# Patient Record
Sex: MALE | Race: WHITE | NOT HISPANIC OR LATINO | Employment: OTHER | URBAN - METROPOLITAN AREA
[De-identification: names, ages, dates, MRNs, and addresses within clinical notes are randomized per-mention and may not be internally consistent; named-entity substitution may affect disease eponyms.]

---

## 2018-05-14 LAB
ALBUMIN (HISTORICAL): 5.6 MG/DL
ALBUMIN CREATININE RATIO (HISTORICAL): 51 MG/G
ANION GAP SERPL CALCULATED.3IONS-SCNC: 15.4 MM/L
BASOPHILS # BLD AUTO: 0.1 X3/UL (ref 0–0.3)
BASOPHILS # BLD AUTO: 0.5 % (ref 0–2)
BUN SERPL-MCNC: 18 MG/DL (ref 7–25)
CALCIUM SERPL-MCNC: 9 MG/DL (ref 8.6–10.5)
CHLORIDE SERPL-SCNC: 101 MM/L (ref 98–107)
CO2 SERPL-SCNC: 24 MM/L (ref 21–31)
CREAT SERPL-MCNC: 1.03 MG/DL (ref 0.7–1.3)
CREATININE, RANDOM URINE (HISTORICAL): 109.9 MG/DL
DEPRECATED RDW RBC AUTO: 13.4 % (ref 11.5–14.5)
EGFR (HISTORICAL): > 60 GFR
EGFR AFRICAN AMERICAN (HISTORICAL): > 60 GFR
EOSINOPHIL # BLD AUTO: 0.8 X3/UL (ref 0–0.5)
EOSINOPHIL NFR BLD AUTO: 8.2 % (ref 0–5)
EST. AVERAGE GLUCOSE BLD GHB EST-MCNC: 163 MG/DL
GLUCOSE (HISTORICAL): 106 MG/DL (ref 65–99)
HBA1C MFR BLD HPLC: 7.3 % (ref 4–6.2)
HCT VFR BLD AUTO: 38.5 % (ref 42–52)
HGB BLD-MCNC: 13 G/DL (ref 14–18)
LYMPHOCYTES # BLD AUTO: 2.6 X3/UL (ref 1.2–4.2)
LYMPHOCYTES NFR BLD AUTO: 26.5 % (ref 20.5–51.1)
MCH RBC QN AUTO: 32.6 PG (ref 26–34)
MCHC RBC AUTO-ENTMCNC: 33.9 G/DL (ref 31–36)
MCV RBC AUTO: 96.1 FL (ref 81–99)
MONOCYTES # BLD AUTO: 0.7 X3/UL (ref 0–1)
MONOCYTES NFR BLD AUTO: 7.1 % (ref 1.7–12)
NEUTROPHILS # BLD AUTO: 5.6 X3/UL (ref 1.4–6.5)
NEUTS SEG NFR BLD AUTO: 57.7 % (ref 42.2–75.2)
OSMOLALITY, SERUM (HISTORICAL): 274 MOSM (ref 262–291)
PLATELET # BLD AUTO: 280 X3/UL (ref 130–400)
PMV BLD AUTO: 8.3 FL (ref 8.6–11.7)
POTASSIUM SERPL-SCNC: 4.4 MM/L (ref 3.5–5.5)
RBC # BLD AUTO: 4 X6/UL (ref 4.3–5.9)
SODIUM SERPL-SCNC: 136 MM/L (ref 134–143)
WBC # BLD AUTO: 9.7 X3/UL (ref 4.8–10.8)

## 2018-09-27 ENCOUNTER — HOSPITAL ENCOUNTER (OUTPATIENT)
Dept: RADIOLOGY | Facility: HOSPITAL | Age: 63
Discharge: HOME | End: 2018-09-27
Payer: MEDICARE

## 2018-09-27 ENCOUNTER — TRANSCRIBE ORDERS (OUTPATIENT)
Dept: ADMINISTRATIVE | Facility: HOSPITAL | Age: 63
End: 2018-09-27

## 2018-09-27 DIAGNOSIS — R52 PAIN: Primary | ICD-10-CM

## 2018-09-27 DIAGNOSIS — R52 PAIN: ICD-10-CM

## 2018-09-27 PROCEDURE — 73560 X-RAY EXAM OF KNEE 1 OR 2: CPT

## 2018-09-28 ENCOUNTER — TRANSCRIBE ORDERS (OUTPATIENT)
Dept: ADMINISTRATIVE | Facility: HOSPITAL | Age: 63
End: 2018-09-28

## 2018-09-28 ENCOUNTER — APPOINTMENT (OUTPATIENT)
Dept: LAB | Facility: HOSPITAL | Age: 63
End: 2018-09-28
Payer: MEDICARE

## 2018-09-28 DIAGNOSIS — E78.2 MIXED HYPERLIPIDEMIA: ICD-10-CM

## 2018-09-28 DIAGNOSIS — D64.9 ANEMIA, UNSPECIFIED TYPE: ICD-10-CM

## 2018-09-28 DIAGNOSIS — M10.9 GOUT, UNSPECIFIED CAUSE, UNSPECIFIED CHRONICITY, UNSPECIFIED SITE: ICD-10-CM

## 2018-09-28 DIAGNOSIS — E11.9 TYPE 2 DIABETES MELLITUS WITHOUT COMPLICATION, WITHOUT LONG-TERM CURRENT USE OF INSULIN (HCC): ICD-10-CM

## 2018-09-28 DIAGNOSIS — D64.9 ANEMIA, UNSPECIFIED TYPE: Primary | ICD-10-CM

## 2018-09-28 LAB
ALBUMIN SERPL BCP-MCNC: 4 G/DL (ref 3.5–5.7)
ALP SERPL-CCNC: 59 U/L (ref 55–165)
ALT SERPL W P-5'-P-CCNC: 19 U/L (ref 7–52)
ANION GAP SERPL CALCULATED.3IONS-SCNC: 9 MMOL/L (ref 4–13)
AST SERPL W P-5'-P-CCNC: 14 U/L (ref 13–39)
BASOPHILS # BLD AUTO: 0 THOUSANDS/ΜL (ref 0–0.1)
BASOPHILS NFR BLD AUTO: 1 % (ref 0–2)
BILIRUB SERPL-MCNC: 0.4 MG/DL (ref 0.2–1)
BUN SERPL-MCNC: 19 MG/DL (ref 7–25)
CALCIUM SERPL-MCNC: 9.1 MG/DL (ref 8.6–10.5)
CHLORIDE SERPL-SCNC: 106 MMOL/L (ref 98–107)
CO2 SERPL-SCNC: 26 MMOL/L (ref 21–31)
CREAT SERPL-MCNC: 1.01 MG/DL (ref 0.7–1.3)
EOSINOPHIL # BLD AUTO: 0.7 THOUSAND/ΜL (ref 0–0.61)
EOSINOPHIL NFR BLD AUTO: 9 % (ref 0–5)
ERYTHROCYTE [DISTWIDTH] IN BLOOD BY AUTOMATED COUNT: 13.2 % (ref 11.5–14.5)
EST. AVERAGE GLUCOSE BLD GHB EST-MCNC: 160 MG/DL
GFR SERPL CREATININE-BSD FRML MDRD: 78 ML/MIN/1.73SQ M
GLUCOSE P FAST SERPL-MCNC: 140 MG/DL (ref 65–99)
HBA1C MFR BLD: 7.2 % (ref 4.2–6.3)
HCT VFR BLD AUTO: 36.8 % (ref 36.5–49.3)
HGB BLD-MCNC: 12.4 G/DL (ref 14–18)
LDLC SERPL DIRECT ASSAY-MCNC: 59.7 MG/DL (ref 75–193)
LYMPHOCYTES # BLD AUTO: 1.8 THOUSANDS/ΜL (ref 0.6–4.47)
LYMPHOCYTES NFR BLD AUTO: 24 % (ref 21–51)
MCH RBC QN AUTO: 33.2 PG (ref 26–34)
MCHC RBC AUTO-ENTMCNC: 33.6 G/DL (ref 31–37)
MCV RBC AUTO: 99 FL (ref 81–99)
MONOCYTES # BLD AUTO: 0.8 THOUSAND/ΜL (ref 0.17–1.22)
MONOCYTES NFR BLD AUTO: 11 % (ref 2–12)
NEUTROPHILS # BLD AUTO: 4.3 THOUSANDS/ΜL (ref 1.4–6.5)
NEUTS SEG NFR BLD AUTO: 56 % (ref 42–75)
NRBC BLD AUTO-RTO: 0 /100 WBCS
PLATELET # BLD AUTO: 260 THOUSANDS/UL (ref 149–390)
PMV BLD AUTO: 7.8 FL (ref 8.6–11.7)
POTASSIUM SERPL-SCNC: 4.1 MMOL/L (ref 3.5–5.5)
PROT SERPL-MCNC: 6.8 G/DL (ref 6.4–8.9)
RBC # BLD AUTO: 3.73 MILLION/UL (ref 4.3–5.9)
SODIUM SERPL-SCNC: 141 MMOL/L (ref 134–143)
URATE SERPL-MCNC: 7.1 MG/DL (ref 2.3–7.6)
WBC # BLD AUTO: 7.7 THOUSAND/UL (ref 4.8–10.8)

## 2018-09-28 PROCEDURE — 83036 HEMOGLOBIN GLYCOSYLATED A1C: CPT

## 2018-09-28 PROCEDURE — 36415 COLL VENOUS BLD VENIPUNCTURE: CPT

## 2018-09-28 PROCEDURE — 83721 ASSAY OF BLOOD LIPOPROTEIN: CPT

## 2018-09-28 PROCEDURE — 85025 COMPLETE CBC W/AUTO DIFF WBC: CPT

## 2018-09-28 PROCEDURE — 84550 ASSAY OF BLOOD/URIC ACID: CPT

## 2018-09-28 PROCEDURE — 80053 COMPREHEN METABOLIC PANEL: CPT

## 2019-02-11 ENCOUNTER — TRANSCRIBE ORDERS (OUTPATIENT)
Dept: ADMINISTRATIVE | Facility: HOSPITAL | Age: 64
End: 2019-02-11

## 2019-02-11 ENCOUNTER — APPOINTMENT (OUTPATIENT)
Dept: LAB | Facility: HOSPITAL | Age: 64
End: 2019-02-11
Payer: MEDICARE

## 2019-02-11 DIAGNOSIS — R53.83 FATIGUE, UNSPECIFIED TYPE: ICD-10-CM

## 2019-02-11 DIAGNOSIS — E10.8 TYPE 1 DIABETES MELLITUS WITH COMPLICATION (HCC): ICD-10-CM

## 2019-02-11 DIAGNOSIS — Z79.899 LONG TERM USE OF DRUG: ICD-10-CM

## 2019-02-11 DIAGNOSIS — R35.1 NOCTURIA: Primary | ICD-10-CM

## 2019-02-11 DIAGNOSIS — M10.9 GOUT, UNSPECIFIED CAUSE, UNSPECIFIED CHRONICITY, UNSPECIFIED SITE: ICD-10-CM

## 2019-02-11 DIAGNOSIS — R35.1 NOCTURIA: ICD-10-CM

## 2019-02-11 LAB
ALBUMIN SERPL BCP-MCNC: 4.2 G/DL (ref 3.5–5.7)
ALP SERPL-CCNC: 69 U/L (ref 55–165)
ALT SERPL W P-5'-P-CCNC: 21 U/L (ref 7–52)
ANION GAP SERPL CALCULATED.3IONS-SCNC: 12 MMOL/L (ref 4–13)
AST SERPL W P-5'-P-CCNC: 19 U/L (ref 13–39)
BASOPHILS # BLD AUTO: 0.1 THOUSANDS/ΜL (ref 0–0.1)
BASOPHILS NFR BLD AUTO: 1 % (ref 0–2)
BILIRUB SERPL-MCNC: 0.5 MG/DL (ref 0.2–1)
BUN SERPL-MCNC: 25 MG/DL (ref 7–25)
CALCIUM SERPL-MCNC: 9.2 MG/DL (ref 8.6–10.5)
CHLORIDE SERPL-SCNC: 100 MMOL/L (ref 98–107)
CO2 SERPL-SCNC: 27 MMOL/L (ref 21–31)
CREAT SERPL-MCNC: 1.32 MG/DL (ref 0.7–1.3)
EOSINOPHIL # BLD AUTO: 1 THOUSAND/ΜL (ref 0–0.61)
EOSINOPHIL NFR BLD AUTO: 10 % (ref 0–5)
ERYTHROCYTE [DISTWIDTH] IN BLOOD BY AUTOMATED COUNT: 13.6 % (ref 11.5–14.5)
EST. AVERAGE GLUCOSE BLD GHB EST-MCNC: 171 MG/DL
GFR SERPL CREATININE-BSD FRML MDRD: 57 ML/MIN/1.73SQ M
GLUCOSE P FAST SERPL-MCNC: 138 MG/DL (ref 65–99)
HBA1C MFR BLD: 7.6 % (ref 4.2–6.3)
HCT VFR BLD AUTO: 39 % (ref 42–47)
HGB BLD-MCNC: 12.7 G/DL (ref 14–18)
LYMPHOCYTES # BLD AUTO: 2.4 THOUSANDS/ΜL (ref 0.6–4.47)
LYMPHOCYTES NFR BLD AUTO: 24 % (ref 21–51)
MCH RBC QN AUTO: 32.5 PG (ref 26–34)
MCHC RBC AUTO-ENTMCNC: 32.6 G/DL (ref 31–37)
MCV RBC AUTO: 100 FL (ref 81–99)
MONOCYTES # BLD AUTO: 0.8 THOUSAND/ΜL (ref 0.17–1.22)
MONOCYTES NFR BLD AUTO: 8 % (ref 2–12)
NEUTROPHILS # BLD AUTO: 6 THOUSANDS/ΜL (ref 1.4–6.5)
NEUTS SEG NFR BLD AUTO: 59 % (ref 42–75)
NRBC BLD AUTO-RTO: 0 /100 WBCS
PLATELET # BLD AUTO: 302 THOUSANDS/UL (ref 149–390)
PMV BLD AUTO: 8.2 FL (ref 8.6–11.7)
POTASSIUM SERPL-SCNC: 4.9 MMOL/L (ref 3.5–5.5)
PROT SERPL-MCNC: 7.6 G/DL (ref 6.4–8.9)
PSA SERPL-MCNC: 3.8 NG/ML (ref 0–4)
RBC # BLD AUTO: 3.91 MILLION/UL (ref 4.3–5.9)
SODIUM SERPL-SCNC: 139 MMOL/L (ref 134–143)
TSH SERPL DL<=0.05 MIU/L-ACNC: 3.04 UIU/ML (ref 0.45–5.33)
URATE SERPL-MCNC: 7.5 MG/DL (ref 2.3–7.6)
WBC # BLD AUTO: 10.2 THOUSAND/UL (ref 4.8–10.8)

## 2019-02-11 PROCEDURE — 84550 ASSAY OF BLOOD/URIC ACID: CPT

## 2019-02-11 PROCEDURE — 83036 HEMOGLOBIN GLYCOSYLATED A1C: CPT

## 2019-02-11 PROCEDURE — 85025 COMPLETE CBC W/AUTO DIFF WBC: CPT

## 2019-02-11 PROCEDURE — 80053 COMPREHEN METABOLIC PANEL: CPT

## 2019-02-11 PROCEDURE — 84153 ASSAY OF PSA TOTAL: CPT

## 2019-02-11 PROCEDURE — 84443 ASSAY THYROID STIM HORMONE: CPT

## 2019-02-11 PROCEDURE — 36415 COLL VENOUS BLD VENIPUNCTURE: CPT

## 2019-04-10 ENCOUNTER — HOSPITAL ENCOUNTER (OUTPATIENT)
Dept: RADIOLOGY | Facility: HOSPITAL | Age: 64
Discharge: HOME | End: 2019-04-10
Payer: MEDICARE

## 2019-04-10 ENCOUNTER — TRANSCRIBE ORDERS (OUTPATIENT)
Dept: ADMINISTRATIVE | Facility: HOSPITAL | Age: 64
End: 2019-04-10

## 2019-04-10 DIAGNOSIS — M20.012 MALLET FINGER OF LEFT HAND: Primary | ICD-10-CM

## 2019-04-10 DIAGNOSIS — M20.012 MALLET FINGER OF LEFT HAND: ICD-10-CM

## 2019-04-10 PROCEDURE — 73140 X-RAY EXAM OF FINGER(S): CPT

## 2019-06-20 ENCOUNTER — OFFICE VISIT (OUTPATIENT)
Dept: FAMILY MEDICINE CLINIC | Facility: CLINIC | Age: 64
End: 2019-06-20
Payer: MEDICARE

## 2019-06-20 VITALS
DIASTOLIC BLOOD PRESSURE: 84 MMHG | BODY MASS INDEX: 38.14 KG/M2 | SYSTOLIC BLOOD PRESSURE: 136 MMHG | HEIGHT: 71 IN | WEIGHT: 272.4 LBS

## 2019-06-20 DIAGNOSIS — I10 ESSENTIAL HYPERTENSION, BENIGN: ICD-10-CM

## 2019-06-20 DIAGNOSIS — G89.29 CHRONIC LOW BACK PAIN WITHOUT SCIATICA, UNSPECIFIED BACK PAIN LATERALITY: ICD-10-CM

## 2019-06-20 DIAGNOSIS — M54.50 CHRONIC LOW BACK PAIN WITHOUT SCIATICA, UNSPECIFIED BACK PAIN LATERALITY: ICD-10-CM

## 2019-06-20 DIAGNOSIS — E11.9 TYPE 2 DIABETES MELLITUS WITHOUT COMPLICATION, WITHOUT LONG-TERM CURRENT USE OF INSULIN (HCC): Primary | ICD-10-CM

## 2019-06-20 PROBLEM — E78.2 MIXED HYPERLIPIDEMIA: Status: ACTIVE | Noted: 2019-06-20

## 2019-06-20 PROBLEM — I25.10 ATHEROSCLEROSIS OF NATIVE CORONARY ARTERY OF NATIVE HEART WITHOUT ANGINA PECTORIS: Status: ACTIVE | Noted: 2019-06-20

## 2019-06-20 PROBLEM — M17.10 OSTEOARTHRITIS OF KNEE: Status: ACTIVE | Noted: 2019-06-20

## 2019-06-20 PROBLEM — M17.9 OSTEOARTHRITIS OF KNEE: Status: ACTIVE | Noted: 2019-06-20

## 2019-06-20 PROCEDURE — 99213 OFFICE O/P EST LOW 20 MIN: CPT | Performed by: FAMILY MEDICINE

## 2019-06-20 RX ORDER — NITROGLYCERIN 0.4 MG/1
0.4 TABLET SUBLINGUAL
COMMUNITY
End: 2019-11-15 | Stop reason: SDUPTHER

## 2019-06-20 RX ORDER — LISINOPRIL 10 MG/1
10 TABLET ORAL DAILY
Refills: 0 | COMMUNITY
Start: 2019-06-12 | End: 2019-06-20 | Stop reason: SDUPTHER

## 2019-06-20 RX ORDER — BETAMETHASONE DIPROPIONATE 0.5 MG/G
1 CREAM TOPICAL 2 TIMES DAILY
COMMUNITY
End: 2020-01-14 | Stop reason: ALTCHOICE

## 2019-06-20 RX ORDER — ATORVASTATIN CALCIUM 40 MG/1
40 TABLET, FILM COATED ORAL DAILY
COMMUNITY
End: 2020-03-10

## 2019-06-20 RX ORDER — METHOCARBAMOL 750 MG/1
TABLET, FILM COATED ORAL 3 TIMES DAILY
COMMUNITY
End: 2020-03-20 | Stop reason: SDUPTHER

## 2019-06-20 RX ORDER — OXYCODONE HYDROCHLORIDE 10 MG/1
10 TABLET ORAL 3 TIMES DAILY
Qty: 90 TABLET | Refills: 0 | Status: SHIPPED | OUTPATIENT
Start: 2019-06-20 | End: 2019-07-16 | Stop reason: SDUPTHER

## 2019-06-20 RX ORDER — EPINEPHRINE 0.3 MG/.3ML
0.3 INJECTION SUBCUTANEOUS ONCE
COMMUNITY
End: 2019-11-15 | Stop reason: SDUPTHER

## 2019-06-20 RX ORDER — FENTANYL 50 UG/H
PATCH TRANSDERMAL
Qty: 5 PATCH | Refills: 0 | Status: SHIPPED | OUTPATIENT
Start: 2019-06-20 | End: 2019-07-09 | Stop reason: SDUPTHER

## 2019-06-20 RX ORDER — METOPROLOL TARTRATE 50 MG/1
50 TABLET, FILM COATED ORAL DAILY
COMMUNITY
End: 2020-04-01

## 2019-06-20 RX ORDER — MELOXICAM 15 MG/1
15 TABLET ORAL DAILY
Refills: 0 | COMMUNITY
Start: 2019-06-06 | End: 2021-02-22 | Stop reason: HOSPADM

## 2019-06-20 RX ORDER — BLOOD-GLUCOSE METER
KIT MISCELLANEOUS
COMMUNITY

## 2019-06-20 RX ORDER — OXYCODONE HYDROCHLORIDE 10 MG/1
10 TABLET ORAL 3 TIMES DAILY
Refills: 0 | COMMUNITY
Start: 2019-05-21 | End: 2019-06-20 | Stop reason: SDUPTHER

## 2019-06-20 RX ORDER — NALOXONE HYDROCHLORIDE 4 MG/.1ML
1 SPRAY NASAL AS NEEDED
COMMUNITY
End: 2019-12-13 | Stop reason: SDUPTHER

## 2019-06-20 RX ORDER — IBUPROFEN 200 MG
200 TABLET ORAL AS NEEDED
COMMUNITY
End: 2020-04-22 | Stop reason: ALTCHOICE

## 2019-06-20 RX ORDER — LISINOPRIL 10 MG/1
10 TABLET ORAL DAILY
Qty: 90 TABLET | Refills: 3 | Status: SHIPPED | OUTPATIENT
Start: 2019-06-20 | End: 2020-06-22 | Stop reason: SDUPTHER

## 2019-06-20 RX ORDER — FENTANYL 50 UG/H
PATCH TRANSDERMAL
Refills: 0 | COMMUNITY
Start: 2019-05-21 | End: 2019-06-20 | Stop reason: SDUPTHER

## 2019-06-20 RX ORDER — AMLODIPINE BESYLATE 10 MG/1
10 TABLET ORAL DAILY
Refills: 0 | COMMUNITY
Start: 2019-06-17 | End: 2020-03-09

## 2019-06-20 RX ORDER — LANCETS 28 GAUGE
EACH MISCELLANEOUS
COMMUNITY

## 2019-06-20 RX ORDER — GABAPENTIN 600 MG/1
600 TABLET ORAL 3 TIMES DAILY
Refills: 0 | COMMUNITY
Start: 2019-06-08 | End: 2020-03-19

## 2019-07-09 ENCOUNTER — TRANSCRIBE ORDERS (OUTPATIENT)
Dept: ADMINISTRATIVE | Facility: HOSPITAL | Age: 64
End: 2019-07-09

## 2019-07-09 ENCOUNTER — APPOINTMENT (OUTPATIENT)
Dept: LAB | Facility: HOSPITAL | Age: 64
End: 2019-07-09
Attending: FAMILY MEDICINE
Payer: MEDICARE

## 2019-07-09 DIAGNOSIS — G89.29 CHRONIC LOW BACK PAIN WITHOUT SCIATICA, UNSPECIFIED BACK PAIN LATERALITY: ICD-10-CM

## 2019-07-09 DIAGNOSIS — M54.50 CHRONIC LOW BACK PAIN WITHOUT SCIATICA, UNSPECIFIED BACK PAIN LATERALITY: ICD-10-CM

## 2019-07-09 DIAGNOSIS — E78.2 MIXED HYPERLIPIDEMIA: ICD-10-CM

## 2019-07-09 DIAGNOSIS — E11.9 DIABETES MELLITUS WITHOUT COMPLICATION (HCC): Primary | ICD-10-CM

## 2019-07-09 DIAGNOSIS — E11.9 DIABETES MELLITUS WITHOUT COMPLICATION (HCC): ICD-10-CM

## 2019-07-09 LAB
ALBUMIN SERPL BCP-MCNC: 4 G/DL (ref 3.5–5)
ALP SERPL-CCNC: 81 U/L (ref 46–116)
ALT SERPL W P-5'-P-CCNC: 30 U/L (ref 12–78)
ANION GAP SERPL CALCULATED.3IONS-SCNC: 10 MMOL/L (ref 4–13)
AST SERPL W P-5'-P-CCNC: 16 U/L (ref 5–45)
BILIRUB SERPL-MCNC: 0.5 MG/DL (ref 0.2–1)
BUN SERPL-MCNC: 17 MG/DL (ref 5–25)
CALCIUM SERPL-MCNC: 8.8 MG/DL (ref 8.3–10.1)
CHLORIDE SERPL-SCNC: 101 MMOL/L (ref 100–108)
CHOLEST SERPL-MCNC: 153 MG/DL (ref 50–200)
CO2 SERPL-SCNC: 27 MMOL/L (ref 21–32)
CREAT SERPL-MCNC: 1.23 MG/DL (ref 0.6–1.3)
EST. AVERAGE GLUCOSE BLD GHB EST-MCNC: 174 MG/DL
GFR SERPL CREATININE-BSD FRML MDRD: 61 ML/MIN/1.73SQ M
GLUCOSE P FAST SERPL-MCNC: 135 MG/DL (ref 65–99)
HBA1C MFR BLD: 7.7 % (ref 4.2–6.3)
HDLC SERPL-MCNC: 46 MG/DL (ref 40–60)
LDLC SERPL CALC-MCNC: 70 MG/DL (ref 0–100)
NONHDLC SERPL-MCNC: 107 MG/DL
POTASSIUM SERPL-SCNC: 4.3 MMOL/L (ref 3.5–5.3)
PROT SERPL-MCNC: 8 G/DL (ref 6.4–8.2)
SODIUM SERPL-SCNC: 138 MMOL/L (ref 136–145)
TRIGL SERPL-MCNC: 184 MG/DL

## 2019-07-09 PROCEDURE — 80053 COMPREHEN METABOLIC PANEL: CPT

## 2019-07-09 PROCEDURE — 36415 COLL VENOUS BLD VENIPUNCTURE: CPT

## 2019-07-09 PROCEDURE — 80061 LIPID PANEL: CPT

## 2019-07-09 PROCEDURE — 83036 HEMOGLOBIN GLYCOSYLATED A1C: CPT

## 2019-07-09 RX ORDER — FENTANYL 50 UG/H
PATCH TRANSDERMAL
Qty: 5 PATCH | Refills: 0 | Status: SHIPPED | OUTPATIENT
Start: 2019-07-09 | End: 2019-07-16 | Stop reason: SDUPTHER

## 2019-07-09 NOTE — PROGRESS NOTES
I queried the [place] prescription drug monitoring program   I note that the patient's finally dispensed 5 fentanyl patches to the patient on June 20th  He requested additional prescription today  That is why  I refilled his prescription 5 additional patches

## 2019-07-16 ENCOUNTER — OFFICE VISIT (OUTPATIENT)
Dept: FAMILY MEDICINE CLINIC | Facility: CLINIC | Age: 64
End: 2019-07-16
Payer: MEDICARE

## 2019-07-16 VITALS
HEIGHT: 75 IN | DIASTOLIC BLOOD PRESSURE: 68 MMHG | WEIGHT: 260.3 LBS | BODY MASS INDEX: 32.36 KG/M2 | SYSTOLIC BLOOD PRESSURE: 132 MMHG

## 2019-07-16 DIAGNOSIS — I10 ESSENTIAL HYPERTENSION, BENIGN: ICD-10-CM

## 2019-07-16 DIAGNOSIS — E11.9 TYPE 2 DIABETES MELLITUS WITHOUT COMPLICATION, WITHOUT LONG-TERM CURRENT USE OF INSULIN (HCC): Primary | ICD-10-CM

## 2019-07-16 DIAGNOSIS — M54.50 CHRONIC LOW BACK PAIN WITHOUT SCIATICA, UNSPECIFIED BACK PAIN LATERALITY: ICD-10-CM

## 2019-07-16 DIAGNOSIS — E78.2 MIXED HYPERLIPIDEMIA: ICD-10-CM

## 2019-07-16 DIAGNOSIS — G89.29 CHRONIC LOW BACK PAIN WITHOUT SCIATICA, UNSPECIFIED BACK PAIN LATERALITY: ICD-10-CM

## 2019-07-16 PROCEDURE — 99214 OFFICE O/P EST MOD 30 MIN: CPT | Performed by: FAMILY MEDICINE

## 2019-07-16 RX ORDER — OXYCODONE HYDROCHLORIDE 10 MG/1
10 TABLET ORAL 3 TIMES DAILY
Qty: 90 TABLET | Refills: 0 | Status: SHIPPED | OUTPATIENT
Start: 2019-07-16 | End: 2019-08-16 | Stop reason: SDUPTHER

## 2019-07-16 RX ORDER — FENTANYL 50 UG/H
PATCH TRANSDERMAL
Qty: 10 PATCH | Refills: 0 | Status: SHIPPED | OUTPATIENT
Start: 2019-07-16 | End: 2019-08-16 | Stop reason: SDUPTHER

## 2019-07-16 NOTE — ASSESSMENT & PLAN NOTE
Patient's blood pressure is well controlled on his current regiment  I recommended no changes  He will continue amlodipine and lisinopril

## 2019-07-16 NOTE — ASSESSMENT & PLAN NOTE
I reviewed with the patient his lipid panel  His LDL cholesterol was satisfactory  It was 70  His triglycerides were elevated  Weight loss will help this

## 2019-07-16 NOTE — PATIENT INSTRUCTIONS
Chronic Back Pain   Deborah Se: Musculoskeletal Back Pain  In: [employer], Pamela RS, Sara RM, et al, [facility]: Concepts and Clinical Practice, 7th ed  [address], [place], 2010  Laura BE & Dagoberto N: Chronic Low Back Pain  In: Gustabo Barnett, [facility], [facility], eds  [employer] Management of Pain, 4th ed  [address],[address] [place] [address], 2010  [address]: Back Pain  In: Tana Aranda MW, ed  The [facility], 6th ed  [address] [facility],[address], [place], 2012  Juana R: Treatment: current treatment recommendations for acute and chronic undifferentiated low back pain  [facility] 2012; 39(3):481-486  © 2017 [facility] Information is for End User's use only and may not be sold, redistributed or otherwise used for commercial purposes  All illustrations and images included in CareNotes® are the copyrighted property of A D A M , Inc  or Kenny Quiñones  The above information is an  only  It is not intended as medical advice for individual conditions or treatments  Talk to your doctor, nurse or pharmacist before following any medical regimen to see if it is safe and effective for you

## 2019-07-16 NOTE — ASSESSMENT & PLAN NOTE
Patient continues to experience chronic low back pain  Without the pain medication, patient would only be able to walk 10 steps or so without having to sit down  He is now able to function and perform his ADLs  Continue treatment with opiate analgesics is recommended as the patient has benefitted  I refilled his medication

## 2019-07-16 NOTE — PROGRESS NOTES
Assessment/Plan:    Chronic low back pain without sciatica  Patient continues to experience chronic low back pain  Without the pain medication, patient would only be able to walk 10 steps or so without having to sit down  He is now able to function and perform his ADLs  Continue treatment with opiate analgesics is recommended as the patient has benefitted  I refilled his medication  Type 2 diabetes mellitus without complication, without long-term current use of insulin (HCC)  Lab Results   Component Value Date    HGBA1C 7 7 (H) 07/09/2019     I reviewed the patient's labs with him  His hemoglobin A1c is 7 7  It has been quite a while now since his hemoglobin A1c has been less than 7 0%  This is the highest it has been for a while  We discussed adding another medication to his regimen but he refuses  He expressed concerned about Tradjenta causing pancreatic cancer  I recommended he continue this medication for now  Benefits outweigh any potential risks  I advised the patient I would like to refer him back to dietary but he declined  He tells me there is no use going if he will not listen to what they tell him to do  We discussed his diet at length  He is unable to exercise but he must try to watch his diet in order to lose weight  No results for input(s): POCGLU in the last 72 hours  Blood Sugar Average: Last 72 hrs:      Essential hypertension, benign  Patient's blood pressure is well controlled on his current regiment  I recommended no changes  He will continue amlodipine and lisinopril  Mixed hyperlipidemia  I reviewed with the patient his lipid panel  His LDL cholesterol was satisfactory  It was 70  His triglycerides were elevated  Weight loss will help this         Diagnoses and all orders for this visit:    Type 2 diabetes mellitus without complication, without long-term current use of insulin (HCC)    Chronic low back pain without sciatica, unspecified back pain laterality  - oxyCODONE (ROXICODONE) 10 MG TABS; Take 1 tablet (10 mg total) by mouth 3 (three) times a dayMax Daily Amount: 30 mg  -     fentaNYL (DURAGESIC) 50 mcg/hr; Apply 1 patch every 3 days    Essential hypertension, benign    Mixed hyperlipidemia        The [place] prescription drug monitoring program was queried  There were no red flags  Safe to proceed  BMI Counseling: Body mass index is 32 54 kg/m²  Discussed the patient's BMI with him  The BMI is above average  BMI counseling and education was provided to the patient  Nutrition recommendations include reducing portion sizes, decreasing overall calorie intake, moderation in carbohydrate intake and reducing intake of saturated fat and trans fat  Total time spent on this consultation was 27 minutes    BMI Counseling: Body mass index is 32 54 kg/m²  Discussed the patient's BMI with him  The BMI is above average  BMI counseling and education was provided to the patient  Nutrition recommendations include reducing portion sizes, decreasing overall calorie intake, moderation in carbohydrate intake and reducing intake of saturated fat and trans fat  Exercise recommendations include moderate aerobic physical activity for 150 minutes/week  Subjective:      Patient ID: Delmi Garcia is a 71 y o  male  This patient is a 69-year-old white male who presents to the office today for follow-up of his diabetes and also a medication refill  The patient has a history of chronic low back pain secondary to discogenic disease as well as osteoarthritis of the right knee  The following portions of the patient's history were reviewed and updated as appropriate: allergies, current medications, past family history, past medical history, past social history, past surgical history and problem list     Review of Systems   Eyes: Negative for photophobia, discharge and visual disturbance  Respiratory: Negative for cough, shortness of breath and wheezing      Cardiovascular: Negative for chest pain, palpitations and leg swelling  Gastrointestinal: Negative for abdominal distention, blood in stool, constipation, diarrhea and nausea  Endocrine: Negative for polydipsia, polyphagia and polyuria  Musculoskeletal: Positive for arthralgias and back pain  Objective:      /68 (BP Location: Left arm, Patient Position: Sitting, Cuff Size: Large)   Ht 6' 3" (1 905 m)   Wt 118 kg (260 lb 4 8 oz)   BMI 32 54 kg/m²          Physical Exam   Constitutional:   Patient is a 76-year-old white male who appears his stated age  He is in no apparent distress   HENT:   Head: Normocephalic and atraumatic  Right Ear: External ear normal    Left Ear: External ear normal    Mouth/Throat: Oropharynx is clear and moist    Tympanic membranes are clear   Eyes: Pupils are equal, round, and reactive to light  Conjunctivae are normal    Neck: Neck supple  No tracheal deviation present  No thyromegaly present  Cardiovascular: Normal rate, regular rhythm and normal heart sounds  Exam reveals no gallop and no friction rub  No murmur heard  Pulmonary/Chest: Effort normal and breath sounds normal  No stridor  No respiratory distress  He has no wheezes  He has no rales  Abdominal: Soft  Bowel sounds are normal  He exhibits no distension and no mass  There is no tenderness  There is no rebound and no guarding  Musculoskeletal:   There is crepitus to palpation and swelling of the right knee  There is decreased range of motion of the lumbar spine with tenderness noted to palpation  Lymphadenopathy:     He has no cervical adenopathy  Vitals reviewed  extremities:  Without cyanosis, clubbing, or edema

## 2019-07-16 NOTE — ASSESSMENT & PLAN NOTE
Lab Results   Component Value Date    HGBA1C 7 7 (H) 07/09/2019     I reviewed the patient's labs with him  His hemoglobin A1c is 7 7  It has been quite a while now since his hemoglobin A1c has been less than 7 0%  This is the highest it has been for a while  We discussed adding another medication to his regimen but he refuses  He expressed concerned about Tradjenta causing pancreatic cancer  I recommended he continue this medication for now  Benefits outweigh any potential risks  I advised the patient I would like to refer him back to dietary but he declined  He tells me there is no use going if he will not listen to what they tell him to do  We discussed his diet at length  He is unable to exercise but he must try to watch his diet in order to lose weight  No results for input(s): POCGLU in the last 72 hours      Blood Sugar Average: Last 72 hrs:

## 2019-08-16 ENCOUNTER — OFFICE VISIT (OUTPATIENT)
Dept: FAMILY MEDICINE CLINIC | Facility: CLINIC | Age: 64
End: 2019-08-16
Payer: MEDICARE

## 2019-08-16 VITALS
WEIGHT: 269.3 LBS | OXYGEN SATURATION: 98 % | HEART RATE: 81 BPM | SYSTOLIC BLOOD PRESSURE: 126 MMHG | HEIGHT: 72 IN | BODY MASS INDEX: 36.47 KG/M2 | DIASTOLIC BLOOD PRESSURE: 80 MMHG

## 2019-08-16 DIAGNOSIS — Z11.59 ENCOUNTER FOR HEPATITIS C SCREENING TEST FOR LOW RISK PATIENT: Primary | ICD-10-CM

## 2019-08-16 DIAGNOSIS — E11.9 TYPE 2 DIABETES MELLITUS WITHOUT COMPLICATION, WITHOUT LONG-TERM CURRENT USE OF INSULIN (HCC): ICD-10-CM

## 2019-08-16 DIAGNOSIS — G89.29 CHRONIC LOW BACK PAIN WITHOUT SCIATICA, UNSPECIFIED BACK PAIN LATERALITY: ICD-10-CM

## 2019-08-16 DIAGNOSIS — M54.50 CHRONIC LOW BACK PAIN WITHOUT SCIATICA, UNSPECIFIED BACK PAIN LATERALITY: ICD-10-CM

## 2019-08-16 PROCEDURE — 99213 OFFICE O/P EST LOW 20 MIN: CPT | Performed by: FAMILY MEDICINE

## 2019-08-16 RX ORDER — OXYCODONE HYDROCHLORIDE 10 MG/1
10 TABLET ORAL 3 TIMES DAILY
Qty: 90 TABLET | Refills: 0 | Status: SHIPPED | OUTPATIENT
Start: 2019-08-16 | End: 2019-09-16 | Stop reason: SDUPTHER

## 2019-08-16 RX ORDER — FENTANYL 50 UG/H
PATCH TRANSDERMAL
Qty: 10 PATCH | Refills: 0 | Status: SHIPPED | OUTPATIENT
Start: 2019-08-16 | End: 2019-09-16 | Stop reason: SDUPTHER

## 2019-08-16 NOTE — PROGRESS NOTES
Assessment/Plan:    Chronic low back pain without sciatica  I refilled the patient's prescriptions for oxycodone as well as his fentanyl  Patient continues to benefit from these medications without side effects  Without them, patient would only be able to walk a few feet without having to sit down  He would have difficulty performing his ADLs, if not finding it impossible  Type 2 diabetes mellitus without complication, without long-term current use of insulin (HCC)  Lab Results   Component Value Date    HGBA1C 7 7 (H) 07/09/2019     Last hemoglobin A1c was noted to be elevated at 7 7  His goal is under 7 0  I asked the patient to watch his diet and try to lose weight  He will have repeat blood work done in October  No results for input(s): POCGLU in the last 72 hours  Blood Sugar Average: Last 72 hrs:         Diagnoses and all orders for this visit:    Encounter for hepatitis C screening test for low risk patient  -     Hepatitis C antibody; Future    Type 2 diabetes mellitus without complication, without long-term current use of insulin (HCC)  -     Microalbumin / creatinine urine ratio  -     Basic metabolic panel; Future  -     Hemoglobin A1C; Future  -     Ambulatory referral to Podiatry; Future    Chronic low back pain without sciatica, unspecified back pain laterality  -     fentaNYL (DURAGESIC) 50 mcg/hr; Apply 1 patch every 3 days  -     oxyCODONE (ROXICODONE) 10 MG TABS; Take 1 tablet (10 mg total) by mouth 3 (three) times a dayMax Daily Amount: 30 mg        The [place] prescription drug monitoring program was queried  There were no red flags  Safe to proceed  Subjective:      Patient ID: Damion Arce is a 71 y o  male  This patient is a deep 39-year-old white male who presents to the office today for his routine checkup  The patient needs refills for his fentanyl patch and oxycodone  He is not due now but will be due in a few days    He just completed his 3 shots of Euflexxa for his knee  He tells me the jury is still out regarding its effectiveness  He is still having problems with his fractured finger  He plans on seeing Orthopedics again  The following portions of the patient's history were reviewed and updated as appropriate: allergies, current medications, past family history, past medical history, past social history, past surgical history and problem list     Review of Systems   Cardiovascular: Negative for chest pain, palpitations and leg swelling  Endocrine: Negative for polydipsia, polyphagia and polyuria  Musculoskeletal: Positive for arthralgias (Chronic right knee pain) and back pain  Reports finger pain         Objective:      /80   Pulse 81   Ht 6' (1 829 m)   Wt 122 kg (269 lb 4 8 oz)   SpO2 98%   BMI 36 52 kg/m²          Physical Exam   Constitutional: He appears well-developed and well-nourished  No distress  HENT:   Head: Normocephalic and atraumatic  Right Ear: External ear normal    Left Ear: External ear normal    Mouth/Throat: No oropharyngeal exudate  Eyes: Pupils are equal, round, and reactive to light  Conjunctivae are normal  No scleral icterus  Neck: Neck supple  No tracheal deviation present  No thyromegaly present  Cardiovascular: Normal rate, regular rhythm and normal heart sounds  Exam reveals no gallop and no friction rub  Pulses are no weak pulses  No murmur heard  Pulses:       Dorsalis pedis pulses are 2+ on the right side, and 2+ on the left side  Posterior tibial pulses are 2+ on the right side, and 2+ on the left side  Pulmonary/Chest: Effort normal and breath sounds normal  No stridor  No respiratory distress  He has no wheezes  He has no rales  Feet:   Right Foot:   Skin Integrity: Negative for ulcer, skin breakdown, erythema, warmth, callus or dry skin  Left Foot:   Skin Integrity: Negative for ulcer, skin breakdown, erythema, warmth, callus or dry skin     Lymphadenopathy:     He has no cervical adenopathy  Skin: He is not diaphoretic  Vitals reviewed  extremities:  Without cyanosis, clubbing, or edema    Patient's shoes and socks removed  Right Foot/Ankle   Right Foot Inspection  Skin Exam: skin normal and skin intact no dry skin, no warmth, no callus, no erythema, no maceration, no abnormal color, no pre-ulcer, no ulcer and no callus                          Toe Exam: no swelling, no tenderness and  no right toe deformity  Sensory   Vibration: absent  Proprioception: absent   Monofilament testing: diminished  Vascular  Capillary refills: < 3 seconds  The right DP pulse is 2+  The right PT pulse is 2+  Left Foot/Ankle  Left Foot Inspection  Skin Exam: skin normal and skin intactno dry skin, no warmth, no erythema, no maceration, normal color, no pre-ulcer, no ulcer and no callus                         Toe Exam: no swelling, no tenderness, no erythema and no left toe deformity                   Sensory   Vibration: diminished  Proprioception: diminished  Monofilament: diminished  Vascular  Capillary refills: < 3 seconds  The left DP pulse is 2+  The left PT pulse is 2+  Assign Risk Category:  No deformity present; Loss of protective sensation;  No weak pulses       Risk: 1

## 2019-08-16 NOTE — ASSESSMENT & PLAN NOTE
I refilled the patient's prescriptions for oxycodone as well as his fentanyl  Patient continues to benefit from these medications without side effects  Without them, patient would only be able to walk a few feet without having to sit down  He would have difficulty performing his ADLs, if not finding it impossible

## 2019-08-16 NOTE — PATIENT INSTRUCTIONS
Foot Care for People with Diabetes   AMBULATORY CARE:   What you need to know about foot care:   · Foot care helps protect your feet and prevent foot ulcers or sores  Long-term high blood sugar levels can damage the blood vessels and nerves in your legs and feet  This damage makes it hard to feel pressure, pain, temperature, and touch  You may not be able to feel a cut or sore, or shoes that are too tight  Foot care is needed to prevent serious problems, such as an infection or amputation  · Diabetes may cause your toes to become crooked or curved under  These changes may affect the way you walk and can lead to increased pressure on your foot  The pressure can decrease blood flow to your feet  Lack of blood flow increases your risk for a foot ulcer  Do not ignore small problems, such as dry skin or small wounds  These can become life-threatening over time without proper care  Contact your healthcare provider if:   · Your feet become numb, weak, or hard to move  · You have pus draining from a sore on your foot  · You have a wound on your foot that gets bigger, deeper, or does not heal      · You see blisters, cuts, scratches, calluses, or sores on your foot  · You have a fever, and your feet become red, warm, and swollen  · Your toenails become thick, curled, or yellow  · You find it hard to check your feet because your vision is poor  · You have questions or concerns about your condition or care  How to care for your feet:   · Check your feet each day  Look at your whole foot, including the bottom, and between and under your toes  Check for wounds, corns, and calluses  Use a mirror to see the bottom of your feet  The skin on your feet may be shiny, tight, or darker than normal  Your feet may also be cold and pale  Feel your feet by running your hands along the tops, bottoms, sides, and between your toes   Redness, swelling, and warmth are signs of blood flow problems that can lead to a foot ulcer  Do not try to remove corns or calluses yourself  · Wash your feet each day with soap and warm water  Do not use hot water, because this can injure your foot  Dry your feet gently with a towel after you wash them  Dry between and under your toes  · Apply lotion or a moisturizer on your dry feet  Ask your healthcare provider what lotions are best to use  Do not put lotion or moisturizer between your toes  · Cut your toenails correctly  File or cut your toenails straight across  Use a soft brush to clean around your toenails  If your toenails are very thick, you may need to have a healthcare provider or specialist cut them  · Protect your feet  Do not walk barefoot or wear your shoes without socks  Check your shoes for rocks or other objects that can hurt your feet  Wear cotton socks to help keep your feet dry  Wear socks without toe seams, or wear them with the seams inside out  Change your socks each day  Do not wear socks that are dirty or damp  · Wear shoes that fit well  Wear shoes that do not rub against any area of your feet  Your shoes should be ½ to ¾ inch (1 to 2 centimeters) longer than your feet  Your shoes should also have extra space around the widest part of your feet  Walking or athletic shoes with laces or straps that adjust are best  Ask your healthcare provider for help to choose shoes that fit you best  Ask him if you need to wear an insert, orthotic, or bandage on your feet  · Go to your follow-up visits  Your healthcare provider will do a foot exam at least once a year  You may need a foot exam more often if you have nerve damage, foot deformities, or ulcers  He will check for nerve damage and how well you can feel your feet  He will check your shoes to see if they fit well  Follow up with your healthcare provider or foot specialist as directed: You will need to have your feet checked at least once a year   You may need a foot exam more often if you have nerve damage, foot deformities, or ulcers  Write down your questions so you remember to ask them during your visits  © 2017 [address] Information is for End User's use only and may not be sold, redistributed or otherwise used for commercial purposes  All illustrations and images included in CareNotes® are the copyrighted property of A D A M [employer] Inc  or Kenny Quiñones  The above information is an  only  It is not intended as medical advice for individual conditions or treatments  Talk to your doctor, nurse or pharmacist before following any medical regimen to see if it is safe and effective for you

## 2019-08-16 NOTE — ASSESSMENT & PLAN NOTE
Lab Results   Component Value Date    HGBA1C 7 7 (H) 07/09/2019     Last hemoglobin A1c was noted to be elevated at 7 7  His goal is under 7 0  I asked the patient to watch his diet and try to lose weight  He will have repeat blood work done in October  No results for input(s): POCGLU in the last 72 hours      Blood Sugar Average: Last 72 hrs:

## 2019-08-20 ENCOUNTER — TELEPHONE (OUTPATIENT)
Dept: FAMILY MEDICINE CLINIC | Facility: CLINIC | Age: 64
End: 2019-08-20

## 2019-08-20 NOTE — TELEPHONE ENCOUNTER
Adrian Fong   Phone Number: [phone]             08/20/19 1:30 PM     As a result of outreach to the external facility it was discovered that the patient did not have the requested Diabetic Eye Exam completed/given  We have spoke with the external facility and the patient did not have the requested service(s)  This message will now be closed  Thank you   Becky Payan    Previous Messages      ----- Message -----   From: Brittaney Mauricio MA   Sent: 8/16/2019  12:36 PM EDT   To: [facility]   Subject: [facility]                                     I have reviewed both our old [address] and need your assistance in locating Diabetic Eye exam  Per Patient, testing was done by Dr Farhat Machado on 2018 Thank you

## 2019-09-16 ENCOUNTER — OFFICE VISIT (OUTPATIENT)
Dept: FAMILY MEDICINE CLINIC | Facility: CLINIC | Age: 64
End: 2019-09-16
Payer: MEDICARE

## 2019-09-16 VITALS
OXYGEN SATURATION: 96 % | WEIGHT: 268 LBS | BODY MASS INDEX: 36.3 KG/M2 | HEART RATE: 76 BPM | DIASTOLIC BLOOD PRESSURE: 82 MMHG | HEIGHT: 72 IN | SYSTOLIC BLOOD PRESSURE: 138 MMHG

## 2019-09-16 DIAGNOSIS — G89.29 CHRONIC LOW BACK PAIN WITHOUT SCIATICA, UNSPECIFIED BACK PAIN LATERALITY: ICD-10-CM

## 2019-09-16 DIAGNOSIS — R97.20 ELEVATED PSA: Primary | ICD-10-CM

## 2019-09-16 DIAGNOSIS — M54.50 CHRONIC LOW BACK PAIN WITHOUT SCIATICA, UNSPECIFIED BACK PAIN LATERALITY: ICD-10-CM

## 2019-09-16 DIAGNOSIS — M17.0 PRIMARY OSTEOARTHRITIS OF BOTH KNEES: ICD-10-CM

## 2019-09-16 DIAGNOSIS — Z23 IMMUNIZATION DUE: ICD-10-CM

## 2019-09-16 PROCEDURE — 99213 OFFICE O/P EST LOW 20 MIN: CPT | Performed by: FAMILY MEDICINE

## 2019-09-16 PROCEDURE — G0008 ADMIN INFLUENZA VIRUS VAC: HCPCS | Performed by: FAMILY MEDICINE

## 2019-09-16 PROCEDURE — 90662 IIV NO PRSV INCREASED AG IM: CPT | Performed by: FAMILY MEDICINE

## 2019-09-16 RX ORDER — FENTANYL 50 UG/H
PATCH TRANSDERMAL
Qty: 10 PATCH | Refills: 0 | Status: SHIPPED | OUTPATIENT
Start: 2019-09-16 | End: 2019-10-16 | Stop reason: SDUPTHER

## 2019-09-16 RX ORDER — OXYCODONE HYDROCHLORIDE 10 MG/1
10 TABLET ORAL 3 TIMES DAILY
Qty: 90 TABLET | Refills: 0 | Status: SHIPPED | OUTPATIENT
Start: 2019-09-16 | End: 2019-10-16 | Stop reason: SDUPTHER

## 2019-09-16 NOTE — PROGRESS NOTES
Assessment/Plan:    Chronic low back pain without sciatica  Patient has chronic lower back pain as well as generalized osteoarthritis, worse in both knees  He continues to require chronic opiates for pain control  Without this medication, patient would likely be homebound and unable to care for his house or himself  I refilled his fentanyl patch as well as his oxycodone  I am aware of the high dose but this is what is required for his pain  Patient has suffered no ill side effects from the medication  He has Narcan at home  Diagnoses and all orders for this visit:    Elevated PSA  -     PSA Total, Diagnostic; Future    Chronic low back pain without sciatica, unspecified back pain laterality  -     oxyCODONE (ROXICODONE) 10 MG TABS; Take 1 tablet (10 mg total) by mouth 3 (three) times a dayMax Daily Amount: 30 mg  -     fentaNYL (DURAGESIC) 50 mcg/hr; Apply 1 patch every 3 days    Immunization due  -     influenza vaccine, 5208-0000, high-dose, PF 0 5 mL (FLUZONE HIGH-DOSE)    Primary osteoarthritis of both knees          The [place] prescription drug monitoring program was queried  There were no red flags  Safe to proceed  Subjective:      Patient ID: Jaiden Gramajo is a 71 y o  male  This patient is a 59-year-old white male presents to the office today for a refill on his pain medication  Patient tells me he had injections in his knees in her little bit better but he still has a lot of pain in both knees  He tells me he had x-rays also on the left knee which showed bone on bone arthritis  He continues to experience chronic lower back pain  The following portions of the patient's history were reviewed and updated as appropriate: allergies, current medications, past family history, past medical history, past social history, past surgical history and problem list     Review of Systems   Gastrointestinal: Negative for blood in stool, diarrhea, nausea and vomiting     Endocrine: Negative for polydipsia, polyphagia and polyuria  Musculoskeletal: Positive for arthralgias, back pain and myalgias  Objective:      /82 (BP Location: Left arm, Patient Position: Sitting, Cuff Size: Adult)   Pulse 76   Ht 6' (1 829 m)   Wt 122 kg (268 lb)   SpO2 96%   BMI 36 35 kg/m²          Physical Exam   Constitutional: He appears well-developed and well-nourished  No distress  HENT:   Head: Normocephalic and atraumatic  Right Ear: External ear normal    Left Ear: External ear normal    Mouth/Throat: Oropharynx is clear and moist  No oropharyngeal exudate  Tympanic membranes are clear   Eyes: Pupils are equal, round, and reactive to light  Conjunctivae are normal  No scleral icterus  Neck: Neck supple  No tracheal deviation present  No thyromegaly present  Cardiovascular: Normal rate, regular rhythm and normal heart sounds  Exam reveals no gallop and no friction rub  No murmur heard  Pulmonary/Chest: Effort normal and breath sounds normal  No respiratory distress  He has no wheezes  He has no rales  Musculoskeletal:   There is tenderness noted to palpation in the bilateral paraspinal lumbar musculature, more so in the area of the sacrum and sacroiliac joints  There is decreased range of motion present of the lumbar spine  Lymphadenopathy:     He has no cervical adenopathy  Skin: He is not diaphoretic  Vitals reviewed  extremities:  Without cyanosis, clubbing, or edema  Swelling of both knees present

## 2019-09-16 NOTE — PATIENT INSTRUCTIONS
Foot Care for People with Diabetes   WHAT YOU NEED TO KNOW:   · Foot care helps protect your feet and prevent foot ulcers or sores  Long-term high blood sugar levels can damage the blood vessels and nerves in your legs and feet  This damage makes it hard to feel pressure, pain, temperature, and touch  You may not be able to feel a cut or sore, or shoes that are too tight  Foot care is needed to prevent serious problems, such as an infection or amputation  · Diabetes may cause your toes to become crooked or curved under  These changes may affect the way you walk and can lead to increased pressure on your foot  The pressure can decrease blood flow to your feet  Lack of blood flow increases your risk for a foot ulcer  Do not ignore small problems, such as dry skin or small wounds  These can become life-threatening over time without proper care  DISCHARGE INSTRUCTIONS:   Contact your healthcare provider if:   · Your feet become numb, weak, or hard to move  · You have pus draining from a sore on your foot  · You have a wound on your foot that gets bigger, deeper, or does not heal      · You see blisters, cuts, scratches, calluses, or sores on your foot  · You have a fever, and your feet become red, warm, and swollen  · Your toenails become thick, curled, or yellow  · You find it hard to check your feet because your vision is poor  · You have questions or concerns about your condition or care  Foot care:   · Check your feet each day  Look at your whole foot, including the bottom, and between and under your toes  Check for wounds, corns, and calluses  Use a mirror to see the bottom of your feet  The skin on your feet may be shiny, tight, or darker than normal  Your feet may also be cold and pale  Feel your feet by running your hands along the tops, bottoms, sides, and between your toes  Redness, swelling, and warmth are signs of blood flow problems that can lead to a foot ulcer   Do not try to remove corns or calluses yourself  · Wash your feet each day with soap and warm water  Do not use hot water, because this can injure your foot  Dry your feet gently with a towel after you wash them  Dry between and under your toes  · Apply lotion or a moisturizer on your dry feet  Ask your healthcare provider what lotions are best to use  Do not put lotion or moisturizer between your toes  · Cut your toenails correctly  File or cut your toenails straight across  Use a soft brush to clean around your toenails  If your toenails are very thick, you may need to have a healthcare provider or specialist cut them  · Protect your feet  Do not walk barefoot or wear your shoes without socks  Check your shoes for rocks or other objects that can hurt your feet  Wear cotton socks to help keep your feet dry  Wear socks without toe seams, or wear them with the seams inside out  Change your socks each day  Do not wear socks that are dirty or damp  · Wear shoes that fit well  Wear shoes that do not rub against any area of your feet  Your shoes should be ½ to ¾ inch (1 to 2 centimeters) longer than your feet  Your shoes should also have extra space around the widest part of your feet  Walking or athletic shoes with laces or straps that adjust are best  Ask your healthcare provider for help to choose shoes that fit you best  Ask him if you need to wear an insert, orthotic, or bandage on your feet  Follow up with your healthcare provider or foot specialist as directed: You will need to have your feet checked at least once a year  You may need a foot exam more often if you have nerve damage, foot deformities, or ulcers  Write down your questions so you remember to ask them during your visits  © 2017 [employer] Information is for End User's use only and may not be sold, redistributed or otherwise used for commercial purposes   All illustrations and images included in CareNotes® are the copyrighted property of [employer]  or Kenny Quiñones  The above information is an  only  It is not intended as medical advice for individual conditions or treatments  Talk to your doctor, nurse or pharmacist before following any medical regimen to see if it is safe and effective for you

## 2019-09-17 NOTE — ASSESSMENT & PLAN NOTE
Patient has chronic lower back pain as well as generalized osteoarthritis, worse in both knees  He continues to require chronic opiates for pain control  Without this medication, patient would likely be homebound and unable to care for his house or himself  I refilled his fentanyl patch as well as his oxycodone  I am aware of the high dose but this is what is required for his pain  Patient has suffered no ill side effects from the medication  He has Narcan at home

## 2019-10-15 ENCOUNTER — APPOINTMENT (OUTPATIENT)
Dept: LAB | Facility: HOSPITAL | Age: 64
End: 2019-10-15
Attending: FAMILY MEDICINE
Payer: MEDICARE

## 2019-10-15 DIAGNOSIS — R97.20 ELEVATED PSA: ICD-10-CM

## 2019-10-15 DIAGNOSIS — E11.9 TYPE 2 DIABETES MELLITUS WITHOUT COMPLICATION, WITHOUT LONG-TERM CURRENT USE OF INSULIN (HCC): ICD-10-CM

## 2019-10-15 DIAGNOSIS — Z11.59 ENCOUNTER FOR HEPATITIS C SCREENING TEST FOR LOW RISK PATIENT: ICD-10-CM

## 2019-10-15 LAB
ANION GAP SERPL CALCULATED.3IONS-SCNC: 14 MMOL/L (ref 4–13)
BUN SERPL-MCNC: 16 MG/DL (ref 5–25)
CALCIUM SERPL-MCNC: 9.3 MG/DL (ref 8.3–10.1)
CHLORIDE SERPL-SCNC: 101 MMOL/L (ref 100–108)
CO2 SERPL-SCNC: 23 MMOL/L (ref 21–32)
CREAT SERPL-MCNC: 1.45 MG/DL (ref 0.6–1.3)
CREAT UR-MCNC: 517 MG/DL
EST. AVERAGE GLUCOSE BLD GHB EST-MCNC: 189 MG/DL
GFR SERPL CREATININE-BSD FRML MDRD: 50 ML/MIN/1.73SQ M
GLUCOSE P FAST SERPL-MCNC: 183 MG/DL (ref 65–99)
HBA1C MFR BLD: 8.2 % (ref 4.2–6.3)
HCV AB SER QL: NORMAL
MICROALBUMIN UR-MCNC: 201 MG/L (ref 0–20)
MICROALBUMIN/CREAT 24H UR: 39 MG/G CREATININE (ref 0–30)
POTASSIUM SERPL-SCNC: 3.9 MMOL/L (ref 3.5–5.3)
PSA SERPL-MCNC: 4 NG/ML (ref 0–4)
SODIUM SERPL-SCNC: 138 MMOL/L (ref 136–145)

## 2019-10-15 PROCEDURE — 86803 HEPATITIS C AB TEST: CPT

## 2019-10-15 PROCEDURE — 80048 BASIC METABOLIC PNL TOTAL CA: CPT

## 2019-10-15 PROCEDURE — 36415 COLL VENOUS BLD VENIPUNCTURE: CPT

## 2019-10-15 PROCEDURE — 82570 ASSAY OF URINE CREATININE: CPT | Performed by: FAMILY MEDICINE

## 2019-10-15 PROCEDURE — 83036 HEMOGLOBIN GLYCOSYLATED A1C: CPT

## 2019-10-15 PROCEDURE — 82043 UR ALBUMIN QUANTITATIVE: CPT | Performed by: FAMILY MEDICINE

## 2019-10-15 PROCEDURE — 84153 ASSAY OF PSA TOTAL: CPT

## 2019-10-16 ENCOUNTER — OFFICE VISIT (OUTPATIENT)
Dept: FAMILY MEDICINE CLINIC | Facility: CLINIC | Age: 64
End: 2019-10-16
Payer: MEDICARE

## 2019-10-16 VITALS
HEIGHT: 72 IN | BODY MASS INDEX: 37.5 KG/M2 | DIASTOLIC BLOOD PRESSURE: 68 MMHG | OXYGEN SATURATION: 97 % | SYSTOLIC BLOOD PRESSURE: 132 MMHG | WEIGHT: 276.9 LBS | HEART RATE: 76 BPM

## 2019-10-16 DIAGNOSIS — Z23 IMMUNIZATION DUE: ICD-10-CM

## 2019-10-16 DIAGNOSIS — G89.29 CHRONIC LOW BACK PAIN WITHOUT SCIATICA, UNSPECIFIED BACK PAIN LATERALITY: ICD-10-CM

## 2019-10-16 DIAGNOSIS — M54.50 CHRONIC LOW BACK PAIN WITHOUT SCIATICA, UNSPECIFIED BACK PAIN LATERALITY: ICD-10-CM

## 2019-10-16 DIAGNOSIS — E11.9 TYPE 2 DIABETES MELLITUS WITHOUT COMPLICATION, WITHOUT LONG-TERM CURRENT USE OF INSULIN (HCC): Primary | ICD-10-CM

## 2019-10-16 DIAGNOSIS — M17.0 PRIMARY OSTEOARTHRITIS OF BOTH KNEES: ICD-10-CM

## 2019-10-16 DIAGNOSIS — I10 ESSENTIAL HYPERTENSION, BENIGN: ICD-10-CM

## 2019-10-16 PROCEDURE — G0009 ADMIN PNEUMOCOCCAL VACCINE: HCPCS | Performed by: FAMILY MEDICINE

## 2019-10-16 PROCEDURE — 90670 PCV13 VACCINE IM: CPT | Performed by: FAMILY MEDICINE

## 2019-10-16 PROCEDURE — 99213 OFFICE O/P EST LOW 20 MIN: CPT | Performed by: FAMILY MEDICINE

## 2019-10-16 RX ORDER — FENTANYL 50 UG/H
PATCH TRANSDERMAL
Qty: 10 PATCH | Refills: 0 | Status: SHIPPED | OUTPATIENT
Start: 2019-10-16 | End: 2019-11-15 | Stop reason: SDUPTHER

## 2019-10-16 RX ORDER — OXYCODONE HYDROCHLORIDE 10 MG/1
10 TABLET ORAL 3 TIMES DAILY
Qty: 90 TABLET | Refills: 0 | Status: SHIPPED | OUTPATIENT
Start: 2019-10-16 | End: 2019-11-15 | Stop reason: SDUPTHER

## 2019-10-16 RX ORDER — GLIPIZIDE 5 MG/1
5 TABLET, FILM COATED, EXTENDED RELEASE ORAL DAILY
Qty: 30 TABLET | Refills: 5 | Status: SHIPPED | OUTPATIENT
Start: 2019-10-16 | End: 2019-12-13 | Stop reason: SDUPTHER

## 2019-10-16 NOTE — ASSESSMENT & PLAN NOTE
Patient has chronic low back pain  I queried the [place] prescription drug monitoring program   There were no red flags and was safe to proceed  I refilled the patient's prescription for oxycodone as well as his prescription for fentanyl patch  Patient has Narcan at home in case of an emergency in his son knows how to use this  We discussed lowering the dose but the patient tells me he is barely getting by with the dose he is on at present  He tells me he has had difficult time since I lowered the dose previously  We will keep him on the same dose

## 2019-10-16 NOTE — ASSESSMENT & PLAN NOTE
Blood pressure is well controlled on his current regiment    I recommended no change with his current medications

## 2019-10-16 NOTE — PATIENT INSTRUCTIONS
Foot Care for People with Diabetes   WHAT YOU NEED TO KNOW:   · Foot care helps protect your feet and prevent foot ulcers or sores  Long-term high blood sugar levels can damage the blood vessels and nerves in your legs and feet  This damage makes it hard to feel pressure, pain, temperature, and touch  You may not be able to feel a cut or sore, or shoes that are too tight  Foot care is needed to prevent serious problems, such as an infection or amputation  · Diabetes may cause your toes to become crooked or curved under  These changes may affect the way you walk and can lead to increased pressure on your foot  The pressure can decrease blood flow to your feet  Lack of blood flow increases your risk for a foot ulcer  Do not ignore small problems, such as dry skin or small wounds  These can become life-threatening over time without proper care  DISCHARGE INSTRUCTIONS:   Contact your healthcare provider if:   · Your feet become numb, weak, or hard to move  · You have pus draining from a sore on your foot  · You have a wound on your foot that gets bigger, deeper, or does not heal      · You see blisters, cuts, scratches, calluses, or sores on your foot  · You have a fever, and your feet become red, warm, and swollen  · Your toenails become thick, curled, or yellow  · You find it hard to check your feet because your vision is poor  · You have questions or concerns about your condition or care  Foot care:   · Check your feet each day  Look at your whole foot, including the bottom, and between and under your toes  Check for wounds, corns, and calluses  Use a mirror to see the bottom of your feet  The skin on your feet may be shiny, tight, or darker than normal  Your feet may also be cold and pale  Feel your feet by running your hands along the tops, bottoms, sides, and between your toes  Redness, swelling, and warmth are signs of blood flow problems that can lead to a foot ulcer   Do not try to remove corns or calluses yourself  · Wash your feet each day with soap and warm water  Do not use hot water, because this can injure your foot  Dry your feet gently with a towel after you wash them  Dry between and under your toes  · Apply lotion or a moisturizer on your dry feet  Ask your healthcare provider what lotions are best to use  Do not put lotion or moisturizer between your toes  · Cut your toenails correctly  File or cut your toenails straight across  Use a soft brush to clean around your toenails  If your toenails are very thick, you may need to have a healthcare provider or specialist cut them  · Protect your feet  Do not walk barefoot or wear your shoes without socks  Check your shoes for rocks or other objects that can hurt your feet  Wear cotton socks to help keep your feet dry  Wear socks without toe seams, or wear them with the seams inside out  Change your socks each day  Do not wear socks that are dirty or damp  · Wear shoes that fit well  Wear shoes that do not rub against any area of your feet  Your shoes should be ½ to ¾ inch (1 to 2 centimeters) longer than your feet  Your shoes should also have extra space around the widest part of your feet  Walking or athletic shoes with laces or straps that adjust are best  Ask your healthcare provider for help to choose shoes that fit you best  Ask him if you need to wear an insert, orthotic, or bandage on your feet  Follow up with your healthcare provider or foot specialist as directed: You will need to have your feet checked at least once a year  You may need a foot exam more often if you have nerve damage, foot deformities, or ulcers  Write down your questions so you remember to ask them during your visits  © 2017 [employer] Information is for End User's use only and may not be sold, redistributed or otherwise used for commercial purposes   All illustrations and images included in CareNotes® are the copyrighted property of [employer]  or Kenny Quiñones  The above information is an  only  It is not intended as medical advice for individual conditions or treatments  Talk to your doctor, nurse or pharmacist before following any medical regimen to see if it is safe and effective for you

## 2019-10-16 NOTE — ASSESSMENT & PLAN NOTE
Lab Results   Component Value Date    HGBA1C 8 2 (H) 10/15/2019    I reviewed with the patient the results of his labs  Fasting blood glucose was 183  Hemoglobin A1c is 8 2%  His goal is less than 7 0  His urine albumin to creatinine ratio was 39  The patient needs to try to lose weight  He needs to watch his diet more carefully  The metformin along his simply not working  He needs another agent put is hampered by finances  I am going to start him on glipizide  He will take extended release glipizide 5 mg daily  I warned of potential side effects such as hypoglycemia

## 2019-10-16 NOTE — PROGRESS NOTES
Assessment/Plan:    Chronic low back pain without sciatica  Patient has chronic low back pain  I queried the [place] prescription drug monitoring program   There were no red flags and was safe to proceed  I refilled the patient's prescription for oxycodone as well as his prescription for fentanyl patch  Patient has Narcan at home in case of an emergency in his son knows how to use this  We discussed lowering the dose but the patient tells me he is barely getting by with the dose he is on at present  He tells me he has had difficult time since I lowered the dose previously  We will keep him on the same dose  Type 2 diabetes mellitus without complication, without long-term current use of insulin ([facility])    Lab Results   Component Value Date    HGBA1C 8 2 (H) 10/15/2019    I reviewed with the patient the results of his labs  Fasting blood glucose was 183  Hemoglobin A1c is 8 2%  His goal is less than 7 0  His urine albumin to creatinine ratio was 39  The patient needs to try to lose weight  He needs to watch his diet more carefully  The metformin along his simply not working  He needs another agent put is hampered by finances  I am going to start him on glipizide  He will take extended release glipizide 5 mg daily  I warned of potential side effects such as hypoglycemia  Essential hypertension, benign  Blood pressure is well controlled on his current regiment  I recommended no change with his current medications       Diagnoses and all orders for this visit:    Type 2 diabetes mellitus without complication, without long-term current use of insulin ([facility])  -     glipiZIDE (GLUCOTROL XL) 5 mg 24 hr tablet; Take 1 tablet (5 mg total) by mouth daily    Chronic low back pain without sciatica, unspecified back pain laterality  -     oxyCODONE (ROXICODONE) 10 MG TABS; Take 1 tablet (10 mg total) by mouth 3 (three) times a dayMax Daily Amount: 30 mg  -     fentaNYL (DURAGESIC) 50 mcg/hr;  Apply 1 patch every 3 days    Immunization due  -     PNEUMOCOCCAL CONJUGATE VACCINE 13-VALENT GREATER THAN 6 MONTHS    Primary osteoarthritis of both knees    Essential hypertension, benign    Other orders  -     ciclopirox (LOPROX) 0 77 % cream; Apply 1 application topically 2 (two) times a day To affected area           BMI Counseling: Body mass index is 37 55 kg/m²  The BMI is above normal  Nutrition recommendations include reducing portion sizes, decreasing overall calorie intake, reducing fast food intake, consuming healthier snacks, decreasing soda and/or juice intake and moderation in carbohydrate intake  Exercise recommendations include exercising 3-5 times per week  Subjective:      Patient ID: Madeline Albright is a 71 y o  male  This patient is a 57-year-old white male who presents to the office today for his follow-up visit  He continues to experience pain in his lower back as well as in his knees  He requests refill on his pain patches as well as refill on his oxycodone  The patient reports to me that he has not been taking his Tradjenta secondary to the cost of the medication  His blood sugars have been poorly controlled  The following portions of the patient's history were reviewed and updated as appropriate: allergies, current medications, past family history, past medical history, past social history, past surgical history and problem list     Review of Systems   HENT: Positive for congestion and sneezing  Musculoskeletal: Positive for arthralgias and back pain  Allergic/Immunologic: Positive for environmental allergies  Neurological: Positive for headaches  Psychiatric/Behavioral: Positive for agitation  Negative for dysphoric mood  The patient is not nervous/anxious            Objective:      /68 (BP Location: Left arm, Patient Position: Sitting, Cuff Size: Adult)   Pulse 76   Ht 6' (1 829 m)   Wt 126 kg (276 lb 14 4 oz)   SpO2 97%   BMI 37 55 kg/m²          Physical Exam Constitutional:   The patient is a 41-year-old white male who appears his stated age  He appears to be in no apparent distress   Eyes: Pupils are equal, round, and reactive to light  Conjunctivae are normal  No scleral icterus  Neck: Neck supple  No tracheal deviation present  No thyromegaly present  Cardiovascular: Normal rate, regular rhythm and normal heart sounds  Exam reveals no gallop and no friction rub  No murmur heard  Pulmonary/Chest: Effort normal and breath sounds normal  No stridor  No respiratory distress  He has no wheezes  He has no rales  Abdominal: Soft  Bowel sounds are normal  He exhibits no distension and no mass  There is no tenderness  There is no rebound and no guarding  Lymphadenopathy:     He has no cervical adenopathy  Psychiatric:   The patient has a blunted affect  Vitals reviewed  extremities:  Without cyanosis, clubbing, or edema

## 2019-11-15 ENCOUNTER — OFFICE VISIT (OUTPATIENT)
Dept: FAMILY MEDICINE CLINIC | Facility: CLINIC | Age: 64
End: 2019-11-15
Payer: MEDICARE

## 2019-11-15 VITALS
DIASTOLIC BLOOD PRESSURE: 68 MMHG | OXYGEN SATURATION: 96 % | HEIGHT: 72 IN | WEIGHT: 273.4 LBS | BODY MASS INDEX: 37.03 KG/M2 | SYSTOLIC BLOOD PRESSURE: 128 MMHG | HEART RATE: 77 BPM

## 2019-11-15 DIAGNOSIS — Z13.6 SCREENING FOR AAA (ABDOMINAL AORTIC ANEURYSM): ICD-10-CM

## 2019-11-15 DIAGNOSIS — E11.9 TYPE 2 DIABETES MELLITUS WITHOUT COMPLICATION, WITHOUT LONG-TERM CURRENT USE OF INSULIN (HCC): ICD-10-CM

## 2019-11-15 DIAGNOSIS — G89.29 CHRONIC LOW BACK PAIN WITHOUT SCIATICA, UNSPECIFIED BACK PAIN LATERALITY: ICD-10-CM

## 2019-11-15 DIAGNOSIS — M54.50 CHRONIC LOW BACK PAIN WITHOUT SCIATICA, UNSPECIFIED BACK PAIN LATERALITY: ICD-10-CM

## 2019-11-15 DIAGNOSIS — I25.10 ATHEROSCLEROSIS OF NATIVE CORONARY ARTERY OF NATIVE HEART WITHOUT ANGINA PECTORIS: Primary | ICD-10-CM

## 2019-11-15 DIAGNOSIS — Z00.00 ENCOUNTER FOR MEDICARE ANNUAL WELLNESS EXAM: ICD-10-CM

## 2019-11-15 DIAGNOSIS — Z91.030 BEE STING ALLERGY: ICD-10-CM

## 2019-11-15 PROCEDURE — G0438 PPPS, INITIAL VISIT: HCPCS | Performed by: FAMILY MEDICINE

## 2019-11-15 PROCEDURE — 99213 OFFICE O/P EST LOW 20 MIN: CPT | Performed by: FAMILY MEDICINE

## 2019-11-15 RX ORDER — OXYCODONE HYDROCHLORIDE 10 MG/1
10 TABLET ORAL 3 TIMES DAILY
Qty: 90 TABLET | Refills: 0 | Status: SHIPPED | OUTPATIENT
Start: 2019-11-15 | End: 2019-12-13 | Stop reason: SDUPTHER

## 2019-11-15 RX ORDER — EPINEPHRINE 0.3 MG/.3ML
0.3 INJECTION SUBCUTANEOUS ONCE
Qty: 0.6 ML | Refills: 0 | Status: SHIPPED | OUTPATIENT
Start: 2019-11-15 | End: 2020-01-14 | Stop reason: ALTCHOICE

## 2019-11-15 RX ORDER — FENTANYL 50 UG/H
PATCH TRANSDERMAL
Qty: 10 PATCH | Refills: 0 | Status: SHIPPED | OUTPATIENT
Start: 2019-11-15 | End: 2019-12-13 | Stop reason: SDUPTHER

## 2019-11-15 RX ORDER — NITROGLYCERIN 0.4 MG/1
0.4 TABLET SUBLINGUAL
Qty: 25 TABLET | Refills: 1 | Status: SHIPPED | OUTPATIENT
Start: 2019-11-15 | End: 2021-08-02 | Stop reason: SDUPTHER

## 2019-11-15 NOTE — PROGRESS NOTES
Assessment and Plan:     Problem List Items Addressed This Visit        Endocrine    Type 2 diabetes mellitus without complication, without long-term current use of insulin ([facility] [address] )       Lab Results   Component Value Date    HGBA1C 8 2 (H) 10/15/2019    Patient's last hemoglobin A1c was elevated at 8 2  Since Verline Silvan was discontinued, due to cost, and the patient was started on glipizide, his blood sugars are now under excellent control  I expect his next hemoglobin A1c will be much lower  Cardiovascular and Mediastinum    Atherosclerosis of native coronary artery of native heart without angina pectoris - Primary     I refilled the patient's prescription for Nitrostat  If he starts developing angina pectoris, he needs to notify me immediately  Relevant Medications    EPINEPHrine (EPIPEN) 0 3 mg/0 3 mL SOAJ    nitroglycerin (NITROSTAT) 0 4 mg SL tablet       Other    Chronic low back pain without sciatica     [place] prescription drug monitoring program was queried  There were no red flags and it was safe to proceed  I refilled the patient's prescriptions for fentanyl patch as well as oxycodone for breakthrough pain  We discussed decreasing dose of oxycodone  The patient tells me he could barely get through the day now  We did not make any change with his medicine  He is on high MME of opiates  He does have Narcan at home  His son was instructed on how to use it in the past by the patient           Relevant Medications    fentaNYL (DURAGESIC) 50 mcg/hr    oxyCODONE (ROXICODONE) 10 MG TABS    Encounter for Medicare annual wellness exam      Other Visit Diagnoses     Bee sting allergy        Relevant Medications    EPINEPHrine (EPIPEN) 0 3 mg/0 3 mL SOAJ    Screening for AAA (abdominal aortic aneurysm)        Relevant Orders    US abdominal aorta screening aaa           Preventive health issues were discussed with patient, and age appropriate screening tests were ordered as noted in patient's After Visit Summary  Personalized health advice and appropriate referrals for health education or preventive services given if needed, as noted in patient's After Visit Summary       History of Present Illness:     Patient presents for Medicare Annual Wellness visit    Patient Care Team:  Apolonia Gonzalez DO as PCP - General (Family Medicine)     Problem List:     Patient Active Problem List   Diagnosis    Chronic low back pain without sciatica    Osteoarthritis of knee    Type 2 diabetes mellitus without complication, without long-term current use of insulin ([address] )    Essential hypertension, benign    Mixed hyperlipidemia    Atherosclerosis of native coronary artery of native heart without angina pectoris    Encounter for Medicare annual wellness exam      Past Medical and Surgical History:     Past Medical History:   Diagnosis Date    Anemia     last assessed: 1/11/2018    BPH without obstruction/lower urinary tract symptoms     last assessed: 12/3/2018    Coronary artery disease     last assessed: 7/22/2015    DDD (degenerative disc disease), lumbar     last assessed: 2/17/2014    Generalized osteoarthritis     last assessed: 4/18/2019    Hyperlipidemia     last assessed: 7/22/2015    Hypertension     last assessed: 7/22/2015    Lumbar spinal stenosis     last assessed: 2/17/2014    Rotator cuff syndrome of shoulder and allied disorders     last assessed: 3/5/2014    Type 2 diabetes mellitus with diabetic polyneuropathy ([facility] [address] )     last assessed: 10/31/2018     Past Surgical History:   Procedure Laterality Date    LUMBAR FUSION      SHOULDER ARTHROSCOPY Right       Family History:     Family History   Problem Relation Age of Onset    Diabetes Mother         type 2    Heart attack Father     Stroke Father     Post-traumatic stress disorder Son     No Known Problems Daughter       Social History:     Social History     Socioeconomic History    Marital status:      Spouse name: None  Number of children: None    Years of education: None    Highest education level: None   Occupational History    None   Social Needs    Financial resource strain: None    Food insecurity:     Worry: None     Inability: None    Transportation needs:     Medical: None     Non-medical: None   Tobacco Use    Smoking status: Former Smoker    Smokeless tobacco: Never Used   Substance and Sexual Activity    Alcohol use: Yes     Frequency: Monthly or less     Drinks per session: 1 or 2     Binge frequency: Never    Drug use: Never    Sexual activity: None   Lifestyle    Physical activity:     Days per week: None     Minutes per session: None    Stress: None   Relationships    Social connections:     Talks on phone: None     Gets together: None     Attends Orthodox service: None     Active member of club or organization: None     Attends meetings of clubs or organizations: None     Relationship status: None    Intimate partner violence:     Fear of current or ex partner: None     Emotionally abused: None     Physically abused: None     Forced sexual activity: None   Other Topics Concern    None   Social History Narrative    None       Medications and Allergies:     Current Outpatient Medications   Medication Sig Dispense Refill    amLODIPine (NORVASC) 10 mg tablet Take 10 mg by mouth daily   0    aspirin 81 MG tablet Take 81 mg by mouth once       atorvastatin (LIPITOR) 40 mg tablet Take 40 mg by mouth daily       B Complex Vitamins (B COMPLEX 1 PO) Take 1 tablet by mouth daily       Blood Glucose Monitoring Suppl (FREESTYLE FREEDOM LITE) w/Device KIT FreeStyle Freedom Lite kit      ciclopirox (LOPROX) 0 77 % cream Apply 1 application topically 2 (two) times a day To affected area  0    EPINEPHrine (EPIPEN) 0 3 mg/0 3 mL SOAJ Inject 0 3 mL (0 3 mg total) into a muscle once for 1 dose 0 6 mL 0    fentaNYL (DURAGESIC) 50 mcg/hr Apply 1 patch every 3 days 10 patch 0    gabapentin (NEURONTIN) 600 MG tablet Take 600 mg by mouth 3 (three) times a day   0    glipiZIDE (GLUCOTROL XL) 5 mg 24 hr tablet Take 1 tablet (5 mg total) by mouth daily 30 tablet 5    glucose blood test strip FreeStyle Lite Strips      ibuprofen (MOTRIN) 200 mg tablet Take 200 mg by mouth as needed       Lancets (FREESTYLE) lancets FreeStyle Lancets 28 gauge      lisinopril (ZESTRIL) 10 mg tablet Take 1 tablet (10 mg total) by mouth daily 90 tablet 3    meloxicam (MOBIC) 15 mg tablet Take 15 mg by mouth daily as needed  0    metFORMIN (GLUCOPHAGE) 1000 MG tablet Take 1 tablet (1,000 mg total) by mouth 2 (two) times a day 180 tablet 3    methocarbamol (ROBAXIN) 750 mg tablet 3 (three) times a day      metoprolol tartrate (LOPRESSOR) 50 mg tablet Take 50 mg by mouth daily       Multiple Vitamins-Minerals (MULTIVITAMIN ADULT PO) Take 1 tablet by mouth daily       Naloxone HCl (NARCAN) 4 MG/0 1ML LIQD 1 each into each nostril as needed       nitroglycerin (NITROSTAT) 0 4 mg SL tablet Place 1 tablet (0 4 mg total) under the tongue every 5 (five) minutes as needed for chest pain 25 tablet 1    oxyCODONE (ROXICODONE) 10 MG TABS Take 1 tablet (10 mg total) by mouth 3 (three) times a dayMax Daily Amount: 30 mg 90 tablet 0    betamethasone, augmented, (DIPROLENE-AF) 0 05 % cream Apply 1 application topically 2 (two) times a day        No current facility-administered medications for this visit        Allergies   Allergen Reactions    Naproxen     Prednisone       Immunizations:     Immunization History   Administered Date(s) Administered    Influenza, high dose seasonal 0 5 mL 09/16/2019    Pneumococcal Conjugate 13-Valent 10/16/2019    Pneumococcal Polysaccharide PPV23 10/02/2014    TD (adult) Preservative Free 04/07/2009    Zoster 12/04/2014      Health Maintenance:         Topic Date Due    CRC Screening: Colonoscopy  09/24/2020    Hepatitis C Screening  Completed         Topic Date Due    DTaP,Tdap,and Td Vaccines (1 - Tdap) 05/04/1965    Hepatitis B Vaccine (1 of 3 - Risk 3-dose series) 05/04/1973      Medicare Health Risk Assessment:     /68 (BP Location: Left arm, Patient Position: Sitting, Cuff Size: Large)   Pulse 77   Ht 6' (1 829 m)   Wt 124 kg (273 lb 6 4 oz)   SpO2 96%   BMI 37 08 kg/m²          Health Risk Assessment:   Patient rates overall health as poor  Patient feels that their physical health rating is slightly worse  Eyesight was rated as slightly worse  Hearing was rated as same  Patient feels that their emotional and mental health rating is slightly worse  Pain experienced in the last 7 days has been some  Patient's pain rating has been 6/10  Patient states that he has experienced no weight loss or gain in last 6 months  Depression Screening:   PHQ-2 Score: 0      Fall Risk Screening: In the past year, patient has experienced: no history of falling in past year      Home Safety:  Patient has trouble with stairs inside or outside of their home  Patient has working smoke alarms and has working carbon monoxide detector  Home safety hazards include: none  Nutrition:   Current diet is Regular  Medications:   Patient is not currently taking any over-the-counter supplements  Patient is able to manage medications  Activities of Daily Living (ADLs)/Instrumental Activities of Daily Living (IADLs):   Walk and transfer into and out of bed and chair?: Yes  Dress and groom yourself?: Yes    Bathe or shower yourself?: Yes    Feed yourself?  Yes  Do your laundry/housekeeping?: Yes  Manage your money, pay your bills and track your expenses?: Yes  Make your own meals?: Yes    Do your own shopping?: Yes    Previous Hospitalizations:   Any hospitalizations or ED visits within the last 12 months?: No      Advance Care Planning:   Living will: No    Durable POA for healthcare: No    Advanced directive: No      Cognitive Screening:   Provider or family/friend/caregiver concerned regarding cognition?: No    PREVENTIVE SCREENINGS      Cardiovascular Screening:    General: Screening Not Indicated and History Lipid Disorder      Diabetes Screening:     General: Screening Not Indicated and History Diabetes      Colorectal Cancer Screening:     General: Screening Current      Prostate Cancer Screening:    General: Screening Current      Osteoporosis Screening:    General: Screening Not Indicated      Abdominal Aortic Aneurysm (AAA) Screening:    Risk factors include: age between 73-67 yo and tobacco use        General: Risks and Benefits Discussed    Due for: Screening AAA Ultrasound      Lung Cancer Screening:     General: Screening Not Indicated      Hepatitis C Screening:    General: Screening Current    Assessment/Plan:    Chronic low back pain without sciatica  [place] prescription drug monitoring program was queried  There were no red flags and it was safe to proceed  I refilled the patient's prescriptions for fentanyl patch as well as oxycodone for breakthrough pain  We discussed decreasing dose of oxycodone  The patient tells me he could barely get through the day now  We did not make any change with his medicine  He is on high MME of opiates  He does have Narcan at home  His son was instructed on how to use it in the past by the patient  Type 2 diabetes mellitus without complication, without long-term current use of insulin ([facility])    Lab Results   Component Value Date    HGBA1C 8 2 (H) 10/15/2019    Patient's last hemoglobin A1c was elevated at 8 2  Since Lazarus [place] was discontinued, due to cost, and the patient was started on glipizide, his blood sugars are now under excellent control  I expect his next hemoglobin A1c will be much lower  Atherosclerosis of native coronary artery of native heart without angina pectoris  I refilled the patient's prescription for Nitrostat  If he starts developing angina pectoris, he needs to notify me immediately         Diagnoses and all orders for this visit:    Atherosclerosis of native coronary artery of native heart without angina pectoris  -     nitroglycerin (NITROSTAT) 0 4 mg SL tablet; Place 1 tablet (0 4 mg total) under the tongue every 5 (five) minutes as needed for chest pain    Chronic low back pain without sciatica, unspecified back pain laterality  -     fentaNYL (DURAGESIC) 50 mcg/hr; Apply 1 patch every 3 days  -     oxyCODONE (ROXICODONE) 10 MG TABS; Take 1 tablet (10 mg total) by mouth 3 (three) times a dayMax Daily Amount: 30 mg    Bee sting allergy  -     EPINEPHrine (EPIPEN) 0 3 mg/0 3 mL SOAJ; Inject 0 3 mL (0 3 mg total) into a muscle once for 1 dose    Screening for AAA (abdominal aortic aneurysm)  -     US abdominal aorta screening aaa; Future    Type 2 diabetes mellitus without complication, without long-term current use of insulin ([facility] )    Encounter for Medicare annual wellness exam        The [place] prescription drug monitoring program was queried  There were no red flags  Safe to proceed  Subjective:      Patient ID: Jennifer Waller  is a 71 y o  male  This patient is a 69-year-old white male who presents to the office today for his checkup  He needs prescriptions for and nitroglycerin tablets  He tells me he has not had to take any nitroglycerin tablets  He also wants a prescription for an EpiPen  He has history of bee sting allergy  He tells me his blood sugars are much improved on glipizide  He tells me he likes this medication a lot  He has not had any side effects  He specifically denies any hypoglycemia  Lastly, he wants refills for his pain medication  He continues to suffer from chronic lower back pain  Without the medication, he claims he would not be able to do anything  He still has pain, even with the medicine, however, he is at least able to care for himself        The following portions of the patient's history were reviewed and updated as appropriate: allergies, current medications, past family history, past medical history, past social history, past surgical history and problem list     Review of Systems   Respiratory: Negative for cough, shortness of breath and wheezing  Cardiovascular: Negative for chest pain, palpitations and leg swelling  Musculoskeletal: Positive for arthralgias (Bilateral knee pain present), back pain and gait problem  Objective:      /68 (BP Location: Left arm, Patient Position: Sitting, Cuff Size: Large)   Pulse 77   Ht 6' (1 829 m)   Wt 124 kg (273 lb 6 4 oz)   SpO2 96%   BMI 37 08 kg/m²          Physical Exam   Constitutional: He appears well-developed and well-nourished  No distress  HENT:   Head: Normocephalic and atraumatic  Right Ear: External ear normal    Left Ear: External ear normal    Mouth/Throat: Oropharynx is clear and moist  No oropharyngeal exudate  Eyes: Pupils are equal, round, and reactive to light  Conjunctivae are normal  No scleral icterus  Neck: Neck supple  No tracheal deviation present  No thyromegaly present  Cardiovascular: Normal rate, regular rhythm and normal heart sounds  Exam reveals no gallop and no friction rub  No murmur heard  Pulmonary/Chest: Effort normal and breath sounds normal  No stridor  No respiratory distress  He has no wheezes  He has no rales  Abdominal: Soft  Bowel sounds are normal  He exhibits no distension and no mass  There is no tenderness  There is no rebound and no guarding  Musculoskeletal:   There is decreased range of motion of the lumbar spine  There is swelling present in both knees   Vitals reviewed  extremities:  Without cyanosis or clubbing  There is edema noted in both lower extremities    It is 1/4 bilaterally    Vicki Leticia, DO

## 2019-11-15 NOTE — PATIENT INSTRUCTIONS

## 2019-11-15 NOTE — ASSESSMENT & PLAN NOTE
Lab Results   Component Value Date    HGBA1C 8 2 (H) 10/15/2019    Patient's last hemoglobin A1c was elevated at 8 2  Since Lily Winchester was discontinued, due to cost, and the patient was started on glipizide, his blood sugars are now under excellent control  I expect his next hemoglobin A1c will be much lower

## 2019-11-15 NOTE — ASSESSMENT & PLAN NOTE
I refilled the patient's prescription for Nitrostat  If he starts developing angina pectoris, he needs to notify me immediately

## 2019-11-15 NOTE — ASSESSMENT & PLAN NOTE
[place] prescription drug monitoring program was queried  There were no red flags and it was safe to proceed  I refilled the patient's prescriptions for fentanyl patch as well as oxycodone for breakthrough pain  We discussed decreasing dose of oxycodone  The patient tells me he could barely get through the day now  We did not make any change with his medicine  He is on high MME of opiates  He does have Narcan at home  His son was instructed on how to use it in the past by the patient

## 2019-12-13 ENCOUNTER — OFFICE VISIT (OUTPATIENT)
Dept: FAMILY MEDICINE CLINIC | Facility: CLINIC | Age: 64
End: 2019-12-13
Payer: MEDICARE

## 2019-12-13 VITALS
HEART RATE: 76 BPM | HEIGHT: 72 IN | WEIGHT: 279.6 LBS | SYSTOLIC BLOOD PRESSURE: 132 MMHG | DIASTOLIC BLOOD PRESSURE: 68 MMHG | BODY MASS INDEX: 37.87 KG/M2 | OXYGEN SATURATION: 97 %

## 2019-12-13 DIAGNOSIS — M17.0 PRIMARY OSTEOARTHRITIS OF BOTH KNEES: ICD-10-CM

## 2019-12-13 DIAGNOSIS — M54.50 CHRONIC LOW BACK PAIN WITHOUT SCIATICA, UNSPECIFIED BACK PAIN LATERALITY: ICD-10-CM

## 2019-12-13 DIAGNOSIS — G89.29 CHRONIC LOW BACK PAIN WITHOUT SCIATICA, UNSPECIFIED BACK PAIN LATERALITY: ICD-10-CM

## 2019-12-13 DIAGNOSIS — E11.9 TYPE 2 DIABETES MELLITUS WITHOUT COMPLICATION, WITHOUT LONG-TERM CURRENT USE OF INSULIN (HCC): ICD-10-CM

## 2019-12-13 DIAGNOSIS — Z79.891 CHRONIC PRESCRIPTION OPIATE USE: Primary | ICD-10-CM

## 2019-12-13 PROCEDURE — 99213 OFFICE O/P EST LOW 20 MIN: CPT | Performed by: FAMILY MEDICINE

## 2019-12-13 RX ORDER — OXYCODONE HYDROCHLORIDE 10 MG/1
10 TABLET ORAL 3 TIMES DAILY
Qty: 90 TABLET | Refills: 0 | Status: SHIPPED | OUTPATIENT
Start: 2019-12-13 | End: 2020-01-14 | Stop reason: SDUPTHER

## 2019-12-13 RX ORDER — GLIPIZIDE 5 MG/1
5 TABLET, FILM COATED, EXTENDED RELEASE ORAL DAILY
Qty: 90 TABLET | Refills: 3 | Status: SHIPPED | OUTPATIENT
Start: 2019-12-13 | End: 2020-12-31

## 2019-12-13 RX ORDER — FENTANYL 50 UG/H
PATCH TRANSDERMAL
Qty: 10 PATCH | Refills: 0 | Status: SHIPPED | OUTPATIENT
Start: 2019-12-13 | End: 2020-01-14 | Stop reason: SDUPTHER

## 2019-12-13 RX ORDER — NALOXONE HYDROCHLORIDE 4 MG/.1ML
SPRAY NASAL
Qty: 1 EACH | Refills: 0 | Status: SHIPPED | OUTPATIENT
Start: 2019-12-13

## 2019-12-13 RX ORDER — EPINEPHRINE 0.3 MG/.3ML
1 INJECTION SUBCUTANEOUS AS NEEDED
Refills: 0 | COMMUNITY
Start: 2019-11-15 | End: 2021-08-02 | Stop reason: SDUPTHER

## 2019-12-13 NOTE — PROGRESS NOTES
Assessment/Plan:    Chronic low back pain without sciatica  Patient has chronic low back pain  He also has severe osteoarthritis of the knees  His back pain is so severe that he needs high dose opiates to manage his pain  We have tried to reduce his medications  I have been unable to reduce the dose of his opiates any further  I feel that medication refill is appropriate  The [facility] prescription drug monitoring program was queried  There were no red flags and it was safe to proceed  I refilled his prescription for fentanyl as well as his prescription for oxycodone  Because of the high dose, I refilled the patient's prescription for Narcan  I advised him to instruct his son on how to administer the Narcan, should this problem ever arise  Diagnoses and all orders for this visit:    Chronic prescription opiate use  -     Naloxone HCl (NARCAN) 4 MG/0 1ML LIQD; Administer 1 dose prn unresponsiveness    Type 2 diabetes mellitus without complication, without long-term current use of insulin ([facility])  -     glipiZIDE (GLUCOTROL XL) 5 mg 24 hr tablet; Take 1 tablet (5 mg total) by mouth daily    Chronic low back pain without sciatica, unspecified back pain laterality  -     oxyCODONE (ROXICODONE) 10 MG TABS; Take 1 tablet (10 mg total) by mouth 3 (three) times a dayMax Daily Amount: 30 mg  -     fentaNYL (DURAGESIC) 50 mcg/hr; Apply 1 patch every 3 days    Primary osteoarthritis of both knees    Other orders  -     EPINEPHrine (EPIPEN) 0 3 mg/0 3 mL SOAJ; Inject 1 mL into a muscle as needed          BMI Counseling: Body mass index is 37 92 kg/m²  The BMI is above normal  Nutrition recommendations include reducing portion sizes, decreasing overall calorie intake, 3-5 servings of fruits/vegetables daily, reducing fast food intake, consuming healthier snacks, decreasing soda and/or juice intake and moderation in carbohydrate intake  Exercise recommendations include exercising 3-5 times per week    Subjective: Patient ID: Dakota Hong  is a 71 y o  male  The patient is a 26-year-old white male presents to the office today for pain medication refill  Patient continues to complain of severe chronic lower back pain  He also continues to complain of severe pain in both knees  He is debating with himself whether not he should have his knee replacement  He denies any excessive sedation from the pain medication  He denies any constipation  He tells me his prescription for Narcan as   He needs a refill for this  He does not think he needs it stating that he is used to the medication  The following portions of the patient's history were reviewed and updated as appropriate: allergies, current medications, past family history, past medical history, past social history, past surgical history and problem list     Review of Systems   Cardiovascular: Negative for chest pain, palpitations and leg swelling  Endocrine: Negative for polydipsia, polyphagia and polyuria  Musculoskeletal: Positive for arthralgias, back pain and gait problem  Objective:      /68 (BP Location: Left arm, Patient Position: Sitting, Cuff Size: Large)   Pulse 76   Ht 6' (1 829 m)   Wt 127 kg (279 lb 9 6 oz)   SpO2 97%   BMI 37 92 kg/m²          Physical Exam   Constitutional:   Patient is a 26-year-old white male who appears his stated age  He was seated on the exam room chair when I entered the room  He was in no distress  HENT:   Head: Normocephalic and atraumatic  Right Ear: External ear normal    Left Ear: External ear normal    Mouth/Throat: Oropharynx is clear and moist  No oropharyngeal exudate  Tympanic membranes are clear   Eyes: Pupils are equal, round, and reactive to light  Conjunctivae are normal  No scleral icterus  Neck: Neck supple  No tracheal deviation present  No thyromegaly present  Cardiovascular: Normal rate, regular rhythm and normal heart sounds   Exam reveals no gallop and no friction rub  No murmur heard  Pulmonary/Chest: Effort normal and breath sounds normal  No stridor  No respiratory distress  He has no wheezes  He has no rales  Abdominal: Soft  Bowel sounds are normal  He exhibits no distension and no mass  There is no tenderness  There is no rebound and no guarding  Musculoskeletal:   There is decreased range of motion present of the lumbar spine  Swelling of both knees is present with crepitus to palpation  Lymphadenopathy:     He has no cervical adenopathy  Vitals reviewed      extremities:  Without cyanosis, clubbing, or edema

## 2019-12-13 NOTE — PATIENT INSTRUCTIONS
Osteoarthritis   AMBULATORY CARE:   Osteoarthritis  occurs when cartilage (tissue that cushions a joint) wears away slowly and causes the bones to rub together  Osteoarthritis (OA) is a long-term condition that often affects the hands, neck, lower back, knees, and hips  OA is also called arthrosis or degenerative joint disease  Common signs and symptoms include the following:   · Joint pain that gets worse when you move the joint     · Joint stiffness that decreases after you move the joint     · Decreased range of movement     · Hard, bony enlargement on your fingers or toes    · A grinding or cracking sound when you move your joint  Seek care immediately if:   · You have severe pain  · You cannot move your joint  Contact your healthcare provider if:   · You have a fever  · Your joint is red and tender  · You have questions or concerns about your condition or care  Treatment for osteoarthritis  may include any of the following:  · Acetaminophen  is used to decrease pain  It is available without a doctor's order  Ask how much to take and how often to take it  Follow directions  Acetaminophen can cause liver damage if not taken correctly  · NSAIDs , such as ibuprofen, help decrease swelling, pain, and fever  This medicine is available with or without a doctor's order  NSAIDs can cause stomach bleeding or kidney problems in certain people  If you take blood thinner medicine, always ask your healthcare provider if NSAIDs are safe for you  Always read the medicine label and follow directions  · Capsaicin cream  may help decrease pain in your joint  · Prescription pain medicine  may be given to decrease severe pain if other medicines do not work  Take the medicine as directed  Do not wait until the pain is severe before you take your medicine  · A steroid injection  may be given if your symptoms get worse      · Physical therapy  is used to teach you exercises to help improve movement and strength, and to decrease pain  · Surgery  may be needed if other treatments do not work  Manage osteoarthritis   · Stay active  Physical activity may reduce your pain and improve your ability to do daily activities  Avoid activities that cause pain  Ask your healthcare provider what type of exercise would be best for you  · Maintain a healthy weight  This helps decrease the strain on the joints in your back, hips, knees, ankles, and feet  Ask your healthcare provider how much you should weigh  Ask him to help you create a weight loss plan if you are overweight  · Use heat or ice  on your joints as directed  Heat and ice help decrease pain, swelling, and muscle spasms  Use a heating pad on a low setting or take a warm bath  Use an ice pack, or put crushed ice in a plastic bag  Cover it with a towel  · Massage  the muscles around the joint to relieve pain and stiffness  · Use a cane, crutches, or a walker  to protect and relieve pressure on your ankle, knee, and hip joints  You may also be prescribed shoe inserts to decrease pressure in your joints  · Wear flat or low-heeled shoes  This will help decrease pain and reduce pressure on your ankle, knee, and hip joints  Follow up with your healthcare provider as directed:  Write down your questions so you remember to ask them during your visits  © 2017 [address] Information is for End User's use only and may not be sold, redistributed or otherwise used for commercial purposes  All illustrations and images included in CareNotes® are the copyrighted property of [employer]  or Kenny Quiñones  The above information is an  only  It is not intended as medical advice for individual conditions or treatments  Talk to your doctor, nurse or pharmacist before following any medical regimen to see if it is safe and effective for you

## 2019-12-13 NOTE — ASSESSMENT & PLAN NOTE
Patient has chronic low back pain  He also has severe osteoarthritis of the knees  His back pain is so severe that he needs high dose opiates to manage his pain  We have tried to reduce his medications  I have been unable to reduce the dose of his opiates any further  I feel that medication refill is appropriate  The [place] prescription drug monitoring program was queried  There were no red flags and it was safe to proceed  I refilled his prescription for fentanyl as well as his prescription for oxycodone  Because of the high dose, I refilled the patient's prescription for Narcan  I advised him to instruct his son on how to administer the Narcan, should this problem ever arise

## 2020-01-14 ENCOUNTER — OFFICE VISIT (OUTPATIENT)
Dept: FAMILY MEDICINE CLINIC | Facility: CLINIC | Age: 65
End: 2020-01-14
Payer: MEDICARE

## 2020-01-14 VITALS
BODY MASS INDEX: 37.98 KG/M2 | HEIGHT: 72 IN | DIASTOLIC BLOOD PRESSURE: 68 MMHG | HEART RATE: 75 BPM | WEIGHT: 280.4 LBS | SYSTOLIC BLOOD PRESSURE: 132 MMHG | OXYGEN SATURATION: 97 %

## 2020-01-14 DIAGNOSIS — E11.9 TYPE 2 DIABETES MELLITUS WITHOUT COMPLICATION, WITHOUT LONG-TERM CURRENT USE OF INSULIN (HCC): ICD-10-CM

## 2020-01-14 DIAGNOSIS — E11.42 DIABETIC POLYNEUROPATHY ASSOCIATED WITH TYPE 2 DIABETES MELLITUS (HCC): ICD-10-CM

## 2020-01-14 DIAGNOSIS — G89.29 CHRONIC LOW BACK PAIN WITHOUT SCIATICA, UNSPECIFIED BACK PAIN LATERALITY: Primary | ICD-10-CM

## 2020-01-14 DIAGNOSIS — E78.2 MIXED HYPERLIPIDEMIA: ICD-10-CM

## 2020-01-14 DIAGNOSIS — M54.50 CHRONIC LOW BACK PAIN WITHOUT SCIATICA, UNSPECIFIED BACK PAIN LATERALITY: Primary | ICD-10-CM

## 2020-01-14 PROBLEM — E10.8 TYPE 1 DIABETES MELLITUS WITH COMPLICATION (HCC): Status: ACTIVE | Noted: 2020-01-14

## 2020-01-14 PROBLEM — E10.8 TYPE 1 DIABETES MELLITUS WITH COMPLICATION (HCC): Status: RESOLVED | Noted: 2020-01-14 | Resolved: 2020-01-14

## 2020-01-14 PROCEDURE — 99213 OFFICE O/P EST LOW 20 MIN: CPT | Performed by: FAMILY MEDICINE

## 2020-01-14 RX ORDER — OXYCODONE HYDROCHLORIDE 10 MG/1
10 TABLET ORAL 3 TIMES DAILY
Qty: 90 TABLET | Refills: 0 | Status: SHIPPED | OUTPATIENT
Start: 2020-01-14 | End: 2020-02-14 | Stop reason: SDUPTHER

## 2020-01-14 RX ORDER — FENTANYL 50 UG/H
PATCH TRANSDERMAL
Qty: 10 PATCH | Refills: 0 | Status: SHIPPED | OUTPATIENT
Start: 2020-01-14 | End: 2020-02-14 | Stop reason: SDUPTHER

## 2020-01-14 NOTE — PATIENT INSTRUCTIONS
Foot Care for People with Diabetes   WHAT YOU NEED TO KNOW:   · Foot care helps protect your feet and prevent foot ulcers or sores  Long-term high blood sugar levels can damage the blood vessels and nerves in your legs and feet  This damage makes it hard to feel pressure, pain, temperature, and touch  You may not be able to feel a cut or sore, or shoes that are too tight  Foot care is needed to prevent serious problems, such as an infection or amputation  · Diabetes may cause your toes to become crooked or curved under  These changes may affect the way you walk and can lead to increased pressure on your foot  The pressure can decrease blood flow to your feet  Lack of blood flow increases your risk for a foot ulcer  Do not ignore small problems, such as dry skin or small wounds  These can become life-threatening over time without proper care  DISCHARGE INSTRUCTIONS:   Contact your healthcare provider if:   · Your feet become numb, weak, or hard to move  · You have pus draining from a sore on your foot  · You have a wound on your foot that gets bigger, deeper, or does not heal      · You see blisters, cuts, scratches, calluses, or sores on your foot  · You have a fever, and your feet become red, warm, and swollen  · Your toenails become thick, curled, or yellow  · You find it hard to check your feet because your vision is poor  · You have questions or concerns about your condition or care  Foot care:   · Check your feet each day  Look at your whole foot, including the bottom, and between and under your toes  Check for wounds, corns, and calluses  Use a mirror to see the bottom of your feet  The skin on your feet may be shiny, tight, or darker than normal  Your feet may also be cold and pale  Feel your feet by running your hands along the tops, bottoms, sides, and between your toes  Redness, swelling, and warmth are signs of blood flow problems that can lead to a foot ulcer   Do not try to remove corns or calluses yourself  · Wash your feet each day with soap and warm water  Do not use hot water, because this can injure your foot  Dry your feet gently with a towel after you wash them  Dry between and under your toes  · Apply lotion or a moisturizer on your dry feet  Ask your healthcare provider what lotions are best to use  Do not put lotion or moisturizer between your toes  · Cut your toenails correctly  File or cut your toenails straight across  Use a soft brush to clean around your toenails  If your toenails are very thick, you may need to have a healthcare provider or specialist cut them  · Protect your feet  Do not walk barefoot or wear your shoes without socks  Check your shoes for rocks or other objects that can hurt your feet  Wear cotton socks to help keep your feet dry  Wear socks without toe seams, or wear them with the seams inside out  Change your socks each day  Do not wear socks that are dirty or damp  · Wear shoes that fit well  Wear shoes that do not rub against any area of your feet  Your shoes should be ½ to ¾ inch (1 to 2 centimeters) longer than your feet  Your shoes should also have extra space around the widest part of your feet  Walking or athletic shoes with laces or straps that adjust are best  Ask your healthcare provider for help to choose shoes that fit you best  Ask him if you need to wear an insert, orthotic, or bandage on your feet  Follow up with your healthcare provider or foot specialist as directed: You will need to have your feet checked at least once a year  You may need a foot exam more often if you have nerve damage, foot deformities, or ulcers  Write down your questions so you remember to ask them during your visits  © 2017 [address] Information is for End User's use only and may not be sold, redistributed or otherwise used for commercial purposes   All illustrations and images included in CareNotes® are the copyrighted property of [employer]  or Kenny Quiñones  The above information is an  only  It is not intended as medical advice for individual conditions or treatments  Talk to your doctor, nurse or pharmacist before following any medical regimen to see if it is safe and effective for you

## 2020-01-14 NOTE — PROGRESS NOTES
Assessment/Plan:    Chronic low back pain without sciatica  Patient has chronic low back pain which interferes with his ability to care for himself  The patient is benefit from the pain medication  I am aware of the high dose  Patient has been on this dose for a long time  I have been unsuccessful in trying to wean the dose down  Patient has tolerated it well  He has Narcan at home  I queried the [place] prescription drug monitoring program   There were no red flags  It was safe to proceed  I felt that refill was appropriate  I refilled the patient's prescription for fentanyl patch as well as oxycodone  Type 2 diabetes mellitus without complication, without long-term current use of insulin ([facility])    Lab Results   Component Value Date    HGBA1C 8 2 (H) 10/15/2019    Patient has type 2 diabetes mellitus  His last hemoglobin A1c went up  I ordered repeat fasting blood work to be done prior to his next visit  Patient had a recent dilated eye exam by Dr Shoemaker Wyatt  We will contact her for the results of the exam   I discussed diabetic foot care with the patient  He does have neuropathy  He will continue gabapentin  I encouraged the patient to lose weight and follow a diabetic diet  Diagnoses and all orders for this visit:    Chronic low back pain without sciatica, unspecified back pain laterality  -     fentaNYL (DURAGESIC) 50 mcg/hr; Apply 1 patch every 3 days  -     oxyCODONE (ROXICODONE) 10 MG TABS; Take 1 tablet (10 mg total) by mouth 3 (three) times a dayMax Daily Amount: 30 mg    Diabetic polyneuropathy associated with type 2 diabetes mellitus ([address] )    Type 2 diabetes mellitus without complication, without long-term current use of insulin ([facility])  -     Hemoglobin A1C; Future  -     Basic metabolic panel; Future    Mixed hyperlipidemia  -     LDL cholesterol, direct; Future        BMI Counseling: Body mass index is 38 03 kg/m²   The BMI is above normal  Nutrition recommendations include decreasing portion sizes, encouraging healthy choices of fruits and vegetables, decreasing fast food intake, consuming healthier snacks, limiting drinks that contain sugar and moderation in carbohydrate intake  No pharmacotherapy was ordered  Subjective:      Patient ID: Estefani Cool  is a 71 y o  male  Patient is a 59-year-old white male who presents to the office today for his routine checkup  The patient has no new medical problems  He continues to complain of chronic pain in his back and as well as chronic pain in his knees  He requests a refill for his pain medication  He has gained 7 pounds since he is November 15 visit, which was before the holidays  He tells me despite this, his blood sugars have been well controlled  They seem to be running between   The following portions of the patient's history were reviewed and updated as appropriate: allergies, current medications, past family history, past medical history, past social history, past surgical history and problem list     Review of Systems   Cardiovascular: Negative for chest pain, palpitations and leg swelling  Gastrointestinal: Negative for abdominal distention, abdominal pain, blood in stool, constipation, diarrhea and vomiting  Musculoskeletal: Positive for arthralgias, back pain and gait problem  Objective:      /68 (BP Location: Left arm, Patient Position: Sitting, Cuff Size: Large)   Pulse 75   Ht 6' (1 829 m)   Wt 127 kg (280 lb 6 4 oz)   SpO2 97%   BMI 38 03 kg/m²          Physical Exam   Constitutional: He appears well-developed and well-nourished  No distress  HENT:   Head: Normocephalic and atraumatic  Right Ear: External ear normal    Left Ear: External ear normal    Mouth/Throat: Oropharynx is clear and moist  No oropharyngeal exudate  Eyes: Pupils are equal, round, and reactive to light  Conjunctivae are normal  No scleral icterus  Neck: Neck supple   No tracheal deviation present  No thyromegaly present  Cardiovascular: Normal rate and regular rhythm  Exam reveals no gallop and no friction rub  Pulses are no weak pulses  Murmur (There is a grade 2/6 systolic murmur present) heard  Pulses:       Dorsalis pedis pulses are 2+ on the right side, and 2+ on the left side  Posterior tibial pulses are 2+ on the right side, and 2+ on the left side  Pulmonary/Chest: Effort normal and breath sounds normal  No stridor  No respiratory distress  He has no wheezes  He has no rales  Abdominal: Soft  Bowel sounds are normal  He exhibits no distension and no mass  There is no tenderness  There is no rebound and no guarding  Musculoskeletal: He exhibits tenderness ( tenderness in the lumbar spine is noted  )  He exhibits no edema or deformity  Decreased lumbar range of motion is noted  Swelling of both knees is noted  Feet:   Right Foot:   Skin Integrity: Negative for ulcer, skin breakdown, erythema, warmth, callus or dry skin  Left Foot:   Skin Integrity: Negative for ulcer, skin breakdown, erythema, warmth, callus or dry skin  Lymphadenopathy:     He has no cervical adenopathy  Vitals reviewed  Patient's shoes and socks removed  Right Foot/Ankle   Right Foot Inspection  Skin Exam: skin normal and skin intact no dry skin, no warmth, no callus, no erythema, no maceration, no abnormal color, no pre-ulcer, no ulcer and no callus                          Toe Exam: no swelling, no tenderness, erythema and  no right toe deformity  Sensory   Vibration: diminished  Proprioception: intact   Monofilament testing: absent  Vascular  Capillary refills: < 3 seconds  The right DP pulse is 2+  The right PT pulse is 2+       Left Foot/Ankle  Left Foot Inspection  Skin Exam: skin normal and skin intactno dry skin, no warmth, no erythema, no maceration, normal color, no pre-ulcer, no ulcer and no callus                         Toe Exam: no swelling, no tenderness and no erythema Sensory   Vibration: diminished  Proprioception: intact  Monofilament: absent  Vascular  Capillary refills: < 3 seconds  The left DP pulse is 2+  The left PT pulse is 2+  Assign Risk Category:  No deformity present; Loss of protective sensation;  No weak pulses       Risk: 1

## 2020-01-15 NOTE — ASSESSMENT & PLAN NOTE
Patient has chronic low back pain which interferes with his ability to care for himself  The patient is benefit from the pain medication  I am aware of the high dose  Patient has been on this dose for a long time  I have been unsuccessful in trying to wean the dose down  Patient has tolerated it well  He has Narcan at home  I queried the [place] prescription drug monitoring program   There were no red flags  It was safe to proceed  I felt that refill was appropriate  I refilled the patient's prescription for fentanyl patch as well as oxycodone

## 2020-01-15 NOTE — ASSESSMENT & PLAN NOTE
Lab Results   Component Value Date    HGBA1C 8 2 (H) 10/15/2019    Patient has type 2 diabetes mellitus  His last hemoglobin A1c went up  I ordered repeat fasting blood work to be done prior to his next visit  Patient had a recent dilated eye exam by Dr Marni Ruiz  We will contact her for the results of the exam   I discussed diabetic foot care with the patient  He does have neuropathy  He will continue gabapentin  I encouraged the patient to lose weight and follow a diabetic diet

## 2020-02-14 DIAGNOSIS — E11.9 TYPE 2 DIABETES MELLITUS WITHOUT COMPLICATION, WITHOUT LONG-TERM CURRENT USE OF INSULIN (HCC): ICD-10-CM

## 2020-02-14 DIAGNOSIS — G89.29 CHRONIC LOW BACK PAIN WITHOUT SCIATICA, UNSPECIFIED BACK PAIN LATERALITY: ICD-10-CM

## 2020-02-14 DIAGNOSIS — M54.50 CHRONIC LOW BACK PAIN WITHOUT SCIATICA, UNSPECIFIED BACK PAIN LATERALITY: ICD-10-CM

## 2020-02-14 RX ORDER — GLIPIZIDE 5 MG/1
5 TABLET, FILM COATED, EXTENDED RELEASE ORAL DAILY
Qty: 90 TABLET | Refills: 3 | Status: CANCELLED | OUTPATIENT
Start: 2020-02-14

## 2020-02-14 RX ORDER — OXYCODONE HYDROCHLORIDE 10 MG/1
10 TABLET ORAL 3 TIMES DAILY
Qty: 90 TABLET | Refills: 0 | Status: SHIPPED | OUTPATIENT
Start: 2020-02-14 | End: 2020-03-20 | Stop reason: SDUPTHER

## 2020-02-14 RX ORDER — FENTANYL 50 UG/H
PATCH TRANSDERMAL
Qty: 10 PATCH | Refills: 0 | Status: SHIPPED | OUTPATIENT
Start: 2020-02-14 | End: 2020-03-20 | Stop reason: SDUPTHER

## 2020-02-14 NOTE — PROGRESS NOTES
The [place] prescription drug monitoring program was queried  There were no red flags  Safe to proceed

## 2020-02-21 ENCOUNTER — OFFICE VISIT (OUTPATIENT)
Dept: FAMILY MEDICINE CLINIC | Facility: CLINIC | Age: 65
End: 2020-02-21
Payer: MEDICARE

## 2020-02-21 VITALS
BODY MASS INDEX: 38.82 KG/M2 | HEART RATE: 79 BPM | SYSTOLIC BLOOD PRESSURE: 130 MMHG | DIASTOLIC BLOOD PRESSURE: 70 MMHG | OXYGEN SATURATION: 96 % | HEIGHT: 72 IN | WEIGHT: 286.6 LBS

## 2020-02-21 DIAGNOSIS — E78.2 MIXED HYPERLIPIDEMIA: ICD-10-CM

## 2020-02-21 DIAGNOSIS — I10 ESSENTIAL HYPERTENSION, BENIGN: ICD-10-CM

## 2020-02-21 DIAGNOSIS — G89.29 CHRONIC LOW BACK PAIN WITHOUT SCIATICA, UNSPECIFIED BACK PAIN LATERALITY: ICD-10-CM

## 2020-02-21 DIAGNOSIS — E11.9 TYPE 2 DIABETES MELLITUS WITHOUT COMPLICATION, WITHOUT LONG-TERM CURRENT USE OF INSULIN (HCC): Primary | ICD-10-CM

## 2020-02-21 DIAGNOSIS — M54.50 CHRONIC LOW BACK PAIN WITHOUT SCIATICA, UNSPECIFIED BACK PAIN LATERALITY: ICD-10-CM

## 2020-02-21 PROCEDURE — 4040F PNEUMOC VAC/ADMIN/RCVD: CPT | Performed by: FAMILY MEDICINE

## 2020-02-21 PROCEDURE — 3075F SYST BP GE 130 - 139MM HG: CPT | Performed by: FAMILY MEDICINE

## 2020-02-21 PROCEDURE — 3078F DIAST BP <80 MM HG: CPT | Performed by: FAMILY MEDICINE

## 2020-02-21 PROCEDURE — 1036F TOBACCO NON-USER: CPT | Performed by: FAMILY MEDICINE

## 2020-02-21 PROCEDURE — 99213 OFFICE O/P EST LOW 20 MIN: CPT | Performed by: FAMILY MEDICINE

## 2020-02-21 NOTE — PROGRESS NOTES
Assessment/Plan:    Type 2 diabetes mellitus without complication, without long-term current use of insulin ([facility] [address] )    Lab Results   Component Value Date    HGBA1C 8 2 (H) 10/15/2019    patient for got to get the blood work done that was previously ordered  He tells me he plans to go tomorrow  We discussed dilated eye exam   We also discussed routine diabetic foot care    Essential hypertension, benign   Blood pressure is well controlled on his current regiment  I recommended no changes    Mixed hyperlipidemia   A direct LDL cholesterol was previously ordered  He will have this done tomorrow  His goal LDL cholesterol is less than 70    Chronic low back pain without sciatica   Patient has chronic low back pain  We discussed dose reduction  He tells me he is suffering and not doing well since his last dose reduction  He does not think he would be able to get by  He has not had any side effects from his medication  He has Narcan at home  I will continue him on the same dose of fentanyl and oxycodone  Diagnoses and all orders for this visit:    Type 2 diabetes mellitus without complication, without long-term current use of insulin ([facility])    Essential hypertension, benign    Mixed hyperlipidemia    Chronic low back pain without sciatica, unspecified back pain laterality          Subjective:      Patient ID: Zaid Bonner  is a 71 y o  male  This patient is a 58-year-old white male presents to the office today for his routine checkup  The patient continues to experience a lot of pain in his lower back, unchanged  He is also experiencing a lot of pain in his knees  He is currently going for viscosupplementation injections  He tells me if this does not help, plans on knee replacement  He continues to take high-dose opiates  He reports no side effects  Specifically, he denies any excessive sedation  He has not experienced any constipation        The following portions of the patient's history were reviewed and updated as appropriate: allergies, current medications, past family history, past medical history, past social history, past surgical history and problem list     Review of Systems   Cardiovascular: Negative for chest pain, palpitations and leg swelling  Endocrine: Negative for polydipsia, polyphagia and polyuria  Musculoskeletal: Positive for arthralgias, back pain and gait problem  Objective:      /70 (BP Location: Left arm, Patient Position: Sitting, Cuff Size: Adult)   Pulse 79   Ht 6' (1 829 m)   Wt 130 kg (286 lb 9 6 oz)   SpO2 96%   BMI 38 87 kg/m²          Physical Exam   Constitutional:   This is an obese 69-year-old white male who appears his stated age  He is in no distress   HENT:   Head: Normocephalic and atraumatic  Right Ear: External ear normal    Left Ear: External ear normal    Mouth/Throat: Oropharynx is clear and moist  No oropharyngeal exudate  Tympanic membranes are clear   Eyes: Pupils are equal, round, and reactive to light  Conjunctivae are normal  No scleral icterus  Neck: Neck supple  No tracheal deviation present  No thyromegaly present  There were no carotid bruits noted   Cardiovascular: Normal rate, regular rhythm and normal heart sounds  Exam reveals no gallop and no friction rub  No murmur heard  Pulmonary/Chest: Effort normal and breath sounds normal  No stridor  No respiratory distress  He has no wheezes  He has no rales  Lymphadenopathy:     He has no cervical adenopathy  Psychiatric: He has a normal mood and affect  His behavior is normal  Judgment and thought content normal    Vitals reviewed        Extremities: Without cyanosis, clubbing, or edema

## 2020-02-22 ENCOUNTER — APPOINTMENT (OUTPATIENT)
Dept: LAB | Facility: HOSPITAL | Age: 65
End: 2020-02-22
Attending: FAMILY MEDICINE
Payer: MEDICARE

## 2020-02-22 DIAGNOSIS — E78.2 MIXED HYPERLIPIDEMIA: ICD-10-CM

## 2020-02-22 DIAGNOSIS — E11.9 TYPE 2 DIABETES MELLITUS WITHOUT COMPLICATION, WITHOUT LONG-TERM CURRENT USE OF INSULIN (HCC): ICD-10-CM

## 2020-02-22 LAB
ANION GAP SERPL CALCULATED.3IONS-SCNC: 12 MMOL/L (ref 4–13)
BUN SERPL-MCNC: 13 MG/DL (ref 5–25)
CALCIUM SERPL-MCNC: 9 MG/DL (ref 8.3–10.1)
CHLORIDE SERPL-SCNC: 104 MMOL/L (ref 100–108)
CO2 SERPL-SCNC: 24 MMOL/L (ref 21–32)
CREAT SERPL-MCNC: 1.06 MG/DL (ref 0.6–1.3)
GFR SERPL CREATININE-BSD FRML MDRD: 73 ML/MIN/1.73SQ M
GLUCOSE P FAST SERPL-MCNC: 131 MG/DL (ref 65–99)
LDLC SERPL DIRECT ASSAY-MCNC: 82 MG/DL (ref 0–100)
POTASSIUM SERPL-SCNC: 4.8 MMOL/L (ref 3.5–5.3)
SODIUM SERPL-SCNC: 140 MMOL/L (ref 136–145)

## 2020-02-22 PROCEDURE — 80048 BASIC METABOLIC PNL TOTAL CA: CPT

## 2020-02-22 PROCEDURE — 83721 ASSAY OF BLOOD LIPOPROTEIN: CPT

## 2020-02-22 PROCEDURE — 36415 COLL VENOUS BLD VENIPUNCTURE: CPT

## 2020-02-22 NOTE — ASSESSMENT & PLAN NOTE
A direct LDL cholesterol was previously ordered  He will have this done tomorrow    His goal LDL cholesterol is less than 70

## 2020-02-22 NOTE — ASSESSMENT & PLAN NOTE
Lab Results   Component Value Date    HGBA1C 8 2 (H) 10/15/2019    patient for got to get the blood work done that was previously ordered  He tells me he plans to go tomorrow    We discussed dilated eye exam   We also discussed routine diabetic foot care

## 2020-02-22 NOTE — ASSESSMENT & PLAN NOTE
Patient has chronic low back pain  We discussed dose reduction  He tells me he is suffering and not doing well since his last dose reduction  He does not think he would be able to get by  He has not had any side effects from his medication  He has Narcan at home  I will continue him on the same dose of fentanyl and oxycodone

## 2020-02-28 ENCOUNTER — APPOINTMENT (OUTPATIENT)
Dept: LAB | Facility: HOSPITAL | Age: 65
End: 2020-02-28
Attending: FAMILY MEDICINE
Payer: MEDICARE

## 2020-02-28 DIAGNOSIS — E11.9 TYPE 2 DIABETES MELLITUS WITHOUT COMPLICATION, WITHOUT LONG-TERM CURRENT USE OF INSULIN (HCC): ICD-10-CM

## 2020-02-28 LAB
EST. AVERAGE GLUCOSE BLD GHB EST-MCNC: 157 MG/DL
HBA1C MFR BLD: 7.1 %

## 2020-02-28 PROCEDURE — 36415 COLL VENOUS BLD VENIPUNCTURE: CPT

## 2020-02-28 PROCEDURE — 83036 HEMOGLOBIN GLYCOSYLATED A1C: CPT

## 2020-03-09 DIAGNOSIS — I10 ESSENTIAL HYPERTENSION, BENIGN: Primary | ICD-10-CM

## 2020-03-09 RX ORDER — AMLODIPINE BESYLATE 10 MG/1
TABLET ORAL
Qty: 90 TABLET | Refills: 2 | Status: SHIPPED | OUTPATIENT
Start: 2020-03-09 | End: 2020-12-10

## 2020-03-10 DIAGNOSIS — E78.2 MIXED HYPERLIPIDEMIA: Primary | ICD-10-CM

## 2020-03-10 RX ORDER — ATORVASTATIN CALCIUM 40 MG/1
TABLET, FILM COATED ORAL
Qty: 90 TABLET | Refills: 2 | Status: SHIPPED | OUTPATIENT
Start: 2020-03-10 | End: 2020-12-10

## 2020-03-19 DIAGNOSIS — M54.50 CHRONIC LOW BACK PAIN WITHOUT SCIATICA, UNSPECIFIED BACK PAIN LATERALITY: Primary | ICD-10-CM

## 2020-03-19 DIAGNOSIS — G89.29 CHRONIC LOW BACK PAIN WITHOUT SCIATICA, UNSPECIFIED BACK PAIN LATERALITY: Primary | ICD-10-CM

## 2020-03-19 RX ORDER — GABAPENTIN 600 MG/1
TABLET ORAL
Qty: 270 TABLET | Refills: 1 | Status: SHIPPED | OUTPATIENT
Start: 2020-03-19 | End: 2020-06-10

## 2020-03-20 DIAGNOSIS — G89.29 CHRONIC LOW BACK PAIN WITHOUT SCIATICA, UNSPECIFIED BACK PAIN LATERALITY: ICD-10-CM

## 2020-03-20 DIAGNOSIS — M54.50 CHRONIC LOW BACK PAIN WITHOUT SCIATICA, UNSPECIFIED BACK PAIN LATERALITY: ICD-10-CM

## 2020-03-20 RX ORDER — METHOCARBAMOL 750 MG/1
750 TABLET, FILM COATED ORAL 3 TIMES DAILY
Qty: 90 TABLET | Refills: 5 | Status: SHIPPED | OUTPATIENT
Start: 2020-03-20 | End: 2020-03-23 | Stop reason: SDUPTHER

## 2020-03-20 RX ORDER — OXYCODONE HYDROCHLORIDE 10 MG/1
10 TABLET ORAL 3 TIMES DAILY
Qty: 90 TABLET | Refills: 0 | Status: SHIPPED | OUTPATIENT
Start: 2020-03-20 | End: 2020-04-21 | Stop reason: SDUPTHER

## 2020-03-20 RX ORDER — FENTANYL 50 UG/H
PATCH TRANSDERMAL
Qty: 10 PATCH | Refills: 0 | Status: SHIPPED | OUTPATIENT
Start: 2020-03-20 | End: 2020-04-21 | Stop reason: SDUPTHER

## 2020-03-23 ENCOUNTER — OFFICE VISIT (OUTPATIENT)
Dept: FAMILY MEDICINE CLINIC | Facility: CLINIC | Age: 65
End: 2020-03-23
Payer: MEDICARE

## 2020-03-23 VITALS
OXYGEN SATURATION: 97 % | DIASTOLIC BLOOD PRESSURE: 68 MMHG | SYSTOLIC BLOOD PRESSURE: 132 MMHG | TEMPERATURE: 98.3 F | HEIGHT: 72 IN | BODY MASS INDEX: 37.95 KG/M2 | HEART RATE: 75 BPM | WEIGHT: 280.2 LBS

## 2020-03-23 DIAGNOSIS — I10 ESSENTIAL HYPERTENSION, BENIGN: Primary | ICD-10-CM

## 2020-03-23 DIAGNOSIS — E11.9 TYPE 2 DIABETES MELLITUS WITHOUT COMPLICATION, WITHOUT LONG-TERM CURRENT USE OF INSULIN (HCC): ICD-10-CM

## 2020-03-23 DIAGNOSIS — G89.29 CHRONIC LOW BACK PAIN WITHOUT SCIATICA, UNSPECIFIED BACK PAIN LATERALITY: ICD-10-CM

## 2020-03-23 DIAGNOSIS — M54.50 CHRONIC LOW BACK PAIN WITHOUT SCIATICA, UNSPECIFIED BACK PAIN LATERALITY: ICD-10-CM

## 2020-03-23 PROCEDURE — 3075F SYST BP GE 130 - 139MM HG: CPT | Performed by: FAMILY MEDICINE

## 2020-03-23 PROCEDURE — 4040F PNEUMOC VAC/ADMIN/RCVD: CPT | Performed by: FAMILY MEDICINE

## 2020-03-23 PROCEDURE — 99213 OFFICE O/P EST LOW 20 MIN: CPT | Performed by: FAMILY MEDICINE

## 2020-03-23 PROCEDURE — 3078F DIAST BP <80 MM HG: CPT | Performed by: FAMILY MEDICINE

## 2020-03-23 PROCEDURE — 1036F TOBACCO NON-USER: CPT | Performed by: FAMILY MEDICINE

## 2020-03-23 PROCEDURE — 3008F BODY MASS INDEX DOCD: CPT | Performed by: FAMILY MEDICINE

## 2020-03-23 PROCEDURE — 3051F HG A1C>EQUAL 7.0%<8.0%: CPT | Performed by: FAMILY MEDICINE

## 2020-03-23 RX ORDER — METHOCARBAMOL 750 MG/1
750 TABLET, FILM COATED ORAL 3 TIMES DAILY
Qty: 270 TABLET | Refills: 3 | Status: SHIPPED | OUTPATIENT
Start: 2020-03-23 | End: 2020-06-22 | Stop reason: SDUPTHER

## 2020-03-23 NOTE — ASSESSMENT & PLAN NOTE
Patient has chronic low back pain secondary to lumbar discogenic disease  I had the patient complete a pain management agreement  He will continue his current fentanyl patch and oxycodone for breakthrough pain  We discussed lowering the dose but he does not feel this is possible  He does have Narcan at home for use in an emergency  He has not had any type of sedation or any other side effects from the medication, which he has tolerated well

## 2020-03-23 NOTE — PROGRESS NOTES
Assessment/Plan:    Chronic low back pain without sciatica  Patient has chronic low back pain secondary to lumbar discogenic disease  I had the patient complete a pain management agreement  He will continue his current fentanyl patch and oxycodone for breakthrough pain  We discussed lowering the dose but he does not feel this is possible  He does have Narcan at home for use in an emergency  He has not had any type of sedation or any other side effects from the medication, which he has tolerated well  Essential hypertension, benign  Blood pressure is under excellent control on his current regiment  I recommended no changes    Type 2 diabetes mellitus without complication, without long-term current use of insulin (HCC)    Lab Results   Component Value Date    HGBA1C 7 1 (H) 02/28/2020    Patient has diabetes mellitus which is reasonably well controlled  I encouraged the patient to try to lose weight and try to stay as active as possible  I also asked him to get a dilated eye exam when Ophthalmology reopens  Diagnoses and all orders for this visit:    Essential hypertension, benign    Chronic low back pain without sciatica, unspecified back pain laterality  -     methocarbamol (ROBAXIN) 750 mg tablet; Take 1 tablet (750 mg total) by mouth 3 (three) times a day    Type 2 diabetes mellitus without complication, without long-term current use of insulin ([facility])          Subjective:      Patient ID: René Zaidi  is a 71 y o  male  This patient is a 74-year-old white male presents to the office today for his routine checkup  Patient is doing well and has no complaints  He reports compliance with his medication  He tries to watch his diet  He continues to experience chronic low back pain as well as chronic pain in his knee  He has difficulty getting around as such        The following portions of the patient's history were reviewed and updated as appropriate: allergies, current medications, past family history, past medical history, past social history, past surgical history and problem list     Review of Systems   Cardiovascular: Negative for chest pain, palpitations and leg swelling  Gastrointestinal: Negative for abdominal distention, abdominal pain, constipation, diarrhea, nausea and vomiting  Musculoskeletal: Positive for arthralgias, back pain and gait problem  Objective:      /68 (BP Location: Left arm, Patient Position: Sitting, Cuff Size: Large)   Pulse 75   Temp 98 3 °F (36 8 °C) (Tympanic)   Ht 6' (1 829 m)   Wt 127 kg (280 lb 3 2 oz)   SpO2 97%   BMI 38 00 kg/m²          Physical Exam   Constitutional: He appears well-developed and well-nourished  No distress  HENT:   Head: Normocephalic and atraumatic  Right Ear: External ear normal    Left Ear: External ear normal    Mouth/Throat: Oropharynx is clear and moist  No oropharyngeal exudate  Eyes: Pupils are equal, round, and reactive to light  Conjunctivae and EOM are normal  No scleral icterus  Neck: No tracheal deviation present  No thyromegaly present  Cardiovascular: Normal rate, regular rhythm and normal heart sounds  Exam reveals no gallop and no friction rub  No murmur heard  Pulmonary/Chest: Effort normal and breath sounds normal  No stridor  No respiratory distress  He has no wheezes  He has no rales  Abdominal: Soft  Bowel sounds are normal  He exhibits no distension and no mass  There is no tenderness  There is no rebound and no guarding  Lymphadenopathy:     He has no cervical adenopathy  Vitals reviewed      extremities:  Without cyanosis, clubbing, or edema

## 2020-03-23 NOTE — PATIENT INSTRUCTIONS
Diabetes and Your Skin   AMBULATORY CARE:   Diabetes  can affect every part of your body, including your skin  Diabetes that is not well controlled can damage blood vessels and nerves  Damage to blood vessels can make it hard for blood to flow to tissues and organs  A lack of blood flow to your skin can cause ulcers that are difficult to heal  Skin ulcers are also called sores  People with diabetes can also have more bacterial skin infections than other people  Most skin conditions can be prevented with good blood sugar control  Skin sores can be hard to heal, or become life or limbthreatening, if not treated early  Contact your healthcare provider if:  · You get a severe burn or cut  · You have a sore that is painful, warm to the touch, or has redness around it  · Your sore does not get better or seems to get worse  · You have a fever  · Your blood sugar levels continue to be higher than they should be  Common skin changes:  · A smooth, dark thickness in the skin on your neck, groin, face and underarms    · Dry, flaky skin    · Skin tags on your eyelids, neck, or underarms    · Yellow color of your palms, soles of your feet, and nails    · Dark patches on your shins    · A constant redness of your face and neck     · Thick skin on the backs of your hands and the tops of your feet  Prevent skin sores:   · Keep your blood sugars within target range  Your healthcare provider will tell you what your blood sugar levels should be  High blood sugar levels increase your risk for skin infections and poor wound healing  · Keep your skin clean  Do not take hot baths or showers  They can cause your skin to get dry  Do not take a bubble bath if you have dry skin  Use moisturizing soaps and lotion after baths or showers  Do not put lotion between your toes  · Keep your skin from becoming too dry,  especially in cold, dry weather   When you scratch dry, itchy skin, you can cause your skin to be open to infection  Bathe less during cold weather and use lotion to moisturize  Use a humidifier to keep air in your home from being dry  · Keep areas where skin touches skin dry  Use talcum powder in areas such as armpits and groin  You may also need it under your breasts, and between your toes  Moisture in these areas can cause a fungal infection  · Treat cuts immediately  Clean minor cuts with soap and water  Cover them with sterile gauze  © 2017 [employer] Information is for End User's use only and may not be sold, redistributed or otherwise used for commercial purposes  All illustrations and images included in CareNotes® are the copyrighted property of [employer]  or Kenny Quiñones  The above information is an  only  It is not intended as medical advice for individual conditions or treatments  Talk to your doctor, nurse or pharmacist before following any medical regimen to see if it is safe and effective for you

## 2020-03-23 NOTE — ASSESSMENT & PLAN NOTE
Lab Results   Component Value Date    HGBA1C 7 1 (H) 02/28/2020    Patient has diabetes mellitus which is reasonably well controlled  I encouraged the patient to try to lose weight and try to stay as active as possible  I also asked him to get a dilated eye exam when Ophthalmology reopens

## 2020-04-01 DIAGNOSIS — I25.10 ATHEROSCLEROSIS OF NATIVE CORONARY ARTERY OF NATIVE HEART WITHOUT ANGINA PECTORIS: Primary | ICD-10-CM

## 2020-04-01 RX ORDER — METOPROLOL TARTRATE 50 MG/1
TABLET, FILM COATED ORAL
Qty: 180 TABLET | Refills: 1 | Status: SHIPPED | OUTPATIENT
Start: 2020-04-01 | End: 2020-04-08

## 2020-04-07 DIAGNOSIS — I25.10 ATHEROSCLEROSIS OF NATIVE CORONARY ARTERY OF NATIVE HEART WITHOUT ANGINA PECTORIS: ICD-10-CM

## 2020-04-08 RX ORDER — METOPROLOL TARTRATE 50 MG/1
TABLET, FILM COATED ORAL
Qty: 180 TABLET | Refills: 1 | Status: SHIPPED | OUTPATIENT
Start: 2020-04-08 | End: 2021-03-31

## 2020-04-21 DIAGNOSIS — M54.50 CHRONIC LOW BACK PAIN WITHOUT SCIATICA, UNSPECIFIED BACK PAIN LATERALITY: ICD-10-CM

## 2020-04-21 DIAGNOSIS — G89.29 CHRONIC LOW BACK PAIN WITHOUT SCIATICA, UNSPECIFIED BACK PAIN LATERALITY: ICD-10-CM

## 2020-04-21 RX ORDER — OXYCODONE HYDROCHLORIDE 10 MG/1
10 TABLET ORAL 3 TIMES DAILY
Qty: 90 TABLET | Refills: 0 | Status: SHIPPED | OUTPATIENT
Start: 2020-04-21 | End: 2020-05-22 | Stop reason: SDUPTHER

## 2020-04-21 RX ORDER — FENTANYL 50 UG/H
PATCH TRANSDERMAL
Qty: 10 PATCH | Refills: 0 | Status: SHIPPED | OUTPATIENT
Start: 2020-04-21 | End: 2020-05-22 | Stop reason: SDUPTHER

## 2020-04-22 ENCOUNTER — OFFICE VISIT (OUTPATIENT)
Dept: FAMILY MEDICINE CLINIC | Facility: CLINIC | Age: 65
End: 2020-04-22
Payer: MEDICARE

## 2020-04-22 VITALS
HEART RATE: 78 BPM | DIASTOLIC BLOOD PRESSURE: 72 MMHG | TEMPERATURE: 98.3 F | WEIGHT: 278.2 LBS | BODY MASS INDEX: 37.68 KG/M2 | SYSTOLIC BLOOD PRESSURE: 130 MMHG | OXYGEN SATURATION: 97 % | HEIGHT: 72 IN

## 2020-04-22 DIAGNOSIS — M54.50 CHRONIC LOW BACK PAIN WITHOUT SCIATICA, UNSPECIFIED BACK PAIN LATERALITY: Primary | ICD-10-CM

## 2020-04-22 DIAGNOSIS — G89.29 CHRONIC LOW BACK PAIN WITHOUT SCIATICA, UNSPECIFIED BACK PAIN LATERALITY: Primary | ICD-10-CM

## 2020-04-22 DIAGNOSIS — S76.319A HAMSTRING STRAIN, INITIAL ENCOUNTER: ICD-10-CM

## 2020-04-22 DIAGNOSIS — M17.0 PRIMARY OSTEOARTHRITIS OF BOTH KNEES: ICD-10-CM

## 2020-04-22 PROCEDURE — 99213 OFFICE O/P EST LOW 20 MIN: CPT | Performed by: FAMILY MEDICINE

## 2020-04-22 PROCEDURE — 3051F HG A1C>EQUAL 7.0%<8.0%: CPT | Performed by: FAMILY MEDICINE

## 2020-04-22 PROCEDURE — 3078F DIAST BP <80 MM HG: CPT | Performed by: FAMILY MEDICINE

## 2020-04-22 PROCEDURE — 1036F TOBACCO NON-USER: CPT | Performed by: FAMILY MEDICINE

## 2020-04-22 PROCEDURE — 3008F BODY MASS INDEX DOCD: CPT | Performed by: FAMILY MEDICINE

## 2020-04-22 PROCEDURE — 3075F SYST BP GE 130 - 139MM HG: CPT | Performed by: FAMILY MEDICINE

## 2020-04-22 PROCEDURE — 4040F PNEUMOC VAC/ADMIN/RCVD: CPT | Performed by: FAMILY MEDICINE

## 2020-05-04 ENCOUNTER — OFFICE VISIT (OUTPATIENT)
Dept: FAMILY MEDICINE CLINIC | Facility: CLINIC | Age: 65
End: 2020-05-04
Payer: MEDICARE

## 2020-05-04 VITALS
WEIGHT: 280.8 LBS | TEMPERATURE: 98.6 F | DIASTOLIC BLOOD PRESSURE: 68 MMHG | SYSTOLIC BLOOD PRESSURE: 132 MMHG | OXYGEN SATURATION: 97 % | HEART RATE: 79 BPM | HEIGHT: 72 IN | BODY MASS INDEX: 38.03 KG/M2

## 2020-05-04 DIAGNOSIS — S91.331A PUNCTURE WOUND OF RIGHT FOOT, INITIAL ENCOUNTER: Primary | ICD-10-CM

## 2020-05-04 DIAGNOSIS — Z23 IMMUNIZATION DUE: Primary | ICD-10-CM

## 2020-05-04 PROCEDURE — 99212 OFFICE O/P EST SF 10 MIN: CPT | Performed by: FAMILY MEDICINE

## 2020-05-04 PROCEDURE — 1036F TOBACCO NON-USER: CPT | Performed by: FAMILY MEDICINE

## 2020-05-04 PROCEDURE — 3008F BODY MASS INDEX DOCD: CPT | Performed by: FAMILY MEDICINE

## 2020-05-04 PROCEDURE — 3051F HG A1C>EQUAL 7.0%<8.0%: CPT | Performed by: FAMILY MEDICINE

## 2020-05-04 PROCEDURE — 3078F DIAST BP <80 MM HG: CPT | Performed by: FAMILY MEDICINE

## 2020-05-04 PROCEDURE — 4040F PNEUMOC VAC/ADMIN/RCVD: CPT | Performed by: FAMILY MEDICINE

## 2020-05-04 PROCEDURE — 90471 IMMUNIZATION ADMIN: CPT | Performed by: FAMILY MEDICINE

## 2020-05-04 PROCEDURE — 1160F RVW MEDS BY RX/DR IN RCRD: CPT | Performed by: FAMILY MEDICINE

## 2020-05-04 PROCEDURE — 90715 TDAP VACCINE 7 YRS/> IM: CPT | Performed by: FAMILY MEDICINE

## 2020-05-04 PROCEDURE — 3075F SYST BP GE 130 - 139MM HG: CPT | Performed by: FAMILY MEDICINE

## 2020-05-22 ENCOUNTER — OFFICE VISIT (OUTPATIENT)
Dept: FAMILY MEDICINE CLINIC | Facility: CLINIC | Age: 65
End: 2020-05-22
Payer: MEDICARE

## 2020-05-22 VITALS
DIASTOLIC BLOOD PRESSURE: 72 MMHG | HEIGHT: 72 IN | TEMPERATURE: 99.5 F | SYSTOLIC BLOOD PRESSURE: 120 MMHG | OXYGEN SATURATION: 95 % | BODY MASS INDEX: 38.19 KG/M2 | HEART RATE: 75 BPM | WEIGHT: 282 LBS

## 2020-05-22 DIAGNOSIS — E11.9 TYPE 2 DIABETES MELLITUS WITHOUT COMPLICATION, WITHOUT LONG-TERM CURRENT USE OF INSULIN (HCC): Primary | ICD-10-CM

## 2020-05-22 DIAGNOSIS — M17.0 PRIMARY OSTEOARTHRITIS OF BOTH KNEES: ICD-10-CM

## 2020-05-22 DIAGNOSIS — G89.29 CHRONIC LOW BACK PAIN WITHOUT SCIATICA, UNSPECIFIED BACK PAIN LATERALITY: ICD-10-CM

## 2020-05-22 DIAGNOSIS — M54.50 CHRONIC LOW BACK PAIN WITHOUT SCIATICA, UNSPECIFIED BACK PAIN LATERALITY: ICD-10-CM

## 2020-05-22 PROCEDURE — 3074F SYST BP LT 130 MM HG: CPT | Performed by: FAMILY MEDICINE

## 2020-05-22 PROCEDURE — 3051F HG A1C>EQUAL 7.0%<8.0%: CPT | Performed by: FAMILY MEDICINE

## 2020-05-22 PROCEDURE — 1036F TOBACCO NON-USER: CPT | Performed by: FAMILY MEDICINE

## 2020-05-22 PROCEDURE — 99213 OFFICE O/P EST LOW 20 MIN: CPT | Performed by: FAMILY MEDICINE

## 2020-05-22 PROCEDURE — 1160F RVW MEDS BY RX/DR IN RCRD: CPT | Performed by: FAMILY MEDICINE

## 2020-05-22 PROCEDURE — 3078F DIAST BP <80 MM HG: CPT | Performed by: FAMILY MEDICINE

## 2020-05-22 PROCEDURE — 4040F PNEUMOC VAC/ADMIN/RCVD: CPT | Performed by: FAMILY MEDICINE

## 2020-05-22 RX ORDER — FENTANYL 50 UG/H
PATCH TRANSDERMAL
Qty: 10 PATCH | Refills: 0 | Status: SHIPPED | OUTPATIENT
Start: 2020-05-22 | End: 2020-06-22 | Stop reason: SDUPTHER

## 2020-05-22 RX ORDER — OXYCODONE HYDROCHLORIDE 10 MG/1
10 TABLET ORAL 3 TIMES DAILY
Qty: 90 TABLET | Refills: 0 | Status: SHIPPED | OUTPATIENT
Start: 2020-05-22 | End: 2020-06-22 | Stop reason: SDUPTHER

## 2020-06-10 DIAGNOSIS — M54.50 CHRONIC LOW BACK PAIN WITHOUT SCIATICA, UNSPECIFIED BACK PAIN LATERALITY: ICD-10-CM

## 2020-06-10 DIAGNOSIS — G89.29 CHRONIC LOW BACK PAIN WITHOUT SCIATICA, UNSPECIFIED BACK PAIN LATERALITY: ICD-10-CM

## 2020-06-10 RX ORDER — GABAPENTIN 600 MG/1
TABLET ORAL
Qty: 270 TABLET | Refills: 1 | Status: SHIPPED | OUTPATIENT
Start: 2020-06-10 | End: 2021-06-07

## 2020-06-22 ENCOUNTER — OFFICE VISIT (OUTPATIENT)
Dept: FAMILY MEDICINE CLINIC | Facility: CLINIC | Age: 65
End: 2020-06-22
Payer: MEDICARE

## 2020-06-22 VITALS
WEIGHT: 273.8 LBS | SYSTOLIC BLOOD PRESSURE: 130 MMHG | TEMPERATURE: 98.1 F | OXYGEN SATURATION: 98 % | BODY MASS INDEX: 37.08 KG/M2 | DIASTOLIC BLOOD PRESSURE: 82 MMHG | HEART RATE: 74 BPM | HEIGHT: 72 IN

## 2020-06-22 DIAGNOSIS — G89.29 CHRONIC LOW BACK PAIN WITHOUT SCIATICA, UNSPECIFIED BACK PAIN LATERALITY: ICD-10-CM

## 2020-06-22 DIAGNOSIS — I10 ESSENTIAL HYPERTENSION, BENIGN: ICD-10-CM

## 2020-06-22 DIAGNOSIS — E11.9 TYPE 2 DIABETES MELLITUS WITHOUT COMPLICATION, WITHOUT LONG-TERM CURRENT USE OF INSULIN (HCC): ICD-10-CM

## 2020-06-22 DIAGNOSIS — M54.50 CHRONIC LOW BACK PAIN WITHOUT SCIATICA, UNSPECIFIED BACK PAIN LATERALITY: ICD-10-CM

## 2020-06-22 PROCEDURE — 1160F RVW MEDS BY RX/DR IN RCRD: CPT | Performed by: FAMILY MEDICINE

## 2020-06-22 PROCEDURE — 3051F HG A1C>EQUAL 7.0%<8.0%: CPT | Performed by: FAMILY MEDICINE

## 2020-06-22 PROCEDURE — 99213 OFFICE O/P EST LOW 20 MIN: CPT | Performed by: FAMILY MEDICINE

## 2020-06-22 PROCEDURE — 3075F SYST BP GE 130 - 139MM HG: CPT | Performed by: FAMILY MEDICINE

## 2020-06-22 PROCEDURE — 3008F BODY MASS INDEX DOCD: CPT | Performed by: FAMILY MEDICINE

## 2020-06-22 PROCEDURE — 4040F PNEUMOC VAC/ADMIN/RCVD: CPT | Performed by: FAMILY MEDICINE

## 2020-06-22 PROCEDURE — 3079F DIAST BP 80-89 MM HG: CPT | Performed by: FAMILY MEDICINE

## 2020-06-22 PROCEDURE — 4010F ACE/ARB THERAPY RXD/TAKEN: CPT | Performed by: FAMILY MEDICINE

## 2020-06-22 PROCEDURE — 1036F TOBACCO NON-USER: CPT | Performed by: FAMILY MEDICINE

## 2020-06-22 RX ORDER — LISINOPRIL 10 MG/1
10 TABLET ORAL DAILY
Qty: 90 TABLET | Refills: 3 | Status: SHIPPED | OUTPATIENT
Start: 2020-06-22 | End: 2021-09-07

## 2020-06-22 RX ORDER — OXYCODONE HYDROCHLORIDE 10 MG/1
10 TABLET ORAL 3 TIMES DAILY
Qty: 90 TABLET | Refills: 0 | Status: SHIPPED | OUTPATIENT
Start: 2020-06-22 | End: 2020-07-22 | Stop reason: SDUPTHER

## 2020-06-22 RX ORDER — FENTANYL 50 UG/H
PATCH TRANSDERMAL
Qty: 10 PATCH | Refills: 0 | Status: SHIPPED | OUTPATIENT
Start: 2020-06-22 | End: 2020-07-22 | Stop reason: SDUPTHER

## 2020-06-22 RX ORDER — METHOCARBAMOL 750 MG/1
750 TABLET, FILM COATED ORAL 3 TIMES DAILY
Qty: 270 TABLET | Refills: 3 | Status: SHIPPED | OUTPATIENT
Start: 2020-06-22 | End: 2020-10-20 | Stop reason: SDUPTHER

## 2020-06-23 ENCOUNTER — TRANSCRIBE ORDERS (OUTPATIENT)
Dept: LAB | Facility: HOSPITAL | Age: 65
End: 2020-06-23

## 2020-06-23 ENCOUNTER — APPOINTMENT (OUTPATIENT)
Dept: LAB | Facility: HOSPITAL | Age: 65
End: 2020-06-23
Payer: MEDICARE

## 2020-06-23 DIAGNOSIS — E11.9 TYPE 2 DIABETES MELLITUS WITHOUT COMPLICATION, WITHOUT LONG-TERM CURRENT USE OF INSULIN (HCC): ICD-10-CM

## 2020-06-23 LAB
ALBUMIN SERPL BCP-MCNC: 4.4 G/DL (ref 3.5–5.7)
ALP SERPL-CCNC: 60 U/L (ref 55–165)
ALT SERPL W P-5'-P-CCNC: 25 U/L (ref 7–52)
ANION GAP SERPL CALCULATED.3IONS-SCNC: 8 MMOL/L (ref 4–13)
AST SERPL W P-5'-P-CCNC: 18 U/L (ref 13–39)
BILIRUB SERPL-MCNC: 0.5 MG/DL (ref 0.2–1)
BUN SERPL-MCNC: 21 MG/DL (ref 7–25)
CALCIUM SERPL-MCNC: 9.6 MG/DL (ref 8.6–10.5)
CHLORIDE SERPL-SCNC: 103 MMOL/L (ref 98–107)
CO2 SERPL-SCNC: 30 MMOL/L (ref 21–31)
CREAT SERPL-MCNC: 1.09 MG/DL (ref 0.7–1.3)
EST. AVERAGE GLUCOSE BLD GHB EST-MCNC: 148 MG/DL
GFR SERPL CREATININE-BSD FRML MDRD: 70 ML/MIN/1.73SQ M
GLUCOSE P FAST SERPL-MCNC: 114 MG/DL (ref 65–99)
HBA1C MFR BLD: 6.8 %
POTASSIUM SERPL-SCNC: 4.7 MMOL/L (ref 3.5–5.5)
PROT SERPL-MCNC: 7.7 G/DL (ref 6.4–8.9)
SODIUM SERPL-SCNC: 141 MMOL/L (ref 134–143)

## 2020-06-23 PROCEDURE — 80053 COMPREHEN METABOLIC PANEL: CPT

## 2020-06-23 PROCEDURE — 36415 COLL VENOUS BLD VENIPUNCTURE: CPT

## 2020-06-23 PROCEDURE — 83036 HEMOGLOBIN GLYCOSYLATED A1C: CPT

## 2020-07-22 ENCOUNTER — OFFICE VISIT (OUTPATIENT)
Dept: FAMILY MEDICINE CLINIC | Facility: CLINIC | Age: 65
End: 2020-07-22
Payer: MEDICARE

## 2020-07-22 VITALS
HEIGHT: 72 IN | TEMPERATURE: 98 F | DIASTOLIC BLOOD PRESSURE: 62 MMHG | BODY MASS INDEX: 35.88 KG/M2 | HEART RATE: 69 BPM | OXYGEN SATURATION: 97 % | WEIGHT: 264.9 LBS | SYSTOLIC BLOOD PRESSURE: 122 MMHG

## 2020-07-22 DIAGNOSIS — M54.50 CHRONIC LOW BACK PAIN WITHOUT SCIATICA, UNSPECIFIED BACK PAIN LATERALITY: ICD-10-CM

## 2020-07-22 DIAGNOSIS — G89.29 CHRONIC LOW BACK PAIN WITHOUT SCIATICA, UNSPECIFIED BACK PAIN LATERALITY: ICD-10-CM

## 2020-07-22 DIAGNOSIS — M17.0 PRIMARY OSTEOARTHRITIS OF BOTH KNEES: ICD-10-CM

## 2020-07-22 DIAGNOSIS — I87.2 VENOUS STASIS ULCER OF LEFT ANKLE LIMITED TO BREAKDOWN OF SKIN WITHOUT VARICOSE VEINS (HCC): Primary | ICD-10-CM

## 2020-07-22 DIAGNOSIS — L97.321 VENOUS STASIS ULCER OF LEFT ANKLE LIMITED TO BREAKDOWN OF SKIN WITHOUT VARICOSE VEINS (HCC): Primary | ICD-10-CM

## 2020-07-22 PROBLEM — L97.329 VENOUS STASIS ULCER OF LEFT ANKLE WITHOUT VARICOSE VEINS (HCC): Status: ACTIVE | Noted: 2020-07-22

## 2020-07-22 PROCEDURE — 1160F RVW MEDS BY RX/DR IN RCRD: CPT | Performed by: FAMILY MEDICINE

## 2020-07-22 PROCEDURE — 3078F DIAST BP <80 MM HG: CPT | Performed by: FAMILY MEDICINE

## 2020-07-22 PROCEDURE — 4040F PNEUMOC VAC/ADMIN/RCVD: CPT | Performed by: FAMILY MEDICINE

## 2020-07-22 PROCEDURE — 99213 OFFICE O/P EST LOW 20 MIN: CPT | Performed by: FAMILY MEDICINE

## 2020-07-22 PROCEDURE — 1036F TOBACCO NON-USER: CPT | Performed by: FAMILY MEDICINE

## 2020-07-22 PROCEDURE — 3074F SYST BP LT 130 MM HG: CPT | Performed by: FAMILY MEDICINE

## 2020-07-22 PROCEDURE — 3008F BODY MASS INDEX DOCD: CPT | Performed by: FAMILY MEDICINE

## 2020-07-22 PROCEDURE — 3044F HG A1C LEVEL LT 7.0%: CPT | Performed by: FAMILY MEDICINE

## 2020-07-22 RX ORDER — OXYCODONE HYDROCHLORIDE 10 MG/1
10 TABLET ORAL 3 TIMES DAILY
Qty: 90 TABLET | Refills: 0 | Status: SHIPPED | OUTPATIENT
Start: 2020-07-22 | End: 2020-08-21 | Stop reason: SDUPTHER

## 2020-07-22 RX ORDER — FENTANYL 50 UG/H
PATCH TRANSDERMAL
Qty: 10 PATCH | Refills: 0 | Status: SHIPPED | OUTPATIENT
Start: 2020-07-22 | End: 2020-08-21 | Stop reason: SDUPTHER

## 2020-07-22 NOTE — PROGRESS NOTES
Assessment/Plan:    Chronic low back pain without sciatica  Patient continues to experience chronic low back pain  He had surgery which failed  I queried the [place] prescription drug monitoring program   There were no red flags and it was safe to proceed  I refilled his oxycodone and it is fentanyl  He has had no problems with sedation  He has Narcan at home and his son is aware of how to use this  Venous stasis ulcer of left ankle without varicose veins ([address] )  I prescribed promogran neel AG  He will be applying a piece of this to the ankle wound daily  I have also recommended he wrap the leg and try to stay off of it and keep it elevated  Call if no improvement or worsens  We discussed referral to podiatry or wound care  He would prefer Podiatry as it was less expensive for him last time  Osteoarthritis of knee  Patient has osteoarthritis of both knees  We discussed surgery  Patient will not consider surgery at this time  Diagnoses and all orders for this visit:    Venous stasis ulcer of left ankle limited to breakdown of skin without varicose veins (HCC)    Chronic low back pain without sciatica, unspecified back pain laterality  -     fentaNYL (DURAGESIC) 50 mcg/hr; Apply 1 patch every 3 days  -     oxyCODONE (ROXICODONE) 10 MG TABS; Take 1 tablet (10 mg total) by mouth 3 (three) times a dayMax Daily Amount: 30 mg    Primary osteoarthritis of both knees        BMI Counseling: Body mass index is 35 93 kg/m²  The BMI is above normal  Nutrition recommendations include reducing portion sizes, decreasing overall calorie intake, 3-5 servings of fruits/vegetables daily, reducing fast food intake, consuming healthier snacks, decreasing soda and/or juice intake and moderation in carbohydrate intake  Exercise recommendations include exercising 3-5 times per week  Subjective:      Patient ID: Chano Basurto  is a 76 y o  male      This is a 79-year-old white male presents to the office today requesting a renewal on his oxycodone and fentanyl  He reports increased pain in his back as well as in his knees  However, he tells me with the COVID-19 pandemic, he does not want to have knee surgery at this time  He has been watching his diet and currently using Slim-Fast   He was successful in losing weight  The following portions of the patient's history were reviewed and updated as appropriate: allergies, current medications, past family history, past medical history, past social history, past surgical history and problem list     Review of Systems   Respiratory: Negative for cough, shortness of breath and wheezing  Cardiovascular: Negative for chest pain, palpitations and leg swelling  Gastrointestinal: Negative for abdominal distention, abdominal pain, blood in stool, constipation, diarrhea, nausea and vomiting  Musculoskeletal: Positive for arthralgias and back pain  Skin: Positive for wound (Reports a new ulcer on his left ankle)  Objective:      /62 (BP Location: Left arm, Patient Position: Sitting, Cuff Size: Large)   Pulse 69   Temp 98 °F (36 7 °C) (Tympanic)   Ht 6' (1 829 m)   Wt 120 kg (264 lb 14 4 oz)   SpO2 97%   BMI 35 93 kg/m²          Physical Exam   Constitutional: He appears well-developed and well-nourished  No distress  HENT:   Head: Normocephalic and atraumatic  Right Ear: External ear normal    Left Ear: External ear normal    Mouth/Throat: Oropharynx is clear and moist  No oropharyngeal exudate  Eyes: Conjunctivae are normal  Right eye exhibits no discharge  Left eye exhibits no discharge  No scleral icterus  Neck: Neck supple  No tracheal deviation present  No thyromegaly present  Cardiovascular: Normal rate, regular rhythm and normal heart sounds  Exam reveals no gallop and no friction rub  No murmur heard  Pulmonary/Chest: Effort normal and breath sounds normal  No stridor  No respiratory distress  He has no wheezes   He has no rales    Musculoskeletal:   Obvious osteoarthritic changes of both knees present, left more so than right   Lymphadenopathy:     He has no cervical adenopathy  Skin:   There is a venous stasis ulcer present on the medial aspect of the patient's left ankle  There is no surrounding erythema  There was no pus or drainage  Vitals reviewed      extremities:  Without cyanosis, clubbing, or edema

## 2020-08-21 ENCOUNTER — HOSPITAL ENCOUNTER (OUTPATIENT)
Dept: RADIOLOGY | Facility: HOSPITAL | Age: 65
Discharge: HOME | End: 2020-08-21
Payer: MEDICARE

## 2020-08-21 ENCOUNTER — OFFICE VISIT (OUTPATIENT)
Dept: FAMILY MEDICINE CLINIC | Facility: CLINIC | Age: 65
End: 2020-08-21
Payer: MEDICARE

## 2020-08-21 VITALS
TEMPERATURE: 98 F | SYSTOLIC BLOOD PRESSURE: 126 MMHG | HEIGHT: 72 IN | WEIGHT: 267.8 LBS | OXYGEN SATURATION: 96 % | HEART RATE: 70 BPM | DIASTOLIC BLOOD PRESSURE: 64 MMHG | BODY MASS INDEX: 36.27 KG/M2

## 2020-08-21 DIAGNOSIS — G89.29 CHRONIC LOW BACK PAIN WITHOUT SCIATICA, UNSPECIFIED BACK PAIN LATERALITY: ICD-10-CM

## 2020-08-21 DIAGNOSIS — M54.2 NECK PAIN: ICD-10-CM

## 2020-08-21 DIAGNOSIS — M17.0 PRIMARY OSTEOARTHRITIS OF BOTH KNEES: ICD-10-CM

## 2020-08-21 DIAGNOSIS — E11.9 TYPE 2 DIABETES MELLITUS WITHOUT COMPLICATION, WITHOUT LONG-TERM CURRENT USE OF INSULIN (HCC): ICD-10-CM

## 2020-08-21 DIAGNOSIS — M54.50 CHRONIC LOW BACK PAIN WITHOUT SCIATICA, UNSPECIFIED BACK PAIN LATERALITY: ICD-10-CM

## 2020-08-21 DIAGNOSIS — M54.2 NECK PAIN: Primary | ICD-10-CM

## 2020-08-21 DIAGNOSIS — F32.0 MAJOR DEPRESSIVE DISORDER, SINGLE EPISODE, MILD (HCC): ICD-10-CM

## 2020-08-21 PROCEDURE — 3074F SYST BP LT 130 MM HG: CPT | Performed by: FAMILY MEDICINE

## 2020-08-21 PROCEDURE — 1036F TOBACCO NON-USER: CPT | Performed by: FAMILY MEDICINE

## 2020-08-21 PROCEDURE — 72050 X-RAY EXAM NECK SPINE 4/5VWS: CPT

## 2020-08-21 PROCEDURE — 99214 OFFICE O/P EST MOD 30 MIN: CPT | Performed by: FAMILY MEDICINE

## 2020-08-21 PROCEDURE — 4040F PNEUMOC VAC/ADMIN/RCVD: CPT | Performed by: FAMILY MEDICINE

## 2020-08-21 PROCEDURE — 3044F HG A1C LEVEL LT 7.0%: CPT | Performed by: FAMILY MEDICINE

## 2020-08-21 PROCEDURE — 3078F DIAST BP <80 MM HG: CPT | Performed by: FAMILY MEDICINE

## 2020-08-21 PROCEDURE — 1160F RVW MEDS BY RX/DR IN RCRD: CPT | Performed by: FAMILY MEDICINE

## 2020-08-21 RX ORDER — FENTANYL 50 UG/H
PATCH TRANSDERMAL
Qty: 10 PATCH | Refills: 0 | Status: SHIPPED | OUTPATIENT
Start: 2020-08-21 | End: 2020-09-21 | Stop reason: SDUPTHER

## 2020-08-21 RX ORDER — OXYCODONE HYDROCHLORIDE 10 MG/1
10 TABLET ORAL 3 TIMES DAILY
Qty: 90 TABLET | Refills: 0 | Status: SHIPPED | OUTPATIENT
Start: 2020-08-21 | End: 2020-09-21 | Stop reason: SDUPTHER

## 2020-08-21 NOTE — PATIENT INSTRUCTIONS
Neck Pain   AMBULATORY CARE:   Neck pain  may be sudden and increase quickly  You may instead feel pain build slowly over time  Neck pain may go away in a few days or weeks, or it may continue for months  The pain may come and go, or be worse with certain movements  The pain may be only in your neck, or it may move to your arms, back, or shoulders  You may also have pain that starts in another body area and moves to your neck  Some types of neck pain are permanent  Seek care immediately if:   · You have an injury that causes neck pain and shooting pain down your arms or legs  · Your neck pain suddenly becomes severe  · You have neck pain along with numbness, tingling, or weakness in your arms or legs  · You have a stiff neck, a headache, and a fever  Contact your healthcare provider if:   · You have new or worsening symptoms  · Your symptoms continue even after treatment  · You have questions or concerns about your condition or care  Treatment  may include any of the following, depending on what is causing your pain:  · Medicines  may be prescribed or recommended by your healthcare provider for pain  You may need medicine to treat nerve pain or to stop muscle spasms  Medicines may also be given to reduce inflammation  Your healthcare provider may inject medicine into a nerve to block pain  Over-the-counter NSAID medicine or acetaminophen may be recommended to help treat minor pain or inflammation  · Traction  is used to relieve pressure from nerves  Your head is gently pulled up and away from your neck  This stretches muscles and ligaments and makes more room for the spine  Your healthcare provider will tell you the kind of traction that will help your neck pain  Do not use traction devices at home unless directed by your healthcare provider  · Surgery  may be needed if the pain is severe or other treatments do not work  Surgery will not help every kind of neck pain   You may need surgery to stabilize a fractured bone or to remove a tumor  Surgery may also be used to widen a narrowed spinal column or to remove a disc from between neck bones  Manage or prevent neck pain:   · Rest your neck as directed  Do not make sudden movements, such as turning your head quickly  Your healthcare provider may recommend you wear a cervical collar for a short time  The collar will prevent you from moving your head  This will give your neck time to heal if an injury is causing your neck pain  Ask your healthcare provider when you can return to sports or other normal daily activities  · Apply heat as directed  Heat helps relieve pain and swelling  Use a heat wrap, or soak a small towel in warm water  Wring out the extra water  Apply the heat wrap or towel for 20 minutes every hour, or as directed  · Apply ice as directed  Ice helps relieve pain and swelling, and can help prevent tissue damage  Use an ice pack, or put ice in a bag  Cover the ice pack or back with a towel before you apply it to your neck  Apply the ice pack or ice for 15 minutes every hour, or as directed  Your healthcare provider can tell you how often to apply ice  · Do neck exercises as directed  Neck exercises help strengthen the muscles and increase range of motion  Your healthcare provider will tell you which exercises are right for you  He may give you instructions, or he may recommend that you work with a physical therapist  Your healthcare provider or therapist can make sure you are doing the exercises correctly  · Maintain good posture  Try to keep your head and shoulders lifted when you sit  If you work in front of a computer, make sure the monitor is at the right level  You should not need to look up down to see the screen  You should also not have to lean forward to be able to read what is on the screen   Make sure your keyboard, mouse, and other computer items are placed where you do not have to extend your shoulder to reach them  Get up often if you work in front of a computer or sit for long periods of time  Stretch or walk around to keep your neck muscles loose  Follow up with your healthcare provider as directed: Your healthcare provider may refer you to a specialist if your pain does not get better with treatment  Write down your questions so you remember to ask them during your visits  © 2017 [address] [employer]  Information is for End User's use only and may not be sold, redistributed or otherwise used for commercial purposes  All illustrations and images included in CareNotes® are the copyrighted property of [employer]  or Kenny Quiñones  The above information is an  only  It is not intended as medical advice for individual conditions or treatments  Talk to your doctor, nurse or pharmacist before following any medical regimen to see if it is safe and effective for you

## 2020-08-21 NOTE — PROGRESS NOTES
Assessment/Plan:    Chronic low back pain without sciatica  [place] prescription drug monitoring program was queried  There were no red flags and it was safe to proceed  It was felt that the patient continues to benefit from chronic opiate therapy  As such, I refilled his oxycodone and his fentanyl  He does have Narcan at home  Neck pain  This was a new complaint for the patient  I suspect he likely has degenerative disc and arthritis, as he has in his lower back  We discussed this  I ordered an x-ray to further evaluate his symptoms  No specific treatment was recommended  Major depressive disorder, single episode, mild ([facility] )  Patient has depression  We had a long discussion about this today  He did not wish to start another medication  He denies being suicidal   Without medication to treat him, I have suggested a referral to see a therapist   He has declined  I did tell him that sometimes this could be a side effect of the opiates  Perhaps we need to consider trying to decrease the dose or perhaps even weaning him off  He refuses telling me he absolutely needs his opiates  For now, I will monitor the patient for his depression  He refused any treatment today  Osteoarthritis of knee  Patient has osteoarthritis of the bilateral knees  We again discussed joint replacement therapy  Because of the COVID-19 pandemic, he is not currently willing to consider surgery  He tells me he is afraid to be in the hospital     Type 2 diabetes mellitus without complication, without long-term current use of insulin ([facility] )    Lab Results   Component Value Date    HGBA1C 6 8 (H) 06/23/2020    Patient gained 3 lb since his last visit  I encouraged him to watch his diet and try to lose weight  Diagnoses and all orders for this visit:    Neck pain  -     XR spine cervical complete 4 or 5 vw non injury;  Future    Chronic low back pain without sciatica, unspecified back pain laterality  -     oxyCODONE (ROXICODONE) 10 MG TABS; Take 1 tablet (10 mg total) by mouth 3 (three) times a dayMax Daily Amount: 30 mg  -     fentaNYL (DURAGESIC) 50 mcg/hr; Apply 1 patch every 3 days    Major depressive disorder, single episode, mild (HCC)    Primary osteoarthritis of both knees    Type 2 diabetes mellitus without complication, without long-term current use of insulin ([facility] [address] )        The [place] prescription drug monitoring program was queried  There were no red flags  Safe to proceed  Subjective:      Patient ID: Cristobal Myers  is a 76 y o  male  This patient is a 68-year-old white male presents to the office today for his routine follow-up visit  He needs a prescription for his pain medication  He tells me he is totally depressed  He tells me this is been going on for a long time  He continues to experience chronic lower back pain  He tells me his knees are currently not too bad  He tells me he is really not able to do much of anything because of his back and his knees  The following portions of the patient's history were reviewed and updated as appropriate: allergies, current medications, past family history, past medical history, past social history, past surgical history and problem list     Review of Systems   Constitutional: Positive for activity change ( reports decreased activity level)  Negative for appetite change and unexpected weight change  Respiratory: Negative for cough, shortness of breath and wheezing  Cardiovascular: Negative for chest pain, palpitations and leg swelling  Gastrointestinal: Negative for abdominal distention, abdominal pain, blood in stool, constipation, diarrhea, nausea and vomiting  Musculoskeletal: Positive for arthralgias (Bilateral knee pain reported), back pain, gait problem and neck pain  Psychiatric/Behavioral: Positive for dysphoric mood and sleep disturbance  Negative for self-injury and suicidal ideas           Objective:      /64 (BP Location: Left arm, Patient Position: Sitting, Cuff Size: Large)   Pulse 70   Temp 98 °F (36 7 °C) (Temporal)   Ht 6' (1 829 m)   Wt 121 kg (267 lb 12 8 oz)   SpO2 96%   BMI 36 32 kg/m²          Physical Exam  Vitals signs reviewed  HENT:      Head: Normocephalic and atraumatic  Right Ear: Tympanic membrane, ear canal and external ear normal       Left Ear: Tympanic membrane, ear canal and external ear normal       Mouth/Throat:      Pharynx: No oropharyngeal exudate  Eyes:      General: No scleral icterus  Right eye: No discharge  Left eye: No discharge  Conjunctiva/sclera: Conjunctivae normal       Pupils: Pupils are equal, round, and reactive to light  Neck:      Musculoskeletal: Neck supple  Cardiovascular:      Rate and Rhythm: Normal rate and regular rhythm  Heart sounds: No murmur  No friction rub  No gallop  Pulmonary:      Effort: Pulmonary effort is normal  No respiratory distress  Breath sounds: Normal breath sounds  No stridor  No wheezing, rhonchi or rales  Musculoskeletal:      Comments: There is decreased range of motion of the lumbar spine  There is swelling present of both knees  There is no tenderness to palpation in the knees or the lumbar spine   Lymphadenopathy:      Cervical: No cervical adenopathy  Psychiatric:      Comments: Patient became very upset and cried

## 2020-08-30 PROBLEM — M54.2 NECK PAIN: Status: ACTIVE | Noted: 2020-08-30

## 2020-08-30 PROBLEM — F32.0 MAJOR DEPRESSIVE DISORDER, SINGLE EPISODE, MILD (HCC): Status: ACTIVE | Noted: 2020-08-30

## 2020-08-30 NOTE — ASSESSMENT & PLAN NOTE
Patient has depression  We had a long discussion about this today  He did not wish to start another medication  He denies being suicidal   Without medication to treat him, I have suggested a referral to see a therapist   He has declined  I did tell him that sometimes this could be a side effect of the opiates  Perhaps we need to consider trying to decrease the dose or perhaps even weaning him off  He refuses telling me he absolutely needs his opiates  For now, I will monitor the patient for his depression  He refused any treatment today

## 2020-08-30 NOTE — ASSESSMENT & PLAN NOTE
[place] prescription drug monitoring program was queried  There were no red flags and it was safe to proceed  It was felt that the patient continues to benefit from chronic opiate therapy  As such, I refilled his oxycodone and his fentanyl  He does have Narcan at home

## 2020-08-30 NOTE — ASSESSMENT & PLAN NOTE
Patient has osteoarthritis of the bilateral knees  We again discussed joint replacement therapy  Because of the COVID-19 pandemic, he is not currently willing to consider surgery    He tells me he is afraid to be in the hospital

## 2020-08-30 NOTE — ASSESSMENT & PLAN NOTE
This was a new complaint for the patient  I suspect he likely has degenerative disc and arthritis, as he has in his lower back  We discussed this  I ordered an x-ray to further evaluate his symptoms  No specific treatment was recommended

## 2020-08-30 NOTE — ASSESSMENT & PLAN NOTE
Lab Results   Component Value Date    HGBA1C 6 8 (H) 06/23/2020    Patient gained 3 lb since his last visit  I encouraged him to watch his diet and try to lose weight

## 2020-09-21 ENCOUNTER — OFFICE VISIT (OUTPATIENT)
Dept: FAMILY MEDICINE CLINIC | Facility: CLINIC | Age: 65
End: 2020-09-21
Payer: MEDICARE

## 2020-09-21 VITALS
HEART RATE: 72 BPM | OXYGEN SATURATION: 95 % | DIASTOLIC BLOOD PRESSURE: 72 MMHG | TEMPERATURE: 98.9 F | WEIGHT: 271.4 LBS | BODY MASS INDEX: 36.81 KG/M2 | SYSTOLIC BLOOD PRESSURE: 124 MMHG

## 2020-09-21 DIAGNOSIS — E11.9 TYPE 2 DIABETES MELLITUS WITHOUT COMPLICATION, WITHOUT LONG-TERM CURRENT USE OF INSULIN (HCC): ICD-10-CM

## 2020-09-21 DIAGNOSIS — E78.2 MIXED HYPERLIPIDEMIA: ICD-10-CM

## 2020-09-21 DIAGNOSIS — G89.29 CHRONIC LOW BACK PAIN WITHOUT SCIATICA, UNSPECIFIED BACK PAIN LATERALITY: Primary | ICD-10-CM

## 2020-09-21 DIAGNOSIS — Z12.5 PROSTATE CANCER SCREENING: ICD-10-CM

## 2020-09-21 DIAGNOSIS — M54.50 CHRONIC LOW BACK PAIN WITHOUT SCIATICA, UNSPECIFIED BACK PAIN LATERALITY: Primary | ICD-10-CM

## 2020-09-21 DIAGNOSIS — Z79.899 ENCOUNTER FOR LONG-TERM (CURRENT) USE OF MEDICATIONS: ICD-10-CM

## 2020-09-21 PROCEDURE — 99213 OFFICE O/P EST LOW 20 MIN: CPT | Performed by: FAMILY MEDICINE

## 2020-09-21 RX ORDER — FENTANYL 50 UG/H
PATCH TRANSDERMAL
Qty: 10 PATCH | Refills: 0 | Status: SHIPPED | OUTPATIENT
Start: 2020-09-21 | End: 2020-10-20 | Stop reason: SDUPTHER

## 2020-09-21 RX ORDER — OXYCODONE HYDROCHLORIDE 10 MG/1
10 TABLET ORAL 3 TIMES DAILY
Qty: 90 TABLET | Refills: 0 | Status: SHIPPED | OUTPATIENT
Start: 2020-09-21 | End: 2020-10-20 | Stop reason: SDUPTHER

## 2020-09-21 NOTE — PROGRESS NOTES
Assessment/Plan:    Chronic low back pain without sciatica  I queried the [place] prescription drug monitoring program website  There were no red flags and it was safe to proceed  I refilled the patient's prescription for his fentanyl as well as for his oxycodone for breakthrough pain  Type 2 diabetes mellitus without complication, without long-term current use of insulin ([facility])    Lab Results   Component Value Date    HGBA1C 6 8 (H) 06/23/2020    Patient has type 2 diabetes mellitus  I ordered repeat fasting blood work to be done prior to his next visit  I encouraged the patient to try to get back on track with diet and lose weight  He apparently was watching his diet and substituting meals with some fast   He was able to lose weight doing that  When he stop doing this, he has begun gaining his weight back  Diagnoses and all orders for this visit:    Chronic low back pain without sciatica, unspecified back pain laterality  -     fentaNYL (DURAGESIC) 50 mcg/hr; Apply 1 patch every 3 days  -     oxyCODONE (ROXICODONE) 10 MG TABS; Take 1 tablet (10 mg total) by mouth 3 (three) times a dayMax Daily Amount: 30 mg    Type 2 diabetes mellitus without complication, without long-term current use of insulin ([facility])  -     Comprehensive metabolic panel; Future  -     Hemoglobin A1C; Future  -     Microalbumin / creatinine urine ratio    Mixed hyperlipidemia  -     Lipid panel; Future    Prostate cancer screening  -     PSA, Total Screen; Future    Encounter for long-term (current) use of medications  -     CBC and differential; Future        The [place] prescription drug monitoring program was queried  There were no red flags  Safe to proceed  Subjective:      Patient ID: Kay Darden  is a 76 y o  male  This is a 79-year-old white male presents to the office today for his routine checkup  He requests a refill for his pain medications  Apparently, he ran out yesterday    He gets a 30 day supply and there were 31 days to the month  He complains of lot of cracking in his neck and was worried about this  He is not getting a lot of pain, just mostly cracking  The following portions of the patient's history were reviewed and updated as appropriate: allergies, current medications, past family history, past medical history, past social history, past surgical history and problem list     Review of Systems   Constitutional: Positive for unexpected weight change  Negative for activity change and appetite change  Cardiovascular: Negative for chest pain, palpitations and leg swelling  Gastrointestinal: Negative for abdominal distention, abdominal pain, blood in stool, diarrhea, nausea and vomiting  Musculoskeletal: Positive for arthralgias and back pain  Negative for neck pain  Objective:      /72 (BP Location: Left arm, Patient Position: Sitting, Cuff Size: Large)   Pulse 72   Temp 98 9 °F (37 2 °C) (Tympanic)   Wt 123 kg (271 lb 6 4 oz)   SpO2 95%   BMI 36 81 kg/m²          Physical Exam  Vitals signs reviewed  Constitutional:       Comments: This is a 58-year-old white male who appears his stated age  He is cooperative and in no apparent distress   HENT:      Head: Normocephalic and atraumatic  Right Ear: Tympanic membrane, ear canal and external ear normal       Left Ear: Tympanic membrane, ear canal and external ear normal       Mouth/Throat:      Mouth: Mucous membranes are moist       Pharynx: Oropharynx is clear  No oropharyngeal exudate or posterior oropharyngeal erythema  Eyes:      General: No scleral icterus  Right eye: No discharge  Left eye: No discharge  Conjunctiva/sclera: Conjunctivae normal       Pupils: Pupils are equal, round, and reactive to light  Neck:      Musculoskeletal: Neck supple  Comments: No thyromegaly is noted  Cardiovascular:      Rate and Rhythm: Normal rate and regular rhythm        Heart sounds: Normal heart sounds  No murmur  No friction rub  No gallop  Pulmonary:      Effort: No respiratory distress  Breath sounds: Normal breath sounds  No stridor  No wheezing, rhonchi or rales  Abdominal:      General: Bowel sounds are normal  There is no distension  Palpations: Abdomen is soft  There is no mass  Tenderness: There is no abdominal tenderness  There is no guarding  Hernia: No hernia is present  Musculoskeletal:         General: Swelling (Swelling of both knees noted) and tenderness ( tenderness to palpation in the lower back) present  Lymphadenopathy:      Cervical: No cervical adenopathy  Psychiatric:         Mood and Affect: Mood normal          Behavior: Behavior normal          Thought Content:  Thought content normal          Judgment: Judgment normal        extremities:  Without cyanosis, clubbing, or edema

## 2020-09-22 NOTE — ASSESSMENT & PLAN NOTE
Lab Results   Component Value Date    HGBA1C 6 8 (H) 06/23/2020    Patient has type 2 diabetes mellitus  I ordered repeat fasting blood work to be done prior to his next visit  I encouraged the patient to try to get back on track with diet and lose weight  He apparently was watching his diet and substituting meals with some fast   He was able to lose weight doing that  When he stop doing this, he has begun gaining his weight back

## 2020-09-22 NOTE — ASSESSMENT & PLAN NOTE
I queried the [place] prescription drug monitoring program website  There were no red flags and it was safe to proceed  I refilled the patient's prescription for his fentanyl as well as for his oxycodone for breakthrough pain

## 2020-10-20 ENCOUNTER — OFFICE VISIT (OUTPATIENT)
Dept: FAMILY MEDICINE CLINIC | Facility: CLINIC | Age: 65
End: 2020-10-20
Payer: MEDICARE

## 2020-10-20 VITALS
HEART RATE: 76 BPM | BODY MASS INDEX: 36.45 KG/M2 | SYSTOLIC BLOOD PRESSURE: 126 MMHG | OXYGEN SATURATION: 97 % | HEIGHT: 72 IN | TEMPERATURE: 97.9 F | DIASTOLIC BLOOD PRESSURE: 62 MMHG | WEIGHT: 269.1 LBS

## 2020-10-20 DIAGNOSIS — Z23 IMMUNIZATION DUE: ICD-10-CM

## 2020-10-20 DIAGNOSIS — M54.50 CHRONIC LOW BACK PAIN WITHOUT SCIATICA, UNSPECIFIED BACK PAIN LATERALITY: Primary | ICD-10-CM

## 2020-10-20 DIAGNOSIS — E11.9 TYPE 2 DIABETES MELLITUS WITHOUT COMPLICATION, WITHOUT LONG-TERM CURRENT USE OF INSULIN (HCC): ICD-10-CM

## 2020-10-20 DIAGNOSIS — G89.29 CHRONIC LOW BACK PAIN WITHOUT SCIATICA, UNSPECIFIED BACK PAIN LATERALITY: Primary | ICD-10-CM

## 2020-10-20 PROCEDURE — G0008 ADMIN INFLUENZA VIRUS VAC: HCPCS

## 2020-10-20 PROCEDURE — 90662 IIV NO PRSV INCREASED AG IM: CPT

## 2020-10-20 PROCEDURE — 99213 OFFICE O/P EST LOW 20 MIN: CPT | Performed by: FAMILY MEDICINE

## 2020-10-20 RX ORDER — FENTANYL 50 UG/H
PATCH TRANSDERMAL
Qty: 10 PATCH | Refills: 0 | Status: SHIPPED | OUTPATIENT
Start: 2020-10-20 | End: 2020-11-20 | Stop reason: SDUPTHER

## 2020-10-20 RX ORDER — OXYCODONE HYDROCHLORIDE 10 MG/1
10 TABLET ORAL 3 TIMES DAILY
Qty: 90 TABLET | Refills: 0 | Status: SHIPPED | OUTPATIENT
Start: 2020-10-20 | End: 2020-11-20 | Stop reason: SDUPTHER

## 2020-10-20 RX ORDER — METHOCARBAMOL 750 MG/1
750 TABLET, FILM COATED ORAL 3 TIMES DAILY
Qty: 270 TABLET | Refills: 3 | Status: SHIPPED | OUTPATIENT
Start: 2020-10-20 | End: 2020-12-21 | Stop reason: HOSPADM

## 2020-10-24 ENCOUNTER — APPOINTMENT (OUTPATIENT)
Dept: LAB | Facility: HOSPITAL | Age: 65
End: 2020-10-24
Attending: FAMILY MEDICINE
Payer: MEDICARE

## 2020-10-24 DIAGNOSIS — Z79.899 ENCOUNTER FOR LONG-TERM (CURRENT) USE OF MEDICATIONS: ICD-10-CM

## 2020-10-24 DIAGNOSIS — E78.2 MIXED HYPERLIPIDEMIA: ICD-10-CM

## 2020-10-24 DIAGNOSIS — Z12.5 PROSTATE CANCER SCREENING: ICD-10-CM

## 2020-10-24 DIAGNOSIS — E11.9 TYPE 2 DIABETES MELLITUS WITHOUT COMPLICATION, WITHOUT LONG-TERM CURRENT USE OF INSULIN (HCC): ICD-10-CM

## 2020-10-24 LAB
ALBUMIN SERPL BCP-MCNC: 4.1 G/DL (ref 3.5–5)
ALP SERPL-CCNC: 80 U/L (ref 46–116)
ALT SERPL W P-5'-P-CCNC: 36 U/L (ref 12–78)
ANION GAP SERPL CALCULATED.3IONS-SCNC: 10 MMOL/L (ref 4–13)
AST SERPL W P-5'-P-CCNC: 20 U/L (ref 5–45)
BASOPHILS # BLD AUTO: 0.09 THOUSANDS/ΜL (ref 0–0.1)
BASOPHILS NFR BLD AUTO: 1 % (ref 0–1)
BILIRUB SERPL-MCNC: 0.3 MG/DL (ref 0.2–1)
BUN SERPL-MCNC: 20 MG/DL (ref 5–25)
CALCIUM SERPL-MCNC: 9.3 MG/DL (ref 8.3–10.1)
CHLORIDE SERPL-SCNC: 103 MMOL/L (ref 100–108)
CHOLEST SERPL-MCNC: 154 MG/DL (ref 50–200)
CO2 SERPL-SCNC: 26 MMOL/L (ref 21–32)
CREAT SERPL-MCNC: 1.22 MG/DL (ref 0.6–1.3)
CREAT UR-MCNC: 148 MG/DL
EOSINOPHIL # BLD AUTO: 1 THOUSAND/ΜL (ref 0–0.61)
EOSINOPHIL NFR BLD AUTO: 10 % (ref 0–6)
ERYTHROCYTE [DISTWIDTH] IN BLOOD BY AUTOMATED COUNT: 12.8 % (ref 11.6–15.1)
EST. AVERAGE GLUCOSE BLD GHB EST-MCNC: 143 MG/DL
GFR SERPL CREATININE-BSD FRML MDRD: 61 ML/MIN/1.73SQ M
GLUCOSE P FAST SERPL-MCNC: 112 MG/DL (ref 65–99)
HBA1C MFR BLD: 6.6 %
HCT VFR BLD AUTO: 38 % (ref 36.5–49.3)
HDLC SERPL-MCNC: 45 MG/DL
HGB BLD-MCNC: 12.5 G/DL (ref 12–17)
IMM GRANULOCYTES # BLD AUTO: 0.03 THOUSAND/UL (ref 0–0.2)
IMM GRANULOCYTES NFR BLD AUTO: 0 % (ref 0–2)
LDLC SERPL CALC-MCNC: 72 MG/DL (ref 0–100)
LYMPHOCYTES # BLD AUTO: 3.17 THOUSANDS/ΜL (ref 0.6–4.47)
LYMPHOCYTES NFR BLD AUTO: 32 % (ref 14–44)
MCH RBC QN AUTO: 33.5 PG (ref 26.8–34.3)
MCHC RBC AUTO-ENTMCNC: 32.9 G/DL (ref 31.4–37.4)
MCV RBC AUTO: 102 FL (ref 82–98)
MICROALBUMIN UR-MCNC: 74.9 MG/L (ref 0–20)
MICROALBUMIN/CREAT 24H UR: 51 MG/G CREATININE (ref 0–30)
MONOCYTES # BLD AUTO: 0.88 THOUSAND/ΜL (ref 0.17–1.22)
MONOCYTES NFR BLD AUTO: 9 % (ref 4–12)
NEUTROPHILS # BLD AUTO: 4.87 THOUSANDS/ΜL (ref 1.85–7.62)
NEUTS SEG NFR BLD AUTO: 48 % (ref 43–75)
NONHDLC SERPL-MCNC: 109 MG/DL
NRBC BLD AUTO-RTO: 0 /100 WBCS
PLATELET # BLD AUTO: 256 THOUSANDS/UL (ref 149–390)
PMV BLD AUTO: 9.5 FL (ref 8.9–12.7)
POTASSIUM SERPL-SCNC: 4.1 MMOL/L (ref 3.5–5.3)
PROT SERPL-MCNC: 8.2 G/DL (ref 6.4–8.2)
PSA SERPL-MCNC: 4.7 NG/ML (ref 0–4)
RBC # BLD AUTO: 3.73 MILLION/UL (ref 3.88–5.62)
SODIUM SERPL-SCNC: 139 MMOL/L (ref 136–145)
TRIGL SERPL-MCNC: 187 MG/DL
WBC # BLD AUTO: 10.04 THOUSAND/UL (ref 4.31–10.16)

## 2020-10-24 PROCEDURE — 80061 LIPID PANEL: CPT

## 2020-10-24 PROCEDURE — 85025 COMPLETE CBC W/AUTO DIFF WBC: CPT

## 2020-10-24 PROCEDURE — 80053 COMPREHEN METABOLIC PANEL: CPT

## 2020-10-24 PROCEDURE — 82570 ASSAY OF URINE CREATININE: CPT | Performed by: FAMILY MEDICINE

## 2020-10-24 PROCEDURE — G0103 PSA SCREENING: HCPCS

## 2020-10-24 PROCEDURE — 36415 COLL VENOUS BLD VENIPUNCTURE: CPT

## 2020-10-24 PROCEDURE — 83036 HEMOGLOBIN GLYCOSYLATED A1C: CPT

## 2020-10-24 PROCEDURE — 82043 UR ALBUMIN QUANTITATIVE: CPT | Performed by: FAMILY MEDICINE

## 2020-11-16 ENCOUNTER — TELEPHONE (OUTPATIENT)
Dept: FAMILY MEDICINE CLINIC | Facility: CLINIC | Age: 65
End: 2020-11-16

## 2020-11-20 DIAGNOSIS — M54.50 CHRONIC LOW BACK PAIN WITHOUT SCIATICA, UNSPECIFIED BACK PAIN LATERALITY: ICD-10-CM

## 2020-11-20 DIAGNOSIS — G89.29 CHRONIC LOW BACK PAIN WITHOUT SCIATICA, UNSPECIFIED BACK PAIN LATERALITY: ICD-10-CM

## 2020-11-20 RX ORDER — FENTANYL 50 UG/H
PATCH TRANSDERMAL
Qty: 10 PATCH | Refills: 0 | Status: SHIPPED | OUTPATIENT
Start: 2020-11-20 | End: 2020-12-21 | Stop reason: SDUPTHER

## 2020-11-20 RX ORDER — OXYCODONE HYDROCHLORIDE 10 MG/1
10 TABLET ORAL 3 TIMES DAILY
Qty: 90 TABLET | Refills: 0 | Status: SHIPPED | OUTPATIENT
Start: 2020-11-20 | End: 2020-12-21 | Stop reason: SDUPTHER

## 2020-11-23 ENCOUNTER — OFFICE VISIT (OUTPATIENT)
Dept: FAMILY MEDICINE CLINIC | Facility: CLINIC | Age: 65
End: 2020-11-23
Payer: MEDICARE

## 2020-11-23 VITALS
HEART RATE: 71 BPM | SYSTOLIC BLOOD PRESSURE: 132 MMHG | DIASTOLIC BLOOD PRESSURE: 62 MMHG | BODY MASS INDEX: 36.52 KG/M2 | WEIGHT: 269.6 LBS | OXYGEN SATURATION: 97 % | TEMPERATURE: 98.1 F | HEIGHT: 72 IN

## 2020-11-23 DIAGNOSIS — Z12.11 COLON CANCER SCREENING: ICD-10-CM

## 2020-11-23 DIAGNOSIS — F32.0 MAJOR DEPRESSIVE DISORDER, SINGLE EPISODE, MILD (HCC): ICD-10-CM

## 2020-11-23 DIAGNOSIS — E78.2 MIXED HYPERLIPIDEMIA: ICD-10-CM

## 2020-11-23 DIAGNOSIS — E11.9 TYPE 2 DIABETES MELLITUS WITHOUT COMPLICATION, WITHOUT LONG-TERM CURRENT USE OF INSULIN (HCC): ICD-10-CM

## 2020-11-23 DIAGNOSIS — E66.01 OBESITY, MORBID (HCC): ICD-10-CM

## 2020-11-23 DIAGNOSIS — M54.50 CHRONIC LOW BACK PAIN WITHOUT SCIATICA, UNSPECIFIED BACK PAIN LATERALITY: Primary | ICD-10-CM

## 2020-11-23 DIAGNOSIS — R97.20 ELEVATED PSA: ICD-10-CM

## 2020-11-23 DIAGNOSIS — G89.29 CHRONIC LOW BACK PAIN WITHOUT SCIATICA, UNSPECIFIED BACK PAIN LATERALITY: Primary | ICD-10-CM

## 2020-11-23 PROCEDURE — 99214 OFFICE O/P EST MOD 30 MIN: CPT | Performed by: FAMILY MEDICINE

## 2020-12-10 DIAGNOSIS — E78.2 MIXED HYPERLIPIDEMIA: ICD-10-CM

## 2020-12-10 DIAGNOSIS — I10 ESSENTIAL HYPERTENSION, BENIGN: ICD-10-CM

## 2020-12-10 RX ORDER — ATORVASTATIN CALCIUM 40 MG/1
TABLET, FILM COATED ORAL
Qty: 90 TABLET | Refills: 2 | Status: SHIPPED | OUTPATIENT
Start: 2020-12-10 | End: 2021-09-07

## 2020-12-10 RX ORDER — AMLODIPINE BESYLATE 10 MG/1
TABLET ORAL
Qty: 90 TABLET | Refills: 2 | Status: SHIPPED | OUTPATIENT
Start: 2020-12-10 | End: 2021-09-07

## 2020-12-21 ENCOUNTER — OFFICE VISIT (OUTPATIENT)
Dept: FAMILY MEDICINE CLINIC | Facility: CLINIC | Age: 65
End: 2020-12-21
Payer: MEDICARE

## 2020-12-21 VITALS
SYSTOLIC BLOOD PRESSURE: 120 MMHG | HEART RATE: 74 BPM | DIASTOLIC BLOOD PRESSURE: 68 MMHG | HEIGHT: 72 IN | WEIGHT: 270.5 LBS | BODY MASS INDEX: 36.64 KG/M2 | OXYGEN SATURATION: 95 % | TEMPERATURE: 99.3 F

## 2020-12-21 DIAGNOSIS — Z00.00 ENCOUNTER FOR MEDICARE ANNUAL WELLNESS EXAM: ICD-10-CM

## 2020-12-21 DIAGNOSIS — S90.31XA CONTUSION OF RIGHT FOOT, INITIAL ENCOUNTER: ICD-10-CM

## 2020-12-21 DIAGNOSIS — M54.50 CHRONIC LOW BACK PAIN WITHOUT SCIATICA, UNSPECIFIED BACK PAIN LATERALITY: Primary | ICD-10-CM

## 2020-12-21 DIAGNOSIS — F32.0 MAJOR DEPRESSIVE DISORDER, SINGLE EPISODE, MILD (HCC): ICD-10-CM

## 2020-12-21 DIAGNOSIS — E11.9 TYPE 2 DIABETES MELLITUS WITHOUT COMPLICATION, WITHOUT LONG-TERM CURRENT USE OF INSULIN (HCC): ICD-10-CM

## 2020-12-21 DIAGNOSIS — I25.10 ATHEROSCLEROSIS OF NATIVE CORONARY ARTERY OF NATIVE HEART WITHOUT ANGINA PECTORIS: ICD-10-CM

## 2020-12-21 DIAGNOSIS — G89.29 CHRONIC LOW BACK PAIN WITHOUT SCIATICA, UNSPECIFIED BACK PAIN LATERALITY: Primary | ICD-10-CM

## 2020-12-21 DIAGNOSIS — I71.9 AORTIC ANEURYSM WITHOUT RUPTURE, UNSPECIFIED PORTION OF AORTA (HCC): ICD-10-CM

## 2020-12-21 DIAGNOSIS — M45.9 ANKYLOSING SPONDYLITIS, UNSPECIFIED SITE OF SPINE (HCC): ICD-10-CM

## 2020-12-21 DIAGNOSIS — I10 ESSENTIAL HYPERTENSION, BENIGN: ICD-10-CM

## 2020-12-21 PROCEDURE — 99214 OFFICE O/P EST MOD 30 MIN: CPT | Performed by: FAMILY MEDICINE

## 2020-12-21 PROCEDURE — 1123F ACP DISCUSS/DSCN MKR DOCD: CPT | Performed by: FAMILY MEDICINE

## 2020-12-21 PROCEDURE — G0439 PPPS, SUBSEQ VISIT: HCPCS | Performed by: FAMILY MEDICINE

## 2020-12-21 RX ORDER — OXYCODONE HYDROCHLORIDE 10 MG/1
10 TABLET ORAL 3 TIMES DAILY
Qty: 90 TABLET | Refills: 0 | Status: SHIPPED | OUTPATIENT
Start: 2020-12-21 | End: 2021-01-21 | Stop reason: SDUPTHER

## 2020-12-21 RX ORDER — FENTANYL 50 UG/H
PATCH TRANSDERMAL
Qty: 10 PATCH | Refills: 0 | Status: SHIPPED | OUTPATIENT
Start: 2020-12-21 | End: 2021-01-21 | Stop reason: SDUPTHER

## 2020-12-22 ENCOUNTER — HOSPITAL ENCOUNTER (OUTPATIENT)
Dept: RADIOLOGY | Facility: HOSPITAL | Age: 65
Discharge: HOME | End: 2020-12-22
Attending: FAMILY MEDICINE
Payer: MEDICARE

## 2020-12-22 DIAGNOSIS — S90.31XA CONTUSION OF RIGHT FOOT, INITIAL ENCOUNTER: ICD-10-CM

## 2020-12-22 PROCEDURE — 73630 X-RAY EXAM OF FOOT: CPT

## 2020-12-31 DIAGNOSIS — E11.9 TYPE 2 DIABETES MELLITUS WITHOUT COMPLICATION, WITHOUT LONG-TERM CURRENT USE OF INSULIN (HCC): ICD-10-CM

## 2020-12-31 RX ORDER — GLIPIZIDE 5 MG/1
TABLET, FILM COATED, EXTENDED RELEASE ORAL
Qty: 90 TABLET | Refills: 3 | Status: SHIPPED | OUTPATIENT
Start: 2020-12-31 | End: 2021-12-16

## 2021-01-21 ENCOUNTER — OFFICE VISIT (OUTPATIENT)
Dept: FAMILY MEDICINE CLINIC | Facility: CLINIC | Age: 66
End: 2021-01-21
Payer: MEDICARE

## 2021-01-21 VITALS
BODY MASS INDEX: 35.65 KG/M2 | DIASTOLIC BLOOD PRESSURE: 72 MMHG | TEMPERATURE: 98 F | HEART RATE: 68 BPM | OXYGEN SATURATION: 95 % | HEIGHT: 72 IN | WEIGHT: 263.2 LBS | SYSTOLIC BLOOD PRESSURE: 126 MMHG

## 2021-01-21 DIAGNOSIS — E11.9 TYPE 2 DIABETES MELLITUS WITHOUT COMPLICATION, WITHOUT LONG-TERM CURRENT USE OF INSULIN (HCC): ICD-10-CM

## 2021-01-21 DIAGNOSIS — E11.42 DIABETIC POLYNEUROPATHY ASSOCIATED WITH TYPE 2 DIABETES MELLITUS (HCC): ICD-10-CM

## 2021-01-21 DIAGNOSIS — F32.0 MAJOR DEPRESSIVE DISORDER, SINGLE EPISODE, MILD (HCC): ICD-10-CM

## 2021-01-21 DIAGNOSIS — E66.01 OBESITY, MORBID (HCC): ICD-10-CM

## 2021-01-21 DIAGNOSIS — G89.29 CHRONIC LOW BACK PAIN WITHOUT SCIATICA, UNSPECIFIED BACK PAIN LATERALITY: Primary | ICD-10-CM

## 2021-01-21 DIAGNOSIS — M54.50 CHRONIC LOW BACK PAIN WITHOUT SCIATICA, UNSPECIFIED BACK PAIN LATERALITY: Primary | ICD-10-CM

## 2021-01-21 PROBLEM — I71.9 AORTIC ANEURYSM WITHOUT RUPTURE (HCC): Status: ACTIVE | Noted: 2021-01-21

## 2021-01-21 PROBLEM — M45.9 ANKYLOSING SPONDYLITIS (HCC): Status: RESOLVED | Noted: 2020-12-21 | Resolved: 2021-01-21

## 2021-01-21 PROCEDURE — 99213 OFFICE O/P EST LOW 20 MIN: CPT | Performed by: FAMILY MEDICINE

## 2021-01-21 RX ORDER — OXYCODONE HYDROCHLORIDE 10 MG/1
10 TABLET ORAL 3 TIMES DAILY
Qty: 90 TABLET | Refills: 0 | Status: SHIPPED | OUTPATIENT
Start: 2021-01-21 | End: 2021-02-18 | Stop reason: SDUPTHER

## 2021-01-21 RX ORDER — METHOCARBAMOL 750 MG/1
750 TABLET, FILM COATED ORAL 3 TIMES DAILY
COMMUNITY
End: 2021-07-22

## 2021-01-21 RX ORDER — FENTANYL 50 UG/H
PATCH TRANSDERMAL
Qty: 10 PATCH | Refills: 0 | Status: SHIPPED | OUTPATIENT
Start: 2021-01-21 | End: 2021-02-18 | Stop reason: SDUPTHER

## 2021-01-21 NOTE — PROGRESS NOTES
Assessment/Plan:    Chronic low back pain without sciatica  Patient has chronic low back pain secondary to lumbar discogenic disease  I queried the [place] prescription drug monitoring program   There were no red flags and it is safe to proceed  He continues to require oxycodone and fentanyl for pain relief, in order to perform his ADLs and continue to live at home alone  I refilled his prescriptions  Diabetic polyneuropathy associated with type 2 diabetes mellitus ([facility] [address] )    Lab Results   Component Value Date    HGBA1C 6 6 (H) 10/24/2020    Patient does have diabetic peripheral neuropathy  I ordered fasting labs which I want him to have done 1 week prior to his next visit  We discussed routine diabetic foot care  I advised the patient to lose weight    Major depressive disorder, single episode, mild ([facility] [address] )  Depression is still present but improving  Continue sertraline 50 mg at bedtime       Diagnoses and all orders for this visit:    Chronic low back pain without sciatica, unspecified back pain laterality  -     oxyCODONE (ROXICODONE) 10 MG TABS; Take 1 tablet (10 mg total) by mouth 3 (three) times a dayMax Daily Amount: 30 mg  -     fentaNYL (DURAGESIC) 50 mcg/hr; Apply 1 patch every 3 days    Major depressive disorder, single episode, mild ([facility])    Diabetic polyneuropathy associated with type 2 diabetes mellitus ([facility] [address] )    Obesity, morbid ([facility] [address] )    Type 2 diabetes mellitus without complication, without long-term current use of insulin ([facility] [address] )  -     Basic metabolic panel; Future  -     Hemoglobin A1C; Future    Other orders  -     methocarbamol (ROBAXIN) 750 mg tablet; Take 750 mg by mouth 3 (three) times a day              Subjective:      Patient ID: Beatriz Biswas  is a 76 y o  male  This is a 58-year-old white male who presents to the office today requesting a refill on his pain medication  He tells me he is out of his oxycodone    He complains of severe pain in his back as well as severe pain in his knees  He tells me he is planning on contacting [facility] to see if they can give him shots in his knees  He really does not want to get knee replacement surgery right now, during the pandemic  He tells me he misses his son terribly  Patient never did his Cologuard test yet      The following portions of the patient's history were reviewed and updated as appropriate: allergies, current medications, past family history, past medical history, past social history, past surgical history and problem list     Review of Systems   Respiratory: Negative for cough, shortness of breath and wheezing  Cardiovascular: Negative for chest pain, palpitations and leg swelling  Gastrointestinal: Negative for abdominal distention, abdominal pain, blood in stool, constipation and diarrhea  Musculoskeletal: Positive for arthralgias and back pain  Psychiatric/Behavioral: Positive for dysphoric mood  Negative for sleep disturbance  Objective:      /72 (BP Location: Left arm, Patient Position: Sitting, Cuff Size: Large)   Pulse 68   Temp 98 °F (36 7 °C) (Tympanic)   Ht 6' (1 829 m)   Wt 119 kg (263 lb 3 2 oz)   SpO2 95%   BMI 35 70 kg/m²          Physical Exam  Vitals signs reviewed  Constitutional:       Comments: This is an obese 13-year-old white male who appears his stated age  He is pleasant, cooperative, and in no distress   HENT:      Head: Normocephalic and atraumatic  Right Ear: Tympanic membrane, ear canal and external ear normal       Left Ear: Tympanic membrane, ear canal and external ear normal       Mouth/Throat:      Mouth: Mucous membranes are moist       Pharynx: Oropharynx is clear  No oropharyngeal exudate  Eyes:      General: No scleral icterus  Right eye: No discharge  Left eye: No discharge  Conjunctiva/sclera: Conjunctivae normal       Pupils: Pupils are equal, round, and reactive to light  Neck:      Musculoskeletal: Neck supple     Cardiovascular: Rate and Rhythm: Normal rate and regular rhythm  Pulses: no weak pulses          Dorsalis pedis pulses are 1+ on the right side and 1+ on the left side  Posterior tibial pulses are 0 on the right side and 0 on the left side  Heart sounds: Normal heart sounds  No murmur  No friction rub  No gallop  Pulmonary:      Effort: Pulmonary effort is normal  No respiratory distress  Breath sounds: Normal breath sounds  No stridor  No wheezing, rhonchi or rales  Musculoskeletal:         General: Swelling ( swelling of both knees noted) and tenderness (Mild tenderness to palpation present in the bilateral paraspinal lumbar musculature ) present  Feet:      Right foot:      Skin integrity: No ulcer, skin breakdown, erythema, warmth, callus or dry skin  Left foot:      Skin integrity: No ulcer, skin breakdown, erythema, warmth, callus or dry skin  Lymphadenopathy:      Cervical: No cervical adenopathy  Psychiatric:         Mood and Affect: Mood normal          Behavior: Behavior normal          Thought Content: Thought content normal          Judgment: Judgment normal          Patient's shoes and socks removed  Right Foot/Ankle   Right Foot Inspection  Skin Exam: skin normal and skin intact no dry skin, no warmth, no callus, no erythema, no maceration, no abnormal color, no pre-ulcer, no ulcer and no callus                          Toe Exam: no swelling, no tenderness, erythema and  no right toe deformity  Sensory   Vibration: absent  Proprioception: absent   Monofilament testing: absent  Vascular  Capillary refills: < 3 seconds  The right DP pulse is 1+  The right PT pulse is 0       Left Foot/Ankle  Left Foot Inspection  Skin Exam: skin normal and skin intactno dry skin, no warmth, no erythema, no maceration, normal color, no pre-ulcer, no ulcer and no callus                         Toe Exam: no swelling, no tenderness, no erythema and no left toe deformity                   Sensory Vibration: diminished  Proprioception: diminished  Monofilament: diminished  Vascular  Capillary refills: < 3 seconds  The left DP pulse is 1+  The left PT pulse is 0  Assign Risk Category:  No deformity present; Loss of protective sensation;  No weak pulses       Risk: 1

## 2021-01-21 NOTE — PATIENT INSTRUCTIONS
Low-Sodium Diet   AMBULATORY CARE:   A low-sodium diet  limits foods that are high in sodium (salt)  You will need to follow a low-sodium diet if you have high blood pressure, kidney disease, or heart failure  You may also need to follow this diet if you have a condition that is causing your body to retain (hold) extra fluid  You may need to limit the amount of sodium you eat in a day to 1,500 to 2,000 mg  Ask your healthcare provider how much sodium you can have each day  How to use food labels to choose foods that are low in sodium:  Read food labels to find the amount of sodium they contain  The amount of sodium is listed in milligrams (mg)  The % Daily Value (DV) column tells you how much of your daily needs are met by 1 serving of the food for each nutrient listed  Choose foods that have less than 5% of the DV of sodium  These foods are considered low in sodium  Foods that have 20% or more of the DV of sodium are considered high in sodium  Some food labels may also list any of the following terms that tell you about the sodium content in the food:  · Sodium-free:  Less than 5 mg in each serving    · Very low sodium:  35 mg of sodium or less in each serving    · Low sodium:  140 mg of sodium or less in each serving    · Reduced sodium: At least 25% less sodium in each serving than the regular type    · Light in sodium:  50% less sodium in each serving    · Unsalted or no added salt:  No extra salt is added during processing (the food may still contain sodium)     Foods to avoid:  Salty foods are high in sodium  You should avoid the following:  · Processed foods:      ? Mixes for cornbread, biscuits, cake, and pudding     ? Instant foods, such as potatoes, cereals, noodles, and rice     ? Packaged foods, such as bread stuffing, rice and pasta mixes, snack dip mixes, and macaroni and cheese     ? Canned foods, such as canned vegetables, soups, broths, sauces, and vegetable or tomato juice    ?  Snack foods, such as salted chips, popcorn, pretzels, pork rinds, salted crackers, and salted nuts    ? Frozen foods, such as dinners, entrees, vegetables with sauces, and breaded meats    ? Sauerkraut, pickled vegetables, and other foods prepared in brine    · Meats and cheeses:      ? Smoked or cured meat, such as corned beef, hernandez, ham, hot dogs, and sausage    ? Canned meats or spreads, such as potted meats, sardines, anchovies, and imitation seafood    ? Deli or lunch meats, such as bologna, ham, turkey, and roast beef    ? Processed cheese, such as American cheese and cheese spreads    · Condiments, sauces, and seasonings:      ? Salt (¼ teaspoon of salt contains 575 mg of sodium)    ? Seasonings made with salt, such as garlic salt, celery salt, onion salt, and seasoned salt    ? Regular soy sauce, barbecue sauce, teriyaki sauce, steak sauce, Worcestershire sauce, and most flavored vinegars    ? Canned gravy and mixes     ? Regular condiments, such as mustard, ketchup, and salad dressings    ? Pickles and olives    ? Meat tenderizers and monosodium glutamate (MSG)    Foods to include:  Read the food label to find the exact amount of sodium in each serving  · Bread and cereal:  Try to choose breads with less than 80 mg of sodium per serving  ? Bread, roll, natalya, tortilla, or unsalted crackers  ? Ready-to-eat cereals with less than 5% DV of sodium (examples include shredded wheat and puffed rice)    ? Pasta    · Vegetables and fruits:      ? Unsalted fresh, frozen, or canned vegetables    ? Fresh, frozen, or canned fruits    ? Fruit juice    · Dairy:  One serving has about 150 mg of sodium  ? Milk, all types    ? Yogurt    ? Hard cheese, such as cheddar, Swiss, [employer], or mozzarella    · Meat and other protein foods:  Some raw meats may have added sodium  ? Plain meats, fish, and poultry     ? Eggs    · Other foods:      ? Homemade pudding    ? Unsalted nuts, popcorn, or pretzels    ?  Unsalted butter or margarine    Ways to decrease sodium:   · Add spices and herbs to foods instead of salt during cooking  Use salt-free seasonings to add flavor to foods  Examples include onion powder, garlic powder, basil, hernandez powder, paprika, and parsley  Try lemon or lime juice or vinegar to give foods a tart flavor  Use hot peppers, pepper, or cayenne pepper to add a spicy flavor  · Do not keep a salt shaker at your kitchen table  This may help keep you from adding salt to food at the table  A teaspoon of salt has 2,300 mg of sodium  It may take time to get used to enjoying the natural flavor of food instead of adding salt  Talk to your healthcare provider before you use salt substitutes  Some salt substitutes have a high amount of potassium and need to be avoided if you have kidney disease  · Choose low-sodium foods at restaurants  Meals from restaurants are often high in sodium  Some restaurants have nutrition information on the menu that tells you the amount of sodium in their foods  If possible, ask for your food to be prepared with less, or no salt  · Shop for unsalted or low-sodium foods and snacks at the grocery store  Examples include unsalted or low-sodium broths, soups, and canned vegetables  Choose fresh or frozen vegetables instead  Choose unsalted nuts or seeds or fresh fruits or vegetables as snacks  Read food labels and choose salt-free, very low-sodium, or low-sodium foods  © Copyright [address] Information is for End User's use only and may not be sold, redistributed or otherwise used for commercial purposes  All illustrations and images included in CareNotes® are the copyrighted property of [employer]  or [facility] [place][note 2] Stalin Bauman   The above information is an  only  It is not intended as medical advice for individual conditions or treatments  Talk to your doctor, nurse or pharmacist before following any medical regimen to see if it is safe and effective for you

## 2021-01-22 NOTE — ASSESSMENT & PLAN NOTE
Lab Results   Component Value Date    HGBA1C 6 6 (H) 10/24/2020    Patient does have diabetic peripheral neuropathy  I ordered fasting labs which I want him to have done 1 week prior to his next visit  We discussed routine diabetic foot care    I advised the patient to lose weight

## 2021-01-25 ENCOUNTER — TELEPHONE (OUTPATIENT)
Dept: FAMILY MEDICINE CLINIC | Facility: CLINIC | Age: 66
End: 2021-01-25

## 2021-02-18 DIAGNOSIS — M54.50 CHRONIC LOW BACK PAIN WITHOUT SCIATICA, UNSPECIFIED BACK PAIN LATERALITY: ICD-10-CM

## 2021-02-18 DIAGNOSIS — G89.29 CHRONIC LOW BACK PAIN WITHOUT SCIATICA, UNSPECIFIED BACK PAIN LATERALITY: ICD-10-CM

## 2021-02-19 DIAGNOSIS — M54.50 CHRONIC LOW BACK PAIN WITHOUT SCIATICA, UNSPECIFIED BACK PAIN LATERALITY: ICD-10-CM

## 2021-02-19 DIAGNOSIS — G89.29 CHRONIC LOW BACK PAIN WITHOUT SCIATICA, UNSPECIFIED BACK PAIN LATERALITY: ICD-10-CM

## 2021-02-19 RX ORDER — FENTANYL 50 UG/H
PATCH TRANSDERMAL
Qty: 10 PATCH | Refills: 0 | Status: SHIPPED | OUTPATIENT
Start: 2021-02-19 | End: 2021-04-19 | Stop reason: SDUPTHER

## 2021-02-19 RX ORDER — OXYCODONE HYDROCHLORIDE 10 MG/1
10 TABLET ORAL 3 TIMES DAILY
Qty: 90 TABLET | Refills: 0 | Status: SHIPPED | OUTPATIENT
Start: 2021-02-19 | End: 2021-03-22 | Stop reason: SDUPTHER

## 2021-02-22 ENCOUNTER — OFFICE VISIT (OUTPATIENT)
Dept: FAMILY MEDICINE CLINIC | Facility: CLINIC | Age: 66
End: 2021-02-22
Payer: MEDICARE

## 2021-02-22 VITALS
SYSTOLIC BLOOD PRESSURE: 142 MMHG | HEIGHT: 72 IN | HEART RATE: 76 BPM | TEMPERATURE: 99.3 F | OXYGEN SATURATION: 95 % | WEIGHT: 260 LBS | DIASTOLIC BLOOD PRESSURE: 74 MMHG | BODY MASS INDEX: 35.21 KG/M2

## 2021-02-22 DIAGNOSIS — G89.29 CHRONIC BILATERAL LOW BACK PAIN WITHOUT SCIATICA: Primary | ICD-10-CM

## 2021-02-22 DIAGNOSIS — R97.20 ELEVATED PSA: ICD-10-CM

## 2021-02-22 DIAGNOSIS — F32.0 MAJOR DEPRESSIVE DISORDER, SINGLE EPISODE, MILD (HCC): ICD-10-CM

## 2021-02-22 DIAGNOSIS — L97.321 VENOUS STASIS ULCER OF LEFT ANKLE LIMITED TO BREAKDOWN OF SKIN WITHOUT VARICOSE VEINS (HCC): ICD-10-CM

## 2021-02-22 DIAGNOSIS — R10.13 EPIGASTRIC PAIN: ICD-10-CM

## 2021-02-22 DIAGNOSIS — E11.9 TYPE 2 DIABETES MELLITUS WITHOUT COMPLICATION, WITHOUT LONG-TERM CURRENT USE OF INSULIN (HCC): ICD-10-CM

## 2021-02-22 DIAGNOSIS — M54.50 CHRONIC BILATERAL LOW BACK PAIN WITHOUT SCIATICA: Primary | ICD-10-CM

## 2021-02-22 DIAGNOSIS — I87.2 VENOUS STASIS ULCER OF LEFT ANKLE LIMITED TO BREAKDOWN OF SKIN WITHOUT VARICOSE VEINS (HCC): ICD-10-CM

## 2021-02-22 PROCEDURE — 99214 OFFICE O/P EST MOD 30 MIN: CPT | Performed by: FAMILY MEDICINE

## 2021-02-22 NOTE — ASSESSMENT & PLAN NOTE
Patient requested a referral to Gastroenterology to evaluate his epigastric pain  He is concerned about an ulcer  I discontinued his Mobic  He will take Tylenol as needed  I put in a referral for him to see Dr Mitul Durbin  I reminded the patient that he needs to do the Cologuard  He continues to refuse colonoscopy

## 2021-02-22 NOTE — ASSESSMENT & PLAN NOTE
Patient's depression is somewhat improved  I refilled his prescription for sertraline  We discussed length of therapy  I plan to treat him for approximately a year  If he is doing well at that point, we can consider weaning him off the medication

## 2021-02-22 NOTE — PROGRESS NOTES
Assessment/Plan:    Chronic low back pain without sciatica  Patient has chronic lower back pain secondary to lumbar degenerative disc disease  He continues to meet criteria for chronic opiate usage  He requires a medication ordered to continue to perform his ADLs  I refilled his prescription on Thursday for him  Epigastric pain    Patient requested a referral to Gastroenterology to evaluate his epigastric pain  He is concerned about an ulcer  I discontinued his Mobic  He will take Tylenol as needed  I put in a referral for him to see Dr Obinna Farfan  I reminded the patient that he needs to do the Cologuard  He continues to refuse colonoscopy  Major depressive disorder, single episode, mild ([facility] [address] )    Patient's depression is somewhat improved  I refilled his prescription for sertraline  We discussed length of therapy  I plan to treat him for approximately a year  If he is doing well at that point, we can consider weaning him off the medication  Type 2 diabetes mellitus without complication, without long-term current use of insulin (HCC)    Lab Results   Component Value Date    HGBA1C 6 6 (H) 10/24/2020     Patient has type 2 diabetes mellitus  He is to have repeat fasting blood work done just prior to his next office visit    Elevated PSA   I had placed an order for a referral for the patient to see his urologist back in November  He tells me he still has not heard from him  I offered to contact the urologist and set up the appointment  Patient declined  I told the patient he may be potentially delaying the diagnosis of a treatable condition, such as prostate cancer  The condition then may become untreatable  He is aware of this but tells me he has too many other things going on         Diagnoses and all orders for this visit:    Chronic bilateral low back pain without sciatica    Venous stasis ulcer of left ankle limited to breakdown of skin without varicose veins (HCC)    Type 2 diabetes mellitus without complication, without long-term current use of insulin (HCC)  -     Hemoglobin A1C    Major depressive disorder, single episode, mild (HCC)  -     sertraline (ZOLOFT) 50 mg tablet; Take 1 at bedtime    Epigastric pain  -     Ambulatory referral to Gastroenterology; Future    Elevated PSA          Subjective:      Patient ID: Jeri Powell  is a 76 y o  male  This patient is a 78-year-old white male who presents to the office today for his routine checkup  He had contacted the office last week as he needed a refill for his pain medication  He is here today for reassessment of his chronic pain  He continues to experience severe chronic lower back pain  He finds it difficult to do much of anything  Requires pain medication in order to continue to perform his ADLs  Also has chronic knee pain  His new complaint today is epigastric pain  He asked for a referral to see Gastroenterology  He tells me he would like to have an upper endoscopy  I told him he is due for a colonoscopy as well  He still has not done the Cologuard  He tells me he will not  Get a colonoscopy  He did not have blood work done in anticipation of today's visit  The following portions of the patient's history were reviewed and updated as appropriate: allergies, current medications, past family history, past medical history, past social history, past surgical history and problem list     Review of Systems   Constitutional: Negative for appetite change and unexpected weight change  Respiratory: Negative for cough, shortness of breath and wheezing  Cardiovascular: Negative for chest pain, palpitations and leg swelling  Gastrointestinal: Positive for abdominal pain  Negative for blood in stool, constipation, diarrhea and nausea  Epigastric pain, relieved with food  Denies melena   Genitourinary: Negative for difficulty urinating          Denies nocturia and weak urinary stream   Musculoskeletal: Positive for arthralgias and back pain  Objective:      /74 (BP Location: Left arm, Patient Position: Sitting, Cuff Size: Large)   Pulse 76   Temp 99 3 °F (37 4 °C) (Tympanic)   Ht 6' (1 829 m)   Wt 118 kg (260 lb)   SpO2 95%   BMI 35 26 kg/m²          Physical Exam  Vitals signs reviewed  Constitutional:       Comments: This is a 21-year-old white male who appears his stated age  He is in no apparent distress   HENT:      Head: Normocephalic and atraumatic  Right Ear: Tympanic membrane, ear canal and external ear normal       Left Ear: Tympanic membrane, ear canal and external ear normal       Mouth/Throat:      Mouth: Mucous membranes are moist       Pharynx: Oropharynx is clear  No oropharyngeal exudate or posterior oropharyngeal erythema  Eyes:      General: No scleral icterus  Right eye: No discharge  Left eye: No discharge  Conjunctiva/sclera: Conjunctivae normal       Pupils: Pupils are equal, round, and reactive to light  Neck:      Musculoskeletal: Neck supple  Vascular: No carotid bruit  Comments: No thyromegaly was noted  Cardiovascular:      Rate and Rhythm: Normal rate and regular rhythm  Pulses: Normal pulses  Heart sounds: Normal heart sounds  No murmur  No friction rub  No gallop  Pulmonary:      Effort: Pulmonary effort is normal  No respiratory distress  Breath sounds: Normal breath sounds  No stridor  No wheezing or rales  Abdominal:      General: Bowel sounds are normal  There is no distension  Palpations: Abdomen is soft  There is no mass  Tenderness: There is no abdominal tenderness  There is no guarding  Comments: There is no hepatosplenomegaly   Musculoskeletal:      Comments: There is tenderness noted to palpation in the bilateral paraspinal lumbar musculature with decreased range of motion   Lymphadenopathy:      Cervical: No cervical adenopathy     Psychiatric:         Mood and Affect: Mood normal          Behavior: Behavior normal          Thought Content: Thought content normal          Judgment: Judgment normal          Extremities: Without cyanosis, clubbing  There is trace lower extremity edema noted bilaterally

## 2021-02-22 NOTE — ASSESSMENT & PLAN NOTE
Patient has chronic lower back pain secondary to lumbar degenerative disc disease  He continues to meet criteria for chronic opiate usage  He requires a medication ordered to continue to perform his ADLs  I refilled his prescription on Thursday for him

## 2021-02-22 NOTE — ASSESSMENT & PLAN NOTE
Lab Results   Component Value Date    HGBA1C 6 6 (H) 10/24/2020     Patient has type 2 diabetes mellitus    He is to have repeat fasting blood work done just prior to his next office visit

## 2021-02-22 NOTE — ASSESSMENT & PLAN NOTE
I had placed an order for a referral for the patient to see his urologist back in November  He tells me he still has not heard from him  I offered to contact the urologist and set up the appointment  Patient declined  I told the patient he may be potentially delaying the diagnosis of a treatable condition, such as prostate cancer  The condition then may become untreatable  He is aware of this but tells me he has too many other things going on

## 2021-03-10 ENCOUNTER — TELEPHONE (OUTPATIENT)
Dept: FAMILY MEDICINE CLINIC | Facility: CLINIC | Age: 66
End: 2021-03-10

## 2021-03-10 DIAGNOSIS — Z23 ENCOUNTER FOR IMMUNIZATION: ICD-10-CM

## 2021-03-22 DIAGNOSIS — G89.29 CHRONIC LOW BACK PAIN WITHOUT SCIATICA, UNSPECIFIED BACK PAIN LATERALITY: ICD-10-CM

## 2021-03-22 DIAGNOSIS — M54.50 CHRONIC LOW BACK PAIN WITHOUT SCIATICA, UNSPECIFIED BACK PAIN LATERALITY: ICD-10-CM

## 2021-03-22 RX ORDER — OXYCODONE HYDROCHLORIDE 10 MG/1
10 TABLET ORAL 3 TIMES DAILY
Qty: 90 TABLET | Refills: 0 | Status: SHIPPED | OUTPATIENT
Start: 2021-03-22 | End: 2021-04-19 | Stop reason: SDUPTHER

## 2021-03-31 DIAGNOSIS — I25.10 ATHEROSCLEROSIS OF NATIVE CORONARY ARTERY OF NATIVE HEART WITHOUT ANGINA PECTORIS: ICD-10-CM

## 2021-03-31 RX ORDER — METOPROLOL TARTRATE 50 MG/1
TABLET, FILM COATED ORAL
Qty: 180 TABLET | Refills: 1 | Status: SHIPPED | OUTPATIENT
Start: 2021-03-31 | End: 2021-09-22

## 2021-04-06 ENCOUNTER — IMMUNIZATIONS (OUTPATIENT)
Dept: FAMILY MEDICINE CLINIC | Facility: HOSPITAL | Age: 66
End: 2021-04-06

## 2021-04-06 DIAGNOSIS — Z23 ENCOUNTER FOR IMMUNIZATION: Primary | ICD-10-CM

## 2021-04-06 PROCEDURE — 0011A SARS-COV-2 / COVID-19 MRNA VACCINE (MODERNA) 100 MCG: CPT

## 2021-04-06 PROCEDURE — 91301 SARS-COV-2 / COVID-19 MRNA VACCINE (MODERNA) 100 MCG: CPT

## 2021-04-19 DIAGNOSIS — G89.29 CHRONIC LOW BACK PAIN WITHOUT SCIATICA, UNSPECIFIED BACK PAIN LATERALITY: ICD-10-CM

## 2021-04-19 DIAGNOSIS — M54.50 CHRONIC LOW BACK PAIN WITHOUT SCIATICA, UNSPECIFIED BACK PAIN LATERALITY: ICD-10-CM

## 2021-04-19 RX ORDER — OXYCODONE HYDROCHLORIDE 10 MG/1
10 TABLET ORAL 3 TIMES DAILY
Qty: 90 TABLET | Refills: 0 | Status: SHIPPED | OUTPATIENT
Start: 2021-04-19 | End: 2021-05-21 | Stop reason: SDUPTHER

## 2021-04-19 RX ORDER — FENTANYL 50 UG/H
PATCH TRANSDERMAL
Qty: 10 PATCH | Refills: 0 | Status: SHIPPED | OUTPATIENT
Start: 2021-04-19 | End: 2021-05-21 | Stop reason: SDUPTHER

## 2021-05-05 ENCOUNTER — IMMUNIZATIONS (OUTPATIENT)
Dept: FAMILY MEDICINE CLINIC | Facility: HOSPITAL | Age: 66
End: 2021-05-05

## 2021-05-05 DIAGNOSIS — Z23 ENCOUNTER FOR IMMUNIZATION: Primary | ICD-10-CM

## 2021-05-05 PROCEDURE — 91301 SARS-COV-2 / COVID-19 MRNA VACCINE (MODERNA) 100 MCG: CPT

## 2021-05-05 PROCEDURE — 0012A SARS-COV-2 / COVID-19 MRNA VACCINE (MODERNA) 100 MCG: CPT

## 2021-05-21 DIAGNOSIS — G89.29 CHRONIC LOW BACK PAIN WITHOUT SCIATICA, UNSPECIFIED BACK PAIN LATERALITY: ICD-10-CM

## 2021-05-21 DIAGNOSIS — M54.50 CHRONIC LOW BACK PAIN WITHOUT SCIATICA, UNSPECIFIED BACK PAIN LATERALITY: ICD-10-CM

## 2021-05-21 NOTE — TELEPHONE ENCOUNTER
Called patient stated that patient needs an appt as per the pain agreement patient signed for his controlled substance  Left message for patient to call for an appt

## 2021-05-23 RX ORDER — FENTANYL 50 UG/H
PATCH TRANSDERMAL
Qty: 10 PATCH | Refills: 0 | Status: SHIPPED | OUTPATIENT
Start: 2021-05-23 | End: 2021-07-01 | Stop reason: SDUPTHER

## 2021-05-23 RX ORDER — OXYCODONE HYDROCHLORIDE 10 MG/1
10 TABLET ORAL 3 TIMES DAILY
Qty: 33 TABLET | Refills: 0 | Status: SHIPPED | OUTPATIENT
Start: 2021-05-23 | End: 2021-06-01 | Stop reason: SDUPTHER

## 2021-06-01 ENCOUNTER — OFFICE VISIT (OUTPATIENT)
Dept: FAMILY MEDICINE CLINIC | Facility: CLINIC | Age: 66
End: 2021-06-01
Payer: MEDICARE

## 2021-06-01 VITALS
SYSTOLIC BLOOD PRESSURE: 128 MMHG | TEMPERATURE: 97.6 F | HEIGHT: 72 IN | HEART RATE: 74 BPM | DIASTOLIC BLOOD PRESSURE: 66 MMHG | OXYGEN SATURATION: 96 % | WEIGHT: 263.4 LBS | BODY MASS INDEX: 35.68 KG/M2

## 2021-06-01 DIAGNOSIS — M17.0 PRIMARY OSTEOARTHRITIS OF BOTH KNEES: ICD-10-CM

## 2021-06-01 DIAGNOSIS — E78.2 MIXED HYPERLIPIDEMIA: ICD-10-CM

## 2021-06-01 DIAGNOSIS — M54.2 NECK PAIN: ICD-10-CM

## 2021-06-01 DIAGNOSIS — E11.9 TYPE 2 DIABETES MELLITUS WITHOUT COMPLICATION, WITHOUT LONG-TERM CURRENT USE OF INSULIN (HCC): ICD-10-CM

## 2021-06-01 DIAGNOSIS — G89.29 CHRONIC LOW BACK PAIN WITHOUT SCIATICA, UNSPECIFIED BACK PAIN LATERALITY: Primary | ICD-10-CM

## 2021-06-01 DIAGNOSIS — M54.50 CHRONIC LOW BACK PAIN WITHOUT SCIATICA, UNSPECIFIED BACK PAIN LATERALITY: Primary | ICD-10-CM

## 2021-06-01 DIAGNOSIS — I10 ESSENTIAL HYPERTENSION, BENIGN: ICD-10-CM

## 2021-06-01 DIAGNOSIS — R97.20 ELEVATED PSA: ICD-10-CM

## 2021-06-01 PROCEDURE — 99214 OFFICE O/P EST MOD 30 MIN: CPT | Performed by: FAMILY MEDICINE

## 2021-06-01 RX ORDER — OXYCODONE HYDROCHLORIDE 10 MG/1
10 TABLET ORAL 3 TIMES DAILY
Qty: 90 TABLET | Refills: 0 | Status: SHIPPED | OUTPATIENT
Start: 2021-06-01 | End: 2021-07-01 | Stop reason: SDUPTHER

## 2021-06-01 NOTE — PATIENT INSTRUCTIONS

## 2021-06-01 NOTE — PROGRESS NOTES
Assessment/Plan:    Chronic low back pain without sciatica   Patient has chronic low back pain  He asked if I can increase the dose of his opiates  He is already taking very large doses of opiates monthly  In the past, he had been on oxycodone 10 mg 4 times per day  He did not demonstrate any pain relief as he continually complained of severe pain at that time as well  I told him he is habituated to the medication that he is taking it so long  I really think it would be best to try to wean down but he declines  He wants to escalate his dosing  I do not think that is the right thing to do  He clearly has an shown that he has gotten better on these kind of medications and I really think the medication should be reconsidered  I do think his depression is now playing a role in his pain  I offered to refer him back to a pain management specialist   He did not say anything but seemed to indicate nonverbally that he would consider it  I did query the [place] prescription drug monitoring program   There were no red flags and it was safe to proceed  I refilled his prescription for oxycodone  Osteoarthritis of knee    I did tell the patient that this is something we can do something about  He absolutely as a candidate to have bilateral knee replacements  He is definitely not going to get better if he just sits around the house sulking  I recommended he go back to see Dr Dane Hopkins  Neck pain    Patient does have cervical spondylosis  Again, I recommended pain management  Type 2 diabetes mellitus without complication, without long-term current use of insulin (HCC)    Lab Results   Component Value Date    HGBA1C 6 6 (H) 10/24/2020     Patient has been noncompliant with follow-up for labs  He has labs ordered  I encouraged him to get the labs done that I had previously ordered    Continue metformin 1000 mg b i d , in addition to glipizide 5 mg daily    Essential hypertension, benign   Blood pressure is well controlled on his current medications  I recommended no change    Mixed hyperlipidemia   Patient is now due to have his LDL cholesterol checked again  His goal LDL cholesterol is less than 70  He will continue atorvastatin 40 mg daily  He did gain 3-1/2 lb since his last visit  He needs to lose weight  Elevated PSA    Patient has elevated PSA  He really needs to follow-up with Urology  I had previously placed a referral for Urology in November but he never followed up  He has not followed up with anything that was ordered  This includes the ultrasound of the aorta, a Cologuard, a GI consult in February, the Urology consult, as well as blood work  Diagnoses and all orders for this visit:    Chronic low back pain without sciatica, unspecified back pain laterality  -     oxyCODONE (ROXICODONE) 10 MG TABS; Take 1 tablet (10 mg total) by mouth 3 (three) times a dayMax Daily Amount: 30 mg    Mixed hyperlipidemia  -     LDL cholesterol, direct; Future    Primary osteoarthritis of both knees    Neck pain    Type 2 diabetes mellitus without complication, without long-term current use of insulin (HCC)    Essential hypertension, benign    Elevated PSA        BMI Counseling: Body mass index is 35 72 kg/m²  The BMI is above normal  Nutrition recommendations include reducing portion sizes, decreasing overall calorie intake, reducing fast food intake and moderation in carbohydrate intake  Subjective:      Patient ID: Ryanne Curry  is a 78 y o  male  This is a 39-year-old white male presents to the office today for his checkup  He did not get the blood work done that I had ordered  He still did not do his Cologuard, which was ordered for him at the end of November  He never went for his ultrasound of the aorta  He never followed up with GI or Urology  He tells me he really does not feel like doing anything    He tells me he is in pain all the time and he will figures "what is the use?"       The following portions of the patient's history were reviewed and updated as appropriate: allergies, current medications, past family history, past medical history, past social history, past surgical history and problem list     Review of Systems   Constitutional: Negative for activity change, appetite change and unexpected weight change  Cardiovascular: Negative for chest pain, palpitations and leg swelling  Gastrointestinal: Negative for abdominal distention, abdominal pain, blood in stool, constipation, diarrhea and nausea  Genitourinary:         Denies nocturia and weak urinary stream   Musculoskeletal: Positive for arthralgias ( reports bilateral knee pain), back pain and neck pain  Reports clicking and popping and neck   Psychiatric/Behavioral: Positive for dysphoric mood and sleep disturbance (  Reports sleep disturbance secondary to pain)  Objective:      /66 (BP Location: Left arm, Patient Position: Sitting, Cuff Size: Large)   Pulse 74   Temp 97 6 °F (36 4 °C) (Temporal)   Ht 6' (1 829 m)   Wt 119 kg (263 lb 6 4 oz)   SpO2 96%   BMI 35 72 kg/m²          Physical Exam  Vitals signs reviewed  Constitutional:       Comments: This patient is a 21-year-old white male who appears his stated age  He is nonseptic looking  He is obese  HENT:      Head: Normocephalic and atraumatic  Right Ear: Tympanic membrane, ear canal and external ear normal  There is no impacted cerumen  Left Ear: Tympanic membrane, ear canal and external ear normal  There is no impacted cerumen  Mouth/Throat:      Mouth: Mucous membranes are moist       Pharynx: Oropharynx is clear  No oropharyngeal exudate or posterior oropharyngeal erythema  Eyes:      General: No scleral icterus  Right eye: No discharge  Left eye: No discharge  Conjunctiva/sclera: Conjunctivae normal       Pupils: Pupils are equal, round, and reactive to light     Neck: Musculoskeletal: Neck supple  Comments: There is no thyromegaly  Cardiovascular:      Rate and Rhythm: Normal rate and regular rhythm  Heart sounds: Normal heart sounds  No murmur  No friction rub  No gallop  Pulmonary:      Effort: Pulmonary effort is normal  No respiratory distress  Breath sounds: Normal breath sounds  No stridor  No wheezing, rhonchi or rales  Abdominal:      General: Bowel sounds are normal  There is no distension  Palpations: Abdomen is soft  There is no mass  Tenderness: There is no abdominal tenderness  There is no guarding  Comments:  No hepatosplenomegaly   Lymphadenopathy:      Cervical: No cervical adenopathy     Psychiatric:      Comments:   Patient does have a blunted affect although did not become tearful today        Extremities: Without cyanosis, clubbing, or edema

## 2021-06-02 NOTE — ASSESSMENT & PLAN NOTE
Patient is now due to have his LDL cholesterol checked again  His goal LDL cholesterol is less than 70  He will continue atorvastatin 40 mg daily  He did gain 3-1/2 lb since his last visit  He needs to lose weight

## 2021-06-02 NOTE — ASSESSMENT & PLAN NOTE
Patient has chronic low back pain  He asked if I can increase the dose of his opiates  He is already taking very large doses of opiates monthly  In the past, he had been on oxycodone 10 mg 4 times per day  He did not demonstrate any pain relief as he continually complained of severe pain at that time as well  I told him he is habituated to the medication that he is taking it so long  I really think it would be best to try to wean down but he declines  He wants to escalate his dosing  I do not think that is the right thing to do  He clearly has an shown that he has gotten better on these kind of medications and I really think the medication should be reconsidered  I do think his depression is now playing a role in his pain  I offered to refer him back to a pain management specialist   He did not say anything but seemed to indicate nonverbally that he would consider it  I did query the [place] prescription drug monitoring program   There were no red flags and it was safe to proceed  I refilled his prescription for oxycodone

## 2021-06-02 NOTE — ASSESSMENT & PLAN NOTE
Lab Results   Component Value Date    HGBA1C 6 6 (H) 10/24/2020     Patient has been noncompliant with follow-up for labs  He has labs ordered  I encouraged him to get the labs done that I had previously ordered    Continue metformin 1000 mg b i d , in addition to glipizide 5 mg daily

## 2021-06-02 NOTE — ASSESSMENT & PLAN NOTE
Patient has elevated PSA  He really needs to follow-up with Urology  I had previously placed a referral for Urology in November but he never followed up  He has not followed up with anything that was ordered  This includes the ultrasound of the aorta, a Cologuard, a GI consult in February, the Urology consult, as well as blood work

## 2021-06-02 NOTE — ASSESSMENT & PLAN NOTE
I did tell the patient that this is something we can do something about  He absolutely as a candidate to have bilateral knee replacements  He is definitely not going to get better if he just sits around the house sulking  I recommended he go back to see Dr Palomo Mahmood

## 2021-06-07 DIAGNOSIS — G89.29 CHRONIC LOW BACK PAIN WITHOUT SCIATICA, UNSPECIFIED BACK PAIN LATERALITY: ICD-10-CM

## 2021-06-07 DIAGNOSIS — M54.50 CHRONIC LOW BACK PAIN WITHOUT SCIATICA, UNSPECIFIED BACK PAIN LATERALITY: ICD-10-CM

## 2021-06-07 RX ORDER — GABAPENTIN 600 MG/1
TABLET ORAL
Qty: 270 TABLET | Refills: 1 | Status: SHIPPED | OUTPATIENT
Start: 2021-06-07 | End: 2021-12-06

## 2021-06-29 ENCOUNTER — TELEPHONE (OUTPATIENT)
Dept: FAMILY MEDICINE CLINIC | Facility: CLINIC | Age: 66
End: 2021-06-29

## 2021-06-29 RX ORDER — OMEPRAZOLE 20 MG/1
CAPSULE, DELAYED RELEASE ORAL
COMMUNITY

## 2021-07-01 ENCOUNTER — OFFICE VISIT (OUTPATIENT)
Dept: FAMILY MEDICINE CLINIC | Facility: CLINIC | Age: 66
End: 2021-07-01
Payer: MEDICARE

## 2021-07-01 VITALS
HEART RATE: 75 BPM | OXYGEN SATURATION: 96 % | BODY MASS INDEX: 35.69 KG/M2 | SYSTOLIC BLOOD PRESSURE: 138 MMHG | WEIGHT: 263.5 LBS | TEMPERATURE: 97.8 F | DIASTOLIC BLOOD PRESSURE: 76 MMHG | HEIGHT: 72 IN

## 2021-07-01 DIAGNOSIS — G89.29 CHRONIC LOW BACK PAIN WITHOUT SCIATICA, UNSPECIFIED BACK PAIN LATERALITY: Primary | ICD-10-CM

## 2021-07-01 DIAGNOSIS — M54.50 CHRONIC LOW BACK PAIN WITHOUT SCIATICA, UNSPECIFIED BACK PAIN LATERALITY: Primary | ICD-10-CM

## 2021-07-01 DIAGNOSIS — E11.42 DIABETIC POLYNEUROPATHY ASSOCIATED WITH TYPE 2 DIABETES MELLITUS (HCC): ICD-10-CM

## 2021-07-01 DIAGNOSIS — M17.0 PRIMARY OSTEOARTHRITIS OF BOTH KNEES: ICD-10-CM

## 2021-07-01 DIAGNOSIS — R53.83 OTHER FATIGUE: ICD-10-CM

## 2021-07-01 PROBLEM — E11.9 TYPE 2 DIABETES MELLITUS WITHOUT COMPLICATION, WITHOUT LONG-TERM CURRENT USE OF INSULIN (HCC): Status: RESOLVED | Noted: 2019-06-20 | Resolved: 2021-07-01

## 2021-07-01 PROCEDURE — 99214 OFFICE O/P EST MOD 30 MIN: CPT | Performed by: FAMILY MEDICINE

## 2021-07-01 RX ORDER — FENTANYL 50 UG/H
PATCH TRANSDERMAL
Qty: 10 PATCH | Refills: 0 | Status: SHIPPED | OUTPATIENT
Start: 2021-07-01

## 2021-07-01 RX ORDER — OXYCODONE HYDROCHLORIDE 10 MG/1
10 TABLET ORAL 3 TIMES DAILY
Qty: 90 TABLET | Refills: 0 | Status: SHIPPED | OUTPATIENT
Start: 2021-07-01 | End: 2021-08-02 | Stop reason: SDUPTHER

## 2021-07-01 NOTE — ASSESSMENT & PLAN NOTE
This is likely due to underlying depression  I discussed this with the patient  Nevertheless, we need to rule out some organic causes  I added a CBC with diff as well as a TSH to the blood work that was previously ordered  I told him he needs to get this done as soon as possible  He keeps putting it off  He is well overdue

## 2021-07-01 NOTE — PATIENT INSTRUCTIONS
Diabetic Retinopathy   WHAT YOU NEED TO KNOW:   What is diabetic retinopathy? Diabetic retinopathy (DR) is eye damage caused by long-term high blood sugar levels  The walls of the blood vessels in the retina weaken and leak blood  This causes swelling and vision problems  Over time, new, weak blood vessels grow, leak blood, and cover the center of the retina  DR can lead to blindness  What increases my risk for DR? · Having diabetes for more than 5 years    · Poor blood sugar control    · High blood pressure, high cholesterol, pregnancy, or kidney disease    · Smoking    · Eye surgery or other eye problems    · Family members with DR    What are the signs and symptoms of DR? · Blurred vision    · Seeing red or black wavy lines that look like a curtain or spider web    · Seeing light flashes or red, black, or grey floating spots (floaters)    · Vision loss    How is DR diagnosed? Your healthcare provider will examine your eyes  Screening may be done regularly  Screening means you are checked for DR before you have signs or symptoms  You will be checked within 5 years of a type 1 diabetes diagnosis, because DR can take 5 years to develop  Your provider will tell you when to get checked  You will need to be checked immediately after a diagnosis of type 2 diabetes  If screening shows you do not have DR, your provider will tell you how often to get screening in the future  The following may be used to check for DR:  · Dilated indirect ophthalmoscopy  may show early damage in your retina  A magnifying lens is used to see your retina and other parts of your eye  Eye drops are placed into your eyes to make your pupils larger  · Fluorescein angiography  uses a dye to show blood leakage and damage in your eye  · Fundus photography  may show your DR and how severe it is  How is DR treated? You may not need treatment if you have mild DR   Healthcare providers will check your eyes regularly to monitor the damage to your retinas  Treatment may include any of the following:  · Laser treatment  may slow DR and prevent blindness  This treatment shrinks the new blood vessels and seals the areas that have leaks  · Anti-vascular endothelial growth factor (anti-VEGF)  treatment helps reduce swelling and improves vision  During this treatment, medicine is injected into the vitreous of the eye  The vitreous is the gel-like material that fills the inside of the eye  · Vitrectomy  is surgery for severe DR  A vitrectomy may be needed if there is bleeding in the vitreous that does not clear  · Medicines  may be used to control your blood pressure and prevent kidney disease  Medicines may also help lower your cholesterol or triglyceride (fatty acid) levels  What can I do to prevent or control DR? · Control your blood sugar  Keep your blood sugar levels as close to normal as possible  You may need to check your blood sugar levels 3 times each day  · Get your eyes checked at least 1 time each year  Your eye doctor may want to see you every 6 months or more often  If you are pregnant, get your eyes checked during the first 13 weeks of your pregnancy  You will need eye exams more often during pregnancy and for 1 year after you give birth  · Check your blood pressure as directed  High blood pressure can damage the blood vessels in your eyes  This can lead to retinopathy  A normal blood pressure is 119/79 or lower  Talk to your healthcare provider about your blood pressure goals  Together you can create a plan to lower your blood pressure if needed and keep it in a healthy range  The plan may include lifestyle changes or medicines to lower your blood pressure  · Talk to your healthcare provider about your cholesterol level  Lab tests are used to measure the amount of cholesterol in your blood  Your provider can help you create a plan to lower your cholesterol level if needed   You may need to make lifestyle changes or take medicines to control your cholesterol level  · Plan for pregnancy if you are female  Pregnancy increases the risk for DR  Pregnancy can worsen existing DR and lead to serious vision problems  You can lower the risk by making sure your blood sugar is controlled before you get pregnant  Your provider may recommend you reach an A1c level of 6 5% or lower before pregnancy  Talk to your healthcare provider about ways to prevent or control DR during pregnancy  · Exercise regularly  Ask your healthcare provider about the best exercise plan for you  He or she will tell you how to control your blood sugar when you exercise  You may need to check your blood sugar more often during exercise  Bring a snack with you when you exercise in case your blood sugar gets too low  If you have proliferative or severe DR, you may not be able to exercise hard or do resistance training  These activities increase the risk for retinal detachment (pulling away) or other vision problems  An eye specialist may need to help you with your exercise plan  · Do not smoke  Nicotine can damage blood vessels in your eyes and make it more difficult to manage your diabetes  Do not use e-cigarettes or smokeless tobacco in place of cigarettes or to help you quit  They still contain nicotine  Ask your healthcare provider for information if you currently smoke and need help quitting  Call your local emergency number [phone] in the [address]) or have someone call if:   · You suddenly cannot see  When should I call my doctor? · Your blurred vision gets worse, or you start to see double  · You see more floating spots  · You see dark spots  · You have questions or concerns about your condition or care  CARE AGREEMENT:   You have the right to help plan your care  Learn about your health condition and how it may be treated   Discuss treatment options with your healthcare providers to decide what care you want to receive  You always have the right to refuse treatment  The above information is an  only  It is not intended as medical advice for individual conditions or treatments  Talk to your doctor, nurse or pharmacist before following any medical regimen to see if it is safe and effective for you  © Copyright [address] Information is for End User's use only and may not be sold, redistributed or otherwise used for commercial purposes   All illustrations and images included in CareNotes® are the copyrighted property of A D [employer] M [employer]  or [address] [place] [address]

## 2021-07-01 NOTE — ASSESSMENT & PLAN NOTE
We discussed the patient's osteoarthritis of his knees  He is currently receiving Euflexxa injections from his orthopedic surgeon  I advised the patient that he is a candidate for total knee replacement  I told him he should know within a few weeks after his 3rd shot whether the flexor has worked

## 2021-07-01 NOTE — ASSESSMENT & PLAN NOTE
Lab Results   Component Value Date    HGBA1C 6 6 (H) 10/24/2020     Patient has type 2 diabetes mellitus  His glycemic control is uncertain  He has been noncompliant with follow-up in getting his blood work done  I attribute this to his depression  He needs to get the blood work done as soon as possible  I gave him a hand written note for the lab so that they do not miss anything that has been ordered  We discussed routine diabetic foot care    We discussed getting a dilated eye exam   The patient has an appointment later this month for an eye exam

## 2021-07-01 NOTE — PROGRESS NOTES
Assessment/Plan:    Chronic low back pain without sciatica    Patient has longstanding chronic low back pain secondary to lumbar discogenic disease  I queried the [place] prescription drug monitoring program   I refilled his prescription for fentanyl patches as well as oxycodone  Other fatigue    This is likely due to underlying depression  I discussed this with the patient  Nevertheless, we need to rule out some organic causes  I added a CBC with diff as well as a TSH to the blood work that was previously ordered  I told him he needs to get this done as soon as possible  He keeps putting it off  He is well overdue  Osteoarthritis of knee  We discussed the patient's osteoarthritis of his knees  He is currently receiving Euflexxa injections from his orthopedic surgeon  I advised the patient that he is a candidate for total knee replacement  I told him he should know within a few weeks after his 3rd shot whether the flexor has worked  Diabetic polyneuropathy associated with type 2 diabetes mellitus ([facility] [address] )    Lab Results   Component Value Date    HGBA1C 6 6 (H) 10/24/2020     Patient has type 2 diabetes mellitus  His glycemic control is uncertain  He has been noncompliant with follow-up in getting his blood work done  I attribute this to his depression  He needs to get the blood work done as soon as possible  I gave him a hand written note for the lab so that they do not miss anything that has been ordered  We discussed routine diabetic foot care  We discussed getting a dilated eye exam   The patient has an appointment later this month for an eye exam        Diagnoses and all orders for this visit:    Chronic low back pain without sciatica, unspecified back pain laterality  -     fentaNYL (DURAGESIC) 50 mcg/hr;  Apply 1 patch every 3 days  -     oxyCODONE (ROXICODONE) 10 MG TABS; Take 1 tablet (10 mg total) by mouth 3 (three) times a dayMax Daily Amount: 30 mg    Other fatigue  -     CBC and differential; Future  -     TSH, 3rd generation with Free T4 reflex; Future    Primary osteoarthritis of both knees    Diabetic polyneuropathy associated with type 2 diabetes mellitus ([facility] [address] )    Other orders  -     omeprazole (PriLOSEC) 20 mg delayed release capsule; omeprazole 20 mg capsule,delayed release          Subjective:      Patient ID: Pia Youngblood  is a 78 y o  male  This is a 59-year-old white male who presents to the office today for his follow-up visit  He needs refill on his fentanyl patches as well as his oxycodone  His main complaint today is fatigue  He tells me does not feel like doing anything  In fact, he tells me when he leaves here, he plans to go home and lay down in just watch TV  He tells me it was difficult to even get out of the house to come here today  He never went for his blood work  He still has not done his Cologuard  The following portions of the patient's history were reviewed and updated as appropriate: allergies, current medications, past family history, past medical history, past social history, past surgical history and problem list     Review of Systems   Constitutional: Positive for activity change and fatigue  Negative for appetite change and unexpected weight change  Respiratory: Negative for cough, shortness of breath and wheezing  Cardiovascular: Positive for palpitations  Negative for chest pain and leg swelling  Gastrointestinal: Negative for abdominal distention, abdominal pain, blood in stool, constipation, diarrhea and nausea  Musculoskeletal: Positive for arthralgias and back pain  Psychiatric/Behavioral: Positive for dysphoric mood  The patient is not nervous/anxious  Objective:      /76 (BP Location: Left arm, Patient Position: Sitting, Cuff Size: Large)   Pulse 75   Temp 97 8 °F (36 6 °C) (Temporal)   Ht 6' (1 829 m)   Wt 120 kg (263 lb 8 oz)   SpO2 96%   BMI 35 74 kg/m²          Physical Exam  Vitals reviewed  Constitutional:       Comments:   Patient is an obese 41-year-old white male who appears his stated age  He is in no apparent distress   HENT:      Head: Normocephalic and atraumatic  Right Ear: Tympanic membrane, ear canal and external ear normal  There is no impacted cerumen  Left Ear: Tympanic membrane, ear canal and external ear normal  There is no impacted cerumen  Mouth/Throat:      Mouth: Mucous membranes are moist       Pharynx: Oropharynx is clear  No oropharyngeal exudate or posterior oropharyngeal erythema  Eyes:      General: No scleral icterus  Right eye: No discharge  Left eye: No discharge  Conjunctiva/sclera: Conjunctivae normal       Pupils: Pupils are equal, round, and reactive to light  Cardiovascular:      Rate and Rhythm: Normal rate and regular rhythm  Heart sounds: Normal heart sounds  No murmur heard  No friction rub  No gallop  Pulmonary:      Effort: Pulmonary effort is normal  No respiratory distress  Breath sounds: Normal breath sounds  No stridor  No wheezing, rhonchi or rales  Abdominal:      General: Bowel sounds are normal  There is no distension  Palpations: Abdomen is soft  There is no mass  Tenderness: There is no abdominal tenderness  There is no guarding  Musculoskeletal:      Cervical back: Neck supple  Comments:  Decreased range of motion of the lumbar spine present there is swelling of both knees present  Lymphadenopathy:      Cervical: No cervical adenopathy  Psychiatric:         Behavior: Behavior normal          Thought Content: Thought content normal          Judgment: Judgment normal        The [facility] prescription drug monitoring program was queried  There were no red flags  Safe to proceed

## 2021-07-01 NOTE — ASSESSMENT & PLAN NOTE
Patient has longstanding chronic low back pain secondary to lumbar discogenic disease  I queried the [place] prescription drug monitoring program   I refilled his prescription for fentanyl patches as well as oxycodone

## 2021-07-12 LAB
LEFT EYE DIABETIC RETINOPATHY: NORMAL
RIGHT EYE DIABETIC RETINOPATHY: NORMAL

## 2021-07-15 ENCOUNTER — VBI (OUTPATIENT)
Dept: ADMINISTRATIVE | Facility: OTHER | Age: 66
End: 2021-07-15

## 2021-07-15 NOTE — TELEPHONE ENCOUNTER
Upon review of the In Basket request we were able to locate, review, and update the patient chart as requested for Diabetic Eye Exam     Any additional questions or concerns should be emailed to the Practice Liaisons via [email or web address] email, please do not reply via In Basket      Thank you  Sarah Esqueda

## 2021-08-02 DIAGNOSIS — I25.10 ATHEROSCLEROSIS OF NATIVE CORONARY ARTERY OF NATIVE HEART WITHOUT ANGINA PECTORIS: ICD-10-CM

## 2021-08-02 DIAGNOSIS — M54.50 CHRONIC LOW BACK PAIN WITHOUT SCIATICA, UNSPECIFIED BACK PAIN LATERALITY: ICD-10-CM

## 2021-08-02 DIAGNOSIS — E11.9 TYPE 2 DIABETES MELLITUS WITHOUT COMPLICATION, WITHOUT LONG-TERM CURRENT USE OF INSULIN (HCC): ICD-10-CM

## 2021-08-02 DIAGNOSIS — G89.29 CHRONIC LOW BACK PAIN WITHOUT SCIATICA, UNSPECIFIED BACK PAIN LATERALITY: ICD-10-CM

## 2021-08-02 DIAGNOSIS — Z91.030 BEE STING ALLERGY: Primary | ICD-10-CM

## 2021-08-02 RX ORDER — OXYCODONE HYDROCHLORIDE 10 MG/1
10 TABLET ORAL 3 TIMES DAILY
Qty: 90 TABLET | Refills: 0 | Status: SHIPPED | OUTPATIENT
Start: 2021-08-02 | End: 2021-09-02 | Stop reason: SDUPTHER

## 2021-08-02 RX ORDER — EPINEPHRINE 0.3 MG/.3ML
INJECTION SUBCUTANEOUS
Qty: 1 EACH | Refills: 3 | Status: SHIPPED | OUTPATIENT
Start: 2021-08-02

## 2021-08-02 RX ORDER — NITROGLYCERIN 0.4 MG/1
0.4 TABLET SUBLINGUAL
Qty: 25 TABLET | Refills: 1 | Status: SHIPPED | OUTPATIENT
Start: 2021-08-02

## 2021-08-09 ENCOUNTER — OFFICE VISIT (OUTPATIENT)
Dept: FAMILY MEDICINE CLINIC | Facility: CLINIC | Age: 66
End: 2021-08-09
Payer: MEDICARE

## 2021-08-09 VITALS
HEART RATE: 66 BPM | SYSTOLIC BLOOD PRESSURE: 128 MMHG | DIASTOLIC BLOOD PRESSURE: 72 MMHG | HEIGHT: 72 IN | OXYGEN SATURATION: 95 % | TEMPERATURE: 97.1 F | BODY MASS INDEX: 36.44 KG/M2 | WEIGHT: 269 LBS

## 2021-08-09 DIAGNOSIS — G89.29 CHRONIC BILATERAL LOW BACK PAIN WITHOUT SCIATICA: Primary | ICD-10-CM

## 2021-08-09 DIAGNOSIS — M54.50 CHRONIC BILATERAL LOW BACK PAIN WITHOUT SCIATICA: Primary | ICD-10-CM

## 2021-08-09 DIAGNOSIS — E11.42 DIABETIC POLYNEUROPATHY ASSOCIATED WITH TYPE 2 DIABETES MELLITUS (HCC): ICD-10-CM

## 2021-08-09 DIAGNOSIS — M17.0 PRIMARY OSTEOARTHRITIS OF BOTH KNEES: ICD-10-CM

## 2021-08-09 PROCEDURE — 99213 OFFICE O/P EST LOW 20 MIN: CPT | Performed by: FAMILY MEDICINE

## 2021-08-09 NOTE — PROGRESS NOTES
Assessment/Plan:    Chronic low back pain without sciatica   Patient has chronic low back pain  I do not understand how he has leftover patches  Not only does he have left over  He estimates he has a 6 month back log of fentanyl patches  I told him the only way this could be possible is if he has not been using them every 3 days as recommended  He believes he has been except on a few occasions  He tells me when he accidentally left the patch on too long, he noticed increased pain  I suggested to the patient that he put the date on the patch when he applies it so he knows when he last did it  His method his to write the date on the box  I suspect noncompliance  I am not going to increase the dose of his oxycodone  I do think that depression is contributing to his pain  He does have Narcan at home  He keeps Narcan at his bedside and also a dose of Narcan down stairs as well  Osteoarthritis of knee    Patient has primary osteoarthritis of both knees  I suggested he make an appointment to see his orthopedic surgeon and schedule his knee replacement  Diabetic polyneuropathy associated with type 2 diabetes mellitus ([facility] )    Lab Results   Component Value Date    HGBA1C 6 6 (H) 10/24/2020     Patient has type 2 diabetes mellitus  He again did not get his blood work  I again stressed to the patient the importance of getting his routine blood work done for follow-up of his diabetes  Diagnoses and all orders for this visit:    Chronic bilateral low back pain without sciatica    Diabetic polyneuropathy associated with type 2 diabetes mellitus (HCC)    Primary osteoarthritis of both knees          Subjective:      Patient ID: Blayne Bustamante  is a 78 y o  male  This is a 40-year-old white male who presents to the office today for his follow-up visit  He tells me he feels "the same"  He feels tired  He reports constant pain  He tells me he is disgruntled    He tells me he is in a bad mood today because he is worried about his son  His son his on administration leave from the police force in [facility], due to a dispute he have with 1 of his superiors  He is looking for another job now  Patient still did not get his blood work done  He continues to take oxycodone 3 times per day for pain  He asked if I can increase it to 4 times per day  He tells me he does not need a refill on his fentanyl patches  He tells me he has a large back log of patches  He does not know how this is possible  The following portions of the patient's history were reviewed and updated as appropriate: allergies, current medications, past family history, past medical history, past social history, past surgical history and problem list     Review of Systems   Cardiovascular: Negative for chest pain, palpitations and leg swelling  Musculoskeletal: Positive for arthralgias, back pain, gait problem and joint swelling  Psychiatric/Behavioral: Positive for dysphoric mood  Negative for self-injury and suicidal ideas  Objective:      /72 (BP Location: Left arm, Patient Position: Sitting, Cuff Size: Large)   Pulse 66   Temp (!) 97 1 °F (36 2 °C) (Temporal)   Ht 6' (1 829 m)   Wt 122 kg (269 lb)   SpO2 95%   BMI 36 48 kg/m²          Physical Exam  Vitals reviewed  Constitutional:       Comments: This is a 78-year-old obese white male who appears his stated age  He is in no apparent distress   HENT:      Head: Normocephalic and atraumatic  Right Ear: Tympanic membrane, ear canal and external ear normal  There is no impacted cerumen  Left Ear: Tympanic membrane, ear canal and external ear normal  There is no impacted cerumen  Mouth/Throat:      Mouth: Mucous membranes are moist       Pharynx: Oropharynx is clear  No oropharyngeal exudate or posterior oropharyngeal erythema  Eyes:      General: No scleral icterus  Right eye: No discharge  Left eye: No discharge  Conjunctiva/sclera: Conjunctivae normal       Pupils: Pupils are equal, round, and reactive to light  Neck:      Comments:  No thyromegaly is noted  Cardiovascular:      Rate and Rhythm: Normal rate and regular rhythm  Heart sounds: Normal heart sounds  No murmur heard  No friction rub  No gallop  Pulmonary:      Effort: Pulmonary effort is normal  No respiratory distress  Breath sounds: Normal breath sounds  No stridor  No wheezing, rhonchi or rales  Abdominal:      General: Bowel sounds are normal  There is no distension  Palpations: Abdomen is soft  There is no mass  Tenderness: There is no abdominal tenderness  There is no guarding  Musculoskeletal:      Cervical back: Neck supple  Comments: There is swelling present of both knees  There is no erythema or heat to the touch  There is decreased range of motion of the lumbar spine  Lymphadenopathy:      Cervical: No cervical adenopathy     Psychiatric:      Comments:   Patient has a blunted affect        Extremities: Without cyanosis, clubbing, or edema

## 2021-08-09 NOTE — ASSESSMENT & PLAN NOTE
Lab Results   Component Value Date    HGBA1C 6 6 (H) 10/24/2020     Patient has type 2 diabetes mellitus  He again did not get his blood work  I again stressed to the patient the importance of getting his routine blood work done for follow-up of his diabetes

## 2021-08-09 NOTE — ASSESSMENT & PLAN NOTE
Patient has chronic low back pain  I do not understand how he has leftover patches  Not only does he have left over  He estimates he has a 6 month back log of fentanyl patches  I told him the only way this could be possible is if he has not been using them every 3 days as recommended  He believes he has been except on a few occasions  He tells me when he accidentally left the patch on too long, he noticed increased pain  I suggested to the patient that he put the date on the patch when he applies it so he knows when he last did it  His method his to write the date on the box  I suspect noncompliance  I am not going to increase the dose of his oxycodone  I do think that depression is contributing to his pain  He does have Narcan at home  He keeps Narcan at his bedside and also a dose of Narcan down stairs as well

## 2021-08-09 NOTE — ASSESSMENT & PLAN NOTE
Patient has primary osteoarthritis of both knees  I suggested he make an appointment to see his orthopedic surgeon and schedule his knee replacement

## 2021-08-09 NOTE — PATIENT INSTRUCTIONS
Foot Care for People with Diabetes   WHAT YOU NEED TO KNOW:   · Foot care helps protect your feet and prevent foot ulcers or sores  Long-term high blood sugar levels can damage the blood vessels and nerves in your legs and feet  This damage makes it hard to feel pressure, pain, temperature, and touch  You may not be able to feel a cut or sore, or shoes that are too tight  Foot care is needed to prevent serious problems, such as an infection or amputation  · Diabetes may cause your toes to become crooked or curved under  These changes may affect the way you walk and can lead to increased pressure on your foot  The pressure can decrease blood flow to your feet  Lack of blood flow increases your risk for a foot ulcer  Do not ignore small problems, such as dry skin or small wounds  These can become life-threatening over time without proper care  DISCHARGE INSTRUCTIONS:   Call your care team provider if:   · Your feet become numb, weak, or hard to move  · You have pus draining from a sore on your foot  · You have a wound on your foot that gets bigger, deeper, or does not heal      · You see blisters, cuts, scratches, calluses, or sores on your foot  · You have a fever, and your feet become red, warm, and swollen  · Your toenails become thick, curled, or yellow  · You find it hard to check your feet because your vision is poor  · You have questions or concerns about your condition or care  Foot care:   · Check your feet each day  Look at your whole foot, including the bottom, and between and under your toes  Check for wounds, corns, and calluses  Use a mirror to see the bottom of your feet  The skin on your feet may be shiny, tight, or darker than normal  Your feet may also be cold and pale  Feel your feet by running your hands along the tops, bottoms, sides, and between your toes  Redness, swelling, and warmth are signs of blood flow problems that can lead to a foot ulcer   Do not try to remove corns or calluses yourself  · Wash your feet each day with soap and warm water  Do not use hot water, because this can injure your foot  Dry your feet gently with a towel after you wash them  Dry between and under your toes  · Apply lotion or a moisturizer on your dry feet  Ask your care team provider what lotions are best to use  Do not put lotion or moisturizer between your toes  Moisture between your toes could lead to skin breakdown  · Cut your toenails correctly  File or cut your toenails straight across  Use a soft brush to clean around your toenails  If your toenails are very thick, you may need to have a care team provider or specialist cut them  · Protect your feet  Do not walk barefoot or wear your shoes without socks  Check your shoes for rocks or other objects that can hurt your feet  Wear cotton socks to help keep your feet dry  Wear socks without toe seams, or wear them with the seams inside out  Change your socks each day  Do not wear socks that are dirty or damp  · Wear shoes that fit well  Wear shoes that do not rub against any area of your feet  Your shoes should be ½ to ¾ inch (1 to 2 centimeters) longer than your feet  Your shoes should also have extra space around the widest part of your feet  Walking or athletic shoes with laces or straps that adjust are best  Ask your care team provider for help to choose shoes that fit you best  Ask him or her if you need to wear an insert, orthotic, or bandage on your feet  · Do not smoke  Smoking can damage your blood vessels and put you at increased risk for foot ulcers  Ask your care team provider for information if you currently smoke and need help to quit  E-cigarettes or smokeless tobacco still contain nicotine  Talk to your care team provider before you use these products  Follow up with your diabetes care team provider or foot specialist as directed:   You will need to have your feet checked at least once a year  Emilia Ochoa may need a foot exam more often if you have nerve damage, foot deformities, or ulcers  Write down your questions so you remember to ask them during your visits  © Copyright [employer] 2021 Information is for End User's use only and may not be sold, redistributed or otherwise used for commercial purposes  All illustrations and images included in CareNotes® are the copyrighted property of A D A M [employer] Inc  or [facility] Stalin Bauman   The above information is an  only  It is not intended as medical advice for individual conditions or treatments  Talk to your doctor, nurse or pharmacist before following any medical regimen to see if it is safe and effective for you

## 2021-09-02 ENCOUNTER — OFFICE VISIT (OUTPATIENT)
Dept: FAMILY MEDICINE CLINIC | Facility: CLINIC | Age: 66
End: 2021-09-02
Payer: MEDICARE

## 2021-09-02 ENCOUNTER — APPOINTMENT (OUTPATIENT)
Dept: LAB | Facility: CLINIC | Age: 66
End: 2021-09-02
Payer: MEDICARE

## 2021-09-02 VITALS
SYSTOLIC BLOOD PRESSURE: 108 MMHG | HEART RATE: 71 BPM | WEIGHT: 263.1 LBS | BODY MASS INDEX: 35.64 KG/M2 | OXYGEN SATURATION: 97 % | TEMPERATURE: 97.7 F | HEIGHT: 72 IN | DIASTOLIC BLOOD PRESSURE: 70 MMHG

## 2021-09-02 DIAGNOSIS — E11.9 TYPE 2 DIABETES MELLITUS WITHOUT COMPLICATION, WITHOUT LONG-TERM CURRENT USE OF INSULIN (HCC): ICD-10-CM

## 2021-09-02 DIAGNOSIS — E11.42 DIABETIC POLYNEUROPATHY ASSOCIATED WITH TYPE 2 DIABETES MELLITUS (HCC): Primary | ICD-10-CM

## 2021-09-02 DIAGNOSIS — F32.0 MAJOR DEPRESSIVE DISORDER, SINGLE EPISODE, MILD (HCC): ICD-10-CM

## 2021-09-02 DIAGNOSIS — M17.0 PRIMARY OSTEOARTHRITIS OF BOTH KNEES: ICD-10-CM

## 2021-09-02 DIAGNOSIS — G89.29 CHRONIC LOW BACK PAIN WITHOUT SCIATICA, UNSPECIFIED BACK PAIN LATERALITY: ICD-10-CM

## 2021-09-02 DIAGNOSIS — E78.2 MIXED HYPERLIPIDEMIA: ICD-10-CM

## 2021-09-02 DIAGNOSIS — R53.83 OTHER FATIGUE: ICD-10-CM

## 2021-09-02 DIAGNOSIS — M54.50 CHRONIC LOW BACK PAIN WITHOUT SCIATICA, UNSPECIFIED BACK PAIN LATERALITY: ICD-10-CM

## 2021-09-02 LAB
ANION GAP SERPL CALCULATED.3IONS-SCNC: 6 MMOL/L (ref 4–13)
BASOPHILS # BLD AUTO: 0.06 THOUSANDS/ΜL (ref 0–0.1)
BASOPHILS NFR BLD AUTO: 1 % (ref 0–1)
BUN SERPL-MCNC: 17 MG/DL (ref 5–25)
CALCIUM SERPL-MCNC: 8.8 MG/DL (ref 8.3–10.1)
CHLORIDE SERPL-SCNC: 109 MMOL/L (ref 100–108)
CO2 SERPL-SCNC: 25 MMOL/L (ref 21–32)
CREAT SERPL-MCNC: 1.37 MG/DL (ref 0.6–1.3)
EOSINOPHIL # BLD AUTO: 0.75 THOUSAND/ΜL (ref 0–0.61)
EOSINOPHIL NFR BLD AUTO: 7 % (ref 0–6)
ERYTHROCYTE [DISTWIDTH] IN BLOOD BY AUTOMATED COUNT: 13.2 % (ref 11.6–15.1)
EST. AVERAGE GLUCOSE BLD GHB EST-MCNC: 143 MG/DL
GFR SERPL CREATININE-BSD FRML MDRD: 53 ML/MIN/1.73SQ M
GLUCOSE SERPL-MCNC: 98 MG/DL (ref 65–140)
HBA1C MFR BLD: 6.6 %
HCT VFR BLD AUTO: 38.6 % (ref 36.5–49.3)
HGB BLD-MCNC: 12.7 G/DL (ref 12–17)
IMM GRANULOCYTES # BLD AUTO: 0.03 THOUSAND/UL (ref 0–0.2)
IMM GRANULOCYTES NFR BLD AUTO: 0 % (ref 0–2)
LDLC SERPL DIRECT ASSAY-MCNC: 72 MG/DL (ref 0–100)
LYMPHOCYTES # BLD AUTO: 2.5 THOUSANDS/ΜL (ref 0.6–4.47)
LYMPHOCYTES NFR BLD AUTO: 23 % (ref 14–44)
MCH RBC QN AUTO: 33.2 PG (ref 26.8–34.3)
MCHC RBC AUTO-ENTMCNC: 32.9 G/DL (ref 31.4–37.4)
MCV RBC AUTO: 101 FL (ref 82–98)
MONOCYTES # BLD AUTO: 0.7 THOUSAND/ΜL (ref 0.17–1.22)
MONOCYTES NFR BLD AUTO: 7 % (ref 4–12)
NEUTROPHILS # BLD AUTO: 6.77 THOUSANDS/ΜL (ref 1.85–7.62)
NEUTS SEG NFR BLD AUTO: 62 % (ref 43–75)
NRBC BLD AUTO-RTO: 0 /100 WBCS
PLATELET # BLD AUTO: 339 THOUSANDS/UL (ref 149–390)
PMV BLD AUTO: 10.4 FL (ref 8.9–12.7)
POTASSIUM SERPL-SCNC: 3.9 MMOL/L (ref 3.5–5.3)
RBC # BLD AUTO: 3.83 MILLION/UL (ref 3.88–5.62)
SODIUM SERPL-SCNC: 140 MMOL/L (ref 136–145)
TSH SERPL DL<=0.05 MIU/L-ACNC: 2.61 UIU/ML (ref 0.36–3.74)
WBC # BLD AUTO: 10.81 THOUSAND/UL (ref 4.31–10.16)

## 2021-09-02 PROCEDURE — 83721 ASSAY OF BLOOD LIPOPROTEIN: CPT

## 2021-09-02 PROCEDURE — 36415 COLL VENOUS BLD VENIPUNCTURE: CPT | Performed by: FAMILY MEDICINE

## 2021-09-02 PROCEDURE — 85025 COMPLETE CBC W/AUTO DIFF WBC: CPT

## 2021-09-02 PROCEDURE — 80048 BASIC METABOLIC PNL TOTAL CA: CPT

## 2021-09-02 PROCEDURE — 83036 HEMOGLOBIN GLYCOSYLATED A1C: CPT | Performed by: FAMILY MEDICINE

## 2021-09-02 PROCEDURE — 99214 OFFICE O/P EST MOD 30 MIN: CPT | Performed by: FAMILY MEDICINE

## 2021-09-02 PROCEDURE — 84443 ASSAY THYROID STIM HORMONE: CPT

## 2021-09-02 RX ORDER — OXYCODONE HYDROCHLORIDE 10 MG/1
10 TABLET ORAL 3 TIMES DAILY
Qty: 90 TABLET | Refills: 0 | Status: SHIPPED | OUTPATIENT
Start: 2021-09-02 | End: 2021-09-28 | Stop reason: SDUPTHER

## 2021-09-02 NOTE — ASSESSMENT & PLAN NOTE
Patient has chronic low back pain  Back pain is secondary to lumbar degenerative disc disease  I queried the [facility] prescription drug monitoring program   I feel that the patient continues to require opiate therapy for treatment of his chronic back pain so that the patient can continue to live at home alone and care for himself  I refilled his prescription for oxycodone

## 2021-09-02 NOTE — PATIENT INSTRUCTIONS
Osteoarthritis   AMBULATORY CARE:   Osteoarthritis  occurs when cartilage (tissue that cushions a joint) wears away slowly and causes the bones to rub together  Osteoarthritis (OA) is a long-term condition that often affects the hands, neck, lower back, knees, and hips  OA is also called arthrosis or degenerative joint disease  Common signs and symptoms include the following:   · Joint pain that gets worse when you move the joint     · Joint stiffness that decreases after you move the joint     · Decreased range of movement     · Hard, bony enlargement on your fingers or toes    · A grinding or cracking sound when you move your joint    Call your doctor or specialist if:   · You have severe pain  · You cannot move your joint  · You have a fever  · Your joint is red and tender  · You have questions or concerns about your condition or care  Treatment for osteoarthritis  may include any of the following:  · Acetaminophen  decreases pain and fever  It is available without a doctor's order  Ask how much to take and how often to take it  Follow directions  Read the labels of all other medicines you are using to see if they also contain acetaminophen, or ask your doctor or pharmacist  Acetaminophen can cause liver damage if not taken correctly  Do not use more than 4 grams (4,000 milligrams) total of acetaminophen in one day  · NSAIDs , such as ibuprofen, help decrease swelling, pain, and fever  This medicine is available with or without a doctor's order  NSAIDs can cause stomach bleeding or kidney problems in certain people  If you take blood thinner medicine, always ask your healthcare provider if NSAIDs are safe for you  Always read the medicine label and follow directions  · Capsaicin cream  may help decrease pain in your joint  · Prescription pain medicine  may be given  Ask your healthcare provider how to take this medicine safely  Some prescription pain medicines contain acetaminophen   Do not take other medicines that contain acetaminophen without talking to your healthcare provider  Too much acetaminophen may cause liver damage  Prescription pain medicine may cause constipation  Ask your healthcare provider how to prevent or treat constipation  · A steroid injection  may be given if your symptoms get worse  · Physical therapy  is used to teach you exercises to help improve movement and strength, and to decrease pain  A physical therapist may move an area with his or her hands  For example, he or she may move your leg in certain ways to treat osteoarthritis in your hip  · Ultrasound  may be used to treat osteoarthritis in certain areas, such as your knee  Ultrasound produces heat that can relieve pain  · Surgery  may be needed if other treatments do not work  Manage your symptoms:   · Stay active  Physical activity may reduce your pain and improve your ability to do daily activities  Avoid activities that cause pain  Ask your healthcare provider what type of exercise would be best for you  · Maintain a healthy weight  This helps decrease the strain on the joints in your back, hips, knees, ankles, and feet  Ask your healthcare provider what a healthy weight is for you  He or she can help you create a weight loss plan if you are overweight  · Use heat or ice on your joints as directed  Heat and ice help decrease pain, swelling, and muscle spasms  For heat, use a heating pad on a low setting for 20 minutes, or take a warm bath  For ice, use an ice pack, or put crushed ice in a plastic bag  Cover it with a towel before you place it on your joint  Use ice for 15 minutes every hour  · Massage the muscles around the joint  Massage helps relieve pain and stiffness  Your healthcare provider or a physical therapist can show you how to do this  If you have hip OA, another person may need to help you massage the area  · Use a cane, crutches, or a walker if directed    These help protect and relieve pressure on your ankle, knee, and hip joints  You may also be prescribed shoe inserts to decrease pressure in your joints  · Wear flat or low-heeled shoes  This will help decrease pain and reduce pressure on your ankle, knee, and hip joints  Follow up with your healthcare provider as directed:  Write down your questions so you remember to ask them during your visits  © Copyright [employer] 2021 Information is for End User's use only and may not be sold, redistributed or otherwise used for commercial purposes  All illustrations and images included in CareNotes® are the copyrighted property of [employer]  or [address] [place], [place] [address]dilma Bauman   The above information is an  only  It is not intended as medical advice for individual conditions or treatments  Talk to your doctor, nurse or pharmacist before following any medical regimen to see if it is safe and effective for you

## 2021-09-02 NOTE — ASSESSMENT & PLAN NOTE
And LDL cholesterol will be done today  His goal is less than 70  He will continue atorvastatin 40 mg daily

## 2021-09-02 NOTE — ASSESSMENT & PLAN NOTE
Lab Results   Component Value Date    HGBA1C 6 6 (H) 10/24/2020     Patient has type 2 diabetes mellitus with uncertain glycemic control  The patient has been noncompliant with follow-up for his diabetes  Fortunately, the lab is now located in this building  I am going to have the patient walk next door and get his blood work done when he leaves the office  I personally walked next door it spoke to the  and told her to make sure she does all the patient's old blood work that was never done  I will make further recommendations to the patient regarding his diabetes when I get the results

## 2021-09-02 NOTE — ASSESSMENT & PLAN NOTE
I advised the patient that I see no end in sight to this pandemic  He may need to reconsider his approach to treating his osteoarthritis  I do feel that the hospital is safe

## 2021-09-02 NOTE — PROGRESS NOTES
Assessment/Plan:    Chronic low back pain without sciatica    Patient has chronic low back pain  Back pain is secondary to lumbar degenerative disc disease  I queried the [place] prescription drug monitoring program   I feel that the patient continues to require opiate therapy for treatment of his chronic back pain so that the patient can continue to live at home alone and care for himself  I refilled his prescription for oxycodone  Diabetic polyneuropathy associated with type 2 diabetes mellitus ([facility] )    Lab Results   Component Value Date    HGBA1C 6 6 (H) 10/24/2020     Patient has type 2 diabetes mellitus with uncertain glycemic control  The patient has been noncompliant with follow-up for his diabetes  Fortunately, the lab is now located in this building  I am going to have the patient walk next door and get his blood work done when he leaves the office  I personally walked next door it spoke to the  and told her to make sure she does all the patient's old blood work that was never done  I will make further recommendations to the patient regarding his diabetes when I get the results  Mixed hyperlipidemia    And LDL cholesterol will be done today  His goal is less than 70  He will continue atorvastatin 40 mg daily  Osteoarthritis of knee   I advised the patient that I see no end in sight to this pandemic  He may need to reconsider his approach to treating his osteoarthritis  I do feel that the hospital is safe  Major depressive disorder, single episode, mild ([facility] )    Still exhibiting depressive symptoms    I want the patient to continue on sertraline 50 mg daily       Diagnoses and all orders for this visit:    Diabetic polyneuropathy associated with type 2 diabetes mellitus (HCC)    Chronic low back pain without sciatica, unspecified back pain laterality  -     oxyCODONE (ROXICODONE) 10 MG TABS; Take 1 tablet (10 mg total) by mouth 3 (three) times a dayMax Daily Amount: 30 mg    Mixed hyperlipidemia    Primary osteoarthritis of both knees    Major depressive disorder, single episode, mild ([address] )        The [place] prescription drug monitoring program was queried  There were no red flags  Safe to proceed  Subjective:      Patient ID: Davee Meckel  is a 78 y o  male  This is a 65-year-old white male presents to the office today for his follow-up visit  The patient tells me he is the same  He continues to experience severe pain in both knees  Tells me that because COVID-19 virus is spiking, he does not want to consider having his knees replaced at this time  Continues with chronic lower back pain  He requests a refill for his oxycodone  He tells me he really does not leave the house much  He goes grocery shopping every 2 weeks  He comes here once a month  That is really the only time he goes out other than the  his prescription  He still never got his blood work done  Fortunately, the lab is now in this building  I am going to have him get his labs today while he is here  The following portions of the patient's history were reviewed and updated as appropriate: allergies, current medications, past family history, past medical history, past social history, past surgical history and problem list     Review of Systems   Constitutional: Negative for activity change, appetite change and unexpected weight change  Respiratory: Negative for cough, shortness of breath and wheezing  Cardiovascular: Negative for chest pain, palpitations and leg swelling  Gastrointestinal: Negative for abdominal distention, abdominal pain, blood in stool, constipation and diarrhea  Musculoskeletal: Positive for arthralgias and back pain  Psychiatric/Behavioral: Positive for dysphoric mood  Negative for suicidal ideas  The patient is not nervous/anxious            Objective:      /70 (BP Location: Left arm, Patient Position: Sitting, Cuff Size: Large)   Pulse 71   Temp 97 7 °F (36 5 °C) (Tympanic)   Ht 6' (1 829 m)   Wt 119 kg (263 lb 1 6 oz)   SpO2 97%   BMI 35 68 kg/m²          Physical Exam  Vitals reviewed  Constitutional:       Comments: This is a 69-year-old white male who appears his stated age  He is obese and in no apparent distress   HENT:      Head: Normocephalic and atraumatic  Right Ear: Tympanic membrane, ear canal and external ear normal  There is no impacted cerumen  Left Ear: Tympanic membrane, ear canal and external ear normal  There is no impacted cerumen  Mouth/Throat:      Mouth: Mucous membranes are moist       Pharynx: Oropharynx is clear  No oropharyngeal exudate or posterior oropharyngeal erythema  Eyes:      General: No scleral icterus  Right eye: No discharge  Left eye: No discharge  Conjunctiva/sclera: Conjunctivae normal       Pupils: Pupils are equal, round, and reactive to light  Neck:      Comments: There is no thyromegaly noted  Cardiovascular:      Rate and Rhythm: Normal rate and regular rhythm  Heart sounds: Normal heart sounds  No murmur heard  No friction rub  No gallop  Pulmonary:      Effort: No respiratory distress  Breath sounds: Normal breath sounds  No stridor  No wheezing, rhonchi or rales  Musculoskeletal:      Cervical back: Neck supple  Comments: Both knees are swollen  There is no erythema or heat to the touch  There is decreased range of motion of the lumbar spine  No muscle spasms detected  Lymphadenopathy:      Cervical: No cervical adenopathy  Psychiatric:         Mood and Affect: Mood normal          Behavior: Behavior normal          Thought Content:  Thought content normal          Judgment: Judgment normal         Extremities: Without cyanosis, clubbing, or edema

## 2021-09-03 ENCOUNTER — TELEPHONE (OUTPATIENT)
Dept: FAMILY MEDICINE CLINIC | Facility: CLINIC | Age: 66
End: 2021-09-03

## 2021-09-03 NOTE — TELEPHONE ENCOUNTER
Spoke with patient with lab results,  LD 72 , thryoid is normal , blood sugar 98, A1C 6 6 very good

## 2021-09-05 DIAGNOSIS — I10 ESSENTIAL HYPERTENSION, BENIGN: ICD-10-CM

## 2021-09-05 DIAGNOSIS — E78.2 MIXED HYPERLIPIDEMIA: ICD-10-CM

## 2021-09-07 RX ORDER — ATORVASTATIN CALCIUM 40 MG/1
TABLET, FILM COATED ORAL
Qty: 90 TABLET | Refills: 2 | Status: SHIPPED | OUTPATIENT
Start: 2021-09-07 | End: 2022-06-06

## 2021-09-07 RX ORDER — LISINOPRIL 10 MG/1
TABLET ORAL
Qty: 90 TABLET | Refills: 3 | Status: SHIPPED | OUTPATIENT
Start: 2021-09-07

## 2021-09-07 RX ORDER — AMLODIPINE BESYLATE 10 MG/1
TABLET ORAL
Qty: 90 TABLET | Refills: 2 | Status: SHIPPED | OUTPATIENT
Start: 2021-09-07 | End: 2022-06-03

## 2021-09-22 DIAGNOSIS — I25.10 ATHEROSCLEROSIS OF NATIVE CORONARY ARTERY OF NATIVE HEART WITHOUT ANGINA PECTORIS: ICD-10-CM

## 2021-09-22 RX ORDER — METOPROLOL TARTRATE 50 MG/1
TABLET, FILM COATED ORAL
Qty: 180 TABLET | Refills: 1 | Status: SHIPPED | OUTPATIENT
Start: 2021-09-22 | End: 2022-03-23

## 2021-09-28 DIAGNOSIS — G89.29 CHRONIC LOW BACK PAIN WITHOUT SCIATICA, UNSPECIFIED BACK PAIN LATERALITY: ICD-10-CM

## 2021-09-28 DIAGNOSIS — M54.50 CHRONIC LOW BACK PAIN WITHOUT SCIATICA, UNSPECIFIED BACK PAIN LATERALITY: ICD-10-CM

## 2021-09-28 RX ORDER — OXYCODONE HYDROCHLORIDE 10 MG/1
10 TABLET ORAL 3 TIMES DAILY
Qty: 90 TABLET | Refills: 0 | Status: SHIPPED | OUTPATIENT
Start: 2021-09-28 | End: 2021-11-02 | Stop reason: SDUPTHER

## 2021-10-26 ENCOUNTER — RA CDI HCC (OUTPATIENT)
Dept: OTHER | Facility: HOSPITAL | Age: 66
End: 2021-10-26

## 2021-11-02 ENCOUNTER — TELEPHONE (OUTPATIENT)
Dept: FAMILY MEDICINE CLINIC | Facility: CLINIC | Age: 66
End: 2021-11-02

## 2021-11-02 DIAGNOSIS — M54.50 CHRONIC LOW BACK PAIN WITHOUT SCIATICA, UNSPECIFIED BACK PAIN LATERALITY: ICD-10-CM

## 2021-11-02 DIAGNOSIS — G89.29 CHRONIC LOW BACK PAIN WITHOUT SCIATICA, UNSPECIFIED BACK PAIN LATERALITY: ICD-10-CM

## 2021-11-02 RX ORDER — OXYCODONE HYDROCHLORIDE 10 MG/1
10 TABLET ORAL 3 TIMES DAILY
Qty: 90 TABLET | Refills: 0 | Status: SHIPPED | OUTPATIENT
Start: 2021-11-02 | End: 2021-12-01 | Stop reason: SDUPTHER

## 2021-12-01 ENCOUNTER — OFFICE VISIT (OUTPATIENT)
Dept: FAMILY MEDICINE CLINIC | Facility: CLINIC | Age: 66
End: 2021-12-01
Payer: MEDICARE

## 2021-12-01 VITALS
SYSTOLIC BLOOD PRESSURE: 138 MMHG | HEIGHT: 72 IN | HEART RATE: 72 BPM | TEMPERATURE: 98.9 F | BODY MASS INDEX: 36.04 KG/M2 | DIASTOLIC BLOOD PRESSURE: 74 MMHG | OXYGEN SATURATION: 97 % | WEIGHT: 266.1 LBS

## 2021-12-01 DIAGNOSIS — F11.20 CONTINUOUS OPIOID DEPENDENCE (HCC): ICD-10-CM

## 2021-12-01 DIAGNOSIS — I10 ESSENTIAL HYPERTENSION, BENIGN: ICD-10-CM

## 2021-12-01 DIAGNOSIS — Z23 ENCOUNTER FOR IMMUNIZATION: ICD-10-CM

## 2021-12-01 DIAGNOSIS — M17.0 PRIMARY OSTEOARTHRITIS OF BOTH KNEES: ICD-10-CM

## 2021-12-01 DIAGNOSIS — M54.50 CHRONIC LOW BACK PAIN WITHOUT SCIATICA, UNSPECIFIED BACK PAIN LATERALITY: Primary | ICD-10-CM

## 2021-12-01 DIAGNOSIS — G89.29 CHRONIC LOW BACK PAIN WITHOUT SCIATICA, UNSPECIFIED BACK PAIN LATERALITY: Primary | ICD-10-CM

## 2021-12-01 DIAGNOSIS — E78.2 MIXED HYPERLIPIDEMIA: ICD-10-CM

## 2021-12-01 DIAGNOSIS — N18.31 STAGE 3A CHRONIC KIDNEY DISEASE (HCC): ICD-10-CM

## 2021-12-01 DIAGNOSIS — E11.42 DIABETIC POLYNEUROPATHY ASSOCIATED WITH TYPE 2 DIABETES MELLITUS (HCC): ICD-10-CM

## 2021-12-01 PROCEDURE — 99214 OFFICE O/P EST MOD 30 MIN: CPT | Performed by: FAMILY MEDICINE

## 2021-12-01 PROCEDURE — 90662 IIV NO PRSV INCREASED AG IM: CPT

## 2021-12-01 PROCEDURE — G0008 ADMIN INFLUENZA VIRUS VAC: HCPCS

## 2021-12-01 RX ORDER — MELOXICAM 15 MG/1
15 TABLET ORAL DAILY
COMMUNITY
End: 2021-12-01 | Stop reason: HOSPADM

## 2021-12-01 RX ORDER — OXYCODONE HYDROCHLORIDE 10 MG/1
10 TABLET ORAL 3 TIMES DAILY
Qty: 90 TABLET | Refills: 0 | Status: SHIPPED | OUTPATIENT
Start: 2021-12-01

## 2021-12-01 RX ORDER — BLOOD-GLUCOSE METER
KIT MISCELLANEOUS
Qty: 100 EACH | Refills: 3 | Status: SHIPPED | OUTPATIENT
Start: 2021-12-01

## 2021-12-03 DIAGNOSIS — E11.9 TYPE 2 DIABETES MELLITUS WITHOUT COMPLICATION, WITHOUT LONG-TERM CURRENT USE OF INSULIN (HCC): Primary | ICD-10-CM

## 2021-12-05 DIAGNOSIS — G89.29 CHRONIC LOW BACK PAIN WITHOUT SCIATICA, UNSPECIFIED BACK PAIN LATERALITY: ICD-10-CM

## 2021-12-05 DIAGNOSIS — M54.50 CHRONIC LOW BACK PAIN WITHOUT SCIATICA, UNSPECIFIED BACK PAIN LATERALITY: ICD-10-CM

## 2021-12-06 ENCOUNTER — TELEPHONE (OUTPATIENT)
Dept: FAMILY MEDICINE CLINIC | Facility: CLINIC | Age: 66
End: 2021-12-06

## 2021-12-06 RX ORDER — GABAPENTIN 600 MG/1
TABLET ORAL
Qty: 270 TABLET | Refills: 1 | Status: SHIPPED | OUTPATIENT
Start: 2021-12-06 | End: 2022-06-03

## 2021-12-14 ENCOUNTER — IMMUNIZATIONS (OUTPATIENT)
Dept: FAMILY MEDICINE CLINIC | Facility: HOSPITAL | Age: 66
End: 2021-12-14

## 2021-12-14 DIAGNOSIS — Z23 ENCOUNTER FOR IMMUNIZATION: Primary | ICD-10-CM

## 2021-12-14 PROCEDURE — 0064A COVID-19 MODERNA VACC 0.25 ML BOOSTER: CPT

## 2021-12-14 PROCEDURE — 91306 COVID-19 MODERNA VACC 0.25 ML BOOSTER: CPT

## 2021-12-16 DIAGNOSIS — E11.9 TYPE 2 DIABETES MELLITUS WITHOUT COMPLICATION, WITHOUT LONG-TERM CURRENT USE OF INSULIN (HCC): ICD-10-CM

## 2021-12-16 RX ORDER — GLIPIZIDE 5 MG/1
TABLET, FILM COATED, EXTENDED RELEASE ORAL
Qty: 90 TABLET | Refills: 3 | Status: SHIPPED | OUTPATIENT
Start: 2021-12-16

## 2021-12-17 ENCOUNTER — TELEPHONE (OUTPATIENT)
Dept: FAMILY MEDICINE CLINIC | Facility: CLINIC | Age: 66
End: 2021-12-17

## 2021-12-17 DIAGNOSIS — E11.42 DIABETIC POLYNEUROPATHY ASSOCIATED WITH TYPE 2 DIABETES MELLITUS (HCC): Primary | ICD-10-CM

## 2021-12-17 DIAGNOSIS — E11.9 TYPE 2 DIABETES MELLITUS WITHOUT COMPLICATION, WITHOUT LONG-TERM CURRENT USE OF INSULIN (HCC): ICD-10-CM

## 2021-12-17 NOTE — TELEPHONE ENCOUNTER
Spoke with Robert regarding test strips  It is not covered under his Medicare Part D plan  Test strips are covered thru Part B  Advised patient that he would need to order test strips thru DME or use a pharmacy that could bill test strips under Part B  Info given on [facility] which patient does not want to drive to [employer] and also advised to check with other pharmacies in area  Patient called back and would like us to order different glucometer and test strips

## 2021-12-20 ENCOUNTER — RA CDI HCC (OUTPATIENT)
Dept: OTHER | Facility: HOSPITAL | Age: 66
End: 2021-12-20

## 2021-12-20 NOTE — PROGRESS NOTES
[facility]  coding opportunities             Chart Reviewed * (Number of) Inbasket suggestions sent to Provider: 1                  Patients insurance company: Medicare     Visit status: Patient canceled the appointment        [facility]  coding opportunities             Chart Reviewed * (Number of) Inbasket suggestions sent to Provider: 1                  Patients insurance company: Medicare           Based on clinical documentation indicated in your record, it appears that the patient may have the following conditions:    E11 22, N18 31 Type 2 diabetes mellitus with chronic kidney disease stage 3a    If this is correct, please document and assess at your next visit, December 27

## 2021-12-27 ENCOUNTER — TELEPHONE (OUTPATIENT)
Dept: OTHER | Facility: OTHER | Age: 66
End: 2021-12-27

## 2021-12-27 DIAGNOSIS — M54.50 CHRONIC LOW BACK PAIN WITHOUT SCIATICA, UNSPECIFIED BACK PAIN LATERALITY: ICD-10-CM

## 2021-12-27 DIAGNOSIS — G89.29 CHRONIC LOW BACK PAIN WITHOUT SCIATICA, UNSPECIFIED BACK PAIN LATERALITY: ICD-10-CM

## 2021-12-27 RX ORDER — OXYCODONE HYDROCHLORIDE 10 MG/1
10 TABLET ORAL 3 TIMES DAILY
Qty: 90 TABLET | Refills: 0 | OUTPATIENT
Start: 2021-12-27

## 2021-12-30 ENCOUNTER — TELEPHONE (OUTPATIENT)
Dept: FAMILY MEDICINE CLINIC | Facility: CLINIC | Age: 66
End: 2021-12-30

## 2021-12-30 ENCOUNTER — TELEPHONE (OUTPATIENT)
Dept: OTHER | Facility: OTHER | Age: 66
End: 2021-12-30

## 2022-03-13 DIAGNOSIS — F32.0 MAJOR DEPRESSIVE DISORDER, SINGLE EPISODE, MILD (HCC): ICD-10-CM

## 2022-03-23 DIAGNOSIS — F32.0 MAJOR DEPRESSIVE DISORDER, SINGLE EPISODE, MILD (HCC): ICD-10-CM

## 2022-03-23 DIAGNOSIS — I25.10 ATHEROSCLEROSIS OF NATIVE CORONARY ARTERY OF NATIVE HEART WITHOUT ANGINA PECTORIS: ICD-10-CM

## 2022-03-23 RX ORDER — METOPROLOL TARTRATE 50 MG/1
TABLET, FILM COATED ORAL
Qty: 180 TABLET | Refills: 1 | Status: SHIPPED | OUTPATIENT
Start: 2022-03-23

## 2022-04-21 DIAGNOSIS — M54.50 CHRONIC BILATERAL LOW BACK PAIN WITHOUT SCIATICA: ICD-10-CM

## 2022-04-21 DIAGNOSIS — G89.29 CHRONIC BILATERAL LOW BACK PAIN WITHOUT SCIATICA: ICD-10-CM

## 2022-04-21 RX ORDER — METHOCARBAMOL 750 MG/1
TABLET, FILM COATED ORAL
Qty: 270 TABLET | Refills: 2 | Status: SHIPPED | OUTPATIENT
Start: 2022-04-21

## 2022-06-06 DIAGNOSIS — E11.42 DIABETIC POLYNEUROPATHY ASSOCIATED WITH TYPE 2 DIABETES MELLITUS (HCC): Primary | ICD-10-CM

## 2022-06-06 DIAGNOSIS — E78.2 MIXED HYPERLIPIDEMIA: ICD-10-CM

## 2022-06-06 DIAGNOSIS — Z12.5 PROSTATE CANCER SCREENING: ICD-10-CM

## 2022-06-06 RX ORDER — ATORVASTATIN CALCIUM 40 MG/1
TABLET, FILM COATED ORAL
Qty: 30 TABLET | Refills: 0 | Status: SHIPPED | OUTPATIENT
Start: 2022-06-06 | End: 2022-06-29

## 2022-06-06 NOTE — TELEPHONE ENCOUNTER
CALLED PT TO SCHEDULE AN APPT  PT DID NOT ANSWER THE PHONE  LEFT A MESSAGE FOR THE PT TO CALL THE OFFICE TO GET THIS SCHEDULED

## 2022-06-29 DIAGNOSIS — E78.2 MIXED HYPERLIPIDEMIA: ICD-10-CM

## 2022-06-29 RX ORDER — ATORVASTATIN CALCIUM 40 MG/1
TABLET, FILM COATED ORAL
Qty: 90 TABLET | Refills: 0 | Status: SHIPPED | OUTPATIENT
Start: 2022-06-29

## 2022-07-21 ENCOUNTER — TELEPHONE (OUTPATIENT)
Dept: FAMILY MEDICINE CLINIC | Facility: CLINIC | Age: 67
End: 2022-07-21

## 2022-09-01 ENCOUNTER — TELEPHONE (OUTPATIENT)
Dept: FAMILY MEDICINE CLINIC | Facility: CLINIC | Age: 67
End: 2022-09-01

## 2022-09-15 ENCOUNTER — APPOINTMENT (OUTPATIENT)
Dept: LAB | Facility: HOSPITAL | Age: 67
End: 2022-09-15
Attending: FAMILY MEDICINE
Payer: MEDICARE

## 2022-09-15 DIAGNOSIS — Z12.5 PROSTATE CANCER SCREENING: ICD-10-CM

## 2022-09-15 DIAGNOSIS — E11.42 DIABETIC POLYNEUROPATHY ASSOCIATED WITH TYPE 2 DIABETES MELLITUS (HCC): ICD-10-CM

## 2022-09-15 DIAGNOSIS — E78.2 MIXED HYPERLIPIDEMIA: ICD-10-CM

## 2022-09-15 LAB
ALBUMIN SERPL BCP-MCNC: 3.9 G/DL (ref 3.5–5)
ALP SERPL-CCNC: 88 U/L (ref 46–116)
ALT SERPL W P-5'-P-CCNC: 18 U/L (ref 12–78)
ANION GAP SERPL CALCULATED.3IONS-SCNC: 13 MMOL/L (ref 4–13)
AST SERPL W P-5'-P-CCNC: 16 U/L (ref 5–45)
BILIRUB SERPL-MCNC: 0.22 MG/DL (ref 0.2–1)
BUN SERPL-MCNC: 25 MG/DL (ref 5–25)
CALCIUM SERPL-MCNC: 8.9 MG/DL (ref 8.3–10.1)
CHLORIDE SERPL-SCNC: 103 MMOL/L (ref 96–108)
CHOLEST SERPL-MCNC: 122 MG/DL
CO2 SERPL-SCNC: 23 MMOL/L (ref 21–32)
CREAT SERPL-MCNC: 1.64 MG/DL (ref 0.6–1.3)
CREAT UR-MCNC: 306 MG/DL
EST. AVERAGE GLUCOSE BLD GHB EST-MCNC: 154 MG/DL
GFR SERPL CREATININE-BSD FRML MDRD: 42 ML/MIN/1.73SQ M
GLUCOSE P FAST SERPL-MCNC: 150 MG/DL (ref 65–99)
HBA1C MFR BLD: 7 %
HDLC SERPL-MCNC: 46 MG/DL
LDLC SERPL CALC-MCNC: 55 MG/DL (ref 0–100)
MICROALBUMIN UR-MCNC: 93 MG/L (ref 0–20)
MICROALBUMIN/CREAT 24H UR: 30 MG/G CREATININE (ref 0–30)
NONHDLC SERPL-MCNC: 76 MG/DL
POTASSIUM SERPL-SCNC: 3.5 MMOL/L (ref 3.5–5.3)
PROT SERPL-MCNC: 8.2 G/DL (ref 6.4–8.4)
PSA SERPL-MCNC: 10.4 NG/ML (ref 0–4)
SODIUM SERPL-SCNC: 139 MMOL/L (ref 135–147)
TRIGL SERPL-MCNC: 107 MG/DL

## 2022-09-15 PROCEDURE — 83036 HEMOGLOBIN GLYCOSYLATED A1C: CPT | Performed by: FAMILY MEDICINE

## 2022-09-15 PROCEDURE — 36415 COLL VENOUS BLD VENIPUNCTURE: CPT | Performed by: FAMILY MEDICINE

## 2022-09-15 PROCEDURE — G0103 PSA SCREENING: HCPCS

## 2022-09-15 PROCEDURE — 82043 UR ALBUMIN QUANTITATIVE: CPT | Performed by: FAMILY MEDICINE

## 2022-09-15 PROCEDURE — 82570 ASSAY OF URINE CREATININE: CPT | Performed by: FAMILY MEDICINE

## 2022-09-15 PROCEDURE — 80061 LIPID PANEL: CPT

## 2022-09-15 PROCEDURE — 80053 COMPREHEN METABOLIC PANEL: CPT

## 2022-09-19 ENCOUNTER — TELEPHONE (OUTPATIENT)
Dept: FAMILY MEDICINE CLINIC | Facility: CLINIC | Age: 67
End: 2022-09-19

## 2022-09-19 DIAGNOSIS — E11.9 TYPE 2 DIABETES MELLITUS WITHOUT COMPLICATION, WITHOUT LONG-TERM CURRENT USE OF INSULIN (HCC): ICD-10-CM

## 2022-09-19 RX ORDER — LANCETS 28 GAUGE
EACH MISCELLANEOUS 2 TIMES DAILY
Qty: 100 EACH | Refills: 3 | Status: SHIPPED | OUTPATIENT
Start: 2022-09-19 | End: 2022-10-26 | Stop reason: SDUPTHER

## 2022-09-19 RX ORDER — BLOOD-GLUCOSE METER
1 EACH MISCELLANEOUS 2 TIMES DAILY
Qty: 100 STRIP | Refills: 3 | Status: SHIPPED | OUTPATIENT
Start: 2022-09-19 | End: 2022-10-26 | Stop reason: ALTCHOICE

## 2022-09-19 NOTE — TELEPHONE ENCOUNTER
I called patient today to schedule an appointment to review his blood work  I was unable to lm due to no answering machine

## 2022-09-20 ENCOUNTER — RA CDI HCC (OUTPATIENT)
Dept: OTHER | Facility: HOSPITAL | Age: 67
End: 2022-09-20

## 2022-09-20 NOTE — PROGRESS NOTES
[facility]  coding opportunities          Chart Reviewed number of suggestions sent to Provider: 1   E11 22, N18 32    Patients Insurance     Medicare Insurance: Medicare

## 2022-09-27 DIAGNOSIS — E78.2 MIXED HYPERLIPIDEMIA: ICD-10-CM

## 2022-09-27 DIAGNOSIS — I25.10 ATHEROSCLEROSIS OF NATIVE CORONARY ARTERY OF NATIVE HEART WITHOUT ANGINA PECTORIS: ICD-10-CM

## 2022-09-27 RX ORDER — ATORVASTATIN CALCIUM 40 MG/1
TABLET, FILM COATED ORAL
Qty: 90 TABLET | Refills: 0 | Status: SHIPPED | OUTPATIENT
Start: 2022-09-27

## 2022-09-27 RX ORDER — METOPROLOL TARTRATE 50 MG/1
TABLET, FILM COATED ORAL
Qty: 180 TABLET | Refills: 1 | Status: SHIPPED | OUTPATIENT
Start: 2022-09-27

## 2022-09-28 DIAGNOSIS — I10 ESSENTIAL HYPERTENSION, BENIGN: ICD-10-CM

## 2022-09-28 RX ORDER — LISINOPRIL 10 MG/1
TABLET ORAL
Qty: 30 TABLET | Refills: 0 | Status: SHIPPED | OUTPATIENT
Start: 2022-09-28 | End: 2022-09-29

## 2022-09-28 RX ORDER — AMLODIPINE BESYLATE 10 MG/1
TABLET ORAL
Qty: 30 TABLET | Refills: 0 | Status: SHIPPED | OUTPATIENT
Start: 2022-09-28 | End: 2022-10-28

## 2022-09-29 DIAGNOSIS — I10 ESSENTIAL HYPERTENSION, BENIGN: ICD-10-CM

## 2022-09-29 RX ORDER — LISINOPRIL 10 MG/1
TABLET ORAL
Qty: 90 TABLET | Refills: 0 | Status: SHIPPED | OUTPATIENT
Start: 2022-09-29

## 2022-09-30 DIAGNOSIS — G89.29 CHRONIC LOW BACK PAIN WITHOUT SCIATICA, UNSPECIFIED BACK PAIN LATERALITY: ICD-10-CM

## 2022-09-30 DIAGNOSIS — M54.50 CHRONIC LOW BACK PAIN WITHOUT SCIATICA, UNSPECIFIED BACK PAIN LATERALITY: ICD-10-CM

## 2022-09-30 RX ORDER — GABAPENTIN 600 MG/1
TABLET ORAL
Qty: 90 TABLET | Refills: 0 | Status: SHIPPED | OUTPATIENT
Start: 2022-09-30

## 2022-10-05 ENCOUNTER — TELEPHONE (OUTPATIENT)
Dept: OTHER | Facility: OTHER | Age: 67
End: 2022-10-05

## 2022-10-05 NOTE — TELEPHONE ENCOUNTER
Pt called asking if his 1p appt for today with Dr Mariah Helton can be switched to a Virtual appt  He needs both knees replaced and is having a lot of trouble walking today  Please call back and advise

## 2022-10-11 ENCOUNTER — TELEPHONE (OUTPATIENT)
Dept: FAMILY MEDICINE CLINIC | Facility: CLINIC | Age: 67
End: 2022-10-11

## 2022-10-12 PROBLEM — Z12.11 COLON CANCER SCREENING: Status: RESOLVED | Noted: 2020-11-23 | Resolved: 2022-10-12

## 2022-10-19 DIAGNOSIS — E11.9 TYPE 2 DIABETES MELLITUS WITHOUT COMPLICATION, WITHOUT LONG-TERM CURRENT USE OF INSULIN (HCC): ICD-10-CM

## 2022-10-25 ENCOUNTER — OFFICE VISIT (OUTPATIENT)
Dept: FAMILY MEDICINE CLINIC | Facility: CLINIC | Age: 67
End: 2022-10-25
Payer: MEDICARE

## 2022-10-25 VITALS
OXYGEN SATURATION: 97 % | HEIGHT: 72 IN | WEIGHT: 261.5 LBS | BODY MASS INDEX: 35.42 KG/M2 | DIASTOLIC BLOOD PRESSURE: 64 MMHG | TEMPERATURE: 99.4 F | HEART RATE: 84 BPM | SYSTOLIC BLOOD PRESSURE: 118 MMHG

## 2022-10-25 DIAGNOSIS — G89.4 CHRONIC PAIN SYNDROME: ICD-10-CM

## 2022-10-25 DIAGNOSIS — E78.2 MIXED HYPERLIPIDEMIA: ICD-10-CM

## 2022-10-25 DIAGNOSIS — Z12.11 COLON CANCER SCREENING: ICD-10-CM

## 2022-10-25 DIAGNOSIS — Z79.891 CHRONIC PRESCRIPTION OPIATE USE: ICD-10-CM

## 2022-10-25 DIAGNOSIS — R97.20 ELEVATED PSA: ICD-10-CM

## 2022-10-25 DIAGNOSIS — E66.01 OBESITY, MORBID (HCC): ICD-10-CM

## 2022-10-25 DIAGNOSIS — I10 ESSENTIAL HYPERTENSION, BENIGN: ICD-10-CM

## 2022-10-25 DIAGNOSIS — E11.9 TYPE 2 DIABETES MELLITUS WITHOUT COMPLICATION, WITHOUT LONG-TERM CURRENT USE OF INSULIN (HCC): ICD-10-CM

## 2022-10-25 DIAGNOSIS — M17.0 PRIMARY OSTEOARTHRITIS OF BOTH KNEES: ICD-10-CM

## 2022-10-25 DIAGNOSIS — M54.50 CHRONIC LOW BACK PAIN WITHOUT SCIATICA, UNSPECIFIED BACK PAIN LATERALITY: ICD-10-CM

## 2022-10-25 DIAGNOSIS — Z00.00 ENCOUNTER FOR MEDICARE ANNUAL WELLNESS EXAM: ICD-10-CM

## 2022-10-25 DIAGNOSIS — F32.0 MAJOR DEPRESSIVE DISORDER, SINGLE EPISODE, MILD (HCC): ICD-10-CM

## 2022-10-25 DIAGNOSIS — I25.10 ATHEROSCLEROSIS OF NATIVE CORONARY ARTERY OF NATIVE HEART WITHOUT ANGINA PECTORIS: ICD-10-CM

## 2022-10-25 DIAGNOSIS — F11.20 CONTINUOUS OPIOID DEPENDENCE (HCC): ICD-10-CM

## 2022-10-25 DIAGNOSIS — E11.42 DIABETIC POLYNEUROPATHY ASSOCIATED WITH TYPE 2 DIABETES MELLITUS (HCC): Primary | ICD-10-CM

## 2022-10-25 DIAGNOSIS — N18.32 STAGE 3B CHRONIC KIDNEY DISEASE (HCC): ICD-10-CM

## 2022-10-25 DIAGNOSIS — G89.29 CHRONIC LOW BACK PAIN WITHOUT SCIATICA, UNSPECIFIED BACK PAIN LATERALITY: ICD-10-CM

## 2022-10-25 DIAGNOSIS — Z23 ENCOUNTER FOR IMMUNIZATION: ICD-10-CM

## 2022-10-25 PROBLEM — S76.319A: Status: RESOLVED | Noted: 2020-04-22 | Resolved: 2022-10-25

## 2022-10-25 PROCEDURE — G0439 PPPS, SUBSEQ VISIT: HCPCS | Performed by: FAMILY MEDICINE

## 2022-10-25 PROCEDURE — 99215 OFFICE O/P EST HI 40 MIN: CPT | Performed by: FAMILY MEDICINE

## 2022-10-25 PROCEDURE — 90662 IIV NO PRSV INCREASED AG IM: CPT

## 2022-10-25 PROCEDURE — G0008 ADMIN INFLUENZA VIRUS VAC: HCPCS

## 2022-10-25 RX ORDER — SERTRALINE HYDROCHLORIDE 100 MG/1
100 TABLET, FILM COATED ORAL DAILY
Qty: 90 TABLET | Refills: 1 | Status: SHIPPED | OUTPATIENT
Start: 2022-10-25

## 2022-10-25 RX ORDER — NALOXONE HYDROCHLORIDE 4 MG/.1ML
SPRAY NASAL
Qty: 1 EACH | Refills: 0 | Status: SHIPPED | OUTPATIENT
Start: 2022-10-25

## 2022-10-25 RX ORDER — FENTANYL 50 UG/H
PATCH TRANSDERMAL
Qty: 10 PATCH | Refills: 0 | Status: SHIPPED | OUTPATIENT
Start: 2022-10-25

## 2022-10-25 NOTE — ASSESSMENT & PLAN NOTE
Lab Results   Component Value Date    EGFR 42 09/15/2022    EGFR 53 09/02/2021    EGFR 61 10/24/2020    CREATININE 1 64 (H) 09/15/2022    CREATININE 1 37 (H) 09/02/2021    CREATININE 1 22 10/24/2020   The patient's creatinine has increased to 1 64  His estimated GFR his declined to 42  He reports that he has been “pounding ibuprofen”  I advised the patient he needs to discontinue use of NSAIDs  I have recommended consultation with Nephrology  He was agreeable and a referral was placed

## 2022-10-25 NOTE — ASSESSMENT & PLAN NOTE
Patient has chronic pain syndrome secondary to chronic lower back pain and bilateral knee pain  He had been on fentanyl 50 mcg patch and oxycodone 10 mg 3 times per day  He is now off oxycodone as he was not following up with me  He has insisted all long that he was taking fentanyl patches and that he had a “stock pile” of them  He tells me he is just about finished now  He has been cutting is patches in half  I queried the [place] prescription drug monitoring program   He has not received fentanyl patches since July of 2021  He tells me he was only using a patch a week instead of using them as prescribed  He insist on a new prescription  I suggested we only prescribe him the 25 mcg patch  He tells me he is in too much pain and now that I am making him stop his ibuprofen, he fears his pain will worsen  He wants to 50 mcg patch  I told him if I prescribe a 50 mcg patch for him and he has not been using this medication as he describes, he can die  He tells me he has been using the patches regularly but he has a stock pile  I warned him he could overdose  He needs to be truthful with me  He insists that he has been using the medication all long  As noted, I queried the [place] prescription drug monitoring program   I had the patient complete an opiate agreement  I advised the patient he will need to come for regular opiate checks  I insisted he give me a urine test prior to starting the medication    I sent in a prescription for him pending his urine test   I also renewed his Narcan

## 2022-10-25 NOTE — ASSESSMENT & PLAN NOTE
Patient's PSA was elevated to 10 4  His PSA has fluctuated in the past   In the past, I had referred him to Urology but he was not seen  He attributes his elevated PSA to having ejaculated within 48 hours of the blood draw  I told him his PSA should never be this high  I am concerned about the possibility of prostate cancer  I insisted that he see a urologist for follow-up  I placed a referral to see Dr Tania Crump

## 2022-10-25 NOTE — ASSESSMENT & PLAN NOTE
Patient never did his Cologuard  We discussed colon cancer screening  He elected to do a fit test   I ordered this for him

## 2022-10-25 NOTE — ASSESSMENT & PLAN NOTE
I reviewed the patient's lipid panel with him  Total cholesterol was 122  Triglycerides were 107  HDL cholesterol is 46  LDL cholesterol is 55  His goal is less than 70  He will continue atorvastatin 40 mg daily

## 2022-10-25 NOTE — PROGRESS NOTES
CHANTAL shelley the he Assessment and Plan:     Problem List Items Addressed This Visit        Endocrine    Type 2 diabetes mellitus without complication, without long-term current use of insulin ([facility] [address] )    Relevant Medications    metFORMIN (GLUCOPHAGE) 1000 MG tablet    Diabetic polyneuropathy associated with type 2 diabetes mellitus ([facility] [address] ) - Primary       Lab Results   Component Value Date    HGBA1C 7 0 (H) 09/15/2022   Patient has type 2 diabetes mellitus  Patient has not been following up regularly  This is the 1st blood work that the patient has had now in over a year  Fortunately, his diabetes control did not significantly worsen as I feared  I reviewed his labs  His urine albumin to creatinine ratio was 30  His fasting blood glucose is 150  Hemoglobin A1c is 7 0  His goal is less than 7 0  Previously, his hemoglobin A1c was 6 6  He plans to see Dr Jolene Montero for dilated eye exam   I gave him a form to present to Dr Jolene Montero  I RECOMMENDED A DIABETIC FOOT EXAM FOR THE PATIENT  DESPITE MY RECOMMENDATIONS AND MY BEST EFFORT TO PERSUADE THE PATIENT, HE REFUSED  He tells me he plans to see a podiatrist   He does have known diabetic peripheral neuropathy and is at least mild risk for limb loss  Relevant Medications    sertraline (ZOLOFT) 100 mg tablet    metFORMIN (GLUCOPHAGE) 1000 MG tablet       Cardiovascular and Mediastinum    Essential hypertension, benign     The patient has hypertension  Patient's blood pressure was noted to be controlled  I did check his blood pressure again myself today and found to be 136/74  The patient will continue amlodipine 10 mg daily and lisinopril 10 mg daily         Atherosclerosis of native coronary artery of native heart without angina pectoris     Patient has prior history of CABG  He is currently asymptomatic on medical therapy              Musculoskeletal and Integument    Primary osteoarthritis of both knees     Patient has been seeing [facility] for the osteoarthritis of his knees  He really needs to have a bilateral knee replacement  He has been putting this off now for years  He had numerous excuses  He was blaming the pandemic before  He told me he would not get the surgery because of the pandemic  His plan now is to see a specialist at [facility] and is planning to have bone marrow with drawn, spun down and injected into his knee  I told him this is not FDA approved and not approved by Medicare  He is going to find this cost prohibitive and he is not going to get it done  His consultation is coming up  I told him he really needs to make an appointment to get his knees replaced  Genitourinary    Stage 3b chronic kidney disease [facility])     Lab Results   Component Value Date    EGFR 42 09/15/2022    EGFR 53 09/02/2021    EGFR 61 10/24/2020    CREATININE 1 64 (H) 09/15/2022    CREATININE 1 37 (H) 09/02/2021    CREATININE 1 22 10/24/2020   The patient's creatinine has increased to 1 64  His estimated GFR his declined to 42  He reports that he has been “pounding ibuprofen”  I advised the patient he needs to discontinue use of NSAIDs  I have recommended consultation with Nephrology  He was agreeable and a referral was placed  Relevant Orders    Ambulatory Referral to Nephrology       Other    Chronic low back pain without sciatica    Relevant Medications    fentaNYL (DURAGESIC) 50 mcg/hr    Mixed hyperlipidemia     I reviewed the patient's lipid panel with him  Total cholesterol was 122  Triglycerides were 107  HDL cholesterol is 46  LDL cholesterol is 55  His goal is less than 70  He will continue atorvastatin 40 mg daily  Encounter for Medicare annual wellness exam    Major depressive disorder, single episode, mild ([address] )     Patient has depression  He has been on sertraline 50 mg daily  He reports compliance with the medication  I recommended he see a psychiatrist   He simply not doing well  He attributes his depression to his physical ailments    I reminded him that his severe depression started when his son moved out  He really needs to see a psychiatrist   He he declined referral   He tells me they want be able to help him  I increased his sertraline to 100 mg daily         Relevant Medications    sertraline (ZOLOFT) 100 mg tablet    Obesity, morbid (HCC)    Elevated PSA     Patient's PSA was elevated to 10 4  His PSA has fluctuated in the past   In the past, I had referred him to Urology but he was not seen  He attributes his elevated PSA to having ejaculated within 48 hours of the blood draw  I told him his PSA should never be this high  I am concerned about the possibility of prostate cancer  I insisted that he see a urologist for follow-up  I placed a referral to see Dr Montse Xie  Relevant Orders    Ambulatory Referral to Urology    Colon cancer screening     Patient never did his Cologuard  We discussed colon cancer screening  He elected to do a fit test   I ordered this for him  Relevant Orders    Occult Blood, Fecal Immunochemical    Continuous opioid dependence ([facility] [address] )    Encounter for immunization    Relevant Orders    influenza vaccine, high-dose, PF 0 7 mL (FLUZONE HIGH-DOSE) (Completed)    Chronic prescription opiate use    Relevant Medications    naloxone (Narcan) 4 mg/0 1 mL nasal spray    Chronic pain syndrome     Patient has chronic pain syndrome secondary to chronic lower back pain and bilateral knee pain  He had been on fentanyl 50 mcg patch and oxycodone 10 mg 3 times per day  He is now off oxycodone as he was not following up with me  He has insisted all long that he was taking fentanyl patches and that he had a “stock pile” of them  He tells me he is just about finished now  He has been cutting is patches in half  I queried the [place] prescription drug monitoring program   He has not received fentanyl patches since July of 2021   He tells me he was only using a patch a week instead of using them as prescribed  He insist on a new prescription  I suggested we only prescribe him the 25 mcg patch  He tells me he is in too much pain and now that I am making him stop his ibuprofen, he fears his pain will worsen  He wants to 50 mcg patch  I told him if I prescribe a 50 mcg patch for him and he has not been using this medication as he describes, he can die  He tells me he has been using the patches regularly but he has a stock pile  I warned him he could overdose  He needs to be truthful with me  He insists that he has been using the medication all long  As noted, I queried the [place] prescription drug monitoring program   I had the patient complete an opiate agreement  I advised the patient he will need to come for regular opiate checks  I insisted he give me a urine test prior to starting the medication  I sent in a prescription for him pending his urine test   I also renewed his Narcan         Relevant Orders    [facility] All Prescribed Meds and Special Instructions    Amphetamines, Methamphetamines    Butalbital    Phenobarbital    Secobarbital    Temazepam    Alprazolam    Clonazepam    Diazepam    Lorazepam    Oxazepam    Gabapentin    Pregabalin    Cocaine    Heroin    Buprenorphine    Levorphanol    Meperidine    Naltrexone    Fentanyl    Methadone    Oxycodone    Oxymorphone    Tapentadol    THC    Tramadol    Codeine, Hydrocodone, Hydropmorphone, Morphine    Bath Salts    Ethyl Glucuronide/Ethyl Sulfate    Kratom    Spice    Methylphenidate    Phentermine    Validity Oxidant    Validity Creatinine    Validity pH    Validity Specific        BMI Counseling: Body mass index is 35 47 kg/m²  The BMI is above normal  Nutrition recommendations include decreasing portion sizes and moderation in carbohydrate intake  Rationale for BMI follow-up plan is due to patient being overweight or obese  Falls Plan of Care: balance, strength, and gait training instructions were provided         Preventive health issues were discussed with patient, and age appropriate screening tests were ordered as noted in patient's After Visit Summary  Personalized health advice and appropriate referrals for health education or preventive services given if needed, as noted in patient's After Visit Summary  History of Present Illness:     Patient presents for a Medicare Wellness Visit    HPI   Patient Care Team:  Prosper Alexis DO as PCP - General (Family Medicine)     Review of Systems:     Review of Systems   Constitutional: Positive for fatigue  Psychiatric/Behavioral: The patient is nervous/anxious  Reports anhedonia, doesn't want to leave the house          Problem List:     Patient Active Problem List   Diagnosis   • Chronic low back pain without sciatica   • Primary osteoarthritis of both knees   • Type 2 diabetes mellitus without complication, without long-term current use of insulin (HCC)   • Essential hypertension, benign   • Mixed hyperlipidemia   • Atherosclerosis of native coronary artery of native heart without angina pectoris   • Encounter for Medicare annual wellness exam   • Puncture wound of right foot   • Venous stasis ulcer of left ankle without varicose veins (HCC)   • Neck pain   • Major depressive disorder, single episode, mild (HCC)   • Encounter for long-term (current) use of medications   • Prostate cancer screening   • Immunization due   • Obesity, morbid ([address] )   • Elevated PSA   • Colon cancer screening   • Contusion of right foot   • Diabetic polyneuropathy associated with type 2 diabetes mellitus ([address] )   • DDD (degenerative disc disease), lumbar   • Epigastric pain   • Other fatigue   • Continuous opioid dependence ([address] )   • Stage 3a chronic kidney disease ([address] )   • Encounter for immunization   • Stage 3b chronic kidney disease ([address] )   • Chronic prescription opiate use   • Chronic pain syndrome      Past Medical and Surgical History:     Past Medical History:   Diagnosis Date   • Anemia last assessed: 2018   • BPH without obstruction/lower urinary tract symptoms     last assessed: 12/3/2018   • Coronary artery disease     last assessed: 2015   • DDD (degenerative disc disease), lumbar     last assessed: 2014   • Diabetes mellitus ([facility] )    • Generalized osteoarthritis     last assessed: 2019   • Hx of fracture     ankle fracture(left)   • Hyperlipidemia     last assessed: 2015   • Hypertension     last assessed: 2015   • Knee injury     partial torn meniscus and acl   • Lumbar spinal stenosis     last assessed: 2014   • Rotator cuff syndrome of shoulder and allied disorders     last assessed: 3/5/2014   • Type 2 diabetes mellitus with diabetic polyneuropathy ([facility] )     last assessed: 10/31/2018     Past Surgical History:   Procedure Laterality Date   • LUMBAR FUSION     • SHOULDER ARTHROSCOPY Right    • SHOULDER SURGERY      2 right/1 left rotator cuff repairs   • SPINAL FUSION      spinal triple fusion L4-5      Family History:     Family History   Problem Relation Age of Onset   • Diabetes Mother         type 2   • Heart attack Father    • Stroke Father    • Post-traumatic stress disorder Son    • No Known Problems Daughter       Social History:     Social History     Socioeconomic History   • Marital status:      Spouse name: None   • Number of children: None   • Years of education: None   • Highest education level: None   Occupational History   • None   Tobacco Use   • Smoking status: Former Smoker     Packs/day: 1 00     Years: 25 00     Pack years: 25 00     Types: Cigarettes     Start date:      Quit date:      Years since quittin 8   • Smokeless tobacco: Never Used   Vaping Use   • Vaping Use: Never used   Substance and Sexual Activity   • Alcohol use: Not Currently   • Drug use: Never   • Sexual activity: None   Other Topics Concern   • None   Social History Narrative   • None     Social Determinants of Health     Financial Resource Strain: Low Risk    • Difficulty of Paying Living Expenses: Not hard at all   Food Insecurity: Not on file   Transportation Needs: No Transportation Needs   • Lack of Transportation (Medical): No   • Lack of Transportation (Non-Medical):  No   Physical Activity: Not on file   Stress: Not on file   Social Connections: Not on file   Intimate Partner Violence: Not on file   Housing Stability: Not on file      Medications and Allergies:     Current Outpatient Medications   Medication Sig Dispense Refill   • amLODIPine (NORVASC) 10 mg tablet take 1 tablet by mouth once daily 30 tablet 0   • aspirin 81 MG tablet Take 81 mg by mouth once      • atorvastatin (LIPITOR) 40 mg tablet take 1 tablet by mouth once daily 90 tablet 0   • B Complex Vitamins (B COMPLEX 1 PO) Take 1 tablet by mouth daily      • EPINEPHrine (EPIPEN) 0 3 mg/0 3 mL SOAJ Inject 0 3 ml Subcutaneously as needed for bee sting 1 each 3   • fentaNYL (DURAGESIC) 50 mcg/hr Apply 1 patch every 3 days 10 patch 0   • gabapentin (NEURONTIN) 600 MG tablet take 1 tablet by mouth three times a day 90 tablet 0   • glipiZIDE (GLUCOTROL XL) 5 mg 24 hr tablet take 1 tablet by mouth once daily 90 tablet 3   • Lancets (freestyle) lancets Use 2 (two) times a day Use as instructed 100 each 3   • lisinopril (ZESTRIL) 10 mg tablet take 1 tablet by mouth once daily 90 tablet 0   • metFORMIN (GLUCOPHAGE) 1000 MG tablet Take 1 tablet (1,000 mg total) by mouth 2 (two) times a day 60 tablet 5   • methocarbamol (ROBAXIN) 750 mg tablet take 1 tablet by mouth three times a day 270 tablet 2   • metoprolol tartrate (LOPRESSOR) 50 mg tablet take 1 tablet by mouth twice a day 180 tablet 1   • Multiple Vitamins-Minerals (MULTIVITAMIN ADULT PO) Take 1 tablet by mouth daily      • naloxone (Narcan) 4 mg/0 1 mL nasal spray Administer 1 dose prn unresponsiveness 1 each 0   • nitroglycerin (Nitrostat) 0 4 mg SL tablet Place 1 tablet (0 4 mg total) under the tongue every 5 (five) minutes as needed for chest pain 25 tablet 1   • sertraline (ZOLOFT) 100 mg tablet Take 1 tablet (100 mg total) by mouth daily 90 tablet 1   • Blood Glucose Monitoring Suppl (FREESTYLE FREEDOM LITE) w/Device KIT FreeStyle Freedom Lite kit (Patient not taking: Reported on 10/25/2022)     • Blood Glucose Monitoring Suppl (FreeStyle Lite) MANISH Use to check blood sugar once a day (Patient not taking: No sig reported) 100 each 3   • glucose blood ([employer] Blood Glucose Test) test strip Use 1 each 2 (two) times a day Use as instructed (Patient not taking: No sig reported) 100 strip 3   • omeprazole (PriLOSEC) 20 mg delayed release capsule omeprazole 20 mg capsule,delayed release (Patient not taking: Reported on 10/25/2022)       No current facility-administered medications for this visit  Allergies   Allergen Reactions   • Naproxen GI Intolerance   • Prednisone GI Intolerance      Immunizations:     Immunization History   Administered Date(s) Administered   • COVID-19 MODERNA VACC 0 25 ML IM BOOSTER 12/14/2021   • COVID-19 MODERNA VACC 0 5 ML IM 04/06/2021, 05/05/2021   • Influenza, high dose seasonal 0 7 mL 09/16/2019, 10/20/2020, 12/01/2021, 10/25/2022   • Pneumococcal Conjugate 13-Valent 10/16/2019   • Pneumococcal Polysaccharide PPV23 10/02/2014   • TD (adult) Preservative Free 04/07/2009   • Td (adult), adsorbed 04/07/2009   • Tdap 05/04/2020   • Zoster 12/04/2014      Health Maintenance:         Topic Date Due   • Colorectal Cancer Screening  09/24/2020   • Hepatitis C Screening  Completed         Topic Date Due   • Pneumococcal Vaccine: 65+ Years (3 - PPSV23 or PCV20) 10/16/2020   • COVID-19 Vaccine (4 - Booster for Natha Larger series) 04/14/2022      Medicare Screening Tests and Risk Assessments:     Erica Collier is here for his Subsequent Wellness visit  Historian  Patient cannot answer questions due to cognitive impairment, intelluctual disability, or expressive limitations       Health Risk Assessment:   Patient rates overall health as poor  Patient feels that their physical health rating is slightly worse  Patient is dissatisfied with their life  Eyesight was rated as same  Hearing was rated as same  Patient feels that their emotional and mental health rating is slightly worse  Patients states they are sometimes angry  Patient states they are always unusually tired/fatigued  Pain experienced in the last 7 days has been a lot  Patient's pain rating has been 8/10  Patient states that he has experienced no weight loss or gain in last 6 months  Reports knee an low back pain    Depression Screening:   PHQ-9 Score: 15      Fall Risk Screening: In the past year, patient has experienced: history of falling in past year    Number of falls: 2 or more  Injured during fall?: No    Feels unsteady when standing or walking?: Yes    Worried about falling?: Yes      Home Safety:  Patient has trouble with stairs inside or outside of their home  Patient has working smoke alarms and has working carbon monoxide detector  Home safety hazards include: none  Nutrition:   Current diet is Regular  Advised to follow a diabetic diet    Medications:   Patient is currently taking over-the-counter supplements  OTC medications include: see medication list  Patient is able to manage medications  Activities of Daily Living (ADLs)/Instrumental Activities of Daily Living (IADLs):   Walk and transfer into and out of bed and chair?: Yes  Dress and groom yourself?: Yes    Bathe or shower yourself?: Yes    Feed yourself?  Yes  Do your laundry/housekeeping?: Yes  Manage your money, pay your bills and track your expenses?: Yes  Make your own meals?: Yes    Do your own shopping?: Yes    Previous Hospitalizations:   Any hospitalizations or ED visits within the last 12 months?: No      Advance Care Planning:   Living will: No    Durable POA for healthcare: No    Advanced directive: No      Cognitive Screening:   Provider or family/friend/caregiver concerned regarding cognition?: No    PREVENTIVE SCREENINGS      Cardiovascular Screening:    General: Screening Not Indicated and History Lipid Disorder      Diabetes Screening:     General: Screening Not Indicated and History Diabetes      Colorectal Cancer Screening:     General: Screening Current      Prostate Cancer Screening:    General: Screening Current      Osteoporosis Screening:    General: Screening Not Indicated      Abdominal Aortic Aneurysm (AAA) Screening:    Risk factors include: age between 73-69 yo and tobacco use        General: Risks and Benefits Discussed    Due for: Screening AAA Ultrasound      Lung Cancer Screening:     General: Screening Not Indicated      Hepatitis C Screening:    General: Screening Current    Screening, Brief Intervention, and Referral to Treatment (SBIRT)    Screening  Typical number of drinks in a day: 0  Typical number of drinks in a week: 0  Interpretation: Low risk drinking behavior      AUDIT-C Screenin) How often did you have a drink containing alcohol in the past year? never  2) How many drinks did you have on a typical day when you were drinking in the past year? 0  3) How often did you have 6 or more drinks on one occasion in the past year? never    AUDIT-C Score: 0  Interpretation: Score 0-3 (male): Negative screen for alcohol misuse    Single Item Drug Screening:  How often have you used an illegal drug (including marijuana) or a prescription medication for non-medical reasons in the past year? never    Single Item Drug Screen Score: 0  Interpretation: Negative screen for possible drug use disorder    Review of Current Opioid Use  Opioid Risk Tool (ORT) Score: 0  Opioid Risk Tool (ORT) Interpretation: Score 0-3: Low risk for opioid misuse    No exam data present     Physical Exam:     /64   Pulse 84   Temp 99 4 °F (37 4 °C) (Tympanic)   Ht 6' (1 829 m)   Wt 119 kg (261 lb 8 oz)   SpO2 97%   BMI 35 47 kg/m²     Physical Exam     Socrates Courser, DO

## 2022-10-25 NOTE — ASSESSMENT & PLAN NOTE
Lab Results   Component Value Date    HGBA1C 7 0 (H) 09/15/2022   Patient has type 2 diabetes mellitus  Patient has not been following up regularly  This is the 1st blood work that the patient has had now in over a year  Fortunately, his diabetes control did not significantly worsen as I feared  I reviewed his labs  His urine albumin to creatinine ratio was 30  His fasting blood glucose is 150  Hemoglobin A1c is 7 0  His goal is less than 7 0  Previously, his hemoglobin A1c was 6 6  He plans to see Dr Renee Schneider for dilated eye exam   I gave him a form to present to Dr Renee Schneider  I RECOMMENDED A DIABETIC FOOT EXAM FOR THE PATIENT  DESPITE MY RECOMMENDATIONS AND MY BEST EFFORT TO PERSUADE THE PATIENT, HE REFUSED  He tells me he plans to see a podiatrist   He does have known diabetic peripheral neuropathy and is at least mild risk for limb loss

## 2022-10-25 NOTE — ASSESSMENT & PLAN NOTE
The patient has hypertension  Patient's blood pressure was noted to be controlled  I did check his blood pressure again myself today and found to be 136/74    The patient will continue amlodipine 10 mg daily and lisinopril 10 mg daily

## 2022-10-25 NOTE — ASSESSMENT & PLAN NOTE
Patient has depression  He has been on sertraline 50 mg daily  He reports compliance with the medication  I recommended he see a psychiatrist   He simply not doing well  He attributes his depression to his physical ailments  I reminded him that his severe depression started when his son moved out  He really needs to see a psychiatrist   He he declined referral   He tells me they want be able to help him    I increased his sertraline to 100 mg daily

## 2022-10-25 NOTE — ASSESSMENT & PLAN NOTE
Patient has been seeing [facility] for the osteoarthritis of his knees  He really needs to have a bilateral knee replacement  He has been putting this off now for years  He had numerous excuses  He was blaming the pandemic before  He told me he would not get the surgery because of the pandemic  His plan now is to see a specialist at [facility] and is planning to have bone marrow with drawn, spun down and injected into his knee  I told him this is not FDA approved and not approved by Medicare  He is going to find this cost prohibitive and he is not going to get it done  His consultation is coming up  I told him he really needs to make an appointment to get his knees replaced

## 2022-10-25 NOTE — PATIENT INSTRUCTIONS
Medicare Preventive Visit Patient Instructions  Thank you for completing your Welcome to Medicare Visit or Medicare Annual Wellness Visit today  Your next wellness visit will be due in one year (10/26/2023)  The screening/preventive services that you may require over the next 5-10 years are detailed below  Some tests may not apply to you based off risk factors and/or age  Screening tests ordered at today's visit but not completed yet may show as past due  Also, please note that scanned in results may not display below  Preventive Screenings:  Service Recommendations Previous Testing/Comments   Colorectal Cancer Screening  · Colonoscopy    · Fecal Occult Blood Test (FOBT)/Fecal Immunochemical Test (FIT)  · Fecal DNA/Cologuard Test  · Flexible Sigmoidoscopy Age: 39-70 years old   Colonoscopy: every 10 years (May be performed more frequently if at higher risk)  OR  FOBT/FIT: every 1 year  OR  Cologuard: every 3 years  OR  Sigmoidoscopy: every 5 years  Screening may be recommended earlier than age 39 if at higher risk for colorectal cancer  Also, an individualized decision between you and your healthcare provider will decide whether screening between the ages of 74-80 would be appropriate   Colonoscopy: 09/24/2013  FOBT/FIT: Not on file  Cologuard: Not on file  Sigmoidoscopy: Not on file    Screening Current     Prostate Cancer Screening Individualized decision between patient and health care provider in men between ages of 53-78   Medicare will cover every 12 months beginning on the day after your 50th birthday PSA: 10 4 ng/mL     Screening Current     Hepatitis C Screening Once for adults born between 1945 and 1965  More frequently in patients at high risk for Hepatitis C Hep C Antibody: 10/15/2019    Screening Current   Diabetes Screening 1-2 times per year if you're at risk for diabetes or have pre-diabetes Fasting glucose: 150 mg/dL (9/15/2022)  A1C: 7 0 % (9/15/2022)  Screening Not Indicated  History Diabetes Cholesterol Screening Once every 5 years if you don't have a lipid disorder  May order more often based on risk factors  Lipid panel: 09/15/2022  Screening Not Indicated  History Lipid Disorder      Other Preventive Screenings Covered by Medicare:  1  Abdominal Aortic Aneurysm (AAA) Screening: covered once if your at risk  You're considered to be at risk if you have a family history of AAA or a male between the age of 73-68 who smoking at least 100 cigarettes in your lifetime  2  Lung Cancer Screening: covers low dose CT scan once per year if you meet all of the following conditions: (1) Age 50-69; (2) No signs or symptoms of lung cancer; (3) Current smoker or have quit smoking within the last 15 years; (4) You have a tobacco smoking history of at least 20 pack years (packs per day x number of years you smoked); (5) You get a written order from a healthcare provider  3  Glaucoma Screening: covered annually if you're considered high risk: (1) You have diabetes OR (2) Family history of glaucoma OR (3)  aged 48 and older OR (3)  American aged 72 and older  3  Osteoporosis Screening: covered every 2 years if you meet one of the following conditions: (1) Have a vertebral abnormality; (2) On glucocorticoid therapy for more than 3 months; (3) Have primary hyperparathyroidism; (4) On osteoporosis medications and need to assess response to drug therapy  5  HIV Screening: covered annually if you're between the age of 12-76  Also covered annually if you are younger than 13 and older than 72 with risk factors for HIV infection  For pregnant patients, it is covered up to 3 times per pregnancy      Immunizations:  Immunization Recommendations   Influenza Vaccine Annual influenza vaccination during flu season is recommended for all persons aged >= 6 months who do not have contraindications   Pneumococcal Vaccine   * Pneumococcal conjugate vaccine = PCV13 (Prevnar 13), PCV15 (Vaxneuvance), PCV20 (Prevnar 20)  * Pneumococcal polysaccharide vaccine = PPSV23 (Pneumovax) Adults 2364 years old: 1-3 doses may be recommended based on certain risk factors  Adults 72 years old: 1-2 doses may be recommended based off what pneumonia vaccine you previously received   Hepatitis B Vaccine 3 dose series if at intermediate or high risk (ex: diabetes, end stage renal disease, liver disease)   Tetanus (Td) Vaccine - COST NOT COVERED BY MEDICARE PART B Following completion of primary series, a booster dose should be given every 10 years to maintain immunity against tetanus  Td may also be given as tetanus wound prophylaxis  Tdap Vaccine - COST NOT COVERED BY MEDICARE PART B Recommended at least once for all adults  For pregnant patients, recommended with each pregnancy  Shingles Vaccine (Shingrix) - COST NOT COVERED BY MEDICARE PART B  2 shot series recommended in those aged 48 and above     Health Maintenance Due:      Topic Date Due   • Colorectal Cancer Screening  09/24/2020   • Hepatitis C Screening  Completed     Immunizations Due:      Topic Date Due   • Pneumococcal Vaccine: 65+ Years (3 - PPSV23 or PCV20) 10/16/2020   • COVID-19 Vaccine (4 - Booster for Moderna series) 04/14/2022   • Influenza Vaccine (1) 09/01/2022     Advance Directives   What are advance directives? Advance directives are legal documents that state your wishes and plans for medical care  These plans are made ahead of time in case you lose your ability to make decisions for yourself  Advance directives can apply to any medical decision, such as the treatments you want, and if you want to donate organs  What are the types of advance directives? There are many types of advance directives, and each state has rules about how to use them  You may choose a combination of any of the following:  · Living will: This is a written record of the treatment you want   You can also choose which treatments you do not want, which to limit, and which to stop at a certain time  This includes surgery, medicine, IV fluid, and tube feedings  · Durable power of  for healthcare [facility]): This is a written record that states who you want to make healthcare choices for you when you are unable to make them for yourself  This person, called a proxy, is usually a family member or a friend  You may choose more than 1 proxy  · Do not resuscitate (DNR) order:  A DNR order is used in case your heart stops beating or you stop breathing  It is a request not to have certain forms of treatment, such as CPR  A DNR order may be included in other types of advance directives  · Medical directive: This covers the care that you want if you are in a coma, near death, or unable to make decisions for yourself  You can list the treatments you want for each condition  Treatment may include pain medicine, surgery, blood transfusions, dialysis, IV or tube feedings, and a ventilator (breathing machine)  · Values history: This document has questions about your views, beliefs, and how you feel and think about life  This information can help others choose the care that you would choose  Why are advance directives important? An advance directive helps you control your care  Although spoken wishes may be used, it is better to have your wishes written down  Spoken wishes can be misunderstood, or not followed  Treatments may be given even if you do not want them  An advance directive may make it easier for your family to make difficult choices about your care  Fall Prevention    Fall prevention  includes ways to make your home and other areas safer  It also includes ways you can move more carefully to prevent a fall  Health conditions that cause changes in your blood pressure, vision, or muscle strength and coordination may increase your risk for falls  Medicines may also increase your risk for falls if they make you dizzy, weak, or sleepy  Fall prevention tips:   · Stand or sit up slowly  · Use assistive devices as directed  · Wear shoes that fit well and have soles that   · Wear a personal alarm  · Stay active  · Manage your medical conditions  Home Safety Tips:  · Add items to prevent falls in the bathroom  · Keep paths clear  · Install bright lights in your home  · Keep items you use often on shelves within reach  · Paint or place reflective tape on the edges of your stairs  Weight Management   Why it is important to manage your weight:  Being overweight increases your risk of health conditions such as heart disease, high blood pressure, type 2 diabetes, and certain types of cancer  It can also increase your risk for osteoarthritis, sleep apnea, and other respiratory problems  Aim for a slow, steady weight loss  Even a small amount of weight loss can lower your risk of health problems  How to lose weight safely:  A safe and healthy way to lose weight is to eat fewer calories and get regular exercise  You can lose up about 1 pound a week by decreasing the number of calories you eat by 500 calories each day  Healthy meal plan for weight management:  A healthy meal plan includes a variety of foods, contains fewer calories, and helps you stay healthy  A healthy meal plan includes the following:  · Eat whole-grain foods more often  A healthy meal plan should contain fiber  Fiber is the part of grains, fruits, and vegetables that is not broken down by your body  Whole-grain foods are healthy and provide extra fiber in your diet  Some examples of whole-grain foods are whole-wheat breads and pastas, oatmeal, brown rice, and bulgur  · Eat a variety of vegetables every day  Include dark, leafy greens such as spinach, kale, mello greens, and mustard greens  Eat yellow and orange vegetables such as carrots, sweet potatoes, and winter squash  · Eat a variety of fruits every day    Choose fresh or canned fruit (canned in its own juice or light syrup) instead of juice  Fruit juice has very little or no fiber  · Eat low-fat dairy foods  Drink fat-free (skim) milk or 1% milk  Eat fat-free yogurt and low-fat cottage cheese  Try low-fat cheeses such as mozzarella and other reduced-fat cheeses  · Choose meat and other protein foods that are low in fat  Choose beans or other legumes such as split peas or lentils  Choose fish, skinless poultry (chicken or turkey), or lean cuts of red meat (beef or pork)  Before you cook meat or poultry, cut off any visible fat  · Use less fat and oil  Try baking foods instead of frying them  Add less fat, such as margarine, sour cream, regular salad dressing and mayonnaise to foods  Eat fewer high-fat foods  Some examples of high-fat foods include french fries, doughnuts, ice cream, and cakes  · Eat fewer sweets  Limit foods and drinks that are high in sugar  This includes candy, cookies, regular soda, and sweetened drinks  Exercise:  Exercise at least 30 minutes per day on most days of the week  Some examples of exercise include walking, biking, dancing, and swimming  You can also fit in more physical activity by taking the stairs instead of the elevator or parking farther away from stores  Ask your healthcare provider about the best exercise plan for you  Narcotic (Opioid) Safety    Use narcotics safely:  · Take prescribed narcotics exactly as directed  · Do not give narcotics to others or take narcotics that belong to someone else  · Do not mix narcotics without medicines or alcohol  · Do not drive or operate heavy machinery after you take the narcotic  · Monitor for side effects and notify your healthcare provider if you experienced side effects such as nausea, sleepiness, itching, or trouble thinking clearly  Manage constipation:    Constipation is the most common side effect of narcotic medicine  Constipation is when you have hard, dry bowel movements, or you go longer than usual between bowel movements   Tell your healthcare provider about all changes in your bowel movements while you are taking narcotics  He or she may recommend laxative medicine to help you have a bowel movement  He or she may also change the kind of narcotic you are taking, or change when you take it  The following are more ways you can prevent or relieve constipation:    · Drink liquids as directed  You may need to drink extra liquids to help soften and move your bowels  Ask how much liquid to drink each day and which liquids are best for you  · Eat high-fiber foods  This may help decrease constipation by adding bulk to your bowel movements  High-fiber foods include fruits, vegetables, whole-grain breads and cereals, and beans  Your healthcare provider or dietitian can help you create a high-fiber meal plan  Your provider may also recommend a fiber supplement if you cannot get enough fiber from food  · Exercise regularly  Regular physical activity can help stimulate your intestines  Walking is a good exercise to prevent or relieve constipation  Ask which exercises are best for you  · Schedule a time each day to have a bowel movement  This may help train your body to have regular bowel movements  Bend forward while you are on the toilet to help move the bowel movement out  Sit on the toilet for at least 10 minutes, even if you do not have a bowel movement  Store narcotics safely:   · Store narcotics where others cannot easily get them  Keep them in a locked cabinet or secure area  Do not  keep them in a purse or other bag you carry with you  A person may be looking for something else and find the narcotics  · Make sure narcotics are stored out of the reach of children  A child can easily overdose on narcotics  Narcotics may look like candy to a small child  The best way to dispose of narcotics: The laws vary by country and area   In the [place], the best way is to return the narcotics through a take-back program  This program is offered by the [employer] ([employer])  The following are options for using the program:  · Take the narcotics to a [employer]IA collection site  The site is often a law enforcement center  Call your local law enforcement center for scheduled take-back days in your area  You will be given information on where to go if the collection site is in a different location  · Take the narcotics to an approved pharmacy or hospital   A pharmacy or hospital may be set up as a collection site  You will need to ask if it is a [employer] collection site if you were not directed there  A pharmacy or doctor's office may not be able to take back narcotics unless it is a [employer] site  · Use a mail-back system  This means you are given containers to put the narcotics into  You will then mail them in the containers  · Use a take-back drop box  This is a place to leave the narcotics at any time  People and animals will not be able to get into the box  Your local law enforcement agency can tell you where to find a drop box in your area  Other ways to manage pain:   · Ask your healthcare provider about non-narcotic medicines to control pain  Nonprescription medicines include NSAIDs (such as ibuprofen) and acetaminophen  Prescription medicines include muscle relaxers, antidepressants, and steroids  · Pain may be managed without any medicines  Some ways to relieve pain include massage, aromatherapy, or meditation  Physical or occupational therapy may also help  For more information:   · Drug Enforcement Administration  [address] [place] [address] [address] [address]  Phone: 2- 474 - 524-1220  Web Address: [facility]/drug_disposal/    · [address]  and Drug Administration  [facility] , [address] [place] [address]  Phone: 8- 078 - [phone]  Web Address: http://[employer]/     © Copyright [employer] 2018 Information is for End User's use only and may not be sold, redistributed or otherwise used for commercial purposes   All illustrations and images included in CareNotes® are the copyrighted property of A D A M , Inc  or Anne Jeff

## 2022-10-26 ENCOUNTER — TELEPHONE (OUTPATIENT)
Dept: UROLOGY | Facility: AMBULATORY SURGERY CENTER | Age: 67
End: 2022-10-26

## 2022-10-26 DIAGNOSIS — E11.9 TYPE 2 DIABETES MELLITUS WITHOUT COMPLICATION, WITHOUT LONG-TERM CURRENT USE OF INSULIN (HCC): ICD-10-CM

## 2022-10-26 RX ORDER — LANCETS 28 GAUGE
EACH MISCELLANEOUS 2 TIMES DAILY
Qty: 100 EACH | Refills: 3 | Status: SHIPPED | OUTPATIENT
Start: 2022-10-26

## 2022-10-26 NOTE — TELEPHONE ENCOUNTER
Contacted patient and scheduled him for a new patient appointment with Kavya Wayne in [place] on 11/10/22  Office address provided and patient advised to arrive 15 minutes early

## 2022-10-26 NOTE — TELEPHONE ENCOUNTER
Complaint/Diagnosis:Elevated PSA    Insurance:[facility]/[employer]     History of cancer:no    Previous urologist:no    Outside Testing/where:epic    If yes,what kind:epic    Records requested/where:epic    Preferred location: [place]

## 2022-10-27 NOTE — PROGRESS NOTES
I attempted to contact the patient this evening to discuss his pain medication  I contacted patient's cellphone as well as his home phone  There was no answer  I received a message that I could not leave a message for him on his answering machine because his voice mailbox was not set up yet  The patient's fentanyl requires prior authorization from his insurance  I think it is best anyway as I have reservations about prescribing this for the patient  It is a long-acting opiate that he has not been taking as prescribed  I am not sure that he has been taking this all long as he tells me  If that is the case, he can overdose  We will need to discuss treatment  I am also waiting for his urine drug screen  He really needs to provide a urine drug screen before I would be willing to prescribe him any type of opiate pain medication

## 2022-10-28 DIAGNOSIS — I10 ESSENTIAL HYPERTENSION, BENIGN: ICD-10-CM

## 2022-10-28 RX ORDER — AMLODIPINE BESYLATE 10 MG/1
TABLET ORAL
Qty: 30 TABLET | Refills: 0 | Status: SHIPPED | OUTPATIENT
Start: 2022-10-28

## 2022-11-23 DIAGNOSIS — I10 ESSENTIAL HYPERTENSION, BENIGN: ICD-10-CM

## 2022-11-23 RX ORDER — AMLODIPINE BESYLATE 10 MG/1
TABLET ORAL
Qty: 30 TABLET | Refills: 0 | Status: SHIPPED | OUTPATIENT
Start: 2022-11-23

## 2022-11-30 ENCOUNTER — RA CDI HCC (OUTPATIENT)
Dept: OTHER | Facility: HOSPITAL | Age: 67
End: 2022-11-30

## 2022-11-30 NOTE — PROGRESS NOTES
[facility]  coding opportunities          Chart Reviewed number of suggestions sent to Provider: 1  E11 [id number]     Patients Insurance     Medicare Insurance: Medicare

## 2022-12-08 DIAGNOSIS — E11.9 TYPE 2 DIABETES MELLITUS WITHOUT COMPLICATION, WITHOUT LONG-TERM CURRENT USE OF INSULIN (HCC): ICD-10-CM

## 2022-12-08 RX ORDER — GLIPIZIDE 5 MG/1
TABLET, FILM COATED, EXTENDED RELEASE ORAL
Qty: 90 TABLET | Refills: 3 | Status: SHIPPED | OUTPATIENT
Start: 2022-12-08

## 2022-12-16 ENCOUNTER — RA CDI HCC (OUTPATIENT)
Dept: OTHER | Facility: HOSPITAL | Age: 67
End: 2022-12-16

## 2022-12-16 NOTE — PROGRESS NOTES
[facility] 75  coding opportunities          Chart Reviewed number of suggestions sent to Provider: 2  E11 22  E11 [id number]     Patients Insurance     Medicare Insurance: [employer]

## 2022-12-23 DIAGNOSIS — E78.2 MIXED HYPERLIPIDEMIA: ICD-10-CM

## 2022-12-23 RX ORDER — ATORVASTATIN CALCIUM 40 MG/1
TABLET, FILM COATED ORAL
Qty: 90 TABLET | Refills: 0 | Status: SHIPPED | OUTPATIENT
Start: 2022-12-23

## 2022-12-24 DIAGNOSIS — I10 ESSENTIAL HYPERTENSION, BENIGN: ICD-10-CM

## 2022-12-24 PROBLEM — Z12.11 COLON CANCER SCREENING: Status: RESOLVED | Noted: 2020-11-23 | Resolved: 2022-12-24

## 2022-12-24 RX ORDER — AMLODIPINE BESYLATE 10 MG/1
TABLET ORAL
Qty: 30 TABLET | Refills: 0 | Status: SHIPPED | OUTPATIENT
Start: 2022-12-24

## 2023-01-22 DIAGNOSIS — I10 ESSENTIAL HYPERTENSION, BENIGN: ICD-10-CM

## 2023-01-23 DIAGNOSIS — I10 ESSENTIAL HYPERTENSION, BENIGN: ICD-10-CM

## 2023-01-23 RX ORDER — AMLODIPINE BESYLATE 10 MG/1
TABLET ORAL
Qty: 30 TABLET | Refills: 0 | Status: SHIPPED | OUTPATIENT
Start: 2023-01-23

## 2023-01-23 RX ORDER — LISINOPRIL 10 MG/1
TABLET ORAL
Qty: 30 TABLET | Refills: 0 | Status: SHIPPED | OUTPATIENT
Start: 2023-01-23 | End: 2023-01-30

## 2023-01-26 ENCOUNTER — TELEPHONE (OUTPATIENT)
Dept: NEPHROLOGY | Facility: CLINIC | Age: 68
End: 2023-01-26

## 2023-01-26 DIAGNOSIS — N18.32 STAGE 3B CHRONIC KIDNEY DISEASE (HCC): Primary | ICD-10-CM

## 2023-02-09 DIAGNOSIS — Z91.030 BEE STING ALLERGY: ICD-10-CM

## 2023-02-09 RX ORDER — EPINEPHRINE 0.3 MG/.3ML
INJECTION SUBCUTANEOUS
Qty: 2 EACH | Refills: 0 | Status: SHIPPED | OUTPATIENT
Start: 2023-02-09

## 2023-02-21 DIAGNOSIS — I10 ESSENTIAL HYPERTENSION, BENIGN: ICD-10-CM

## 2023-02-21 RX ORDER — AMLODIPINE BESYLATE 10 MG/1
TABLET ORAL
Qty: 30 TABLET | Refills: 0 | Status: SHIPPED | OUTPATIENT
Start: 2023-02-21

## 2023-03-06 DIAGNOSIS — E11.9 TYPE 2 DIABETES MELLITUS WITHOUT COMPLICATION, WITHOUT LONG-TERM CURRENT USE OF INSULIN (HCC): Primary | ICD-10-CM

## 2023-03-06 DIAGNOSIS — E78.2 MIXED HYPERLIPIDEMIA: ICD-10-CM

## 2023-03-08 NOTE — ASSESSMENT & PLAN NOTE
I prescribed promogran neel AG  He will be applying a piece of this to the ankle wound daily  I have also recommended he wrap the leg and try to stay off of it and keep it elevated  Call if no improvement or worsens  We discussed referral to podiatry or wound care  He would prefer Podiatry as it was less expensive for him last time 
Patient continues to experience chronic low back pain  He had surgery which failed  I queried the [place] prescription drug monitoring program   There were no red flags and it was safe to proceed  I refilled his oxycodone and it is fentanyl  He has had no problems with sedation  He has Narcan at home and his son is aware of how to use this 
Patient has osteoarthritis of both knees  We discussed surgery  Patient will not consider surgery at this time 
hard copy, drawn during this pregnancy

## 2023-03-24 DIAGNOSIS — E78.2 MIXED HYPERLIPIDEMIA: ICD-10-CM

## 2023-03-24 RX ORDER — ATORVASTATIN CALCIUM 40 MG/1
TABLET, FILM COATED ORAL
Qty: 90 TABLET | Refills: 0 | Status: SHIPPED | OUTPATIENT
Start: 2023-03-24

## 2023-03-25 DIAGNOSIS — I10 ESSENTIAL HYPERTENSION, BENIGN: ICD-10-CM

## 2023-03-25 RX ORDER — LISINOPRIL 10 MG/1
TABLET ORAL
Qty: 30 TABLET | Refills: 0 | Status: SHIPPED | OUTPATIENT
Start: 2023-03-25

## 2023-03-25 RX ORDER — AMLODIPINE BESYLATE 10 MG/1
TABLET ORAL
Qty: 30 TABLET | Refills: 0 | Status: SHIPPED | OUTPATIENT
Start: 2023-03-25

## 2023-03-31 DIAGNOSIS — I25.10 ATHEROSCLEROSIS OF NATIVE CORONARY ARTERY OF NATIVE HEART WITHOUT ANGINA PECTORIS: ICD-10-CM

## 2023-03-31 RX ORDER — METOPROLOL TARTRATE 50 MG/1
TABLET, FILM COATED ORAL
Qty: 180 TABLET | Refills: 1 | Status: SHIPPED | OUTPATIENT
Start: 2023-03-31

## 2023-04-21 DIAGNOSIS — I10 ESSENTIAL HYPERTENSION, BENIGN: ICD-10-CM

## 2023-04-21 DIAGNOSIS — E11.9 TYPE 2 DIABETES MELLITUS WITHOUT COMPLICATION, WITHOUT LONG-TERM CURRENT USE OF INSULIN (HCC): ICD-10-CM

## 2023-04-21 RX ORDER — AMLODIPINE BESYLATE 10 MG/1
TABLET ORAL
Qty: 30 TABLET | Refills: 0 | Status: SHIPPED | OUTPATIENT
Start: 2023-04-21

## 2023-04-24 RX ORDER — LISINOPRIL 10 MG/1
TABLET ORAL
Qty: 30 TABLET | Refills: 0 | Status: SHIPPED | OUTPATIENT
Start: 2023-04-24

## 2023-05-23 DIAGNOSIS — E11.9 TYPE 2 DIABETES MELLITUS WITHOUT COMPLICATION, WITHOUT LONG-TERM CURRENT USE OF INSULIN (HCC): ICD-10-CM

## 2023-05-23 DIAGNOSIS — I10 ESSENTIAL HYPERTENSION, BENIGN: ICD-10-CM

## 2023-05-23 RX ORDER — LISINOPRIL 10 MG/1
TABLET ORAL
Qty: 30 TABLET | Refills: 0 | OUTPATIENT
Start: 2023-05-23

## 2023-05-23 RX ORDER — AMLODIPINE BESYLATE 10 MG/1
TABLET ORAL
Qty: 30 TABLET | Refills: 0 | OUTPATIENT
Start: 2023-05-23

## 2023-05-23 NOTE — TELEPHONE ENCOUNTER
Called patient to schedule an appointment due to patient being overdue and a medication request coming in from the pharmacy  Patient did not answer  Left a message for patient to call the office to schedule and appointment

## 2023-06-22 DIAGNOSIS — E78.2 MIXED HYPERLIPIDEMIA: ICD-10-CM

## 2023-06-22 RX ORDER — ATORVASTATIN CALCIUM 40 MG/1
TABLET, FILM COATED ORAL
Qty: 90 TABLET | Refills: 0 | Status: SHIPPED | OUTPATIENT
Start: 2023-06-22

## 2023-07-23 DIAGNOSIS — G89.29 CHRONIC BILATERAL LOW BACK PAIN WITHOUT SCIATICA: ICD-10-CM

## 2023-07-23 DIAGNOSIS — I10 ESSENTIAL HYPERTENSION, BENIGN: ICD-10-CM

## 2023-07-23 DIAGNOSIS — E11.9 TYPE 2 DIABETES MELLITUS WITHOUT COMPLICATION, WITHOUT LONG-TERM CURRENT USE OF INSULIN (HCC): ICD-10-CM

## 2023-07-23 DIAGNOSIS — I25.10 ATHEROSCLEROSIS OF NATIVE CORONARY ARTERY OF NATIVE HEART WITHOUT ANGINA PECTORIS: ICD-10-CM

## 2023-07-23 DIAGNOSIS — M54.50 CHRONIC BILATERAL LOW BACK PAIN WITHOUT SCIATICA: ICD-10-CM

## 2023-07-24 RX ORDER — AMLODIPINE BESYLATE 10 MG/1
10 TABLET ORAL DAILY
Qty: 30 TABLET | Refills: 0 | OUTPATIENT
Start: 2023-07-24

## 2023-07-24 RX ORDER — METOPROLOL TARTRATE 50 MG/1
50 TABLET, FILM COATED ORAL 2 TIMES DAILY
Qty: 180 TABLET | Refills: 0 | OUTPATIENT
Start: 2023-07-24

## 2023-07-24 RX ORDER — GLIPIZIDE 5 MG/1
5 TABLET, FILM COATED, EXTENDED RELEASE ORAL DAILY
Qty: 90 TABLET | Refills: 0 | OUTPATIENT
Start: 2023-07-24

## 2023-07-24 RX ORDER — LISINOPRIL 10 MG/1
10 TABLET ORAL DAILY
Qty: 30 TABLET | Refills: 0 | OUTPATIENT
Start: 2023-07-24

## 2023-07-24 RX ORDER — METHOCARBAMOL 750 MG/1
750 TABLET, FILM COATED ORAL 3 TIMES DAILY
Qty: 270 TABLET | Refills: 0 | OUTPATIENT
Start: 2023-07-24

## 2023-10-11 ENCOUNTER — TELEPHONE (OUTPATIENT)
Dept: FAMILY MEDICINE CLINIC | Facility: CLINIC | Age: 68
End: 2023-10-11

## 2023-10-11 NOTE — TELEPHONE ENCOUNTER
I called the patient and left a message to call and schedule an over due appointment. I also left a message to schedule a my chart appointment.

## 2024-02-21 PROBLEM — Z12.5 PROSTATE CANCER SCREENING: Status: RESOLVED | Noted: 2020-09-21 | Resolved: 2024-02-21

## 2024-02-21 PROBLEM — Z00.00 ENCOUNTER FOR MEDICARE ANNUAL WELLNESS EXAM: Status: RESOLVED | Noted: 2019-11-15 | Resolved: 2024-02-21

## 2024-03-19 ENCOUNTER — TELEPHONE (OUTPATIENT)
Dept: FAMILY MEDICINE CLINIC | Facility: CLINIC | Age: 69
End: 2024-03-19

## 2024-03-19 DIAGNOSIS — R97.20 ELEVATED PSA: ICD-10-CM

## 2024-03-19 DIAGNOSIS — E78.2 MIXED HYPERLIPIDEMIA: ICD-10-CM

## 2024-03-19 DIAGNOSIS — E11.9 TYPE 2 DIABETES MELLITUS WITHOUT COMPLICATION, WITHOUT LONG-TERM CURRENT USE OF INSULIN (HCC): Primary | ICD-10-CM

## 2024-03-19 DIAGNOSIS — Z79.899 ENCOUNTER FOR LONG-TERM (CURRENT) USE OF MEDICATIONS: ICD-10-CM

## 2024-03-19 NOTE — TELEPHONE ENCOUNTER
I called the patient to schedule an over due appointment and to let him know put new labs in the system due to the labs were  and over a year old.I left a message to call the office to schedule the appointment.

## 2024-03-25 NOTE — TELEPHONE ENCOUNTER
I called the patient x 2 to schedule an over due appointment,I had to leave a message to call to schedule.

## 2024-03-28 ENCOUNTER — CLINICAL SUPPORT (OUTPATIENT)
Dept: FAMILY MEDICINE CLINIC | Facility: HOME HEALTHCARE | Age: 69
End: 2024-03-28
Payer: MEDICARE

## 2024-03-28 DIAGNOSIS — R97.20 ELEVATED PSA: ICD-10-CM

## 2024-03-28 DIAGNOSIS — E11.9 TYPE 2 DIABETES MELLITUS WITHOUT COMPLICATION, WITHOUT LONG-TERM CURRENT USE OF INSULIN (HCC): ICD-10-CM

## 2024-03-28 DIAGNOSIS — I10 ESSENTIAL HYPERTENSION, BENIGN: Primary | ICD-10-CM

## 2024-03-28 DIAGNOSIS — Z79.899 ENCOUNTER FOR LONG-TERM (CURRENT) USE OF MEDICATIONS: ICD-10-CM

## 2024-03-28 DIAGNOSIS — E78.2 MIXED HYPERLIPIDEMIA: ICD-10-CM

## 2024-03-28 LAB
ALBUMIN SERPL BCP-MCNC: 4 G/DL (ref 3.5–5)
ALP SERPL-CCNC: 77 U/L (ref 34–104)
ALT SERPL W P-5'-P-CCNC: 20 U/L (ref 7–52)
ANION GAP SERPL CALCULATED.3IONS-SCNC: 15 MMOL/L (ref 4–13)
AST SERPL W P-5'-P-CCNC: 19 U/L (ref 13–39)
BASOPHILS # BLD AUTO: 0.08 THOUSANDS/ÂΜL (ref 0–0.1)
BASOPHILS NFR BLD AUTO: 1 % (ref 0–1)
BILIRUB SERPL-MCNC: 0.67 MG/DL (ref 0.2–1)
BUN SERPL-MCNC: 25 MG/DL (ref 5–25)
CALCIUM SERPL-MCNC: 9.2 MG/DL (ref 8.4–10.2)
CHLORIDE SERPL-SCNC: 96 MMOL/L (ref 96–108)
CHOLEST SERPL-MCNC: 343 MG/DL
CO2 SERPL-SCNC: 23 MMOL/L (ref 21–32)
CREAT SERPL-MCNC: 1.45 MG/DL (ref 0.6–1.3)
CREAT UR-MCNC: 75.1 MG/DL
EOSINOPHIL # BLD AUTO: 0.35 THOUSAND/ÂΜL (ref 0–0.61)
EOSINOPHIL NFR BLD AUTO: 3 % (ref 0–6)
ERYTHROCYTE [DISTWIDTH] IN BLOOD BY AUTOMATED COUNT: 12.7 % (ref 11.6–15.1)
EST. AVERAGE GLUCOSE BLD GHB EST-MCNC: 369 MG/DL
GFR SERPL CREATININE-BSD FRML MDRD: 48 ML/MIN/1.73SQ M
GLUCOSE P FAST SERPL-MCNC: 370 MG/DL (ref 65–99)
HBA1C MFR BLD: 14.5 %
HCT VFR BLD AUTO: 43.2 % (ref 36.5–49.3)
HDLC SERPL-MCNC: 44 MG/DL
HGB BLD-MCNC: 14.5 G/DL (ref 12–17)
IMM GRANULOCYTES # BLD AUTO: 0.05 THOUSAND/UL (ref 0–0.2)
IMM GRANULOCYTES NFR BLD AUTO: 0 % (ref 0–2)
LDLC SERPL DIRECT ASSAY-MCNC: 168 MG/DL (ref 0–100)
LYMPHOCYTES # BLD AUTO: 2.85 THOUSANDS/ÂΜL (ref 0.6–4.47)
LYMPHOCYTES NFR BLD AUTO: 25 % (ref 14–44)
MCH RBC QN AUTO: 33.5 PG (ref 26.8–34.3)
MCHC RBC AUTO-ENTMCNC: 33.6 G/DL (ref 31.4–37.4)
MCV RBC AUTO: 100 FL (ref 82–98)
MICROALBUMIN UR-MCNC: 957.8 MG/L
MICROALBUMIN/CREAT 24H UR: 1275 MG/G CREATININE (ref 0–30)
MONOCYTES # BLD AUTO: 0.74 THOUSAND/ÂΜL (ref 0.17–1.22)
MONOCYTES NFR BLD AUTO: 6 % (ref 4–12)
NEUTROPHILS # BLD AUTO: 7.51 THOUSANDS/ÂΜL (ref 1.85–7.62)
NEUTS SEG NFR BLD AUTO: 65 % (ref 43–75)
NRBC BLD AUTO-RTO: 0 /100 WBCS
PLATELET # BLD AUTO: 321 THOUSANDS/UL (ref 149–390)
PMV BLD AUTO: 10.7 FL (ref 8.9–12.7)
POTASSIUM SERPL-SCNC: 4.5 MMOL/L (ref 3.5–5.3)
PROT SERPL-MCNC: 7.7 G/DL (ref 6.4–8.4)
RBC # BLD AUTO: 4.33 MILLION/UL (ref 3.88–5.62)
SODIUM SERPL-SCNC: 134 MMOL/L (ref 135–147)
TRIGL SERPL-MCNC: 632 MG/DL
WBC # BLD AUTO: 11.58 THOUSAND/UL (ref 4.31–10.16)

## 2024-03-28 PROCEDURE — 82570 ASSAY OF URINE CREATININE: CPT | Performed by: FAMILY MEDICINE

## 2024-03-28 PROCEDURE — 83721 ASSAY OF BLOOD LIPOPROTEIN: CPT

## 2024-03-28 PROCEDURE — 84153 ASSAY OF PSA TOTAL: CPT | Performed by: FAMILY MEDICINE

## 2024-03-28 PROCEDURE — 84154 ASSAY OF PSA FREE: CPT | Performed by: FAMILY MEDICINE

## 2024-03-28 PROCEDURE — 80053 COMPREHEN METABOLIC PANEL: CPT | Performed by: FAMILY MEDICINE

## 2024-03-28 PROCEDURE — 85025 COMPLETE CBC W/AUTO DIFF WBC: CPT | Performed by: FAMILY MEDICINE

## 2024-03-28 PROCEDURE — 83036 HEMOGLOBIN GLYCOSYLATED A1C: CPT | Performed by: FAMILY MEDICINE

## 2024-03-28 PROCEDURE — 80061 LIPID PANEL: CPT | Performed by: FAMILY MEDICINE

## 2024-03-28 PROCEDURE — 82043 UR ALBUMIN QUANTITATIVE: CPT | Performed by: FAMILY MEDICINE

## 2024-03-29 LAB
PSA FREE MFR SERPL: 14 %
PSA FREE SERPL-MCNC: 1.86 NG/ML
PSA SERPL-MCNC: 13.3 NG/ML (ref 0–4)

## 2024-04-01 ENCOUNTER — OFFICE VISIT (OUTPATIENT)
Dept: FAMILY MEDICINE CLINIC | Facility: CLINIC | Age: 69
End: 2024-04-01
Payer: MEDICARE

## 2024-04-01 VITALS
SYSTOLIC BLOOD PRESSURE: 185 MMHG | TEMPERATURE: 97.8 F | HEIGHT: 72 IN | DIASTOLIC BLOOD PRESSURE: 92 MMHG | WEIGHT: 239 LBS | BODY MASS INDEX: 32.37 KG/M2 | HEART RATE: 100 BPM | OXYGEN SATURATION: 97 %

## 2024-04-01 DIAGNOSIS — E11.65 POORLY CONTROLLED TYPE 2 DIABETES MELLITUS WITH PERIPHERAL NEUROPATHY (HCC): Primary | ICD-10-CM

## 2024-04-01 DIAGNOSIS — D75.89 MACROCYTOSIS: ICD-10-CM

## 2024-04-01 DIAGNOSIS — Z00.00 MEDICARE ANNUAL WELLNESS VISIT, SUBSEQUENT: ICD-10-CM

## 2024-04-01 DIAGNOSIS — E11.42 POORLY CONTROLLED TYPE 2 DIABETES MELLITUS WITH PERIPHERAL NEUROPATHY (HCC): Primary | ICD-10-CM

## 2024-04-01 DIAGNOSIS — I25.10 ATHEROSCLEROSIS OF NATIVE CORONARY ARTERY OF NATIVE HEART WITHOUT ANGINA PECTORIS: ICD-10-CM

## 2024-04-01 DIAGNOSIS — Z12.11 COLON CANCER SCREENING: ICD-10-CM

## 2024-04-01 DIAGNOSIS — E78.2 MIXED HYPERLIPIDEMIA: ICD-10-CM

## 2024-04-01 DIAGNOSIS — I10 ESSENTIAL HYPERTENSION, BENIGN: ICD-10-CM

## 2024-04-01 DIAGNOSIS — F33.2 SEVERE RECURRENT MAJOR DEPRESSION WITHOUT PSYCHOTIC FEATURES (HCC): ICD-10-CM

## 2024-04-01 DIAGNOSIS — E66.01 OBESITY, MORBID (HCC): ICD-10-CM

## 2024-04-01 DIAGNOSIS — Z13.6 SCREENING FOR AAA (ABDOMINAL AORTIC ANEURYSM): ICD-10-CM

## 2024-04-01 DIAGNOSIS — M17.0 PRIMARY OSTEOARTHRITIS OF BOTH KNEES: ICD-10-CM

## 2024-04-01 DIAGNOSIS — R97.20 ELEVATED PSA: ICD-10-CM

## 2024-04-01 PROBLEM — L97.329 VENOUS STASIS ULCER OF LEFT ANKLE WITHOUT VARICOSE VEINS (HCC): Status: RESOLVED | Noted: 2020-07-22 | Resolved: 2024-04-01

## 2024-04-01 PROBLEM — F32.0 MAJOR DEPRESSIVE DISORDER, SINGLE EPISODE, MILD (HCC): Status: RESOLVED | Noted: 2020-08-30 | Resolved: 2024-04-01

## 2024-04-01 PROBLEM — N18.32 STAGE 3B CHRONIC KIDNEY DISEASE (HCC): Status: RESOLVED | Noted: 2022-10-25 | Resolved: 2024-04-01

## 2024-04-01 PROBLEM — F11.20 CONTINUOUS OPIOID DEPENDENCE (HCC): Status: RESOLVED | Noted: 2021-12-01 | Resolved: 2024-04-01

## 2024-04-01 PROBLEM — I87.2 VENOUS STASIS ULCER OF LEFT ANKLE WITHOUT VARICOSE VEINS (HCC): Status: RESOLVED | Noted: 2020-07-22 | Resolved: 2024-04-01

## 2024-04-01 PROCEDURE — G0439 PPPS, SUBSEQ VISIT: HCPCS | Performed by: FAMILY MEDICINE

## 2024-04-01 PROCEDURE — 99215 OFFICE O/P EST HI 40 MIN: CPT | Performed by: FAMILY MEDICINE

## 2024-04-01 RX ORDER — ROSUVASTATIN CALCIUM 40 MG/1
40 TABLET, COATED ORAL DAILY
Qty: 30 TABLET | Refills: 5 | Status: SHIPPED | OUTPATIENT
Start: 2024-04-01

## 2024-04-01 RX ORDER — BUPROPION HYDROCHLORIDE 150 MG/1
150 TABLET ORAL EVERY MORNING
Qty: 30 TABLET | Refills: 1 | Status: SHIPPED | OUTPATIENT
Start: 2024-04-01 | End: 2024-09-28

## 2024-04-01 RX ORDER — GLIPIZIDE 5 MG/1
5 TABLET ORAL
Qty: 30 TABLET | Refills: 1 | Status: SHIPPED | OUTPATIENT
Start: 2024-04-01

## 2024-04-01 RX ORDER — AMLODIPINE BESYLATE AND BENAZEPRIL HYDROCHLORIDE 5; 10 MG/1; MG/1
1 CAPSULE ORAL DAILY
Qty: 30 CAPSULE | Refills: 1 | Status: SHIPPED | OUTPATIENT
Start: 2024-04-01

## 2024-04-01 NOTE — PROGRESS NOTES
Assessment and Plan:     Problem List Items Addressed This Visit       Primary osteoarthritis of both knees     Patient has severe bone-on-bone osteoarthritis of both knees.  Unfortunately, I told him that in order to be cleared for his surgery he would have to have hemoglobin A1c less than 8.0.  He is going to take quite some time to get this down again.         Essential hypertension, benign     Patient also has uncontrolled hypertension secondary to noncompliance.  In the past, he had taken amlodipine, lisinopril, and metoprolol.  That is a lot of medicine to start him on again.  We will have to titrate up his medicines slowly.  I started him on Lotrel 5/10 daily.  I will reassess his blood pressure and make necessary changes when he returns.         Relevant Medications    amLODIPine-benazepril (LOTREL 5-10) 5-10 MG per capsule    Mixed hyperlipidemia     Patient has hyperlipidemia.  I reviewed his lipid panel, which was also atrocious.  His total cholesterol was 343.  Triglycerides were 632.  Normal is less than 150.  His direct LDL cholesterol is 168.  His goal is at least less than 60.  I started the patient on intensive lipid-lowering therapy with rosuvastatin 40 mg daily.         Relevant Medications    rosuvastatin (CRESTOR) 40 MG tablet    Atherosclerosis of native coronary artery of native heart without angina pectoris     Patient has coronary artery disease.  Fortunately, he remains asymptomatic despite his lack of compliance.         Obesity, morbid (HCC)    Elevated PSA     Patient has an elevated PSA.  In the past, I had referred the patient to Dr. Garcia but he never followed up.  His PSA is now up to 13.3.  I explained to the patient that elevated PSA can indicate prostate cancer.  It can go up for other reasons as well such as enlarged prostate or infection.  However, he absolutely needs to see urology.  I placed a referral for him to see Dr. Garcia.         Relevant Orders    Ambulatory Referral  to Urology    Colon cancer screening    Relevant Orders    Occult Blood, Fecal Immunochemical    Poorly controlled type 2 diabetes mellitus with peripheral neuropathy (HCC) - Primary       Lab Results   Component Value Date    HGBA1C 14.5 (H) 03/28/2024   Patient has poorly controlled diabetes mellitus secondary to noncompliance with with follow-up and medications.  I reviewed his labs.  His fasting blood glucose was 370.  The patient is symptomatic.  He is having polyuria and polydipsia.  He also notes blurred vision.  Urine albumin to creatinine ratio was 1275.  His hemoglobin A1c is 14.5%.  I explained to the patient that he was on a lot of medication in the past.  I cannot start him back on what he was taking in the past.  He also tells me he refuses to take metformin because he read bad things about it online.  I will these things are untrue, I do not think I would be able to convince the patient otherwise I did not even try.  I started the patient on glipizide 5 mg once a day.  Will titrate up the dose.  I did tell the patient that there is a good chance I may need to start him on insulin.  I asked him to have a repeat BMP in 2 weeks.  I will call him with the results and make any adjustments with his medicine that are necessary.  I am scheduling the patient a follow-up visit in a month.         Relevant Medications    glipiZIDE (GLUCOTROL) 5 mg tablet    Other Relevant Orders    Ambulatory referral to Diabetic Education - use to refer for diabetes group classes, individual diabetes education, medical nutrition therapy, device training    Basic metabolic panel    Macrocytosis     Hemoglobin and hematocrit were normal but MCV was elevated at 100.  I will need to follow this.  Patient denies any alcohol use at all.         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  I discussed referral to psychiatry but he declined.  In the past, patient had taken  "sertraline 100 mg/day.  He still had depression issues even with sertraline but he was better than he is now.  I think he needs something more stimulating.  I started the patient on Wellbutrin  mg once a day in the morning.  I will titrate up that dose when I see him back, if necessary.         Relevant Medications    buPROPion (WELLBUTRIN XL) 150 mg 24 hr tablet     Other Visit Diagnoses       Medicare annual wellness visit, subsequent        Screening for AAA (abdominal aortic aneurysm)        Relevant Orders    US abdominal aorta screening aaa            Depression Screening and Follow-up Plan: Patient's depression screening was positive with a PHQ-9 score of 6. Patient declines further evaluation by mental health professional and/or medications. Brief counseling provided. Will re-evaluate at next office visit.     Falls Plan of Care: balance, strength, and gait training instructions were provided.       Preventive health issues were discussed with patient, and age appropriate screening tests were ordered as noted in patient's After Visit Summary.  Personalized health advice and appropriate referrals for health education or preventive services given if needed, as noted in patient's After Visit Summary.     History of Present Illness:     Patient presents for a Medicare Wellness Visit    This is a 68-year-old white male who presents to the office today for his routine checkup as well as for his annual Medicare wellness exam.  I have not seen this patient since October 2022.  It has been a long time since he had blood work.  He tells me he has been off all of his medications since \"I do not remember\".  Apparently, it has been quite sometime since he took any medications.  Tells me he has an appointment to see Dr. Ramirez for a total knee replacement.  As such, he wanted to get back on his medication and get back on track with his health care.  He did have blood work done in anticipation of this visit.  I had to " reorder all of his blood work which had long since .  Patient has been inactive.  He has not seen ophthalmology.  He is not seeing nephrology.  He never followed up with urology.       Patient Care Team:  Bryant Holloway DO as PCP - General (Family Medicine)     Review of Systems:     Review of Systems   Cardiovascular:  Negative for chest pain, palpitations and leg swelling.   Gastrointestinal:  Negative for abdominal distention, abdominal pain, blood in stool, constipation, diarrhea and nausea.   Musculoskeletal:  Positive for arthralgias and back pain.   Psychiatric/Behavioral:  Positive for dysphoric mood. Negative for suicidal ideas.         Problem List:     Patient Active Problem List   Diagnosis    Chronic low back pain without sciatica    Primary osteoarthritis of both knees    Essential hypertension, benign    Mixed hyperlipidemia    Atherosclerosis of native coronary artery of native heart without angina pectoris    Puncture wound of right foot    Neck pain    Encounter for long-term (current) use of medications    Immunization due    Obesity, morbid (HCC)    Elevated PSA    Colon cancer screening    Contusion of right foot    Poorly controlled type 2 diabetes mellitus with peripheral neuropathy (HCC)    DDD (degenerative disc disease), lumbar    Epigastric pain    Other fatigue    Stage 3a chronic kidney disease (HCC)    Encounter for immunization    Chronic prescription opiate use    Chronic pain syndrome    Macrocytosis    Severe recurrent major depression without psychotic features (HCC)      Past Medical and Surgical History:     Past Medical History:   Diagnosis Date    Anemia     last assessed: 2018    Arthritis     BPH without obstruction/lower urinary tract symptoms     last assessed: 12/3/2018    Coronary artery disease     last assessed: 2015    DDD (degenerative disc disease), lumbar     last assessed: 2014    Diabetes mellitus (HCC)     Generalized osteoarthritis     last  assessed: 2019    Hx of fracture     ankle fracture(left)    Hyperlipidemia     last assessed: 2015    Hypertension     last assessed: 2015    Knee injury     partial torn meniscus and acl    Lumbar spinal stenosis     last assessed: 2014    Pneumonia Dec 1994    Rotator cuff syndrome of shoulder and allied disorders     last assessed: 3/5/2014    Type 2 diabetes mellitus with diabetic polyneuropathy (HCC)     last assessed: 10/31/2018     Past Surgical History:   Procedure Laterality Date    CARDIAC SURGERY      CORONARY ARTERY BYPASS GRAFT  2014    LUMBAR FUSION      SHOULDER ARTHROSCOPY Right     SHOULDER SURGERY      2 right/1 left rotator cuff repairs    SPINAL FUSION      spinal triple fusion L4-5      Family History:     Family History   Problem Relation Age of Onset    Diabetes Mother         type 2    Arthritis Mother     Heart attack Father     Stroke Father     Post-traumatic stress disorder Son     No Known Problems Daughter       Social History:     Social History     Socioeconomic History    Marital status:      Spouse name: None    Number of children: None    Years of education: None    Highest education level: None   Occupational History    None   Tobacco Use    Smoking status: Former     Current packs/day: 0.00     Average packs/day: 1 pack/day for 26.0 years (26.0 ttl pk-yrs)     Types: Cigarettes     Start date:      Quit date:      Years since quittin.2     Passive exposure: Past    Smokeless tobacco: Never   Vaping Use    Vaping status: Never Used   Substance and Sexual Activity    Alcohol use: Not Currently    Drug use: Not Currently     Types: Marijuana    Sexual activity: Not Currently     Partners: Female     Birth control/protection: Condom Male   Other Topics Concern    None   Social History Narrative    None     Social Determinants of Health     Financial Resource Strain: Low Risk  (10/25/2022)    Overall Financial Resource Strain (CARDIA)      Difficulty of Paying Living Expenses: Not hard at all   Food Insecurity: No Food Insecurity (4/1/2024)    Hunger Vital Sign     Worried About Running Out of Food in the Last Year: Never true     Ran Out of Food in the Last Year: Never true   Transportation Needs: No Transportation Needs (4/1/2024)    PRAPARE - Transportation     Lack of Transportation (Medical): No     Lack of Transportation (Non-Medical): No   Physical Activity: Not on file   Stress: Not on file   Social Connections: Not on file   Intimate Partner Violence: Not on file   Housing Stability: Low Risk  (4/1/2024)    Housing Stability Vital Sign     Unable to Pay for Housing in the Last Year: No     Number of Places Lived in the Last Year: 1     Unstable Housing in the Last Year: No      Medications and Allergies:     Current Outpatient Medications   Medication Sig Dispense Refill    amLODIPine-benazepril (LOTREL 5-10) 5-10 MG per capsule Take 1 capsule by mouth daily 30 capsule 1    buPROPion (WELLBUTRIN XL) 150 mg 24 hr tablet Take 1 tablet (150 mg total) by mouth every morning 30 tablet 1    EPINEPHrine (EPIPEN) 0.3 mg/0.3 mL SOAJ inject 0.3 milliliters ( 0.3 milligrams ) intramuscularly in OUTER THIGH and HOLD for 3 SECONDS use if needed for SEVERE ALLERGIC REACTION May repeat one time after 10 MINUTES if needed for maximum daily dose of 2 INJECTIONS 2 each 0    glipiZIDE (GLUCOTROL) 5 mg tablet Take 1 tablet (5 mg total) by mouth daily with breakfast 30 tablet 1    glucose blood test strip Use 1 each 2 (two) times a day (Patient not taking: Reported on 4/1/2024) 100 strip 3    Lancets (freestyle) lancets Use 2 (two) times a day (Patient not taking: Reported on 4/1/2024) 100 each 3    rosuvastatin (CRESTOR) 40 MG tablet Take 1 tablet (40 mg total) by mouth daily 30 tablet 5    aspirin 81 MG tablet Take 81 mg by mouth once  (Patient not taking: Reported on 4/1/2024)      Blood Glucose Monitoring Suppl (FREESTYLE FREEDOM LITE) w/Device KIT [employer]  Freedom Lite kit (Patient not taking: Reported on 10/25/2022)      Blood Glucose Monitoring Suppl (FreeStyle Lite) MANISH Use to check blood sugar once a day (Patient not taking: Reported on 10/25/2022) 100 each 3    Multiple Vitamins-Minerals (MULTIVITAMIN ADULT PO) Take 1 tablet by mouth daily  (Patient not taking: Reported on 4/1/2024)       No current facility-administered medications for this visit.     Allergies   Allergen Reactions    Naproxen GI Intolerance    Prednisone GI Intolerance      Immunizations:     Immunization History   Administered Date(s) Administered    COVID-19 MODERNA VACC 0.25 ML IM BOOSTER 12/14/2021    COVID-19 MODERNA VACC 0.5 ML IM 04/06/2021, 05/05/2021    COVID-19 Moderna Vac BIVALENT 12 Yr+ IM 0.5 ML 10/25/2022    Influenza, high dose seasonal 0.7 mL 09/16/2019, 10/20/2020, 12/01/2021, 10/25/2022, 02/09/2024    Pneumococcal Conjugate 13-Valent 10/16/2019    Pneumococcal Conjugate Vaccine 20-valent (Pcv20), Polysace 02/09/2024    Pneumococcal Polysaccharide PPV23 10/02/2014    Respiratory Syncytial Virus Vaccine (Recombinant, Adjuvanted) 02/09/2024    TD (adult) Preservative Free 04/07/2009    Td (adult), adsorbed 04/07/2009    Tdap 05/04/2020    Zoster 12/04/2014      Health Maintenance:         Topic Date Due    Colorectal Cancer Screening  09/24/2020    Hepatitis C Screening  Completed         Topic Date Due    COVID-19 Vaccine (5 - 2023-24 season) 09/01/2023      Medicare Screening Tests and Risk Assessments:     Bryant is here for his Subsequent Wellness visit. Last Medicare Wellness visit information reviewed, patient interviewed and updates made to the record today.      Health Risk Assessment:   Patient rates overall health as fair. Patient feels that their physical health rating is same. Patient is dissatisfied with their life. Eyesight was rated as slightly worse. Hearing was rated as same. Patient feels that their emotional and mental health rating is same. Patients states they  are sometimes angry. Patient states they are always unusually tired/fatigued. Pain experienced in the last 7 days has been some. Patient's pain rating has been 9/10. Patient states that he has experienced no weight loss or gain in last 6 months. Reports chronic knee and back pain    Depression Screening:   PHQ-9 Score: 6      Fall Risk Screening:   In the past year, patient has experienced: no history of falling in past year      Home Safety:  Patient has trouble with stairs inside or outside of their home. Patient has working smoke alarms and has working carbon monoxide detector. Home safety hazards include: none.     Nutrition:   Current diet is Regular. Advised to follow a diabetic diet    Medications:   Patient is not currently taking any over-the-counter supplements. Patient is able to manage medications.     Activities of Daily Living (ADLs)/Instrumental Activities of Daily Living (IADLs):   Walk and transfer into and out of bed and chair?: Yes  Dress and groom yourself?: Yes    Bathe or shower yourself?: Yes    Feed yourself? Yes  Do your laundry/housekeeping?: Yes  Manage your money, pay your bills and track your expenses?: Yes  Make your own meals?: Yes    Do your own shopping?: Yes    Previous Hospitalizations:   Any hospitalizations or ED visits within the last 12 months?: No      Advance Care Planning:   Living will: No    Durable POA for healthcare: No      Cognitive Screening:   Provider or family/friend/caregiver concerned regarding cognition?: No    PREVENTIVE SCREENINGS      Cardiovascular Screening:    General: Screening Not Indicated and History Lipid Disorder      Diabetes Screening:     General: Screening Not Indicated and History Diabetes      Colorectal Cancer Screening:     General: Risks and Benefits Discussed    Due for: FOBT/FIT      Prostate Cancer Screening:    General: Screening Current      Osteoporosis Screening:    General: Screening Not Indicated      Abdominal Aortic Aneurysm (AAA)  Screening:    Risk factors include: age between 65-74 yo and tobacco use      Due for: Screening AAA Ultrasound      Lung Cancer Screening:     General: Screening Not Indicated      Hepatitis C Screening:    General: Screening Current    Screening, Brief Intervention, and Referral to Treatment (SBIRT)    Screening  Typical number of drinks in a day: 0  Typical number of drinks in a week: 0  Interpretation: Low risk drinking behavior.    AUDIT-C Screenin) How often did you have a drink containing alcohol in the past year? never  2) How many drinks did you have on a typical day when you were drinking in the past year? 0  3) How often did you have 6 or more drinks on one occasion in the past year? never    AUDIT-C Score: 0  Interpretation: Score 0-3 (male): Negative screen for alcohol misuse    Single Item Drug Screening:  How often have you used an illegal drug (including marijuana) or a prescription medication for non-medical reasons in the past year? never    Single Item Drug Screen Score: 0  Interpretation: Negative screen for possible drug use disorder    No results found.     Physical Exam:     BP (!) 185/92   Pulse 100   Temp 97.8 °F (36.6 °C) (Tympanic)   Ht 6' (1.829 m)   Wt 108 kg (239 lb)   SpO2 97%   BMI 32.41 kg/m²     Physical Exam  Vitals reviewed.   Constitutional:       Comments: This patient is a 68-year-old white male who appears his stated age.  Obese and in no apparent distress   HENT:      Head: Normocephalic and atraumatic.      Right Ear: Tympanic membrane, ear canal and external ear normal. There is no impacted cerumen.      Left Ear: Tympanic membrane, ear canal and external ear normal. There is no impacted cerumen.      Mouth/Throat:      Mouth: Mucous membranes are moist.      Pharynx: Oropharynx is clear. No oropharyngeal exudate or posterior oropharyngeal erythema.   Eyes:      General: No scleral icterus.        Right eye: No discharge.      Conjunctiva/sclera: Conjunctivae  normal.      Pupils: Pupils are equal, round, and reactive to light.   Neck:      Vascular: No carotid bruit.      Comments: No thyromegaly  Cardiovascular:      Rate and Rhythm: Normal rate and regular rhythm.      Pulses: no weak pulses.           Dorsalis pedis pulses are 2+ on the right side and 2+ on the left side.        Posterior tibial pulses are 2+ on the right side and 2+ on the left side.      Heart sounds: Normal heart sounds. No murmur heard.     No friction rub. No gallop.   Pulmonary:      Effort: Pulmonary effort is normal. No respiratory distress.      Breath sounds: Normal breath sounds. No stridor. No wheezing, rhonchi or rales.   Abdominal:      General: Bowel sounds are normal. There is no distension.      Palpations: Abdomen is soft. There is no mass.      Tenderness: There is no abdominal tenderness. There is no guarding.   Musculoskeletal:      Cervical back: Neck supple.      Right lower leg: No edema.      Left lower leg: No edema.   Feet:      Right foot:      Skin integrity: No ulcer, skin breakdown, erythema, warmth, callus or dry skin.      Left foot:      Skin integrity: No ulcer, skin breakdown, erythema, warmth, callus or dry skin.   Lymphadenopathy:      Cervical: No cervical adenopathy.   Psychiatric:      Comments: Patient has a very blunted affect        Patient's shoes and socks removed.    Right Foot/Ankle   Right Foot Inspection  Skin Exam: skin normal and skin intact. No dry skin, no warmth, no callus, no erythema, no maceration, no abnormal color, no pre-ulcer, no ulcer and no callus.     Toe Exam: No swelling, no tenderness, erythema and  no right toe deformity    Sensory   Vibration: diminished  Proprioception: intact  Monofilament testing: diminished    Vascular  Capillary refills: < 3 seconds  The right DP pulse is 2+. The right PT pulse is 2+.     Left Foot/Ankle  Left Foot Inspection  Skin Exam: skin normal and skin intact. No dry skin, no warmth, no erythema, no  maceration, normal color, no pre-ulcer, no ulcer and no callus.     Toe Exam: No swelling, no tenderness, no erythema and no left toe deformity.     Sensory   Vibration: absent  Proprioception: diminished  Monofilament testing: absent    Vascular  Capillary refills: < 3 seconds  The left DP pulse is 2+. The left PT pulse is 2+.     Assign Risk Category  No deformity present  Loss of protective sensation  No weak pulses  Risk: 1      Bryant Holloway DO

## 2024-04-01 NOTE — ASSESSMENT & PLAN NOTE
Patient has an elevated PSA.  In the past, I had referred the patient to Dr. Garcia but he never followed up.  His PSA is now up to 13.3.  I explained to the patient that elevated PSA can indicate prostate cancer.  It can go up for other reasons as well such as enlarged prostate or infection.  However, he absolutely needs to see urology.  I placed a referral for him to see Dr. Garcia.

## 2024-04-01 NOTE — PATIENT INSTRUCTIONS
Medicare Preventive Visit Patient Instructions  Thank you for completing your Welcome to Medicare Visit or Medicare Annual Wellness Visit today. Your next wellness visit will be due in one year (4/2/2025).  The screening/preventive services that you may require over the next 5-10 years are detailed below. Some tests may not apply to you based off risk factors and/or age. Screening tests ordered at today's visit but not completed yet may show as past due. Also, please note that scanned in results may not display below.  Preventive Screenings:  Service Recommendations Previous Testing/Comments   Colorectal Cancer Screening  Colonoscopy    Fecal Occult Blood Test (FOBT)/Fecal Immunochemical Test (FIT)  Fecal DNA/Cologuard Test  Flexible Sigmoidoscopy Age: 45-75 years old   Colonoscopy: every 10 years (May be performed more frequently if at higher risk)  OR  FOBT/FIT: every 1 year  OR  Cologuard: every 3 years  OR  Sigmoidoscopy: every 5 years  Screening may be recommended earlier than age 45 if at higher risk for colorectal cancer. Also, an individualized decision between you and your healthcare provider will decide whether screening between the ages of 76-85 would be appropriate. Colonoscopy: 09/24/2013  FOBT/FIT: Not on file  Cologuard: Not on file  Sigmoidoscopy: Not on file          Prostate Cancer Screening Individualized decision between patient and health care provider in men between ages of 55-69   Medicare will cover every 12 months beginning on the day after your 50th birthday PSA: 13.3 ng/mL     Screening Current     Hepatitis C Screening Once for adults born between 1945 and 1965  More frequently in patients at high risk for Hepatitis C Hep C Antibody: 10/15/2019    Screening Current   Diabetes Screening 1-2 times per year if you're at risk for diabetes or have pre-diabetes Fasting glucose: 370 mg/dL (3/28/2024)  A1C: 14.5 % (3/28/2024)  Screening Not Indicated  History Diabetes   Cholesterol Screening Once  every 5 years if you don't have a lipid disorder. May order more often based on risk factors. Lipid panel: 03/28/2024  Screening Not Indicated  History Lipid Disorder      Other Preventive Screenings Covered by Medicare:  Abdominal Aortic Aneurysm (AAA) Screening: covered once if your at risk. You're considered to be at risk if you have a family history of AAA or a male between the age of 65-75 who smoking at least 100 cigarettes in your lifetime.  Lung Cancer Screening: covers low dose CT scan once per year if you meet all of the following conditions: (1) Age 55-77; (2) No signs or symptoms of lung cancer; (3) Current smoker or have quit smoking within the last 15 years; (4) You have a tobacco smoking history of at least 20 pack years (packs per day x number of years you smoked); (5) You get a written order from a healthcare provider.  Glaucoma Screening: covered annually if you're considered high risk: (1) You have diabetes OR (2) Family history of glaucoma OR (3)  aged 50 and older OR (4)  American aged 65 and older  Osteoporosis Screening: covered every 2 years if you meet one of the following conditions: (1) Have a vertebral abnormality; (2) On glucocorticoid therapy for more than 3 months; (3) Have primary hyperparathyroidism; (4) On osteoporosis medications and need to assess response to drug therapy.  HIV Screening: covered annually if you're between the age of 15-65. Also covered annually if you are younger than 15 and older than 65 with risk factors for HIV infection. For pregnant patients, it is covered up to 3 times per pregnancy.    Immunizations:  Immunization Recommendations   Influenza Vaccine Annual influenza vaccination during flu season is recommended for all persons aged >= 6 months who do not have contraindications   Pneumococcal Vaccine   * Pneumococcal conjugate vaccine = PCV13 (Prevnar 13), PCV15 (Vaxneuvance), PCV20 (Prevnar 20)  * Pneumococcal polysaccharide vaccine  = PPSV23 (Pneumovax) Adults 19-63 yo with certain risk factors or if 65+ yo  If never received any pneumonia vaccine: recommend Prevnar 20 (PCV20)  Give PCV20 if previously received 1 dose of PCV13 or PPSV23   Hepatitis B Vaccine 3 dose series if at intermediate or high risk (ex: diabetes, end stage renal disease, liver disease)   Respiratory syncytial virus (RSV) Vaccine - COVERED BY MEDICARE PART D  * RSVPreF3 (Arexvy) CDC recommends that adults 60 years of age and older may receive a single dose of RSV vaccine using shared clinical decision-making (SCDM)   Tetanus (Td) Vaccine - COST NOT COVERED BY MEDICARE PART B Following completion of primary series, a booster dose should be given every 10 years to maintain immunity against tetanus. Td may also be given as tetanus wound prophylaxis.   Tdap Vaccine - COST NOT COVERED BY MEDICARE PART B Recommended at least once for all adults. For pregnant patients, recommended with each pregnancy.   Shingles Vaccine (Shingrix) - COST NOT COVERED BY MEDICARE PART B  2 shot series recommended in those 19 years and older who have or will have weakened immune systems or those 50 years and older     Health Maintenance Due:      Topic Date Due   • Colorectal Cancer Screening  09/24/2020   • Hepatitis C Screening  Completed     Immunizations Due:      Topic Date Due   • COVID-19 Vaccine (5 - 2023-24 season) 09/01/2023     Advance Directives   What are advance directives?  Advance directives are legal documents that state your wishes and plans for medical care. These plans are made ahead of time in case you lose your ability to make decisions for yourself. Advance directives can apply to any medical decision, such as the treatments you want, and if you want to donate organs.   What are the types of advance directives?  There are many types of advance directives, and each state has rules about how to use them. You may choose a combination of any of the following:  Living will:  This is  a written record of the treatment you want. You can also choose which treatments you do not want, which to limit, and which to stop at a certain time. This includes surgery, medicine, IV fluid, and tube feedings.   Durable power of  for healthcare (DPAHC):  This is a written record that states who you want to make healthcare choices for you when you are unable to make them for yourself. This person, called a proxy, is usually a family member or a friend. You may choose more than 1 proxy.  Do not resuscitate (DNR) order:  A DNR order is used in case your heart stops beating or you stop breathing. It is a request not to have certain forms of treatment, such as CPR. A DNR order may be included in other types of advance directives.  Medical directive:  This covers the care that you want if you are in a coma, near death, or unable to make decisions for yourself. You can list the treatments you want for each condition. Treatment may include pain medicine, surgery, blood transfusions, dialysis, IV or tube feedings, and a ventilator (breathing machine).  Values history:  This document has questions about your views, beliefs, and how you feel and think about life. This information can help others choose the care that you would choose.  Why are advance directives important?  An advance directive helps you control your care. Although spoken wishes may be used, it is better to have your wishes written down. Spoken wishes can be misunderstood, or not followed. Treatments may be given even if you do not want them. An advance directive may make it easier for your family to make difficult choices about your care.   Weight Management   Why it is important to manage your weight:  Being overweight increases your risk of health conditions such as heart disease, high blood pressure, type 2 diabetes, and certain types of cancer. It can also increase your risk for osteoarthritis, sleep apnea, and other respiratory problems. Aim  for a slow, steady weight loss. Even a small amount of weight loss can lower your risk of health problems.  How to lose weight safely:  A safe and healthy way to lose weight is to eat fewer calories and get regular exercise. You can lose up about 1 pound a week by decreasing the number of calories you eat by 500 calories each day.   Healthy meal plan for weight management:  A healthy meal plan includes a variety of foods, contains fewer calories, and helps you stay healthy. A healthy meal plan includes the following:  Eat whole-grain foods more often.  A healthy meal plan should contain fiber. Fiber is the part of grains, fruits, and vegetables that is not broken down by your body. Whole-grain foods are healthy and provide extra fiber in your diet. Some examples of whole-grain foods are whole-wheat breads and pastas, oatmeal, brown rice, and bulgur.  Eat a variety of vegetables every day.  Include dark, leafy greens such as spinach, kale, mello greens, and mustard greens. Eat yellow and orange vegetables such as carrots, sweet potatoes, and winter squash.   Eat a variety of fruits every day.  Choose fresh or canned fruit (canned in its own juice or light syrup) instead of juice. Fruit juice has very little or no fiber.  Eat low-fat dairy foods.  Drink fat-free (skim) milk or 1% milk. Eat fat-free yogurt and low-fat cottage cheese. Try low-fat cheeses such as mozzarella and other reduced-fat cheeses.  Choose meat and other protein foods that are low in fat.  Choose beans or other legumes such as split peas or lentils. Choose fish, skinless poultry (chicken or turkey), or lean cuts of red meat (beef or pork). Before you cook meat or poultry, cut off any visible fat.   Use less fat and oil.  Try baking foods instead of frying them. Add less fat, such as margarine, sour cream, regular salad dressing and mayonnaise to foods. Eat fewer high-fat foods. Some examples of high-fat foods include french fries, doughnuts, ice  cream, and cakes.  Eat fewer sweets.  Limit foods and drinks that are high in sugar. This includes candy, cookies, regular soda, and sweetened drinks.  Exercise:  Exercise at least 30 minutes per day on most days of the week. Some examples of exercise include walking, biking, dancing, and swimming. You can also fit in more physical activity by taking the stairs instead of the elevator or parking farther away from stores. Ask your healthcare provider about the best exercise plan for you.      © Copyright [employer] 2018 Information is for End User's use only and may not be sold, redistributed or otherwise used for commercial purposes. All illustrations and images included in CareNotes® are the copyrighted property of [employer]. or [employer]    Foot Care for People with Diabetes   AMBULATORY CARE:   What you need to know about foot care:  Long-term high blood sugar levels can damage the blood vessels and nerves in your legs and feet. This damage makes it hard to feel pressure, pain, temperature, and touch. You may not be able to feel a cut or sore, or shoes that are too tight. Foot care is needed to prevent serious problems, such as an infection or amputation. Diabetes may cause your toes to become crooked or curved under. These changes may affect the way you walk and can lead to increased pressure on your foot. The pressure can decrease blood flow to your feet. Lack of blood flow increases your risk for a foot ulcer.  Call your care team provider if:   Your feet become numb, weak, or hard to move.    You have pus draining from a sore on your foot.    You have a wound on your foot that gets bigger, deeper, or does not heal.    You see blisters, cuts, scratches, calluses, or sores on your foot.    You have a fever, and your feet become red, warm, and swollen.    Your toenails become thick, curled, or yellow.    You find it hard to check your feet because your vision is poor.    You have questions or  concerns about your condition or care.    How to care for your feet:   Check your feet each day.  Look at your whole foot, including the bottom, and between and under your toes. Check for wounds, corns, and calluses. Use a mirror to see the bottom of your feet. The skin on your feet may be shiny, tight, or darker than normal. Your feet may also be cold and pale. Feel your feet by running your hands along the tops, bottoms, sides, and between your toes. Redness, swelling, and warmth are signs of blood flow problems that can lead to a foot ulcer. Do not try to remove corns or calluses yourself. Do not ignore small problems, such as dry skin or small wounds. These can become life-threatening over time without proper care.         Wash your feet each day with soap and warm water.  Do not use hot water, because this can injure your foot. Dry your feet gently with a towel after you wash them. Dry between and under your toes.    Apply lotion or a moisturizer on your dry feet.  Ask your care team provider what lotions are best to use. Do not put lotion or moisturizer between your toes. Moisture between your toes could lead to skin breakdown.    Cut your toenails correctly.  File or cut your toenails straight across. Use a soft brush to clean around your toenails. If your toenails are very thick, you may need to have a care team provider or specialist cut them.     Protect your feet.  Do not walk barefoot or wear your shoes without socks. Check your shoes for rocks or other objects that can hurt your feet. Wear cotton socks to help keep your feet dry. Wear socks without toe seams, or wear them with the seams inside out. Change your socks each day. Do not wear socks that are dirty or damp.    Wear shoes that fit well.  Wear shoes that do not rub against any area of your feet. Your shoes should be ½ to ¾ inch (1 to 2 centimeters) longer than your feet. Your shoes should also have extra space around the widest part of your  feet. Walking or athletic shoes with laces or straps that adjust are best. Ask your care team provider for help to choose shoes that fit you best. Ask your provider if you need to wear an insert, orthotic, or bandage on your feet.         Go to your follow-up visits.  Your care team provider will do a foot exam at least 1 time each year. You may need a foot exam more often if you have nerve damage, foot deformities, or ulcers. Your provider will check for nerve damage and how well you can feel your feet. Your provider will check your shoes to see if they fit well.    Do not smoke.  Smoking can damage your blood vessels and put you at increased risk for foot ulcers. Ask your care team provider for information if you currently smoke and need help to quit. E-cigarettes or smokeless tobacco still contain nicotine. Talk to your care team provider before you use these products.    Follow up with your diabetes care team provider or foot specialist as directed:  You will need to have your feet checked at least 1 time each year. You may need a foot exam more often if you have nerve damage, foot deformities, or ulcers. Write down your questions so you remember to ask them during your visits.  © Copyright [employer] 2023 Information is for End User's use only and may not be sold, redistributed or otherwise used for commercial purposes.  The above information is an  only. It is not intended as medical advice for individual conditions or treatments. Talk to your doctor, nurse or pharmacist before following any medical regimen to see if it is safe and effective for you.

## 2024-04-01 NOTE — ASSESSMENT & PLAN NOTE
Lab Results   Component Value Date    HGBA1C 14.5 (H) 03/28/2024   Patient has poorly controlled diabetes mellitus secondary to noncompliance with with follow-up and medications.  I reviewed his labs.  His fasting blood glucose was 370.  The patient is symptomatic.  He is having polyuria and polydipsia.  He also notes blurred vision.  Urine albumin to creatinine ratio was 1275.  His hemoglobin A1c is 14.5%.  I explained to the patient that he was on a lot of medication in the past.  I cannot start him back on what he was taking in the past.  He also tells me he refuses to take metformin because he read bad things about it online.  I will these things are untrue, I do not think I would be able to convince the patient otherwise I did not even try.  I started the patient on glipizide 5 mg once a day.  Will titrate up the dose.  I did tell the patient that there is a good chance I may need to start him on insulin.  I asked him to have a repeat BMP in 2 weeks.  I will call him with the results and make any adjustments with his medicine that are necessary.  I am scheduling the patient a follow-up visit in a month.

## 2024-04-01 NOTE — ASSESSMENT & PLAN NOTE
Patient has coronary artery disease.  Fortunately, he remains asymptomatic despite his lack of compliance.

## 2024-04-01 NOTE — ASSESSMENT & PLAN NOTE
Patient has severe bone-on-bone osteoarthritis of both knees.  Unfortunately, I told him that in order to be cleared for his surgery he would have to have hemoglobin A1c less than 8.0.  He is going to take quite some time to get this down again.

## 2024-04-01 NOTE — ASSESSMENT & PLAN NOTE
Patient also has uncontrolled hypertension secondary to noncompliance.  In the past, he had taken amlodipine, lisinopril, and metoprolol.  That is a lot of medicine to start him on again.  We will have to titrate up his medicines slowly.  I started him on Lotrel 5/10 daily.  I will reassess his blood pressure and make necessary changes when he returns.

## 2024-04-01 NOTE — ASSESSMENT & PLAN NOTE
Patient has severe depression.  I discussed referral to psychiatry but he declined.  In the past, patient had taken sertraline 100 mg/day.  He still had depression issues even with sertraline but he was better than he is now.  I think he needs something more stimulating.  I started the patient on Wellbutrin  mg once a day in the morning.  I will titrate up that dose when I see him back, if necessary.

## 2024-04-01 NOTE — ASSESSMENT & PLAN NOTE
Patient has hyperlipidemia.  I reviewed his lipid panel, which was also atrocious.  His total cholesterol was 343.  Triglycerides were 632.  Normal is less than 150.  His direct LDL cholesterol is 168.  His goal is at least less than 60.  I started the patient on intensive lipid-lowering therapy with rosuvastatin 40 mg daily.

## 2024-04-01 NOTE — ASSESSMENT & PLAN NOTE
Hemoglobin and hematocrit were normal but MCV was elevated at 100.  I will need to follow this.  Patient denies any alcohol use at all.

## 2024-04-02 DIAGNOSIS — E11.65 POORLY CONTROLLED TYPE 2 DIABETES MELLITUS WITH PERIPHERAL NEUROPATHY (HCC): ICD-10-CM

## 2024-04-02 DIAGNOSIS — E11.42 POORLY CONTROLLED TYPE 2 DIABETES MELLITUS WITH PERIPHERAL NEUROPATHY (HCC): ICD-10-CM

## 2024-04-02 DIAGNOSIS — E78.2 MIXED HYPERLIPIDEMIA: ICD-10-CM

## 2024-04-02 RX ORDER — ROSUVASTATIN CALCIUM 40 MG/1
40 TABLET, COATED ORAL DAILY
Qty: 90 TABLET | Refills: 5 | OUTPATIENT
Start: 2024-04-02

## 2024-04-02 RX ORDER — GLIPIZIDE 5 MG/1
5 TABLET ORAL
Qty: 90 TABLET | Refills: 1 | OUTPATIENT
Start: 2024-04-02

## 2024-04-03 ENCOUNTER — APPOINTMENT (OUTPATIENT)
Dept: LAB | Facility: HOSPITAL | Age: 69
End: 2024-04-03
Payer: MEDICARE

## 2024-04-03 DIAGNOSIS — Z12.11 COLON CANCER SCREENING: ICD-10-CM

## 2024-04-03 LAB — HEMOCCULT STL QL IA: POSITIVE

## 2024-04-03 PROCEDURE — G0328 FECAL BLOOD SCRN IMMUNOASSAY: HCPCS

## 2024-04-11 ENCOUNTER — TELEPHONE (OUTPATIENT)
Dept: FAMILY MEDICINE CLINIC | Facility: CLINIC | Age: 69
End: 2024-04-11

## 2024-04-11 DIAGNOSIS — R19.5 POSITIVE COLORECTAL CANCER SCREENING USING COLOGUARD TEST: Primary | ICD-10-CM

## 2024-04-11 NOTE — PROGRESS NOTES
I spoke to the patient today regarding his positive Cologuard.  I explained this is a test for colon cancer.  I told him that there are false positive test and it does not necessarily mean he has colon cancer.  However, he needs to be evaluated further.  I am going to recommend a colonoscopy to further evaluate the positive Cologuard.  Patient last had a colonoscopy in 2013 by Dr. Tripp Valadez who has since left the area.  Patient was agreeable to being evaluated at [facility].  I placed a referral to see Dr. Black.

## 2024-04-11 NOTE — TELEPHONE ENCOUNTER
I called 's office to schedule an appointment.The office requested the last 2 PSA and ov note.All was faxed to [phone].The office will schedule the appointment.Patient's both phone numbers were provided.

## 2024-04-13 ENCOUNTER — APPOINTMENT (OUTPATIENT)
Dept: LAB | Facility: HOSPITAL | Age: 69
End: 2024-04-13
Payer: MEDICARE

## 2024-04-13 DIAGNOSIS — E11.65 POORLY CONTROLLED TYPE 2 DIABETES MELLITUS WITH PERIPHERAL NEUROPATHY (HCC): ICD-10-CM

## 2024-04-13 DIAGNOSIS — E11.42 POORLY CONTROLLED TYPE 2 DIABETES MELLITUS WITH PERIPHERAL NEUROPATHY (HCC): ICD-10-CM

## 2024-04-13 LAB
ANION GAP SERPL CALCULATED.3IONS-SCNC: 12 MMOL/L (ref 4–13)
BUN SERPL-MCNC: 20 MG/DL (ref 5–25)
CALCIUM SERPL-MCNC: 9.6 MG/DL (ref 8.4–10.2)
CHLORIDE SERPL-SCNC: 101 MMOL/L (ref 96–108)
CO2 SERPL-SCNC: 25 MMOL/L (ref 21–32)
CREAT SERPL-MCNC: 1.44 MG/DL (ref 0.6–1.3)
GFR SERPL CREATININE-BSD FRML MDRD: 49 ML/MIN/1.73SQ M
GLUCOSE P FAST SERPL-MCNC: 181 MG/DL (ref 65–99)
POTASSIUM SERPL-SCNC: 4.1 MMOL/L (ref 3.5–5.3)
SODIUM SERPL-SCNC: 138 MMOL/L (ref 135–147)

## 2024-04-13 PROCEDURE — 80048 BASIC METABOLIC PNL TOTAL CA: CPT

## 2024-04-13 PROCEDURE — 36415 COLL VENOUS BLD VENIPUNCTURE: CPT

## 2024-04-15 DIAGNOSIS — E11.42 POORLY CONTROLLED TYPE 2 DIABETES MELLITUS WITH PERIPHERAL NEUROPATHY (HCC): Primary | ICD-10-CM

## 2024-04-15 DIAGNOSIS — E11.65 POORLY CONTROLLED TYPE 2 DIABETES MELLITUS WITH PERIPHERAL NEUROPATHY (HCC): Primary | ICD-10-CM

## 2024-04-24 ENCOUNTER — RA CDI HCC (OUTPATIENT)
Dept: OTHER | Facility: HOSPITAL | Age: 69
End: 2024-04-24

## 2024-05-01 PROBLEM — Z12.11 COLON CANCER SCREENING: Status: RESOLVED | Noted: 2020-11-23 | Resolved: 2024-05-01

## 2024-05-20 DIAGNOSIS — F33.2 SEVERE RECURRENT MAJOR DEPRESSION WITHOUT PSYCHOTIC FEATURES (HCC): ICD-10-CM

## 2024-05-20 DIAGNOSIS — E11.65 POORLY CONTROLLED TYPE 2 DIABETES MELLITUS WITH PERIPHERAL NEUROPATHY (HCC): ICD-10-CM

## 2024-05-20 DIAGNOSIS — I10 ESSENTIAL HYPERTENSION, BENIGN: ICD-10-CM

## 2024-05-20 DIAGNOSIS — E11.42 POORLY CONTROLLED TYPE 2 DIABETES MELLITUS WITH PERIPHERAL NEUROPATHY (HCC): ICD-10-CM

## 2024-05-21 RX ORDER — AMLODIPINE BESYLATE AND BENAZEPRIL HYDROCHLORIDE 5; 10 MG/1; MG/1
1 CAPSULE ORAL DAILY
Qty: 30 CAPSULE | Refills: 5 | Status: SHIPPED | OUTPATIENT
Start: 2024-05-21

## 2024-05-21 RX ORDER — GLIPIZIDE 5 MG/1
5 TABLET ORAL
Qty: 30 TABLET | Refills: 5 | Status: SHIPPED | OUTPATIENT
Start: 2024-05-21

## 2024-05-21 RX ORDER — BUPROPION HYDROCHLORIDE 150 MG/1
150 TABLET ORAL EVERY MORNING
Qty: 30 TABLET | Refills: 5 | Status: SHIPPED | OUTPATIENT
Start: 2024-05-21

## 2024-05-22 ENCOUNTER — OFFICE VISIT (OUTPATIENT)
Dept: FAMILY MEDICINE CLINIC | Facility: CLINIC | Age: 69
End: 2024-05-22
Payer: MEDICARE

## 2024-05-22 VITALS
WEIGHT: 255.2 LBS | DIASTOLIC BLOOD PRESSURE: 74 MMHG | SYSTOLIC BLOOD PRESSURE: 164 MMHG | HEART RATE: 88 BPM | TEMPERATURE: 97.1 F | BODY MASS INDEX: 34.57 KG/M2 | OXYGEN SATURATION: 97 % | HEIGHT: 72 IN

## 2024-05-22 DIAGNOSIS — R01.1 HEART MURMUR: ICD-10-CM

## 2024-05-22 DIAGNOSIS — E11.42 POORLY CONTROLLED TYPE 2 DIABETES MELLITUS WITH PERIPHERAL NEUROPATHY (HCC): Primary | ICD-10-CM

## 2024-05-22 DIAGNOSIS — F33.2 SEVERE RECURRENT MAJOR DEPRESSION WITHOUT PSYCHOTIC FEATURES (HCC): ICD-10-CM

## 2024-05-22 DIAGNOSIS — I25.10 ATHEROSCLEROSIS OF NATIVE CORONARY ARTERY OF NATIVE HEART WITHOUT ANGINA PECTORIS: ICD-10-CM

## 2024-05-22 DIAGNOSIS — E11.65 POORLY CONTROLLED TYPE 2 DIABETES MELLITUS WITH PERIPHERAL NEUROPATHY (HCC): Primary | ICD-10-CM

## 2024-05-22 DIAGNOSIS — I10 ESSENTIAL HYPERTENSION, BENIGN: ICD-10-CM

## 2024-05-22 PROCEDURE — 99214 OFFICE O/P EST MOD 30 MIN: CPT | Performed by: FAMILY MEDICINE

## 2024-05-22 PROCEDURE — G2211 COMPLEX E/M VISIT ADD ON: HCPCS | Performed by: FAMILY MEDICINE

## 2024-05-22 RX ORDER — METOPROLOL SUCCINATE 50 MG/1
50 TABLET, EXTENDED RELEASE ORAL DAILY
Qty: 30 TABLET | Refills: 5 | Status: SHIPPED | OUTPATIENT
Start: 2024-05-22 | End: 2024-05-24

## 2024-05-22 NOTE — PROGRESS NOTES
Ambulatory Visit  Name: Bryant Bustamante Jr.      : 1955      MRN: [id number]  Encounter Provider: Bryant Holloway DO  Encounter Date: 2024   Encounter department: [facility]    Assessment & Plan   1. Poorly controlled type 2 diabetes mellitus with peripheral neuropathy (HCC)  Assessment & Plan:    Lab Results   Component Value Date    HGBA1C 14.5 (H) 2024   I reviewed the patient's Accu-Cheks which are much better controlled.  He will continue glipizide 5 mg daily with breakfast.  I increased his metformin to 1000 mg twice daily.  Orders:  -     metFORMIN (GLUCOPHAGE) 1000 MG tablet; Take 1 tablet (1,000 mg total) by mouth 2 (two) times a day with meals  -     Hemoglobin A1C; Future; Expected date: 2024  -     Basic metabolic panel; Future; Expected date: 2024  2. Heart murmur  Assessment & Plan:  Surgery is being contemplated for his knees.  Hemoglobin A1c needs to come down under 8% before he can have the surgery.  New heart murmur detected so echocardiogram was ordered.  Orders:  -     Echo complete w/ contrast if indicated; Future; Expected date: 2024  3. Essential hypertension, benign  Assessment & Plan:  Blood pressure is still less than optimally controlled although improving.  I rechecked his blood pressure myself today and found his blood pressure to be 148/76.  I started the patient on metoprolol succinate ER 50 mg once a day.  Continue Lotrel 5/10 daily.  May need to consider increasing that dose as well.  Asked the patient to follow a low-sodium diet.  Follow-up visit is scheduled for 3 months.  Orders:  -     metoprolol succinate (Toprol XL) 50 mg 24 hr tablet; Take 1 tablet (50 mg total) by mouth daily  4. Severe recurrent major depression without psychotic features (HCC)  Assessment & Plan:  Depression seems much improved on bupropion  mg daily in the morning.  Patient does have persistent sleep disturbance and I will monitor him for right  now.  5. Atherosclerosis of native coronary artery of native heart without angina pectoris  Assessment & Plan:  Patient has coronary artery disease which is currently asymptomatic.  Continue aspirin and statin therapy.  As noted, metoprolol added to his regiment which should also help for his blood pressure control.       History of Present Illness     This is a 69-year-old white male who presents to the office today for his routine checkup.  The patient complains of knee pain.  He laments that he should have gotten his knee surgery 4 to 5 years ago.  He tells me he does not know why he Procrastinating.  He has been having difficulty sleeping for quite a long time now.  He tells me when he lays down to sleep at night he will feel his heart beating.  He tells me it does not race or skip beats.  He can hear it beating though.  He has not been experiencing any chest pain.  He does not have any shortness of breath.        Review of Systems   Respiratory:  Negative for cough, shortness of breath and wheezing.    Cardiovascular:  Negative for chest pain, palpitations and leg swelling.   Gastrointestinal:  Negative for abdominal distention, abdominal pain, blood in stool, constipation, diarrhea and nausea.   Musculoskeletal:  Positive for arthralgias, back pain and gait problem.   Psychiatric/Behavioral:  Positive for sleep disturbance. Negative for dysphoric mood.        Objective     /74   Pulse 88   Temp (!) 97.1 °F (36.2 °C) (Temporal)   Ht 6' (1.829 m)   Wt 116 kg (255 lb 3.2 oz)   SpO2 97%   BMI 34.61 kg/m²     Physical Exam  Vitals reviewed.   Constitutional:       Comments: This is a 69-year-old white male who appears his stated age.  He was pleasant and cooperative.  He was in no apparent distress.   HENT:      Head: Normocephalic and atraumatic.      Right Ear: Tympanic membrane, ear canal and external ear normal. There is no impacted cerumen.      Left Ear: Tympanic membrane, ear canal and external  ear normal. There is no impacted cerumen.      Mouth/Throat:      Mouth: Mucous membranes are moist.      Pharynx: Oropharynx is clear. No oropharyngeal exudate or posterior oropharyngeal erythema.   Eyes:      General: No scleral icterus.        Right eye: No discharge.         Left eye: No discharge.      Conjunctiva/sclera: Conjunctivae normal.      Pupils: Pupils are equal, round, and reactive to light.   Neck:      Comments: There is no thyromegaly  Cardiovascular:      Rate and Rhythm: Normal rate and regular rhythm.      Heart sounds: Murmur (Grade 2/6 systolic ejection murmur noted) heard.      No friction rub. No gallop.   Pulmonary:      Effort: Pulmonary effort is normal. No respiratory distress.      Breath sounds: Normal breath sounds. No stridor. No wheezing, rhonchi or rales.   Abdominal:      General: Bowel sounds are normal. There is no distension.      Palpations: Abdomen is soft. There is no mass.      Tenderness: There is no abdominal tenderness. There is no guarding.      Comments: No organomegaly   Musculoskeletal:      Cervical back: Neck supple.   Lymphadenopathy:      Cervical: No cervical adenopathy.   Psychiatric:         Mood and Affect: Mood normal.         Behavior: Behavior normal.         Thought Content: Thought content normal.         Judgment: Judgment normal.     Extremities: Without cyanosis, clubbing, or edema    Administrative Statements

## 2024-05-23 NOTE — ASSESSMENT & PLAN NOTE
Surgery is being contemplated for his knees.  Hemoglobin A1c needs to come down under 8% before he can have the surgery.  New heart murmur detected so echocardiogram was ordered.

## 2024-05-23 NOTE — ASSESSMENT & PLAN NOTE
Depression seems much improved on bupropion  mg daily in the morning.  Patient does have persistent sleep disturbance and I will monitor him for right now.

## 2024-05-23 NOTE — ASSESSMENT & PLAN NOTE
Blood pressure is still less than optimally controlled although improving.  I rechecked his blood pressure myself today and found his blood pressure to be 148/76.  I started the patient on metoprolol succinate ER 50 mg once a day.  Continue Lotrel 5/10 daily.  May need to consider increasing that dose as well.  Asked the patient to follow a low-sodium diet.  Follow-up visit is scheduled for 3 months.

## 2024-05-23 NOTE — ASSESSMENT & PLAN NOTE
Patient has coronary artery disease which is currently asymptomatic.  Continue aspirin and statin therapy.  As noted, metoprolol added to his regiment which should also help for his blood pressure control.

## 2024-05-23 NOTE — ASSESSMENT & PLAN NOTE
Lab Results   Component Value Date    HGBA1C 14.5 (H) 03/28/2024   I reviewed the patient's Accu-Cheks which are much better controlled.  He will continue glipizide 5 mg daily with breakfast.  I increased his metformin to 1000 mg twice daily.

## 2024-05-24 DIAGNOSIS — I10 ESSENTIAL HYPERTENSION, BENIGN: ICD-10-CM

## 2024-05-24 DIAGNOSIS — N52.9 IMPOTENCE OF ORGANIC ORIGIN: Primary | ICD-10-CM

## 2024-05-24 RX ORDER — SILDENAFIL 100 MG/1
100 TABLET, FILM COATED ORAL DAILY PRN
Qty: 5 TABLET | Refills: 5 | Status: SHIPPED | OUTPATIENT
Start: 2024-05-24

## 2024-05-24 RX ORDER — METOPROLOL SUCCINATE 50 MG/1
50 TABLET, EXTENDED RELEASE ORAL DAILY
Qty: 90 TABLET | Refills: 1 | Status: SHIPPED | OUTPATIENT
Start: 2024-05-24

## 2024-06-03 ENCOUNTER — TELEPHONE (OUTPATIENT)
Age: 69
End: 2024-06-03

## 2024-06-03 DIAGNOSIS — E11.42 POORLY CONTROLLED TYPE 2 DIABETES MELLITUS WITH PERIPHERAL NEUROPATHY (HCC): ICD-10-CM

## 2024-06-03 DIAGNOSIS — I10 ESSENTIAL HYPERTENSION, BENIGN: ICD-10-CM

## 2024-06-03 DIAGNOSIS — E78.2 MIXED HYPERLIPIDEMIA: ICD-10-CM

## 2024-06-03 DIAGNOSIS — F33.2 SEVERE RECURRENT MAJOR DEPRESSION WITHOUT PSYCHOTIC FEATURES (HCC): ICD-10-CM

## 2024-06-03 DIAGNOSIS — E11.65 POORLY CONTROLLED TYPE 2 DIABETES MELLITUS WITH PERIPHERAL NEUROPATHY (HCC): ICD-10-CM

## 2024-06-03 RX ORDER — ROSUVASTATIN CALCIUM 40 MG/1
40 TABLET, COATED ORAL DAILY
Qty: 30 TABLET | Refills: 5 | Status: SHIPPED | OUTPATIENT
Start: 2024-06-03

## 2024-06-03 RX ORDER — GLIPIZIDE 5 MG/1
5 TABLET ORAL
Qty: 30 TABLET | Refills: 0 | OUTPATIENT
Start: 2024-06-03

## 2024-06-03 RX ORDER — AMLODIPINE BESYLATE AND BENAZEPRIL HYDROCHLORIDE 5; 10 MG/1; MG/1
1 CAPSULE ORAL DAILY
Qty: 30 CAPSULE | Refills: 0 | OUTPATIENT
Start: 2024-06-03

## 2024-06-03 RX ORDER — BUPROPION HYDROCHLORIDE 150 MG/1
150 TABLET ORAL EVERY MORNING
Qty: 30 TABLET | Refills: 0 | OUTPATIENT
Start: 2024-06-03

## 2024-06-03 NOTE — TELEPHONE ENCOUNTER
E-Prescribing Status: Receipt confirmed by pharmacy (5/21/2024 10:27 AM EDT)     Sent my chart message.

## 2024-06-03 NOTE — TELEPHONE ENCOUNTER
Patient called about refills and advised :    The four refills you requested today: 05/21/24 we sent 6 month supplies to [facility] for you - showing filled. Please contact the pharmacy to - will send in Rosuvastatin today.     E-Prescribing Status: Receipt confirmed by pharmacy (5/21/2024 10:27 AM EDT)

## 2024-07-19 ENCOUNTER — APPOINTMENT (OUTPATIENT)
Dept: LAB | Facility: HOSPITAL | Age: 69
End: 2024-07-19
Payer: MEDICARE

## 2024-07-19 DIAGNOSIS — E11.42 POORLY CONTROLLED TYPE 2 DIABETES MELLITUS WITH PERIPHERAL NEUROPATHY (HCC): ICD-10-CM

## 2024-07-19 DIAGNOSIS — E11.65 POORLY CONTROLLED TYPE 2 DIABETES MELLITUS WITH PERIPHERAL NEUROPATHY (HCC): ICD-10-CM

## 2024-07-19 LAB
ANION GAP SERPL CALCULATED.3IONS-SCNC: 13 MMOL/L (ref 4–13)
BUN SERPL-MCNC: 30 MG/DL (ref 5–25)
CALCIUM SERPL-MCNC: 9.8 MG/DL (ref 8.4–10.2)
CHLORIDE SERPL-SCNC: 105 MMOL/L (ref 96–108)
CO2 SERPL-SCNC: 24 MMOL/L (ref 21–32)
CREAT SERPL-MCNC: 1.56 MG/DL (ref 0.6–1.3)
EST. AVERAGE GLUCOSE BLD GHB EST-MCNC: 189 MG/DL
GFR SERPL CREATININE-BSD FRML MDRD: 44 ML/MIN/1.73SQ M
GLUCOSE P FAST SERPL-MCNC: 144 MG/DL (ref 65–99)
HBA1C MFR BLD: 8.2 %
POTASSIUM SERPL-SCNC: 4.2 MMOL/L (ref 3.5–5.3)
SODIUM SERPL-SCNC: 142 MMOL/L (ref 135–147)

## 2024-07-19 PROCEDURE — 83036 HEMOGLOBIN GLYCOSYLATED A1C: CPT

## 2024-07-19 PROCEDURE — 36415 COLL VENOUS BLD VENIPUNCTURE: CPT

## 2024-07-19 PROCEDURE — 80048 BASIC METABOLIC PNL TOTAL CA: CPT

## 2024-07-23 ENCOUNTER — OFFICE VISIT (OUTPATIENT)
Dept: FAMILY MEDICINE CLINIC | Facility: CLINIC | Age: 69
End: 2024-07-23
Payer: MEDICARE

## 2024-07-23 VITALS
HEART RATE: 79 BPM | DIASTOLIC BLOOD PRESSURE: 80 MMHG | WEIGHT: 250.1 LBS | OXYGEN SATURATION: 96 % | SYSTOLIC BLOOD PRESSURE: 130 MMHG | HEIGHT: 72 IN | BODY MASS INDEX: 33.87 KG/M2 | TEMPERATURE: 96.7 F

## 2024-07-23 DIAGNOSIS — Z91.030 BEE STING ALLERGY: ICD-10-CM

## 2024-07-23 DIAGNOSIS — G89.29 CHRONIC BILATERAL LOW BACK PAIN WITHOUT SCIATICA: ICD-10-CM

## 2024-07-23 DIAGNOSIS — R01.1 HEART MURMUR: ICD-10-CM

## 2024-07-23 DIAGNOSIS — E78.2 MIXED HYPERLIPIDEMIA: ICD-10-CM

## 2024-07-23 DIAGNOSIS — I25.10 ATHEROSCLEROSIS OF NATIVE CORONARY ARTERY OF NATIVE HEART WITHOUT ANGINA PECTORIS: ICD-10-CM

## 2024-07-23 DIAGNOSIS — E11.65 POORLY CONTROLLED TYPE 2 DIABETES MELLITUS WITH PERIPHERAL NEUROPATHY (HCC): Primary | ICD-10-CM

## 2024-07-23 DIAGNOSIS — E11.42 POORLY CONTROLLED TYPE 2 DIABETES MELLITUS WITH PERIPHERAL NEUROPATHY (HCC): Primary | ICD-10-CM

## 2024-07-23 DIAGNOSIS — M54.50 CHRONIC BILATERAL LOW BACK PAIN WITHOUT SCIATICA: ICD-10-CM

## 2024-07-23 DIAGNOSIS — I10 ESSENTIAL HYPERTENSION, BENIGN: ICD-10-CM

## 2024-07-23 DIAGNOSIS — N18.32 STAGE 3B CHRONIC KIDNEY DISEASE (HCC): ICD-10-CM

## 2024-07-23 DIAGNOSIS — R97.20 ELEVATED PSA: ICD-10-CM

## 2024-07-23 DIAGNOSIS — E11.42 DIABETIC POLYNEUROPATHY ASSOCIATED WITH TYPE 2 DIABETES MELLITUS (HCC): ICD-10-CM

## 2024-07-23 DIAGNOSIS — E11.9 TYPE 2 DIABETES MELLITUS WITHOUT COMPLICATION, WITHOUT LONG-TERM CURRENT USE OF INSULIN (HCC): ICD-10-CM

## 2024-07-23 PROBLEM — N18.31 STAGE 3A CHRONIC KIDNEY DISEASE (HCC): Status: RESOLVED | Noted: 2021-12-01 | Resolved: 2024-07-23

## 2024-07-23 PROCEDURE — G2211 COMPLEX E/M VISIT ADD ON: HCPCS | Performed by: FAMILY MEDICINE

## 2024-07-23 PROCEDURE — 99214 OFFICE O/P EST MOD 30 MIN: CPT | Performed by: FAMILY MEDICINE

## 2024-07-23 RX ORDER — METHOCARBAMOL 500 MG/1
500 TABLET, FILM COATED ORAL 3 TIMES DAILY
Qty: 90 TABLET | Refills: 2 | Status: SHIPPED | OUTPATIENT
Start: 2024-07-23

## 2024-07-23 RX ORDER — BLOOD SUGAR DIAGNOSTIC
STRIP MISCELLANEOUS
Qty: 100 STRIP | Refills: 5 | Status: SHIPPED | OUTPATIENT
Start: 2024-07-23

## 2024-07-23 RX ORDER — LANCETS 28 GAUGE
EACH MISCELLANEOUS
Qty: 100 EACH | Refills: 5 | Status: SHIPPED | OUTPATIENT
Start: 2024-07-23

## 2024-07-23 RX ORDER — NITROGLYCERIN 0.4 MG/1
0.4 TABLET SUBLINGUAL
Qty: 25 TABLET | Refills: 0 | Status: SHIPPED | OUTPATIENT
Start: 2024-07-23

## 2024-07-23 RX ORDER — EPINEPHRINE 0.3 MG/.3ML
INJECTION SUBCUTANEOUS
Qty: 2 EACH | Refills: 0 | Status: SHIPPED | OUTPATIENT
Start: 2024-07-23

## 2024-07-23 RX ORDER — GLIPIZIDE 5 MG/1
5 TABLET ORAL
Qty: 60 TABLET | Refills: 5 | Status: SHIPPED | OUTPATIENT
Start: 2024-07-23 | End: 2024-07-25

## 2024-07-23 NOTE — PROGRESS NOTES
Ambulatory Visit  Name: Bryant Bustamante Jr.      : 1955      MRN: [id number]  Encounter Provider: Bryant Holloway DO  Encounter Date: 2024   Encounter department: [facility]    Assessment & Plan   1. Poorly controlled type 2 diabetes mellitus with peripheral neuropathy (HCC)  Assessment & Plan:    Lab Results   Component Value Date    HGBA1C 8.2 (H) 2024   Patient has poorly controlled diabetes.  Glycemic control is improving, now that he is back on medication.  Continue metformin 1000 mg twice daily.  Increase glipizide to 5 mg twice daily.  Cost is a consideration for this patient.  I reviewed his most recent labs.  His fasting blood glucose was 144.  Hemoglobin A1c is 8.2.  He was previously 14.5.  I ordered repeat hemoglobin A1c and fasting blood sugar for 3 months.  Orders:  -     glipiZIDE (GLUCOTROL) 5 mg tablet; Take 1 tablet (5 mg total) by mouth 2 (two) times a day before meals  2. Bee sting allergy  Assessment & Plan:  As per the patient request, I refilled his prescription for his EpiPen.  Orders:  -     EPINEPHrine (EPIPEN) 0.3 mg/0.3 mL SOAJ; inject 0.3 milliliters ( 0.3 milligrams ) intramuscularly in OUTER THIGH and HOLD for 3 SECONDS use if needed for SEVERE ALLERGIC REACTION May repeat one time after 10 MINUTES if needed for maximum daily dose of 2 INJECTIONS  3. Diabetic polyneuropathy associated with type 2 diabetes mellitus (HCC)  -     Lancets (freestyle) lancets; Use to check sugars twice a day  -     glucose blood (FREESTYLE TEST STRIPS) test strip; Use to check blood sugars twice a day  4. Type 2 diabetes mellitus without complication, without long-term current use of insulin (HCC)  Assessment & Plan:    Lab Results   Component Value Date    HGBA1C 8.2 (H) 2024   Patient has poorly controlled diabetes.  Glycemic control is improving, now that he is back on medication.  Continue metformin 1000 mg twice daily.  Increase glipizide to 5 mg twice daily.   Cost is a consideration for this patient.  I reviewed his most recent labs.  His fasting blood glucose was 144.  Hemoglobin A1c is 8.2.  He was previously 14.5.  I ordered repeat hemoglobin A1c and fasting blood sugar for 3 months.  Orders:  -     glucose blood test strip; Use 1 each 2 (two) times a day  -     Albumin / creatinine urine ratio; Future; Expected date: 10/23/2024  -     Basic metabolic panel; Future; Expected date: 10/23/2024  -     Hemoglobin A1C; Future; Expected date: 10/23/2024  5. Atherosclerosis of native coronary artery of native heart without angina pectoris  Assessment & Plan:  Patient has coronary artery disease which remains asymptomatic.  I refilled his prescription for Nitrostat.  His previous prescription had  and he had not taken any of them.  Orders:  -     nitroglycerin (NITROSTAT) 0.4 mg SL tablet; Place 1 tablet (0.4 mg total) under the tongue every 5 (five) minutes as needed for chest pain  6. Chronic bilateral low back pain without sciatica  Assessment & Plan:  Patient requested a prescription for methocarbamol.  I told him I really do not think this is going to help but he wants to try it again.  He had taken this in the past.  As such, I sent in a prescription for methocarbamol 750 mg.  He will take 1 3 times per day.  I advised the patient that if it is not helping he should simply discontinue it.  I told the patient that I would be unwilling to prescribe him opiates for noncancer related pain.  Orders:  -     methocarbamol (ROBAXIN) 500 mg tablet; Take 1 tablet (500 mg total) by mouth 3 (three) times a day  7. Stage 3b chronic kidney disease (HCC)  Assessment & Plan:  Lab Results   Component Value Date    EGFR 44 2024    EGFR 49 2024    EGFR 48 2024    CREATININE 1.56 (H) 2024    CREATININE 1.44 (H) 2024    CREATININE 1.45 (H) 2024   Patient has not followed up with nephrology as well.  I reviewed his most recent labs.  His creatinine  was 1.56.  His estimated GFR is 44 which now puts him in chronic kidney disease stage IIIb.  8. Mixed hyperlipidemia  Assessment & Plan:  I ordered a fasting lipid panel for the patient's next office visit.  His goal LDL cholesterol is less than 70.  Patient will continue intensive lipid-lowering therapy with rosuvastatin 40 mg daily.  Orders:  -     Lipid Panel with Direct LDL reflex; Future; Expected date: 10/23/2024  9. Elevated PSA  Assessment & Plan:  Patient's PSA continues to rise and patient has not followed up with urology.  I referred the patient several times now for urology consultation.  I told the patient frankly that I am concerned he may have prostate cancer.  I told the patient that that is a common cause of elevated PSA.  The test was to screen for prostate cancer.  Patient needs to follow-up with urology.  I ordered a free and total PSA for his next visit.  Regardless of the results, I want him to see urology before then.  Orders:  -     PSA, total and free; Future  10. Essential hypertension, benign  Assessment & Plan:  Blood pressure is well-controlled.  Blood pressure is currently 130/80.  I recommended no change with his current medications.  11. Heart murmur  Assessment & Plan:  Patient was noncompliant with follow-up with regards to getting his echocardiogram.  I do suspect this is a benign ejection murmur but it should be evaluated.       History of Present Illness     This is a 69-year-old white male who presents to the office today for routine checkup.  The patient tells me he is the same.  He still has severe pain in his knees and severe pain in his back.  He wants to know I will prescribe him opiates for pain.  He has an upcoming appointment to see Dr. Ramirez.  He wants to have knee surgery.  He tells me he has to have his hemoglobin A1c less than 8.0.  He did have labs done for today's visit.  The patient never saw the dietitian for his diabetes.  I had previously referred him to  "certified diabetic educator for this.  He never followed up with urology for his elevated PSA.  He never saw gastroenterology and got the colonoscopy.  He never got the ultrasound of his aorta.  He never went for the echocardiogram to evaluate his heart murmur.  Lastly, he never saw nephrology.  I had previously referred the patient to nephrology for his chronic kidney disease.  Patient tells me his blood sugars are \"all over the place\".  He did not have a logbook of his blood sugars with him.  He does not really follow a diabetic diet.  He eats pancakes and Chinese toast with hernandez every morning.  He did lose 5 pounds since I last saw him.        Review of Systems   Respiratory:  Negative for cough, shortness of breath and wheezing.    Cardiovascular:  Negative for chest pain, palpitations and leg swelling.   Gastrointestinal:  Negative for abdominal distention, abdominal pain, blood in stool, constipation, diarrhea, nausea and vomiting.   Musculoskeletal:  Positive for arthralgias and back pain.   Allergic/Immunologic:        Positive for allergy to yellowjackets   Psychiatric/Behavioral:  Positive for dysphoric mood.        Objective     /80 (BP Location: Left arm, Patient Position: Sitting, Cuff Size: Large)   Pulse 79   Temp (!) 96.7 °F (35.9 °C) (Tympanic)   Ht 6' (1.829 m)   Wt 113 kg (250 lb 1.6 oz)   SpO2 96%   BMI 33.92 kg/m²     Physical Exam  Vitals reviewed.   Constitutional:       Comments: Patient is a 69-year-old white male who appears his stated age.  He is nonseptic in appearance and in no apparent distress.   HENT:      Head: Normocephalic and atraumatic.      Right Ear: Tympanic membrane, ear canal and external ear normal. There is no impacted cerumen.      Left Ear: Tympanic membrane, ear canal and external ear normal. There is no impacted cerumen.      Mouth/Throat:      Mouth: Mucous membranes are moist.      Pharynx: Oropharynx is clear. No oropharyngeal exudate or posterior " oropharyngeal erythema.   Eyes:      General:         Right eye: No discharge.         Left eye: No discharge.      Conjunctiva/sclera: Conjunctivae normal.      Pupils: Pupils are equal, round, and reactive to light.   Neck:      Comments: There is no thyromegaly noted  Cardiovascular:      Rate and Rhythm: Normal rate and regular rhythm.      Heart sounds: Murmur (There is a grade 2/6 systolic ejection murmur present) heard.      No friction rub. No gallop.   Pulmonary:      Effort: Pulmonary effort is normal. No respiratory distress.      Breath sounds: Normal breath sounds. No stridor. No wheezing, rhonchi or rales.   Musculoskeletal:      Cervical back: Neck supple.      Right lower leg: No edema.      Left lower leg: No edema.   Lymphadenopathy:      Cervical: No cervical adenopathy.   Psychiatric:      Comments: Patient has a flat affect       Administrative Statements

## 2024-07-23 NOTE — ASSESSMENT & PLAN NOTE
Lab Results   Component Value Date    HGBA1C 8.2 (H) 07/19/2024   Patient has poorly controlled diabetes.  Glycemic control is improving, now that he is back on medication.  Continue metformin 1000 mg twice daily.  Increase glipizide to 5 mg twice daily.  Cost is a consideration for this patient.  I reviewed his most recent labs.  His fasting blood glucose was 144.  Hemoglobin A1c is 8.2.  He was previously 14.5.  I ordered repeat hemoglobin A1c and fasting blood sugar for 3 months.  
As per the patient request, I refilled his prescription for his EpiPen.  
Blood pressure is well-controlled.  Blood pressure is currently 130/80.  I recommended no change with his current medications.  
I ordered a fasting lipid panel for the patient's next office visit.  His goal LDL cholesterol is less than 70.  Patient will continue intensive lipid-lowering therapy with rosuvastatin 40 mg daily.  
Lab Results   Component Value Date    EGFR 44 07/19/2024    EGFR 49 04/13/2024    EGFR 48 03/28/2024    CREATININE 1.56 (H) 07/19/2024    CREATININE 1.44 (H) 04/13/2024    CREATININE 1.45 (H) 03/28/2024   Patient has not followed up with nephrology as well.  I reviewed his most recent labs.  His creatinine was 1.56.  His estimated GFR is 44 which now puts him in chronic kidney disease stage IIIb.  
Patient has coronary artery disease which remains asymptomatic.  I refilled his prescription for Nitrostat.  His previous prescription had  and he had not taken any of them.  
Patient requested a prescription for methocarbamol.  I told him I really do not think this is going to help but he wants to try it again.  He had taken this in the past.  As such, I sent in a prescription for methocarbamol 750 mg.  He will take 1 3 times per day.  I advised the patient that if it is not helping he should simply discontinue it.  I told the patient that I would be unwilling to prescribe him opiates for noncancer related pain.  
Patient was noncompliant with follow-up with regards to getting his echocardiogram.  I do suspect this is a benign ejection murmur but it should be evaluated.  
Patient's PSA continues to rise and patient has not followed up with urology.  I referred the patient several times now for urology consultation.  I told the patient frankly that I am concerned he may have prostate cancer.  I told the patient that that is a common cause of elevated PSA.  The test was to screen for prostate cancer.  Patient needs to follow-up with urology.  I ordered a free and total PSA for his next visit.  Regardless of the results, I want him to see urology before then.  
No

## 2024-07-23 NOTE — PATIENT INSTRUCTIONS
"Patient Education     Foot care for people with diabetes   The Basics   Written by the doctors and editors at [facility]   Why is foot care important if I have diabetes? -- Diabetes can cause nerve damage if your blood sugar is high for a long time. The medical term for this is \"diabetic neuropathy.\"  If you have problems with the nerves in your feet, you might not be able to feel pain in your foot. Normally, people feel pain when they get a cut or a blister on their foot. The pain tells them that they need to treat their cut so it can heal. But people with nerve damage might not feel any pain when their feet get hurt. They might not even know that they have a cut, so they might not treat it. Problems that aren't treated right away can get much worse. For example, an untreated cut can get infected and turn into an open sore.  High blood sugar can also damage blood vessels and decrease blood flow to your feet. This can weaken your skin and make wounds take longer to heal. You are also more likely to get an infection if you have high blood sugar.  How do I take care of my feet? -- Taking good care of your feet can help prevent foot problems. You should:   Wash your feet every day with soap and warm water. Pat your feet dry, and be sure to dry the skin between your toes.   Keep your feet moisturized. Put lotion on the tops and bottoms of your feet, but not between your toes.   Check your feet every day (figure 1). Look for cuts, blisters, redness, or swelling. Use a mirror, or ask someone to help you check the bottoms of your feet. Check all parts of the foot, especially between the toes. Look for broken skin, ulcers, blisters, or redness.   Trim your toenails straight across when needed (figure 2). Do not cut the corners of your toenails. File rough edges. Do not cut your cuticles. Ask for help if you cannot see well or have problems reaching your feet.   Ask your doctor or nurse to check your feet at each visit. Take " your shoes and socks off for these checks.   See a foot care provider (such as a podiatrist) if you have an ingrown toenail, corn, or callus. Do not try to remove corns and calluses yourself.  How do I protect my feet from injury? -- There are several ways to protect your feet. You can:   Wear shoes and socks at all times, even at home. Do not walk barefoot. Wear swim shoes if you go to the beach or a swimming pool.   Choose shoes that fit well. They should not be not too tight or too loose. Your shoes should have plenty of room for your toes (figure 3). Your doctor might give you a prescription for special shoes. Check to see if they are covered by your insurance.   Check your shoes each time before you put them on to make sure that the lining is smooth. Also check to make sure that there is nothing inside the shoes before putting them on.   Do not wear shoes that expose any part of the foot, like sandals, thongs, or clogs.   Wear cotton socks that fit loosely. Do not wear shoes without socks.   Protect your feet from heat and cold. Test bath water before putting your feet in it to make sure that it is not too hot. Do not walk barefoot on hot ground. Take extra care when going outside in the cold and wear warm socks.  What else should I know? -- You can lower your risk for foot problems by keeping your blood sugar levels as close to your goal as possible. Other things you can do include:   Move your ankles and toes often to help with blood flow. You can wear a support stocking to help with swelling.   Walk often. Regular walking helps blood flow.   If you smoke, try to quit. Your doctor or nurse can help. Smoking causes poor blood flow to your feet and can damage your nerves.  When should I call the doctor? -- Call your doctor or nurse for advice if you have:   A fever of 100.4°F (38°C) or higher, chills, or a wound that will not heal   Swelling, redness, warmth around a wound, a foul smell coming from a wound, or  yellowish, greenish, or bloody discharge   Sores or blisters on your feet that hurt more or less than you would expect   Numbness or tingling in your foot or leg   Corns, calluses, blisters, or new sores on your foot   Very dry, scaly, or cracked skin on your feet   Changes in the way your foot joints or arch look  All topics are updated as new evidence becomes available and our peer review process is complete.  This topic retrieved from [employer] on: Mar 13, 2024.  Topic 827733 Version 2.0  Release: 32.2.4 - C32.71  © 2024 UpToDate, Inc. and/or its affiliates. All rights reserved.  figure 1: Foot check for people with diabetes     People with diabetes should check both of their feet every day. It is important to check your feet all over, including in between your toes. If you can't see the bottom of your foot, use a mirror or ask another person to check for you. Let your doctor or nurse know if you find any:  Redness   Cuts or cracks in the skin   Blisters   Swelling   Graphic 92635 Version 3.0  figure 2: Trim your toenails     Trim your toenails straight across and smooth them with a nail file.  Graphic 39500 Version 2.0  figure 3: Correct shoe shape     Choose shoes that fit the right way and are not too tight or too loose. Your shoes should have plenty of room for your toes.  Graphic 34611 Version 2.0  Consumer Information Use and Disclaimer   Disclaimer: This generalized information is a limited summary of diagnosis, treatment, and/or medication information. It is not meant to be comprehensive and should be used as a tool to help the user understand and/or assess potential diagnostic and treatment options. It does NOT include all information about conditions, treatments, medications, side effects, or risks that may apply to a specific patient. It is not intended to be medical advice or a substitute for the medical advice, diagnosis, or treatment of a health care provider based on the health care provider's  examination and assessment of a patient's specific and unique circumstances. Patients must speak with a health care provider for complete information about their health, medical questions, and treatment options, including any risks or benefits regarding use of medications. This information does not endorse any treatments or medications as safe, effective, or approved for treating a specific patient. UpToDate, Inc. and its affiliates disclaim any warranty or liability relating to this information or the use thereof.The use of this information is governed by the Terms of Use, available at https://www.woltersDraftuwer.com/en/know/clinical-effectiveness-terms. 2024© UpToDate, Inc. and its affiliates and/or licensors. All rights reserved.  Copyright   © 2024 UpToDate, Inc. and/or its affiliates. All rights reserved.

## 2024-07-25 DIAGNOSIS — E11.65 POORLY CONTROLLED TYPE 2 DIABETES MELLITUS WITH PERIPHERAL NEUROPATHY (HCC): ICD-10-CM

## 2024-07-25 DIAGNOSIS — E11.42 POORLY CONTROLLED TYPE 2 DIABETES MELLITUS WITH PERIPHERAL NEUROPATHY (HCC): ICD-10-CM

## 2024-07-25 RX ORDER — GLIPIZIDE 5 MG/1
5 TABLET ORAL
Qty: 180 TABLET | Refills: 1 | Status: SHIPPED | OUTPATIENT
Start: 2024-07-25

## 2024-07-27 DIAGNOSIS — I10 ESSENTIAL HYPERTENSION, BENIGN: ICD-10-CM

## 2024-07-28 RX ORDER — AMLODIPINE BESYLATE AND BENAZEPRIL HYDROCHLORIDE 5; 10 MG/1; MG/1
1 CAPSULE ORAL DAILY
Qty: 100 CAPSULE | Refills: 1 | Status: SHIPPED | OUTPATIENT
Start: 2024-07-28

## 2024-08-01 DIAGNOSIS — E78.2 MIXED HYPERLIPIDEMIA: ICD-10-CM

## 2024-08-01 RX ORDER — ROSUVASTATIN CALCIUM 40 MG/1
40 TABLET, COATED ORAL DAILY
Qty: 90 TABLET | Refills: 1 | Status: SHIPPED | OUTPATIENT
Start: 2024-08-01

## 2024-08-13 ENCOUNTER — CONSULT (OUTPATIENT)
Dept: FAMILY MEDICINE CLINIC | Facility: CLINIC | Age: 69
End: 2024-08-13
Payer: MEDICARE

## 2024-08-13 VITALS
HEART RATE: 67 BPM | WEIGHT: 249.2 LBS | DIASTOLIC BLOOD PRESSURE: 71 MMHG | TEMPERATURE: 97.6 F | BODY MASS INDEX: 33.75 KG/M2 | SYSTOLIC BLOOD PRESSURE: 124 MMHG | HEIGHT: 72 IN | OXYGEN SATURATION: 98 %

## 2024-08-13 DIAGNOSIS — E78.2 MIXED HYPERLIPIDEMIA: ICD-10-CM

## 2024-08-13 DIAGNOSIS — Z01.818 PRE-OP EXAMINATION: ICD-10-CM

## 2024-08-13 DIAGNOSIS — E11.42 TYPE 2 DIABETES MELLITUS WITH POLYNEUROPATHY (HCC): ICD-10-CM

## 2024-08-13 DIAGNOSIS — M17.0 PRIMARY OSTEOARTHRITIS OF BOTH KNEES: Primary | ICD-10-CM

## 2024-08-13 DIAGNOSIS — R97.20 ELEVATED PSA: ICD-10-CM

## 2024-08-13 DIAGNOSIS — I10 ESSENTIAL HYPERTENSION, BENIGN: ICD-10-CM

## 2024-08-13 PROCEDURE — 99214 OFFICE O/P EST MOD 30 MIN: CPT | Performed by: FAMILY MEDICINE

## 2024-08-13 PROCEDURE — 93000 ELECTROCARDIOGRAM COMPLETE: CPT | Performed by: FAMILY MEDICINE

## 2024-08-13 NOTE — ASSESSMENT & PLAN NOTE
Patient has an elevated PSA.  I am concerned about the possibility of this patient having prostate cancer.  This has been an issue for several years now and the patient has had several referrals to urology.  He has been noncompliant with seeing urology.  He tells me he does plan to see urology after he has his knee surgery.  I think the patient is at increased risk of postop urinary retention as result.

## 2024-08-13 NOTE — ASSESSMENT & PLAN NOTE
Patient has hypertension and blood pressure is now well-controlled.  I rechecked his blood pressure today and found it to be 124/70.  He will continue Lotrel 5/10 daily

## 2024-08-13 NOTE — ASSESSMENT & PLAN NOTE
Lab Results   Component Value Date    HGBA1C 7.7 (H) 08/08/2024   Patient has diabetes with improvement in glycemic control.  Of note, patient's hemoglobin A1c had gone up because he had stopped his medications and had stopped follow-up at my office for his diabetes and his other medical problems.  His diabetes is now under improved control and I expect there will continue to improve, pending patient compliance.  I recommend no change with his current medications.  I would recommend medical consultation for his diabetes during his hospital stay.  I am also going to recommend patient be discharged to home on his normal outpatient medication.

## 2024-08-13 NOTE — ASSESSMENT & PLAN NOTE
Patient has severe osteoarthritis of both knees.  He is scheduled for a left total knee replacement.  I reviewed the patient's labs.  His preadmission labs consisted of a sed rate and C-reactive protein.  He had a homocystine level.  A uric acid level.  He had a fasting insulin level drawn.  A CBC, PT PTT, and CMP were drawn.  He also had a repeat hemoglobin A1c.  His hemoglobin A1c continues to improve and is down to 7.7.  Today, I did do a preop EKG.  The EKG showed a sinus rhythm with a possible old anteroseptal wall MI, age-indeterminate.  This may also be due to lead placement.  There were no ischemic changes.  Patient is medically cleared for his proposed surgery.  Patient was advised that he should not take any of his medications on the morning of his surgery.

## 2024-08-13 NOTE — PROGRESS NOTES
Ambulatory Visit  Name: Bryant Bustamante Jr.      : 1955      MRN: [id number]  Encounter Provider: Bryant Holloway DO  Encounter Date: 2024   Encounter department: [facility] PRIMARY [facility]    Assessment & Plan   1. Primary osteoarthritis of both knees  Assessment & Plan:  Patient has severe osteoarthritis of both knees.  He is scheduled for a left total knee replacement.  I reviewed the patient's labs.  His preadmission labs consisted of a sed rate and C-reactive protein.  He had a homocystine level.  A uric acid level.  He had a fasting insulin level drawn.  A CBC, PT PTT, and CMP were drawn.  He also had a repeat hemoglobin A1c.  His hemoglobin A1c continues to improve and is down to 7.7.  Today, I did do a preop EKG.  The EKG showed a sinus rhythm with a possible old anteroseptal wall MI, age-indeterminate.  This may also be due to lead placement.  There were no ischemic changes.  Patient is medically cleared for his proposed surgery.  Patient was advised that he should not take any of his medications on the morning of his surgery.  2. Pre-op examination  -     POCT ECG  3. Type 2 diabetes mellitus with polyneuropathy (HCC)  Assessment & Plan:    Lab Results   Component Value Date    HGBA1C 7.7 (H) 2024   Patient has diabetes with improvement in glycemic control.  Of note, patient's hemoglobin A1c had gone up because he had stopped his medications and had stopped follow-up at my office for his diabetes and his other medical problems.  His diabetes is now under improved control and I expect there will continue to improve, pending patient compliance.  I recommend no change with his current medications.  I would recommend medical consultation for his diabetes during his hospital stay.  I am also going to recommend patient be discharged to home on his normal outpatient medication.  4. Essential hypertension, benign  Assessment & Plan:  Patient has hypertension and blood pressure is now  well-controlled.  I rechecked his blood pressure today and found it to be 124/70.  He will continue Lotrel 5/10 daily  5. Elevated PSA  Assessment & Plan:  Patient has an elevated PSA.  I am concerned about the possibility of this patient having prostate cancer.  This has been an issue for several years now and the patient has had several referrals to urology.  He has been noncompliant with seeing urology.  He tells me he does plan to see urology after he has his knee surgery.  I think the patient is at increased risk of postop urinary retention as result.  6. Mixed hyperlipidemia  Assessment & Plan:  Recent lipid panel shows his LDL cholesterol to be at goal which is less than 70.       History of Present Illness     This is a 69-year-old white male who presents to the office today for preop medical clearance.  The patient is scheduled for a left total knee replacement.  Surgery is scheduled for September 9.  His surgeon is Dr. Ramirez.  Patient did have preadmission labs done.  He tells me he is scheduled to get a preadmission EKG on August 26 at [facility].  Patient complains about bilateral knee pain.  He tells me that the methocarbamol is helping with his back pain.        Review of Systems   Respiratory:  Negative for shortness of breath.    Cardiovascular:  Negative for chest pain, palpitations and leg swelling.   Gastrointestinal:  Negative for abdominal pain, diarrhea, nausea and vomiting.   Genitourinary:  Negative for difficulty urinating.   Musculoskeletal:  Positive for arthralgias, back pain and gait problem.       Objective     /71 (BP Location: Left arm, Patient Position: Sitting, Cuff Size: Large)   Pulse 67   Temp 97.6 °F (36.4 °C) (Tympanic)   Ht 6' (1.829 m)   Wt 113 kg (249 lb 3.2 oz)   SpO2 98%   BMI 33.80 kg/m²     Physical Exam  Vitals reviewed.   Constitutional:       Comments: This is a 69-year-old white male who appears his stated age.  He was pleasant, cooperative,  and in no apparent distress   HENT:      Head: Normocephalic and atraumatic.      Right Ear: Tympanic membrane, ear canal and external ear normal. There is no impacted cerumen.      Left Ear: Tympanic membrane, ear canal and external ear normal. There is no impacted cerumen.      Mouth/Throat:      Mouth: Mucous membranes are moist.      Pharynx: Oropharynx is clear. No oropharyngeal exudate or posterior oropharyngeal erythema.   Eyes:      General: No scleral icterus.        Right eye: No discharge.         Left eye: No discharge.      Conjunctiva/sclera: Conjunctivae normal.      Pupils: Pupils are equal, round, and reactive to light.   Cardiovascular:      Rate and Rhythm: Normal rate and regular rhythm.      Heart sounds: Normal heart sounds. No murmur heard.     No friction rub. No gallop.   Pulmonary:      Effort: Pulmonary effort is normal. No respiratory distress.      Breath sounds: Normal breath sounds. No stridor. No wheezing, rhonchi or rales.   Abdominal:      General: Bowel sounds are normal. There is no distension.      Palpations: Abdomen is soft. There is no mass.      Tenderness: There is no abdominal tenderness. There is no guarding.   Musculoskeletal:      Cervical back: Neck supple.   Lymphadenopathy:      Cervical: No cervical adenopathy.   Psychiatric:         Mood and Affect: Mood normal.         Behavior: Behavior normal.         Thought Content: Thought content normal.         Judgment: Judgment normal.     Extremities: Without cyanosis, clubbing, or edema  Administrative Statements

## 2024-08-14 DIAGNOSIS — G89.29 CHRONIC BILATERAL LOW BACK PAIN WITHOUT SCIATICA: Primary | ICD-10-CM

## 2024-08-14 DIAGNOSIS — M54.50 CHRONIC BILATERAL LOW BACK PAIN WITHOUT SCIATICA: Primary | ICD-10-CM

## 2024-08-14 RX ORDER — METHOCARBAMOL 750 MG/1
750 TABLET, FILM COATED ORAL 3 TIMES DAILY
Qty: 90 TABLET | Refills: 5 | Status: SHIPPED | OUTPATIENT
Start: 2024-08-14

## 2024-08-20 ENCOUNTER — TELEPHONE (OUTPATIENT)
Age: 69
End: 2024-08-20

## 2024-08-20 NOTE — TELEPHONE ENCOUNTER
Patient will be stopping by the office at some point if you can print out his immunizations for him, he needs them for an upcoming knee surgery

## 2024-09-16 DIAGNOSIS — E11.42 POORLY CONTROLLED TYPE 2 DIABETES MELLITUS WITH PERIPHERAL NEUROPATHY (HCC): ICD-10-CM

## 2024-09-16 DIAGNOSIS — F33.2 SEVERE RECURRENT MAJOR DEPRESSION WITHOUT PSYCHOTIC FEATURES (HCC): ICD-10-CM

## 2024-09-16 DIAGNOSIS — E11.65 POORLY CONTROLLED TYPE 2 DIABETES MELLITUS WITH PERIPHERAL NEUROPATHY (HCC): ICD-10-CM

## 2024-09-18 RX ORDER — BUPROPION HYDROCHLORIDE 150 MG/1
150 TABLET ORAL EVERY MORNING
Qty: 90 TABLET | Refills: 1 | Status: SHIPPED | OUTPATIENT
Start: 2024-09-18

## 2024-10-22 ENCOUNTER — RA CDI HCC (OUTPATIENT)
Dept: OTHER | Facility: HOSPITAL | Age: 69
End: 2024-10-22

## 2024-10-30 ENCOUNTER — OFFICE VISIT (OUTPATIENT)
Dept: FAMILY MEDICINE CLINIC | Facility: CLINIC | Age: 69
End: 2024-10-30
Payer: MEDICARE

## 2024-10-30 VITALS
HEIGHT: 72 IN | HEART RATE: 84 BPM | SYSTOLIC BLOOD PRESSURE: 130 MMHG | DIASTOLIC BLOOD PRESSURE: 74 MMHG | WEIGHT: 267.9 LBS | BODY MASS INDEX: 36.29 KG/M2 | OXYGEN SATURATION: 96 % | TEMPERATURE: 98.5 F

## 2024-10-30 DIAGNOSIS — E11.42 TYPE 2 DIABETES MELLITUS WITH POLYNEUROPATHY (HCC): Primary | ICD-10-CM

## 2024-10-30 DIAGNOSIS — Z23 ENCOUNTER FOR IMMUNIZATION: ICD-10-CM

## 2024-10-30 DIAGNOSIS — M17.0 PRIMARY OSTEOARTHRITIS OF BOTH KNEES: ICD-10-CM

## 2024-10-30 DIAGNOSIS — M79.605 PAIN OF LEFT LOWER EXTREMITY: ICD-10-CM

## 2024-10-30 DIAGNOSIS — I10 ESSENTIAL HYPERTENSION, BENIGN: ICD-10-CM

## 2024-10-30 PROCEDURE — G0008 ADMIN INFLUENZA VIRUS VAC: HCPCS

## 2024-10-30 PROCEDURE — 99214 OFFICE O/P EST MOD 30 MIN: CPT | Performed by: FAMILY MEDICINE

## 2024-10-30 PROCEDURE — 90662 IIV NO PRSV INCREASED AG IM: CPT

## 2024-10-30 RX ORDER — GABAPENTIN 300 MG/1
300 CAPSULE ORAL 3 TIMES DAILY
COMMUNITY

## 2024-10-30 RX ORDER — TRAMADOL HYDROCHLORIDE 50 MG/1
50 TABLET ORAL EVERY 6 HOURS PRN
COMMUNITY
Start: 2024-10-28

## 2024-10-30 NOTE — ASSESSMENT & PLAN NOTE
Lab Results   Component Value Date    HGBA1C 7.7 (H) 08/08/2024   Patient has type 2 diabetes mellitus.  I reviewed his labs, which were done just prior to his surgery.  He did not have a logbook of his blood sugars with him but he tells me his average blood sugars are around 110.  However, he does tell me that if he eats the wrong thing it will spike up into the mid 200s.  Patient does have repeat labs ordered.  I told him to get these done just before Thanksgiving.

## 2024-10-30 NOTE — PROGRESS NOTES
Ambulatory Visit  Name: Bryant Bustamante Jr.      : 1955      MRN: [id number]  Encounter Provider: Bryant Holloway DO  Encounter Date: 10/30/2024   Encounter department: [facility] PRIMARY [facility]ARE    Assessment & Plan  Type 2 diabetes mellitus with polyneuropathy (HCC)    Lab Results   Component Value Date    HGBA1C 7.7 (H) 2024   Patient has type 2 diabetes mellitus.  I reviewed his labs, which were done just prior to his surgery.  He did not have a logbook of his blood sugars with him but he tells me his average blood sugars are around 110.  However, he does tell me that if he eats the wrong thing it will spike up into the mid 200s.  Patient does have repeat labs ordered.  I told him to get these done just before Thanks.         Pain of left lower extremity  Patient has pain and swelling of the left leg.  This may simply be a lymphedema following his knee surgery.  However, he DVT needs to be ruled out.  I am going to refer the patient for a stat venous Doppler to rule out DVT.  Orders:    VAS VENOUS DUPLEX -LOWER LIMB UNILATERAL; Future    Encounter for immunization    Orders:    influenza vaccine, high-dose, PF 0.5 mL (Fluzone High Dose)    Essential hypertension, benign  I checked the patient's blood pressure myself today.  I found his blood pressure to be 130/74.  The patient will continue his current regiment.         Primary osteoarthritis of both knees  Patient has osteoarthritis of both knees.  I advised the patient that he really should have had physical therapy following his left knee replacement.  I reviewed the records that were available in the computer and do not see any mention of physical therapy.  I told the patient that he should talk to Dr. Ramirez about this at his appointment next week.            History of Present Illness     This is a 69-year-old white male who presents to the office today for his routine checkup.  The patient's main complaint today is severe pain in his  left leg.  He tells me that he his knee and leg have not gotten better since his surgery.  He complains of pain and swelling in the left calf and even pain in the left quadricep.  He tells me his knee is still swollen.  He had his left total knee replacement on September 9.  He was discharged to home.  He had no home health care.  He had no orders for physical therapy.  He tells me he has just been sitting around.  He gained 18 pounds since his last visit.  He tells me he does check his blood sugars.  He did not have a logbook of his blood sugars with him.  He tells me his pain medications have not been helping.          Review of Systems   Respiratory:  Negative for cough, shortness of breath and wheezing.    Cardiovascular:  Negative for chest pain, palpitations and leg swelling.   Gastrointestinal:  Negative for abdominal distention, abdominal pain, blood in stool, constipation, diarrhea and nausea.   Genitourinary:  Negative for dysuria, frequency and urgency.   Musculoskeletal:  Positive for arthralgias and gait problem.           Objective     /74 (BP Location: Left arm, Patient Position: Sitting, Cuff Size: Large)   Pulse 84   Temp 98.5 °F (36.9 °C) (Tympanic)   Ht 6' (1.829 m)   Wt 122 kg (267 lb 14.4 oz)   SpO2 96%   BMI 36.33 kg/m²     Physical Exam  Vitals reviewed.   Constitutional:       Comments: This is a 69-year-old white male who appears his stated age.  The patient is nonseptic in appearance and in no apparent distress   HENT:      Head: Normocephalic and atraumatic.      Right Ear: Tympanic membrane, ear canal and external ear normal. There is no impacted cerumen.      Left Ear: Tympanic membrane, ear canal and external ear normal. There is no impacted cerumen.      Mouth/Throat:      Mouth: Mucous membranes are moist.      Pharynx: Oropharynx is clear. No oropharyngeal exudate or posterior oropharyngeal erythema.   Eyes:      General: No scleral icterus.        Right eye: No  discharge.         Left eye: No discharge.      Conjunctiva/sclera: Conjunctivae normal.      Pupils: Pupils are equal, round, and reactive to light.   Cardiovascular:      Rate and Rhythm: Normal rate and regular rhythm.      Heart sounds: Normal heart sounds. No murmur heard.     No friction rub. No gallop.   Pulmonary:      Effort: Pulmonary effort is normal. No respiratory distress.      Breath sounds: Normal breath sounds. No stridor. No wheezing, rhonchi or rales.   Musculoskeletal:      Cervical back: Neck supple.   Lymphadenopathy:      Cervical: No cervical adenopathy.   Psychiatric:      Comments: Patient has a blunted affect.     Extremities: There is swelling of the left lower extremity.  He has pitting edema noted to below the knee with calf tenderness present.  Homans' sign was negative however.  There is no palpable cord.  Leg and thigh were warm to the touch.  No skin mottling noted.  Incision of the left knee has healed well.  Knee is still swollen.  Patient refused his diabetic foot exam.

## 2024-10-30 NOTE — ASSESSMENT & PLAN NOTE
Patient has pain and swelling of the left leg.  This may simply be a lymphedema following his knee surgery.  However, he DVT needs to be ruled out.  I am going to refer the patient for a stat venous Doppler to rule out DVT.  Orders:    VAS VENOUS DUPLEX -LOWER LIMB UNILATERAL; Future

## 2024-10-30 NOTE — ASSESSMENT & PLAN NOTE
I checked the patient's blood pressure myself today.  I found his blood pressure to be 130/74.  The patient will continue his current regiment.

## 2024-10-30 NOTE — ASSESSMENT & PLAN NOTE
Patient has osteoarthritis of both knees.  I advised the patient that he really should have had physical therapy following his left knee replacement.  I reviewed the records that were available in the computer and do not see any mention of physical therapy.  I told the patient that he should talk to Dr. Ramirez about this at his appointment next week.

## 2024-10-31 ENCOUNTER — HOSPITAL ENCOUNTER (OUTPATIENT)
Dept: NON INVASIVE DIAGNOSTICS | Facility: HOSPITAL | Age: 69
Discharge: HOME | End: 2024-10-31
Attending: FAMILY MEDICINE
Payer: MEDICARE

## 2024-10-31 ENCOUNTER — TELEPHONE (OUTPATIENT)
Dept: FAMILY MEDICINE CLINIC | Facility: CLINIC | Age: 69
End: 2024-10-31

## 2024-10-31 DIAGNOSIS — M79.605 PAIN OF LEFT LOWER EXTREMITY: ICD-10-CM

## 2024-10-31 PROCEDURE — 93971 EXTREMITY STUDY: CPT | Performed by: SURGERY

## 2024-10-31 PROCEDURE — 93971 EXTREMITY STUDY: CPT

## 2024-10-31 NOTE — TELEPHONE ENCOUNTER
Spoke with central scheduling to schedule stat doppler for today. Scheduling unable to reach department. Case created for department to call patient to schedule doppler.      [place] office tried to call patient twice. Tried to leave message unsure that voicemail is working properly.

## 2024-10-31 NOTE — TELEPHONE ENCOUNTER
----- Message from Bryant Holloway DO sent at 10/31/2024  4:02 PM EDT -----  Venous doppler was negative for DVT

## 2024-10-31 NOTE — TELEPHONE ENCOUNTER
Results sent to patient through My chart due to not answering the phone and unable to leave a message

## 2024-11-11 DIAGNOSIS — I10 ESSENTIAL HYPERTENSION, BENIGN: ICD-10-CM

## 2024-11-12 RX ORDER — AMLODIPINE AND BENAZEPRIL HYDROCHLORIDE 5; 10 MG/1; MG/1
1 CAPSULE ORAL DAILY
Qty: 30 CAPSULE | Refills: 5 | Status: SHIPPED | OUTPATIENT
Start: 2024-11-12

## 2024-12-08 DIAGNOSIS — I10 ESSENTIAL HYPERTENSION, BENIGN: ICD-10-CM

## 2024-12-09 RX ORDER — METOPROLOL SUCCINATE 50 MG/1
50 TABLET, EXTENDED RELEASE ORAL DAILY
Qty: 90 TABLET | Refills: 1 | Status: ON HOLD | OUTPATIENT
Start: 2024-12-09

## 2024-12-20 ENCOUNTER — NURSE TRIAGE (OUTPATIENT)
Dept: FAMILY MEDICINE CLINIC | Facility: CLINIC | Age: 69
End: 2024-12-20

## 2024-12-20 NOTE — TELEPHONE ENCOUNTER
"Reason for Disposition  • MODERATE difficulty breathing (e.g., speaks in phrases, SOB even at rest, pulse 100-120) of new-onset or worse than normal    Answer Assessment - Initial Assessment Questions  1. RESPIRATORY STATUS: \"Describe your breathing?\" (e.g., wheezing, shortness of breath, unable to speak, severe coughing)       Sob   2. ONSET: \"When did this breathing problem begin?\"       A WEEK AGO  3. PATTERN \"Does the difficult breathing come and go, or has it been constant since it started?\"       Comes and goes, before now constant   4. SEVERITY: \"How bad is your breathing?\" (e.g., mild, moderate, severe)       severe  5. RECURRENT SYMPTOM: \"Have you had difficulty breathing before?\" If Yes, ask: \"When was the last time?\" and \"What happened that time?\"       yes  6. CARDIAC HISTORY: \"Do you have any history of heart disease?\" (e.g., heart attack, angina, bypass surgery, angioplasty)       Excessive   7. LUNG HISTORY: \"Do you have any history of lung disease?\"  (e.g., pulmonary embolus, asthma, emphysema)      denies  8. CAUSE: \"What do you think is causing the breathing problem?\"       unsure  9. OTHER SYMPTOMS: \"Do you have any other symptoms?\" (e.g., chest pain, cough, dizziness, fever, runny nose)      Denies, mild nasal nose possible allergies   10. O2 SATURATION MONITOR:  \"Do you use an oxygen saturation monitor (pulse oximeter) at home?\" If Yes, ask: \"What is your reading (oxygen level) today?\" \"What is your usual oxygen saturation reading?\" (e.g., 95%)        Unable to get it  11. PREGNANCY: \"Is there any chance you are pregnant?\" \"When was your last menstrual period?\"        N/a  12. TRAVEL: \"Have you traveled out of the country in the last month?\" (e.g., travel history, exposures)        denies    Protocols used: Breathing Difficulty-Adult-[place]    "

## 2024-12-20 NOTE — PATIENT COMMUNICATION
Pep warm tx pt to CTS, pt complains of SOB, even at rest. Pt states its worse when he moves around but he simmers down once he rests. Pt denies chest pain, numbness/tinging at this time.  Pt has an extensive cardiac history.    Per protocol, Triage nurse advised pt to go to the ER. Pt refused and requested Triage nurse to call the office and see if PCP will see him. Triage nurse called 2-times both clinical and clerical, no answer. Triage nurse educated pt on the importance of going to the ER and pt requested for an appt Monday. Nurse informed pt theres no appts available till after christmas. Pt verbalized understanding. Triage nurse encouraged pt to seek further medical assistance at urgent care, if he isn't comfortable with the ER. Pt verbalized he will possibly try that option.    PCP please follow up with patient.

## 2024-12-22 ENCOUNTER — HOSPITAL ENCOUNTER (INPATIENT)
Facility: HOSPITAL | Age: 69
LOS: 6 days | Discharge: HOME | DRG: 291 | End: 2024-12-28
Attending: EMERGENCY MEDICINE | Admitting: INTERNAL MEDICINE
Payer: MEDICARE

## 2024-12-22 ENCOUNTER — APPOINTMENT (EMERGENCY)
Dept: CT IMAGING | Facility: HOSPITAL | Age: 69
DRG: 291 | End: 2024-12-22
Payer: MEDICARE

## 2024-12-22 ENCOUNTER — APPOINTMENT (EMERGENCY)
Dept: RADIOLOGY | Facility: HOSPITAL | Age: 69
DRG: 291 | End: 2024-12-22
Payer: MEDICARE

## 2024-12-22 DIAGNOSIS — E83.42 HYPOMAGNESEMIA: ICD-10-CM

## 2024-12-22 DIAGNOSIS — J81.1 PULMONARY EDEMA: Primary | ICD-10-CM

## 2024-12-22 DIAGNOSIS — N18.9 CHRONIC KIDNEY DISEASE: ICD-10-CM

## 2024-12-22 DIAGNOSIS — R79.89 ELEVATED BRAIN NATRIURETIC PEPTIDE (BNP) LEVEL: ICD-10-CM

## 2024-12-22 PROBLEM — R91.8 PULMONARY NODULES: Status: ACTIVE | Noted: 2024-12-22

## 2024-12-22 PROBLEM — I16.0 HYPERTENSIVE URGENCY: Status: ACTIVE | Noted: 2024-12-22

## 2024-12-22 PROBLEM — D72.829 LEUKOCYTOSIS: Status: ACTIVE | Noted: 2024-12-22

## 2024-12-22 LAB
2HR DELTA HS TROPONIN: -1 NG/L
4HR DELTA HS TROPONIN: -1 NG/L
ALBUMIN SERPL BCG-MCNC: 4 G/DL (ref 3.5–5)
ALP SERPL-CCNC: 71 U/L (ref 34–104)
ALT SERPL W P-5'-P-CCNC: 14 U/L (ref 7–52)
ANION GAP SERPL CALCULATED.3IONS-SCNC: 11 MMOL/L (ref 4–13)
ANION GAP SERPL CALCULATED.3IONS-SCNC: 12 MMOL/L (ref 4–13)
AST SERPL W P-5'-P-CCNC: 13 U/L (ref 13–39)
BACTERIA UR QL AUTO: ABNORMAL /HPF
BASOPHILS # BLD AUTO: 0.08 THOUSANDS/ÂΜL (ref 0–0.1)
BASOPHILS NFR BLD AUTO: 1 % (ref 0–1)
BILIRUB SERPL-MCNC: 0.46 MG/DL (ref 0.2–1)
BILIRUB UR QL STRIP: NEGATIVE
BNP SERPL-MCNC: 509 PG/ML (ref 0–100)
BUN SERPL-MCNC: 32 MG/DL (ref 5–25)
BUN SERPL-MCNC: 34 MG/DL (ref 5–25)
CALCIUM SERPL-MCNC: 9 MG/DL (ref 8.4–10.2)
CALCIUM SERPL-MCNC: 9.1 MG/DL (ref 8.4–10.2)
CARDIAC TROPONIN I PNL SERPL HS: 16 NG/L (ref ?–50)
CARDIAC TROPONIN I PNL SERPL HS: 16 NG/L (ref ?–50)
CARDIAC TROPONIN I PNL SERPL HS: 17 NG/L (ref ?–50)
CHLORIDE SERPL-SCNC: 105 MMOL/L (ref 96–108)
CHLORIDE SERPL-SCNC: 107 MMOL/L (ref 96–108)
CLARITY UR: CLEAR
CO2 SERPL-SCNC: 21 MMOL/L (ref 21–32)
CO2 SERPL-SCNC: 23 MMOL/L (ref 21–32)
COLOR UR: COLORLESS
CREAT SERPL-MCNC: 1.56 MG/DL (ref 0.6–1.3)
CREAT SERPL-MCNC: 1.69 MG/DL (ref 0.6–1.3)
D DIMER PPP FEU-MCNC: 1.29 UG/ML FEU
EOSINOPHIL # BLD AUTO: 0.44 THOUSAND/ÂΜL (ref 0–0.61)
EOSINOPHIL NFR BLD AUTO: 4 % (ref 0–6)
ERYTHROCYTE [DISTWIDTH] IN BLOOD BY AUTOMATED COUNT: 14.3 % (ref 11.6–15.1)
GFR SERPL CREATININE-BSD FRML MDRD: 40 ML/MIN/1.73SQ M
GFR SERPL CREATININE-BSD FRML MDRD: 44 ML/MIN/1.73SQ M
GLUCOSE SERPL-MCNC: 177 MG/DL (ref 65–140)
GLUCOSE SERPL-MCNC: 187 MG/DL (ref 65–140)
GLUCOSE SERPL-MCNC: 190 MG/DL (ref 65–140)
GLUCOSE SERPL-MCNC: 204 MG/DL (ref 65–140)
GLUCOSE UR STRIP-MCNC: NEGATIVE MG/DL
HCT VFR BLD AUTO: 37.6 % (ref 36.5–49.3)
HGB BLD-MCNC: 11.7 G/DL (ref 12–17)
HGB UR QL STRIP.AUTO: NEGATIVE
IMM GRANULOCYTES # BLD AUTO: 0.03 THOUSAND/UL (ref 0–0.2)
IMM GRANULOCYTES NFR BLD AUTO: 0 % (ref 0–2)
KETONES UR STRIP-MCNC: NEGATIVE MG/DL
LEUKOCYTE ESTERASE UR QL STRIP: NEGATIVE
LYMPHOCYTES # BLD AUTO: 2.96 THOUSANDS/ÂΜL (ref 0.6–4.47)
LYMPHOCYTES NFR BLD AUTO: 24 % (ref 14–44)
MAGNESIUM SERPL-MCNC: 1.8 MG/DL (ref 1.9–2.7)
MCH RBC QN AUTO: 32.1 PG (ref 26.8–34.3)
MCHC RBC AUTO-ENTMCNC: 31.1 G/DL (ref 31.4–37.4)
MCV RBC AUTO: 103 FL (ref 82–98)
MONOCYTES # BLD AUTO: 1.15 THOUSAND/ÂΜL (ref 0.17–1.22)
MONOCYTES NFR BLD AUTO: 9 % (ref 4–12)
NEUTROPHILS # BLD AUTO: 7.64 THOUSANDS/ÂΜL (ref 1.85–7.62)
NEUTS SEG NFR BLD AUTO: 62 % (ref 43–75)
NITRITE UR QL STRIP: NEGATIVE
NON-SQ EPI CELLS URNS QL MICRO: ABNORMAL /HPF
NRBC BLD AUTO-RTO: 0 /100 WBCS
PH UR STRIP.AUTO: 5.5 [PH]
PLATELET # BLD AUTO: 278 THOUSANDS/UL (ref 149–390)
PMV BLD AUTO: 10.3 FL (ref 8.9–12.7)
POTASSIUM SERPL-SCNC: 3.9 MMOL/L (ref 3.5–5.3)
POTASSIUM SERPL-SCNC: 4 MMOL/L (ref 3.5–5.3)
PROCALCITONIN SERPL-MCNC: 0.07 NG/ML
PROT SERPL-MCNC: 7.3 G/DL (ref 6.4–8.4)
PROT UR STRIP-MCNC: ABNORMAL MG/DL
RBC # BLD AUTO: 3.65 MILLION/UL (ref 3.88–5.62)
RBC #/AREA URNS AUTO: ABNORMAL /HPF
SODIUM SERPL-SCNC: 138 MMOL/L (ref 135–147)
SODIUM SERPL-SCNC: 141 MMOL/L (ref 135–147)
SP GR UR STRIP.AUTO: 1.01 (ref 1–1.03)
UROBILINOGEN UR STRIP-ACNC: <2 MG/DL
WBC # BLD AUTO: 12.3 THOUSAND/UL (ref 4.31–10.16)
WBC #/AREA URNS AUTO: ABNORMAL /HPF

## 2024-12-22 PROCEDURE — 84484 ASSAY OF TROPONIN QUANT: CPT | Performed by: EMERGENCY MEDICINE

## 2024-12-22 PROCEDURE — 83036 HEMOGLOBIN GLYCOSYLATED A1C: CPT

## 2024-12-22 PROCEDURE — 80048 BASIC METABOLIC PNL TOTAL CA: CPT | Performed by: EMERGENCY MEDICINE

## 2024-12-22 PROCEDURE — 84145 PROCALCITONIN (PCT): CPT | Performed by: EMERGENCY MEDICINE

## 2024-12-22 PROCEDURE — 99285 EMERGENCY DEPT VISIT HI MDM: CPT | Performed by: EMERGENCY MEDICINE

## 2024-12-22 PROCEDURE — 80053 COMPREHEN METABOLIC PANEL: CPT | Performed by: INTERNAL MEDICINE

## 2024-12-22 PROCEDURE — 96365 THER/PROPH/DIAG IV INF INIT: CPT

## 2024-12-22 PROCEDURE — 96366 THER/PROPH/DIAG IV INF ADDON: CPT

## 2024-12-22 PROCEDURE — 71045 X-RAY EXAM CHEST 1 VIEW: CPT

## 2024-12-22 PROCEDURE — 96375 TX/PRO/DX INJ NEW DRUG ADDON: CPT

## 2024-12-22 PROCEDURE — 99285 EMERGENCY DEPT VISIT HI MDM: CPT

## 2024-12-22 PROCEDURE — 81001 URINALYSIS AUTO W/SCOPE: CPT | Performed by: INTERNAL MEDICINE

## 2024-12-22 PROCEDURE — 96367 TX/PROPH/DG ADDL SEQ IV INF: CPT

## 2024-12-22 PROCEDURE — 99223 1ST HOSP IP/OBS HIGH 75: CPT | Performed by: INTERNAL MEDICINE

## 2024-12-22 PROCEDURE — 71275 CT ANGIOGRAPHY CHEST: CPT

## 2024-12-22 PROCEDURE — 85379 FIBRIN DEGRADATION QUANT: CPT | Performed by: EMERGENCY MEDICINE

## 2024-12-22 PROCEDURE — 85025 COMPLETE CBC W/AUTO DIFF WBC: CPT | Performed by: EMERGENCY MEDICINE

## 2024-12-22 PROCEDURE — 93005 ELECTROCARDIOGRAM TRACING: CPT

## 2024-12-22 PROCEDURE — 83735 ASSAY OF MAGNESIUM: CPT | Performed by: EMERGENCY MEDICINE

## 2024-12-22 PROCEDURE — 82948 REAGENT STRIP/BLOOD GLUCOSE: CPT

## 2024-12-22 PROCEDURE — 36415 COLL VENOUS BLD VENIPUNCTURE: CPT | Performed by: EMERGENCY MEDICINE

## 2024-12-22 PROCEDURE — 83880 ASSAY OF NATRIURETIC PEPTIDE: CPT | Performed by: EMERGENCY MEDICINE

## 2024-12-22 RX ORDER — TRAMADOL HYDROCHLORIDE 50 MG/1
50 TABLET ORAL EVERY 6 HOURS PRN
Status: DISCONTINUED | OUTPATIENT
Start: 2024-12-22 | End: 2024-12-23

## 2024-12-22 RX ORDER — FUROSEMIDE 10 MG/ML
40 INJECTION INTRAMUSCULAR; INTRAVENOUS
Status: DISCONTINUED | OUTPATIENT
Start: 2024-12-23 | End: 2024-12-25

## 2024-12-22 RX ORDER — INSULIN LISPRO 100 [IU]/ML
1-6 INJECTION, SOLUTION INTRAVENOUS; SUBCUTANEOUS
Status: DISCONTINUED | OUTPATIENT
Start: 2024-12-22 | End: 2024-12-28 | Stop reason: HOSPADM

## 2024-12-22 RX ORDER — NITROGLYCERIN 0.4 MG/1
0.4 TABLET SUBLINGUAL ONCE
Status: DISCONTINUED | OUTPATIENT
Start: 2024-12-22 | End: 2024-12-28 | Stop reason: HOSPADM

## 2024-12-22 RX ORDER — ASPIRIN 81 MG/1
81 TABLET, CHEWABLE ORAL DAILY
Status: DISCONTINUED | OUTPATIENT
Start: 2024-12-23 | End: 2024-12-28 | Stop reason: HOSPADM

## 2024-12-22 RX ORDER — AMLODIPINE BESYLATE 5 MG/1
5 TABLET ORAL DAILY
Status: DISCONTINUED | OUTPATIENT
Start: 2024-12-22 | End: 2024-12-28 | Stop reason: HOSPADM

## 2024-12-22 RX ORDER — ATORVASTATIN CALCIUM 40 MG/1
80 TABLET, FILM COATED ORAL
Status: DISCONTINUED | OUTPATIENT
Start: 2024-12-22 | End: 2024-12-28 | Stop reason: HOSPADM

## 2024-12-22 RX ORDER — ENOXAPARIN SODIUM 100 MG/ML
40 INJECTION SUBCUTANEOUS DAILY
Status: DISCONTINUED | OUTPATIENT
Start: 2024-12-22 | End: 2024-12-25

## 2024-12-22 RX ORDER — PREGABALIN 75 MG/1
150 CAPSULE ORAL 2 TIMES DAILY
Status: DISCONTINUED | OUTPATIENT
Start: 2024-12-22 | End: 2024-12-28 | Stop reason: HOSPADM

## 2024-12-22 RX ORDER — METOPROLOL SUCCINATE 50 MG/1
50 TABLET, EXTENDED RELEASE ORAL DAILY
Status: DISCONTINUED | OUTPATIENT
Start: 2024-12-22 | End: 2024-12-28 | Stop reason: HOSPADM

## 2024-12-22 RX ORDER — CEFTRIAXONE 2 G/50ML
2000 INJECTION, SOLUTION INTRAVENOUS ONCE
Status: COMPLETED | OUTPATIENT
Start: 2024-12-22 | End: 2024-12-22

## 2024-12-22 RX ORDER — BUPROPION HYDROCHLORIDE 150 MG/1
150 TABLET ORAL EVERY MORNING
Status: DISCONTINUED | OUTPATIENT
Start: 2024-12-23 | End: 2024-12-28 | Stop reason: HOSPADM

## 2024-12-22 RX ORDER — SODIUM CHLORIDE 9 MG/ML
3 INJECTION INTRAVENOUS
Status: DISCONTINUED | OUTPATIENT
Start: 2024-12-22 | End: 2024-12-28 | Stop reason: HOSPADM

## 2024-12-22 RX ORDER — LISINOPRIL 10 MG/1
10 TABLET ORAL DAILY
Status: DISCONTINUED | OUTPATIENT
Start: 2024-12-22 | End: 2024-12-24

## 2024-12-22 RX ORDER — FUROSEMIDE 10 MG/ML
40 INJECTION INTRAMUSCULAR; INTRAVENOUS ONCE
Status: COMPLETED | OUTPATIENT
Start: 2024-12-22 | End: 2024-12-22

## 2024-12-22 RX ORDER — MAGNESIUM SULFATE HEPTAHYDRATE 40 MG/ML
2 INJECTION, SOLUTION INTRAVENOUS ONCE
Status: COMPLETED | OUTPATIENT
Start: 2024-12-22 | End: 2024-12-22

## 2024-12-22 RX ORDER — PREGABALIN 150 MG/1
1 CAPSULE ORAL 2 TIMES DAILY
COMMUNITY
Start: 2024-11-05

## 2024-12-22 RX ORDER — NITROGLYCERIN 0.4 MG/1
0.4 TABLET SUBLINGUAL ONCE
Status: COMPLETED | OUTPATIENT
Start: 2024-12-22 | End: 2024-12-22

## 2024-12-22 RX ADMIN — METOPROLOL SUCCINATE 50 MG: 50 TABLET, EXTENDED RELEASE ORAL at 15:14

## 2024-12-22 RX ADMIN — INSULIN LISPRO 1 UNITS: 100 INJECTION, SOLUTION INTRAVENOUS; SUBCUTANEOUS at 22:16

## 2024-12-22 RX ADMIN — CEFTRIAXONE 2000 MG: 2 INJECTION, SOLUTION INTRAVENOUS at 09:44

## 2024-12-22 RX ADMIN — PREGABALIN 150 MG: 75 CAPSULE ORAL at 15:14

## 2024-12-22 RX ADMIN — PREGABALIN 150 MG: 75 CAPSULE ORAL at 22:16

## 2024-12-22 RX ADMIN — ENOXAPARIN SODIUM 40 MG: 40 INJECTION SUBCUTANEOUS at 15:13

## 2024-12-22 RX ADMIN — MAGNESIUM SULFATE HEPTAHYDRATE 2 G: 40 INJECTION, SOLUTION INTRAVENOUS at 10:24

## 2024-12-22 RX ADMIN — NITROGLYCERIN 0.4 MG: 0.4 TABLET SUBLINGUAL at 09:40

## 2024-12-22 RX ADMIN — LISINOPRIL 10 MG: 10 TABLET ORAL at 15:13

## 2024-12-22 RX ADMIN — INSULIN LISPRO 1 UNITS: 100 INJECTION, SOLUTION INTRAVENOUS; SUBCUTANEOUS at 16:17

## 2024-12-22 RX ADMIN — NITROGLYCERIN 1 INCH: 20 OINTMENT TOPICAL at 11:37

## 2024-12-22 RX ADMIN — IOHEXOL 85 ML: 350 INJECTION, SOLUTION INTRAVENOUS at 11:00

## 2024-12-22 RX ADMIN — AMLODIPINE BESYLATE 5 MG: 5 TABLET ORAL at 15:14

## 2024-12-22 RX ADMIN — DESMOPRESSIN ACETATE 80 MG: 0.2 TABLET ORAL at 16:03

## 2024-12-22 RX ADMIN — FUROSEMIDE 40 MG: 10 INJECTION, SOLUTION INTRAMUSCULAR; INTRAVENOUS at 09:40

## 2024-12-22 NOTE — ASSESSMENT & PLAN NOTE
Suspect with pulmonary edema  S/p IV lasix and NTG with improvement  ECG nonischemic, hs trop 17>16  Resume home antihypertensive regimen with Norvasc 5mg, benazepril 10mg, Toprol-XL 50mg  IV diuresis as above  Monitor BP trends

## 2024-12-22 NOTE — ASSESSMENT & PLAN NOTE
Recommended CT chest w/ in 3-6 months then at 18-24 months for follow-up  Note, mild subcarinal lymphadenopathy suspected to be reactive that can also be re-eval at time of f/u CT chest wo as above

## 2024-12-22 NOTE — ASSESSMENT & PLAN NOTE
Lab Results   Component Value Date    EGFR 44 12/22/2024    EGFR 44 (L) 08/08/2024    EGFR 44 07/19/2024    CREATININE 1.56 (H) 12/22/2024    CREATININE 1.65 (H) 08/08/2024    CREATININE 1.56 (H) 07/19/2024     Appears at baseline  Monitor closely with diuresis

## 2024-12-22 NOTE — ASSESSMENT & PLAN NOTE
Hx CABG in 2012. No longer follows with cardiology, historically saw Dr Beyer  Denies any ischemic workup since  Noted with moderate coronary artery calcifications on CT  Continue aspirin, statin, Toprol-XL

## 2024-12-22 NOTE — ED PROVIDER NOTES
Time reflects when diagnosis was documented in both MDM as applicable and the Disposition within this note       Time User Action Codes Description Comment    12/22/2024  1:05 PM Nick Guzmanr Add [J81.1] Pulmonary edema     12/22/2024  1:05 PM Nick Guzman Jean Pierre Add [R79.89] Elevated brain natriuretic peptide (BNP) level     12/22/2024  1:05 PM Nick Guzman Jean Pierre Add [E83.42] Hypomagnesemia     12/22/2024  1:05 PM Cornelia Guzmanry Jean Pierre Add [N18.9] Chronic kidney disease           ED Disposition       ED Disposition   Admit    Condition   Stable    Date/Time   Sun Dec 22, 2024  1:05 PM    Comment   Case was discussed with Dr Torres and the patient's admission status was agreed to be Admission Status: inpatient status to the service of Dr. Torres.               Assessment & Plan       Medical Decision Making  Amount and/or Complexity of Data Reviewed  Labs: ordered. Decision-making details documented in ED Course.  Radiology: ordered. Decision-making details documented in ED Course.    Risk  Prescription drug management.  Decision regarding hospitalization.        ED Course as of 12/22/24 1512   Sun Dec 22, 2024   0935 X-ray chest 1 view portable  Airway midline  Pulmonary vascular congestion pattern without effusions  Possible superimposed right basilar consolidation  No pneumothorax  Cardiac silhouette enlarged compared to prior  No acute osseous abnormality   0936 Basis of chest x-ray, strongest suspicion for cardiac etiology of symptoms.  Chest x-ray has an appearance that suggested vascular congestion pattern with possible--less likely--superimposed consolidation of the right base.  Additional imaging will be obtained of the chest.  In the meantime, will start treatment for suspected volume overload possibly secondary to decompensated hypertension with use of nitroglycerin and diuretic medication.  CBC/BMP/magnesium/BNP/troponin/procalcitonin  Will ultimately hospitalize   0939 CBC and  differential(!)  Leukocytosis: Has had comparatively mild leukocytosis previously.  Hemoglobin anemic which is new from prior.  Platelets normal.   1003 Basic metabolic panel(!)   1003 B-Type Natriuretic Peptide(BNP)(!)   1003 HS Troponin 0hr (reflex protocol)   1003 Magnesium(!)   1003 Will replete magnesium intravenously.  CT of the chest to better characterize the lung abnormality.  Elevated BNP is in keeping with volume overload/heart failure.  Nonischemic troponin; will require repeat values.  1.5 points per Wells score due to tachycardia; left lower extremity edema preceded current symptoms and attributable to lymphedema as opposed to DVT.  However, not able to exclude DVT on basis of exam findings.  Will check D-dimer given pretest concern with CT of the chest modality depending upon result.   1044 D-dimer, quantitative(!)  Significantly elevated; will obtain CTA of the chest   1044 Procalcitonin  Lower concern for bacterial lower respiratory tract infection   1109 CTA completed and awaiting interpretation   1112 Per my review of CTA, no large central PE.  No pulmonary infarction.  No focal consolidations.  Groundglass opacities consistent with pulmonary edema pattern.  Bilateral effusions, right greater than left.  Overall most consistent with volume overload/CHF   1116 Updated patient regarding laboratory findings up to this point and need for hospitalization to which he is agreeable.  Awaiting CT report   1249 HS Troponin I 2hr  Delta of -1 not consistent with ACS   1250 CTA chest pe study  IMPRESSION:     No pulmonary embolus.     Groundglass opacity and septal thickening due to pulmonary edema with small pleural effusions, larger on the right.     6 mm and smaller average diameter nodules, the largest in the left lower lobe. The patient is a former smoker and per 2017 Fleischner Society guidelines, follow-up should be obtained with a chest CT with no contrast at 3 to 6 months and then at 18 to 24    months.     Mild subcarinal lymphadenopathy, likely reactive, but it can be reevaluated at the time of follow-up for the lung nodules on the chest CT with no contrast.     This study was marked in Epic for immediate notification and follow-up.        1257 Incidental findings reviewed with patient  He was given a copy of the CT report   1300 Secure message to Dr. Torres of internal medicine       Medications   sodium chloride (PF) 0.9 % injection 3 mL (has no administration in time range)   nitroglycerin (NITROSTAT) SL tablet 0.4 mg (0 mg Sublingual Hold 12/22/24 1135)   nitroglycerin (NITROSTAT) SL tablet 0.4 mg (0.4 mg Sublingual Given 12/22/24 0940)   furosemide (LASIX) injection 40 mg (40 mg Intravenous Given 12/22/24 0940)   cefTRIAXone (ROCEPHIN) IVPB (premix in dextrose) 2,000 mg 50 mL (0 mg Intravenous Stopped 12/22/24 1017)   magnesium sulfate 2 g/50 mL IVPB (premix) 2 g (2 g Intravenous New Bag 12/22/24 1024)   iohexol (OMNIPAQUE) 350 MG/ML injection (MULTI-DOSE) 85 mL (85 mL Intravenous Given 12/22/24 1100)   nitroglycerin (NITRO-BID) 2 % TD ointment 1 inch (1 inch Topical Given 12/22/24 1137)       ED Risk Strat Scores   HEART Risk Score      Flowsheet Row Most Recent Value   Heart Score Risk Calculator    History 1 Filed at: 12/22/2024 1047   ECG 1 Filed at: 12/22/2024 1047   Age 2 Filed at: 12/22/2024 1047   Risk Factors 2 Filed at: 12/22/2024 1047   Troponin 1 Filed at: 12/22/2024 1047   HEART Score 7 Filed at: 12/22/2024 1047          HEART Risk Score      Flowsheet Row Most Recent Value   Heart Score Risk Calculator    History 1 Filed at: 12/22/2024 1047   ECG 1 Filed at: 12/22/2024 1047   Age 2 Filed at: 12/22/2024 1047   Risk Factors 2 Filed at: 12/22/2024 1047   Troponin 1 Filed at: 12/22/2024 1047   HEART Score 7 Filed at: 12/22/2024 1047                            SBIRT 22yo+      Flowsheet Row Most Recent Value   Initial Alcohol Screen: US AUDIT-C     1. How often do you have a drink  containing alcohol? 0 Filed at: 12/22/2024 0913   2. How many drinks containing alcohol do you have on a typical day you are drinking?  0 Filed at: 12/22/2024 0913   3a. Male UNDER 65: How often do you have five or more drinks on one occasion? 0 Filed at: 12/22/2024 0913   3b. FEMALE Any Age, or MALE 65+: How often do you have 4 or more drinks on one occassion? 0 Filed at: 12/22/2024 0913   Audit-C Score 0 Filed at: 12/22/2024 0913   SUNG: How many times in the past year have you...    Used an illegal drug or used a prescription medication for non-medical reasons? Never Filed at: 12/22/2024 0913            Wells' Criteria for PE      Flowsheet Row Most Recent Value   Wells' Criteria for PE    Clinical signs and symptoms of DVT 3 Filed at: 12/22/2024 1047   PE is primary diagnosis or equally likely --   HR >100 1.5 Filed at: 12/22/2024 1047   Immobilization at least 3 days or Surgery in the previous 4 weeks 0 Filed at: 12/22/2024 1047   Previous, objectively diagnosed PE or DVT 0 Filed at: 12/22/2024 1047   Hemoptysis 0 Filed at: 12/22/2024 1047   Malignancy with treatment within 6 months or palliative 0 Filed at: 12/22/2024 1047   Wells' Criteria Total 4.5 Filed at: 12/22/2024 1047                        History of Present Illness       Chief Complaint   Patient presents with    Shortness of Breath     Patient states he started with SOB about a week ago which is worse with ambulating. Patient denies any cough, fever or chest pain.       Past Medical History:   Diagnosis Date    Anemia     last assessed: 1/11/2018    Arthritis     BPH without obstruction/lower urinary tract symptoms     last assessed: 12/3/2018    Coronary artery disease     last assessed: 7/22/2015    DDD (degenerative disc disease), lumbar     last assessed: 2/17/2014    Diabetes mellitus (HCC)     Generalized osteoarthritis     last assessed: 4/18/2019    Hx of fracture     ankle fracture(left)    Hyperlipidemia     last assessed: 7/22/2015     Hypertension     last assessed: 2015    Knee injury     partial torn meniscus and acl    Lumbar spinal stenosis     last assessed: 2014    Pneumonia Dec 1994    Rotator cuff syndrome of shoulder and allied disorders     last assessed: 3/5/2014    Type 2 diabetes mellitus with diabetic polyneuropathy (HCC)     last assessed: 10/31/2018      Past Surgical History:   Procedure Laterality Date    CARDIAC SURGERY      CORONARY ARTERY BYPASS GRAFT  2014    LUMBAR FUSION      SHOULDER ARTHROSCOPY Right     SHOULDER SURGERY      2 right/1 left rotator cuff repairs    SPINAL FUSION      spinal triple fusion L4-5      Family History   Problem Relation Age of Onset    Diabetes Mother         type 2    Arthritis Mother     Heart attack Father     Stroke Father     Post-traumatic stress disorder Son     No Known Problems Daughter       Social History     Tobacco Use    Smoking status: Former     Current packs/day: 0.00     Average packs/day: 1 pack/day for 26.0 years (26.0 ttl pk-yrs)     Types: Cigarettes     Start date:      Quit date:      Years since quittin.9     Passive exposure: Past    Smokeless tobacco: Never   Vaping Use    Vaping status: Never Used   Substance Use Topics    Alcohol use: Not Currently    Drug use: Not Currently     Types: Marijuana      E-Cigarette/Vaping    E-Cigarette Use Never User       E-Cigarette/Vaping Substances    Nicotine No     THC No     CBD No     Flavoring No     Other No     Unknown No       I have reviewed and agree with the history as documented.     70 y/o male with noted PMH presents to emergency department with gradually increasing dyspnea over the past week, present with exertion.  States that when he is walking distances of approximately 15 m or more he will become significantly dyspneic and require several minutes of rest to recover from the dyspnea prior to being able to perform additional physical activity.  Does not have chest pain at rest or with  physical exertion.  Has phlegm that he has to clear from the throat without a productive cough at any point.  No fevers of which he is aware.  No palpitations/syncope/presyncope/nausea/vomiting/diaphoresis.  Otherwise in normal state of health until symptom onset.  States for the past 3 months he has had to sleep upright in a chair, with dyspnea increasing when attempting to lie flat.  Has left lower extremity lymphedema developing post total arthroplasty left knee in September 2024.  Has undergone evaluation recently for lymphedema treatment recently.  Has undergone prior VTE evaluation on 31 Oct with LLE duplex that was negative for DVT. No hx of VTE per se.      History provided by:  Patient, relative and medical records  Shortness of Breath  Associated symptoms: cough    Associated symptoms: no abdominal pain, no chest pain, no diaphoresis, no fever, no vomiting and no wheezing        Review of Systems   Constitutional:  Positive for fatigue. Negative for chills, diaphoresis and fever.   Respiratory:  Positive for cough and shortness of breath. Negative for chest tightness, wheezing and stridor.    Cardiovascular:  Positive for leg swelling. Negative for chest pain and palpitations.   Gastrointestinal:  Negative for abdominal distention, abdominal pain, nausea and vomiting.   Musculoskeletal: Negative.    Skin: Negative.    Neurological:  Negative for syncope, speech difficulty, weakness and light-headedness.   All other systems reviewed and are negative.          Objective       ED Triage Vitals   Temperature Pulse Blood Pressure Respirations SpO2 Patient Position - Orthostatic VS   12/22/24 0913 12/22/24 0913 12/22/24 0913 12/22/24 0913 12/22/24 0913 12/22/24 0945   (!) 97.4 °F (36.3 °C) 102 (!) 202/109 (!) 26 99 % Sitting      Temp Source Heart Rate Source BP Location FiO2 (%) Pain Score    12/22/24 0913 12/22/24 0913 12/22/24 0945 -- 12/22/24 0913    Temporal Monitor Left arm  No Pain      Vitals      Date  and Time Temp Pulse SpO2 Resp BP Pain Score FACES Pain Rating User   12/22/24 1409 98.2 °F (36.8 °C) 79 95 % 17 148/82 -- -- DII   12/22/24 1315 98.7 °F (37.1 °C) 79 93 % 18 157/80 No Pain -- CD   12/22/24 1215 98.7 °F (37.1 °C) 76 94 % 16 147/82 No Pain -- CD   12/22/24 1134 -- -- -- -- 146/84 -- -- CD   12/22/24 1132 -- 74 96 % -- 157/80 -- -- CD   12/22/24 1130 -- 73 96 % 20 157/80 -- -- CD   12/22/24 1103 98.7 °F (37.1 °C) 82 95 % 22 169/86 No Pain -- CD   12/22/24 1030 98.7 °F (37.1 °C) 81 95 % 22 149/83 No Pain -- PP   12/22/24 1015 -- 101 91 % 22 184/96 No Pain -- PP   12/22/24 1000 97.8 °F (36.6 °C) 87 91 % 22 155/76 No Pain -- PP   12/22/24 0945 -- 80 92 % 24 143/71 -- -- PP   12/22/24 0930 -- 84 93 % 25 170/86 -- -- CK   12/22/24 0913 97.4 °F (36.3 °C) 102 99 % 26 202/109 No Pain -- CK            Physical Exam  Vitals and nursing note reviewed.   Constitutional:       General: He is awake. He is in acute distress (Mild respiratory distress; speaks in full sentences).      Appearance: Normal appearance. He is well-developed.   HENT:      Head: Normocephalic and atraumatic.      Right Ear: Hearing and external ear normal.      Left Ear: Hearing and external ear normal.   Neck:      Trachea: Trachea and phonation normal.   Cardiovascular:      Rate and Rhythm: Regular rhythm. Tachycardia present.      Pulses:           Radial pulses are 2+ on the right side and 2+ on the left side.        Dorsalis pedis pulses are 2+ on the right side and 2+ on the left side.        Posterior tibial pulses are 2+ on the right side and 2+ on the left side.      Heart sounds: Normal heart sounds, S1 normal and S2 normal. No murmur heard.     No friction rub. No gallop.      Comments: Chronic lymphedema in left lower extremity  Pulmonary:      Effort: Tachypnea, accessory muscle usage and respiratory distress present.      Breath sounds: No stridor. Rales present. No decreased breath sounds, wheezing or rhonchi.      Comments:  Speaks in full sentences  Few crackles in lower lobes but exam limited secondary to body habitus  Abdominal:      General: There is no distension.      Palpations: There is no mass.      Tenderness: There is no abdominal tenderness. There is no guarding or rebound.      Comments: Obese; nondistended   Skin:     General: Skin is warm and dry.   Neurological:      Mental Status: He is alert and oriented to person, place, and time.      GCS: GCS eye subscore is 4. GCS verbal subscore is 5. GCS motor subscore is 6.      Cranial Nerves: No cranial nerve deficit.      Sensory: No sensory deficit.      Motor: No abnormal muscle tone.      Comments: PERRLA; EOMI. Sensation intact to light touch over face in V1-V3 distribution bilaterally. Facial expressions symmetric. Tongue/uvula midline. Shoulder shrug equal bilaterally. Strength 5/5 in UE/LE bilaterally. Sensation intact to light touch in UE/LE bilaterally.         Results Reviewed       Procedure Component Value Units Date/Time    HS Troponin I 4hr [[id number]] Collected: 12/22/24 1430    Lab Status: In process Specimen: Blood from Arm, Left Updated: 12/22/24 1447    Hemoglobin A1c w/EAG Estimation (Prechecked if no A1C within 90 days) [[id number]] Collected: 12/22/24 1430    Lab Status: In process Specimen: Blood from Arm, Left Updated: 12/22/24 1447    HS Troponin I 2hr [[id number]]  (Normal) Collected: 12/22/24 1215    Lab Status: Final result Specimen: Blood from Arm, Right Updated: 12/22/24 1246     hs TnI 2hr 16 ng/L      Delta 2hr hsTnI -1 ng/L     D-dimer, quantitative [[id number]]  (Abnormal) Collected: 12/22/24 1021    Lab Status: Final result Specimen: Blood Updated: 12/22/24 1038     D-Dimer, Quant 1.29 ug/ml FEU     Narrative:      In the evaluation for possible pulmonary embolism, in the appropriate (Well's Score of 4 or less) patient, the age adjusted d-dimer cutoff for this patient can be calculated as:    Age x 0.01 (in ug/mL) for Age-adjusted D-dimer  exclusion threshold for a patient over 50 years.    Procalcitonin [[id number]]  (Normal) Collected: 12/22/24 0925    Lab Status: Final result Specimen: Blood Updated: 12/22/24 1030     Procalcitonin 0.07 ng/ml     HS Troponin 0hr (reflex protocol) [[id number]]  (Normal) Collected: 12/22/24 0925    Lab Status: Final result Specimen: Blood from Arm, Right Updated: 12/22/24 0958     hs TnI 0hr 17 ng/L     B-Type Natriuretic Peptide(BNP) [[id number]]  (Abnormal) Collected: 12/22/24 0925    Lab Status: Final result Specimen: Blood from Arm, Right Updated: 12/22/24 0958      pg/mL     Basic metabolic panel [[id number]]  (Abnormal) Collected: 12/22/24 0925    Lab Status: Final result Specimen: Blood from Arm, Right Updated: 12/22/24 0951     Sodium 141 mmol/L      Potassium 3.9 mmol/L      Chloride 107 mmol/L      CO2 23 mmol/L      ANION GAP 11 mmol/L      BUN 32 mg/dL      Creatinine 1.56 mg/dL      Glucose 190 mg/dL      Calcium 9.0 mg/dL      eGFR 44 ml/min/1.73sq m     Narrative:      National Kidney Disease Foundation guidelines for Chronic Kidney Disease (CKD):     Stage 1 with normal or high GFR (GFR > 90 mL/min/1.73 square meters)    Stage 2 Mild CKD (GFR = 60-89 mL/min/1.73 square meters)    Stage 3A Moderate CKD (GFR = 45-59 mL/min/1.73 square meters)    Stage 3B Moderate CKD (GFR = 30-44 mL/min/1.73 square meters)    Stage 4 Severe CKD (GFR = 15-29 mL/min/1.73 square meters)    Stage 5 End Stage CKD (GFR <15 mL/min/1.73 square meters)  Note: GFR calculation is accurate only with a steady state creatinine    Magnesium [[id number]]  (Abnormal) Collected: 12/22/24 0925    Lab Status: Final result Specimen: Blood from Arm, Right Updated: 12/22/24 0951     Magnesium 1.8 mg/dL     CBC and differential [[id number]]  (Abnormal) Collected: 12/22/24 0925    Lab Status: Final result Specimen: Blood from Arm, Right Updated: 12/22/24 0937     WBC 12.30 Thousand/uL      RBC 3.65 Million/uL      Hemoglobin 11.7 g/dL       Hematocrit 37.6 %       fL      MCH 32.1 pg      MCHC 31.1 g/dL      RDW 14.3 %      MPV 10.3 fL      Platelets 278 Thousands/uL      nRBC 0 /100 WBCs      Segmented % 62 %      Immature Grans % 0 %      Lymphocytes % 24 %      Monocytes % 9 %      Eosinophils Relative 4 %      Basophils Relative 1 %      Absolute Neutrophils 7.64 Thousands/µL      Absolute Immature Grans 0.03 Thousand/uL      Absolute Lymphocytes 2.96 Thousands/µL      Absolute Monocytes 1.15 Thousand/µL      Eosinophils Absolute 0.44 Thousand/µL      Basophils Absolute 0.08 Thousands/µL             CTA chest pe study   Final Interpretation by Tania Dubon MD (12/22 [id number])      No pulmonary embolus.      Groundglass opacity and septal thickening due to pulmonary edema with small pleural effusions, larger on the right.      6 mm and smaller average diameter nodules, the largest in the left lower lobe. The patient is a former smoker and per 2017 Fleischner Society guidelines, follow-up should be obtained with a chest CT with no contrast at 3 to 6 months and then at 18 to 24    months.      Mild subcarinal lymphadenopathy, likely reactive, but it can be reevaluated at the time of follow-up for the lung nodules on the chest CT with no contrast.      This study was marked in Epic for immediate notification and follow-up.                  Workstation performed: [id number]         X-ray chest 1 view portable   Final Interpretation by Odalis Carpenter MD (12/22 7686)      Increased lung markings and perihilar prominence, raise concern for congestion, mildly more pronounced      No acute consolidation      Resident: Michelle Murray I, the attending radiologist, have reviewed the images and agree with the final report above.      Workstation performed: [id number]             ECG 12 Lead Documentation Only    Date/Time: 12/22/2024 9:19 AM    Performed by: Nick Guzman DO  Authorized by: Nick Guzman DO    Indications / Diagnosis:   Dyspnea  ECG reviewed by me, the ED Provider: yes    Patient location:  ED  Previous ECG:     Comparison to cardiac monitor: Yes    Interpretation:     Interpretation: abnormal    Rate:     ECG rate:  99    ECG rate assessment: normal    Rhythm:     Rhythm: sinus rhythm    Ectopy:     Ectopy: none    QRS:     QRS axis:  Normal    QRS intervals:  Normal  Conduction:     Conduction: normal    ST segments:     ST segments:  Non-specific (Nonspecific ST changes across multiple leads with no ST elevation)  T waves:     T waves: normal    Comments:       QRS 70 QTc 456      ED Medication and Procedure Management   Prior to Admission Medications   Prescriptions Last Dose Informant Patient Reported? Taking?   Blood Glucose Monitoring Suppl (FREESTYLE FREEDOM LITE) w/Device KIT  Self Yes Yes   Blood Glucose Monitoring Suppl (FreeStyle Lite) MANISH  Self No Yes   Sig: Use to check blood sugar once a day   EPINEPHrine (EPIPEN) 0.3 mg/0.3 mL SOAJ  Self No Yes   Sig: inject 0.3 milliliters ( 0.3 milligrams ) intramuscularly in OUTER THIGH and HOLD for 3 SECONDS use if needed for SEVERE ALLERGIC REACTION May repeat one time after 10 MINUTES if needed for maximum daily dose of 2 INJECTIONS   Lancets (freestyle) lancets  Self No Yes   Sig: Use to check sugars twice a day   Multiple Vitamins-Minerals (MULTIVITAMIN ADULT PO)  Self Yes Yes   Sig: Take 1 tablet by mouth daily   amLODIPine-benazepril (LOTREL 5-10) 5-10 MG per capsule   No Yes   Sig: take 1 capsule by mouth daily   aspirin 81 MG tablet  Self Yes Yes   Sig: Take 81 mg by mouth once   buPROPion (WELLBUTRIN XL) 150 mg 24 hr tablet  Self No Yes   Sig: take 1 tablet by mouth every morning   gabapentin (NEURONTIN) 300 mg capsule Not Taking Self Yes No   Sig: Take 300 mg by mouth 3 (three) times a day   Patient not taking: Reported on 12/22/2024   glipiZIDE (GLUCOTROL) 5 mg tablet  Self No Yes   Sig: take 1 tablet by mouth twice a day BEFORE MEALS   glucose blood (FREESTYLE  TEST STRIPS) test strip  Self No Yes   Sig: Use to check blood sugars twice a day   metFORMIN (GLUCOPHAGE) 1000 MG tablet  Self No Yes   Sig: take 1 tablet by mouth twice a day with meals   methocarbamol (Robaxin-750) 750 mg tablet  Self No Yes   Sig: Take 1 tablet (750 mg total) by mouth 3 (three) times a day   metoprolol succinate (TOPROL-XL) 50 mg 24 hr tablet   No Yes   Sig: take 1 tablet by mouth once daily   nitroglycerin (NITROSTAT) 0.4 mg SL tablet  Self No Yes   Sig: Place 1 tablet (0.4 mg total) under the tongue every 5 (five) minutes as needed for chest pain   pregabalin (LYRICA) 150 mg capsule   Yes Yes   Sig: Take 1 capsule by mouth 2 (two) times a day   rosuvastatin (CRESTOR) 40 MG tablet  Self No Yes   Sig: take 1 tablet by mouth once daily   traMADol (ULTRAM) 50 mg tablet  Self Yes Yes   Sig: Take 50 mg by mouth every 6 (six) hours as needed for severe pain      Facility-Administered Medications: None     Current Discharge Medication List        CONTINUE these medications which have NOT CHANGED    Details   amLODIPine-benazepril (LOTREL 5-10) 5-10 MG per capsule take 1 capsule by mouth daily  Qty: 30 capsule, Refills: 5    Associated Diagnoses: Essential hypertension, benign      aspirin 81 MG tablet Take 81 mg by mouth once      Blood Glucose Monitoring Suppl (FREESTYLE FREEDOM LITE) w/Device KIT       Blood Glucose Monitoring Suppl (FreeStyle Lite) MANISH Use to check blood sugar once a day  Qty: 100 each, Refills: 3    Associated Diagnoses: Diabetic polyneuropathy associated with type 2 diabetes mellitus (HCC)      buPROPion (WELLBUTRIN XL) 150 mg 24 hr tablet take 1 tablet by mouth every morning  Qty: 90 tablet, Refills: 1    Associated Diagnoses: Severe recurrent major depression without psychotic features ([facility])      EPINEPHrine (EPIPEN) 0.3 mg/0.3 mL SOAJ inject 0.3 milliliters ( 0.3 milligrams ) intramuscularly in OUTER THIGH and HOLD for 3 SECONDS use if needed for SEVERE ALLERGIC REACTION May  repeat one time after 10 MINUTES if needed for maximum daily dose of 2 INJECTIONS  Qty: 2 each, Refills: 0    Associated Diagnoses: Bee sting allergy      glipiZIDE (GLUCOTROL) 5 mg tablet take 1 tablet by mouth twice a day BEFORE MEALS  Qty: 180 tablet, Refills: 1    Associated Diagnoses: Poorly controlled type 2 diabetes mellitus with peripheral neuropathy (HCC)      glucose blood (FREESTYLE TEST STRIPS) test strip Use to check blood sugars twice a day  Qty: 100 strip, Refills: 5    Associated Diagnoses: Diabetic polyneuropathy associated with type 2 diabetes mellitus (HCC)      Lancets (freestyle) lancets Use to check sugars twice a day  Qty: 100 each, Refills: 5    Associated Diagnoses: Diabetic polyneuropathy associated with type 2 diabetes mellitus (HCC)      metFORMIN (GLUCOPHAGE) 1000 MG tablet take 1 tablet by mouth twice a day with meals  Qty: 180 tablet, Refills: 1    Associated Diagnoses: Poorly controlled type 2 diabetes mellitus with peripheral neuropathy (HCC)      methocarbamol (Robaxin-750) 750 mg tablet Take 1 tablet (750 mg total) by mouth 3 (three) times a day  Qty: 90 tablet, Refills: 5    Associated Diagnoses: Chronic bilateral low back pain without sciatica      metoprolol succinate (TOPROL-XL) 50 mg 24 hr tablet take 1 tablet by mouth once daily  Qty: 90 tablet, Refills: 1    Associated Diagnoses: Essential hypertension, benign      Multiple Vitamins-Minerals (MULTIVITAMIN ADULT PO) Take 1 tablet by mouth daily      nitroglycerin (NITROSTAT) 0.4 mg SL tablet Place 1 tablet (0.4 mg total) under the tongue every 5 (five) minutes as needed for chest pain  Qty: 25 tablet, Refills: 0    Associated Diagnoses: Atherosclerosis of native coronary artery of native heart without angina pectoris      pregabalin (LYRICA) 150 mg capsule Take 1 capsule by mouth 2 (two) times a day      rosuvastatin (CRESTOR) 40 MG tablet take 1 tablet by mouth once daily  Qty: 90 tablet, Refills: 1    Associated Diagnoses:  Mixed hyperlipidemia      traMADol (ULTRAM) 50 mg tablet Take 50 mg by mouth every 6 (six) hours as needed for severe pain      gabapentin (NEURONTIN) 300 mg capsule Take 300 mg by mouth 3 (three) times a day           No discharge procedures on file.  ED SEPSIS DOCUMENTATION   Time reflects when diagnosis was documented in both MDM as applicable and the Disposition within this note       Time User Action Codes Description Comment    12/22/2024  1:05 PM Nick Guzman [J81.1] Pulmonary edema     12/22/2024  1:05 PM Nick Guzman Add [R79.89] Elevated brain natriuretic peptide (BNP) level     12/22/2024  1:05 PM Nick Guzman Add [E83.42] Hypomagnesemia     12/22/2024  1:05 PM Nick Guzman Add [N18.9] Chronic kidney disease                  Nick Guzman DO  12/22/24 1278

## 2024-12-22 NOTE — H&P
"H&P - Hospitalist   Name: Bryant Bustamante Jr. 69 y.o. male I MRN: [id number]  Unit/Bed#: 426-01 I Date of Admission: 12/22/2024   Date of Service: 12/22/2024 I Hospital Day: 0     Assessment & Plan  Pulmonary edema  70 yo male PMHx CAD s/p CABG, HTN, DM, CKD presents with worsening ALEXIS x 1 week.  Notable for orthopnea, increasing bilateral lower extremity edema and weight gain over the past few months.    No prior hx of CHF. Suspected possible underlying ischemic cardiomyopathy   CTA chest no PE, noted with GGO and septal thickeing d/t pulmonary edema with small pleurfal effusion R>L    Check echo   Start IV lasix 40mg bid   Daily weights, I&Os, Na/fluid restriction  Cardiology consult   Atherosclerosis of native coronary artery of native heart without angina pectoris  Hx CABG in 2012. No longer follows with cardiology, historically saw Dr Beyer  Denies any ischemic workup since  Noted with moderate coronary artery calcifications on CT  Continue aspirin, statin, Toprol-XL  Hypertensive urgency  Suspect with pulmonary edema  S/p IV lasix and NTG with improvement  ECG nonischemic, hs trop 17>16  Resume home antihypertensive regimen with Norvasc 5mg, benazepril 10mg, Toprol-XL 50mg  IV diuresis as above  Monitor BP trends   Stage 3b chronic kidney disease (HCC)  Lab Results   Component Value Date    EGFR 44 12/22/2024    EGFR 44 (L) 08/08/2024    EGFR 44 07/19/2024    CREATININE 1.56 (H) 12/22/2024    CREATININE 1.65 (H) 08/08/2024    CREATININE 1.56 (H) 07/19/2024     Appears at baseline  Monitor closely with diuresis  Chronic pain syndrome  Tramadol as needed  Type 2 diabetes mellitus with polyneuropathy (HCC)  Lab Results   Component Value Date    HGBA1C 7.7 (H) 08/08/2024       No results for input(s): \"POCGLU\" in the last 72 hours.    Blood Sugar Average: Last 72 hrs:    Update A1c   Hold oral medications  SSI & acu-checks ac/hs    Pulmonary nodules  Recommended CT chest w/ in 3-6 months then at 18-24 " months for follow-up  Note, mild subcarinal lymphadenopathy suspected to be reactive that can also be re-eval at time of f/u CT chest wo as above  Leukocytosis  CTA chest without evidence of consolidation  Remains afebrile. Procal negative  So other s/s of infection  Likely reactive   Monitor off abx & trend      VTE Pharmacologic Prophylaxis:   lovenox  Code Status: Level 1 - Full Code   Discussion with family: Updated  (son) at bedside.    Anticipated Length of Stay: Patient will be admitted on an inpatient basis with an anticipated length of stay of greater than 2 midnights secondary to acute pulmonary edema.    History of Present Illness   Chief Complaint: PHILIP Bustamante Jr. is a 69 y.o. male with a PMH of HTN, CAD s/p CABG, CKD, DM who presents with worsening dyspnea on exertion over the past week and acutely worsening over the past 3 days.  Reports ALEXIS with ambulating a couple feet across the room.  Prior reports he had the tolerance for walking longer distances and up stairs without issue.  Denies symptoms at rest.  Denies any chest pain or chest discomfort.  Notes for the past few months he has had worsening lower extremity edema left greater than right.  Attributes this to left lower extremity edema to lymphedema developed after left total knee arthroplasty in September.  Did have venous duplex at that time negative for DVT. Patient also reports associated orthopnea over the past few months as well.  He also reports he has been progressively gaining weight. In past, he typically weighed around 250 pounds.  Patient reports he does not follow any dietary restrictions. He has a history of CABG in 2012.  He has not followed up with a cardiologist since.  States he is compliant with aspirin, statin and beta-blocker.  He denies any more recent cardiac testing since then.  Was noted to have outpatient echocardiogram ordered given finding of new systolic murmur which he did not have done.   Otherwise, patient denies any fevers, chills, abdominal pain, nausea/vomiting/diarrhea.  Does report some occasional dizziness/lightheadedness when changing positions quickly.    In the ED, laboratory workup remarkable for BNP of 590.  WBCs 12.30. CXR significant for pulmonary vascular congestion.  He had elevated D-dimer and therefore had CTA chest PE study which was negative for PE, however revealing groundglass opacities and septal thickening due to pulmonary edema with small pleural effusions, larger on the right. He had significantly elevated blood pressures that improved after administration of sublingual nitroglycerin and topical nitroglycerin and IV Lasix.  Patient was subsequently admitted for further evaluation and treatment.    Review of Systems   Constitutional:  Negative for chills and fever.   Respiratory:  Positive for shortness of breath.    Cardiovascular:  Positive for leg swelling. Negative for chest pain and palpitations.   Gastrointestinal:  Negative for abdominal pain, diarrhea and nausea.       Historical Information   Past Medical History:   Diagnosis Date    Anemia     last assessed: 1/11/2018    Arthritis     BPH without obstruction/lower urinary tract symptoms     last assessed: 12/3/2018    Coronary artery disease     last assessed: 7/22/2015    DDD (degenerative disc disease), lumbar     last assessed: 2/17/2014    Diabetes mellitus (HCC)     Generalized osteoarthritis     last assessed: 4/18/2019    Hx of fracture     ankle fracture(left)    Hyperlipidemia     last assessed: 7/22/2015    Hypertension     last assessed: 7/22/2015    Knee injury     partial torn meniscus and acl    Lumbar spinal stenosis     last assessed: 2/17/2014    Pneumonia Dec 1994    Rotator cuff syndrome of shoulder and allied disorders     last assessed: 3/5/2014    Type 2 diabetes mellitus with diabetic polyneuropathy (HCC)     last assessed: 10/31/2018     Past Surgical History:   Procedure Laterality Date     CARDIAC SURGERY  2014    CORONARY ARTERY BYPASS GRAFT  2014    LUMBAR FUSION      SHOULDER ARTHROSCOPY Right     SHOULDER SURGERY      2 right/1 left rotator cuff repairs    SPINAL FUSION      spinal triple fusion L4-5     Social History     Tobacco Use    Smoking status: Former     Current packs/day: 0.00     Average packs/day: 1 pack/day for 26.0 years (26.0 ttl pk-yrs)     Types: Cigarettes     Start date:      Quit date:      Years since quittin.9     Passive exposure: Past    Smokeless tobacco: Never   Vaping Use    Vaping status: Never Used   Substance and Sexual Activity    Alcohol use: Not Currently    Drug use: Not Currently     Types: Marijuana    Sexual activity: Not Currently     Partners: Female     Birth control/protection: Condom Male     E-Cigarette/Vaping    E-Cigarette Use Never User      E-Cigarette/Vaping Substances    Nicotine No     THC No     CBD No     Flavoring No     Other No     Unknown No        Social History:  Marital Status:    Occupation:   Patient Pre-hospital Living Situation:   Patient Pre-hospital Level of Mobility:   Patient Pre-hospital Diet Restrictions:     Meds/Allergies   I have reviewed home medications with patient personally.  Prior to Admission medications    Medication Sig Start Date End Date Taking? Authorizing Provider   amLODIPine-benazepril (LOTREL 5-10) 5-10 MG per capsule take 1 capsule by mouth daily 24  Yes Bryant Holloway DO   aspirin 81 MG tablet Take 81 mg by mouth once   Yes Historical Provider, MD   Blood Glucose Monitoring Suppl (FREESTYLE FREEDOM LITE) w/Device KIT    Yes Historical Provider, MD   Blood Glucose Monitoring Suppl (FreeStyle Lite) MANISH Use to check blood sugar once a day 21  Yes Bryant Holloway DO   buPROPion (WELLBUTRIN XL) 150 mg 24 hr tablet take 1 tablet by mouth every morning 24  Yes Bryant Holloway DO   EPINEPHrine (EPIPEN) 0.3 mg/0.3 mL SOAJ inject 0.3 milliliters ( 0.3 milligrams ) intramuscularly in OUTER THIGH  and HOLD for 3 SECONDS use if needed for SEVERE ALLERGIC REACTION May repeat one time after 10 MINUTES if needed for maximum daily dose of 2 INJECTIONS 7/23/24  Yes Bryant Holloway DO   glipiZIDE (GLUCOTROL) 5 mg tablet take 1 tablet by mouth twice a day BEFORE MEALS 7/25/24  Yes Bryant Holloway DO   glucose blood (FREESTYLE TEST STRIPS) test strip Use to check blood sugars twice a day 7/23/24  Yes Bryant Holloway DO   Lancets (freestyle) lancets Use to check sugars twice a day 7/23/24  Yes Bryant Holloway DO   metFORMIN (GLUCOPHAGE) 1000 MG tablet take 1 tablet by mouth twice a day with meals 9/18/24  Yes Bryant Holloway DO   methocarbamol (Robaxin-750) 750 mg tablet Take 1 tablet (750 mg total) by mouth 3 (three) times a day 8/14/24  Yes Bryant Holloway DO   metoprolol succinate (TOPROL-XL) 50 mg 24 hr tablet take 1 tablet by mouth once daily 12/9/24  Yes Bryant Holloway DO   Multiple Vitamins-Minerals (MULTIVITAMIN ADULT PO) Take 1 tablet by mouth daily   Yes Historical Provider, MD   nitroglycerin (NITROSTAT) 0.4 mg SL tablet Place 1 tablet (0.4 mg total) under the tongue every 5 (five) minutes as needed for chest pain 7/23/24  Yes Bryant Holloway DO   pregabalin (LYRICA) 150 mg capsule Take 1 capsule by mouth 2 (two) times a day 11/5/24  Yes Historical Provider, MD   rosuvastatin (CRESTOR) 40 MG tablet take 1 tablet by mouth once daily 8/1/24  Yes Bryant Holloway DO   traMADol (ULTRAM) 50 mg tablet Take 50 mg by mouth every 6 (six) hours as needed for severe pain 10/28/24  Yes Historical Provider, MD   gabapentin (NEURONTIN) 300 mg capsule Take 300 mg by mouth 3 (three) times a day  Patient not taking: Reported on 12/22/2024    Historical Provider, MD     Allergies   Allergen Reactions    Naproxen GI Intolerance    Prednisone GI Intolerance       Objective :  Temp:  [97.4 °F (36.3 °C)-98.7 °F (37.1 °C)] 98.2 °F (36.8 °C)  HR:  [] 79  BP: (143-202)/() 148/82  Resp:  [16-26] 17  SpO2:  [91 %-99 %] 95 %  O2 Device: None (Room air)    Physical  Exam  Constitutional:       General: He is not in acute distress.     Appearance: He is obese.   HENT:      Head: Normocephalic and atraumatic.   Cardiovascular:      Rate and Rhythm: Normal rate and regular rhythm.      Heart sounds: Murmur heard.   Pulmonary:      Effort: Pulmonary effort is normal.      Breath sounds: Rales present.   Chest:      Chest wall: No tenderness.   Abdominal:      General: Abdomen is flat. Bowel sounds are normal.      Palpations: Abdomen is soft.      Tenderness: There is no abdominal tenderness.   Musculoskeletal:      Right lower leg: Edema present.      Left lower leg: Edema present.      Comments: Bilateral pitting edema past knee, L>R   Skin:     General: Skin is warm and dry.   Neurological:      General: No focal deficit present.      Mental Status: He is alert and oriented to person, place, and time.          Lines/Drains:            Lab Results: I have reviewed the following results:  Results from last 7 days   Lab Units 12/22/24  0925   WBC Thousand/uL 12.30*   HEMOGLOBIN g/dL 11.7*   HEMATOCRIT % 37.6   PLATELETS Thousands/uL 278   SEGS PCT % 62   LYMPHO PCT % 24   MONO PCT % 9   EOS PCT % 4     Results from last 7 days   Lab Units 12/22/24  0925   SODIUM mmol/L 141   POTASSIUM mmol/L 3.9   CHLORIDE mmol/L 107   CO2 mmol/L 23   BUN mg/dL 32*   CREATININE mg/dL 1.56*   ANION GAP mmol/L 11   CALCIUM mg/dL 9.0   GLUCOSE RANDOM mg/dL 190*             Lab Results   Component Value Date    HGBA1C 7.7 (H) 08/08/2024    HGBA1C 8.2 (H) 07/19/2024    HGBA1C 14.5 (H) 03/28/2024     Results from last 7 days   Lab Units 12/22/24  0925   PROCALCITONIN ng/ml 0.07       Imaging Results Review: I reviewed radiology reports from this admission including: CT chest and xray(s).  Other Study Results Review: No additional pertinent studies reviewed.    Administrative Statements       ** Please Note: This note has been constructed using a voice recognition system. **

## 2024-12-22 NOTE — ASSESSMENT & PLAN NOTE
CTA chest without evidence of consolidation  Remains afebrile. Procal negative  So other s/s of infection  Likely reactive   Monitor off abx & trend

## 2024-12-22 NOTE — ASSESSMENT & PLAN NOTE
"Lab Results   Component Value Date    HGBA1C 7.7 (H) 08/08/2024       No results for input(s): \"POCGLU\" in the last 72 hours.    Blood Sugar Average: Last 72 hrs:    Update A1c   Hold oral medications  SSI & acu-checks ac/hs    "

## 2024-12-22 NOTE — ASSESSMENT & PLAN NOTE
68 yo male PMHx CAD s/p CABG, HTN, DM, CKD presents with worsening ALEXIS x 1 week.  Notable for orthopnea, increasing bilateral lower extremity edema and weight gain over the past few months.    No prior hx of CHF. Suspected possible underlying ischemic cardiomyopathy   CTA chest no PE, noted with GGO and septal thickeing d/t pulmonary edema with small pleurfal effusion R>L    Check echo   Start IV lasix 40mg bid   Daily weights, I&Os, Na/fluid restriction  Cardiology consult

## 2024-12-23 ENCOUNTER — APPOINTMENT (INPATIENT)
Dept: NON INVASIVE DIAGNOSTICS | Facility: HOSPITAL | Age: 69
DRG: 291 | End: 2024-12-23
Payer: MEDICARE

## 2024-12-23 PROBLEM — L03.116 CELLULITIS OF LEFT LOWER EXTREMITY: Status: ACTIVE | Noted: 2024-12-22

## 2024-12-23 LAB
AORTIC ROOT: 3.2 CM
AORTIC VALVE MEAN VELOCITY: 23.6 M/S
ASCENDING AORTA: 3.5 CM
ATRIAL RATE: 99 BPM
AV AREA BY CONTINUOUS VTI: 1.8 CM2
AV AREA PEAK VELOCITY: 1.9 CM2
AV LVOT MEAN GRADIENT: 3 MMHG
AV LVOT PEAK GRADIENT: 5 MMHG
AV MEAN GRADIENT: 23 MMHG
AV PEAK GRADIENT: 35 MMHG
AV VALVE AREA: 1.27 CM2
AV VELOCITY RATIO: 0.38
BASOPHILS # BLD AUTO: 0.06 THOUSANDS/ÂΜL (ref 0–0.1)
BASOPHILS NFR BLD AUTO: 1 % (ref 0–1)
BSA FOR ECHO PROCEDURE: 2.47 M2
DOP CALC AO PEAK VEL: 2.96 M/S
DOP CALC AO VTI: 61.12 CM
DOP CALC LVOT AREA: 3.46 CM2
DOP CALC LVOT CARDIAC INDEX: 3.65 L/MIN/M2
DOP CALC LVOT CARDIAC OUTPUT: 9.01 L/MIN
DOP CALC LVOT DIAMETER: 2.1 CM
DOP CALC LVOT PEAK VEL VTI: 22.43 CM
DOP CALC LVOT PEAK VEL: 1.11 M/S
DOP CALC LVOT STROKE INDEX: 46.2 ML/M2
DOP CALC LVOT STROKE VOLUME: 77.65 CM3
E WAVE DECELERATION TIME: 166 MS
E/A RATIO: 1.19
EOSINOPHIL # BLD AUTO: 0.44 THOUSAND/ÂΜL (ref 0–0.61)
EOSINOPHIL NFR BLD AUTO: 5 % (ref 0–6)
ERYTHROCYTE [DISTWIDTH] IN BLOOD BY AUTOMATED COUNT: 14.4 % (ref 11.6–15.1)
EST. AVERAGE GLUCOSE BLD GHB EST-MCNC: 229 MG/DL
FRACTIONAL SHORTENING: 23 (ref 28–44)
GLUCOSE SERPL-MCNC: 157 MG/DL (ref 65–140)
GLUCOSE SERPL-MCNC: 178 MG/DL (ref 65–140)
GLUCOSE SERPL-MCNC: 230 MG/DL (ref 65–140)
GLUCOSE SERPL-MCNC: 239 MG/DL (ref 65–140)
HBA1C MFR BLD: 9.6 %
HCT VFR BLD AUTO: 32.4 % (ref 36.5–49.3)
HGB BLD-MCNC: 10.3 G/DL (ref 12–17)
IMM GRANULOCYTES # BLD AUTO: 0.03 THOUSAND/UL (ref 0–0.2)
IMM GRANULOCYTES NFR BLD AUTO: 0 % (ref 0–2)
INTERVENTRICULAR SEPTUM IN DIASTOLE (PARASTERNAL SHORT AXIS VIEW): 1.4 CM
INTERVENTRICULAR SEPTUM: 1.4 CM (ref 0.6–1.1)
LAAS-AP2: 27.1 CM2
LAAS-AP4: 25.9 CM2
LEFT ATRIUM SIZE: 5.1 CM
LEFT ATRIUM VOLUME (MOD BIPLANE): 94 ML
LEFT ATRIUM VOLUME INDEX (MOD BIPLANE): 38.1 ML/M2
LEFT INTERNAL DIMENSION IN SYSTOLE: 4 CM (ref 2.1–4)
LEFT VENTRICULAR INTERNAL DIMENSION IN DIASTOLE: 5.2 CM (ref 3.5–6)
LEFT VENTRICULAR POSTERIOR WALL IN END DIASTOLE: 1.3 CM
LEFT VENTRICULAR STROKE VOLUME: 61 ML
LVSV (TEICH): 61 ML
LYMPHOCYTES # BLD AUTO: 2.13 THOUSANDS/ÂΜL (ref 0.6–4.47)
LYMPHOCYTES NFR BLD AUTO: 23 % (ref 14–44)
MAGNESIUM SERPL-MCNC: 1.9 MG/DL (ref 1.9–2.7)
MCH RBC QN AUTO: 31.9 PG (ref 26.8–34.3)
MCHC RBC AUTO-ENTMCNC: 31.8 G/DL (ref 31.4–37.4)
MCV RBC AUTO: 100 FL (ref 82–98)
MONOCYTES # BLD AUTO: 1.05 THOUSAND/ÂΜL (ref 0.17–1.22)
MONOCYTES NFR BLD AUTO: 11 % (ref 4–12)
MV E'TISSUE VEL-LAT: 16 CM/S
MV E'TISSUE VEL-SEP: 9 CM/S
MV PEAK A VEL: 0.85 M/S
MV PEAK E VEL: 101 CM/S
MV STENOSIS PRESSURE HALF TIME: 48 MS
MV VALVE AREA P 1/2 METHOD: 4.58
NEUTROPHILS # BLD AUTO: 5.5 THOUSANDS/ÂΜL (ref 1.85–7.62)
NEUTS SEG NFR BLD AUTO: 60 % (ref 43–75)
NRBC BLD AUTO-RTO: 0 /100 WBCS
P AXIS: 50 DEGREES
PLATELET # BLD AUTO: 240 THOUSANDS/UL (ref 149–390)
PMV BLD AUTO: 10.6 FL (ref 8.9–12.7)
PR INTERVAL: 188 MS
PROCALCITONIN SERPL-MCNC: 0.08 NG/ML
PULM VEIN S/D RATIO: 1.38
PV PEAK D VEL: 0.16 M/S
PV PEAK S VEL: 0.22 M/S
QRS AXIS: 78 DEGREES
QRSD INTERVAL: 70 MS
QT INTERVAL: 356 MS
QTC INTERVAL: 456 MS
RA PRESSURE ESTIMATED: 8 MMHG
RBC # BLD AUTO: 3.23 MILLION/UL (ref 3.88–5.62)
RIGHT ATRIAL 2D VOLUME: 74 ML
RIGHT ATRIUM AREA SYSTOLE A4C: 23.3 CM2
RV PSP: 28 MMHG
SL CV LEFT ATRIUM LENGTH A2C: 7.3 CM
SL CV LV EF: 55
SL CV PED ECHO LEFT VENTRICLE DIASTOLIC VOLUME (MOD BIPLANE) 2D: 131 ML
SL CV PED ECHO LEFT VENTRICLE SYSTOLIC VOLUME (MOD BIPLANE) 2D: 70 ML
T WAVE AXIS: 72 DEGREES
TR MAX PG: 20 MMHG
TR PEAK VELOCITY: 2.3 M/S
TRICUSPID ANNULAR PLANE SYSTOLIC EXCURSION: 2.6 CM
TRICUSPID VALVE PEAK E WAVE VELOCITY: 0.08 M/S
TRICUSPID VALVE PEAK REGURGITATION VELOCITY: 2.25 M/S
VENTRICULAR RATE: 99 BPM
WBC # BLD AUTO: 9.21 THOUSAND/UL (ref 4.31–10.16)

## 2024-12-23 PROCEDURE — 85025 COMPLETE CBC W/AUTO DIFF WBC: CPT

## 2024-12-23 PROCEDURE — C8929 TTE W OR WO FOL WCON,DOPPLER: HCPCS

## 2024-12-23 PROCEDURE — 99223 1ST HOSP IP/OBS HIGH 75: CPT | Performed by: INTERNAL MEDICINE

## 2024-12-23 PROCEDURE — 83735 ASSAY OF MAGNESIUM: CPT

## 2024-12-23 PROCEDURE — 93306 TTE W/DOPPLER COMPLETE: CPT | Performed by: INTERNAL MEDICINE

## 2024-12-23 PROCEDURE — 93010 ELECTROCARDIOGRAM REPORT: CPT | Performed by: INTERNAL MEDICINE

## 2024-12-23 PROCEDURE — 99232 SBSQ HOSP IP/OBS MODERATE 35: CPT | Performed by: FAMILY MEDICINE

## 2024-12-23 PROCEDURE — 82948 REAGENT STRIP/BLOOD GLUCOSE: CPT

## 2024-12-23 PROCEDURE — 84145 PROCALCITONIN (PCT): CPT

## 2024-12-23 RX ORDER — CEFTRIAXONE 2 G/50ML
2000 INJECTION, SOLUTION INTRAVENOUS EVERY 24 HOURS
Status: DISCONTINUED | OUTPATIENT
Start: 2024-12-23 | End: 2024-12-28 | Stop reason: HOSPADM

## 2024-12-23 RX ORDER — OXYCODONE HYDROCHLORIDE 5 MG/1
5 TABLET ORAL EVERY 6 HOURS PRN
Refills: 0 | Status: DISCONTINUED | OUTPATIENT
Start: 2024-12-23 | End: 2024-12-28 | Stop reason: HOSPADM

## 2024-12-23 RX ADMIN — METOPROLOL SUCCINATE 50 MG: 50 TABLET, EXTENDED RELEASE ORAL at 08:01

## 2024-12-23 RX ADMIN — PREGABALIN 150 MG: 75 CAPSULE ORAL at 21:47

## 2024-12-23 RX ADMIN — INSULIN LISPRO 3 UNITS: 100 INJECTION, SOLUTION INTRAVENOUS; SUBCUTANEOUS at 11:49

## 2024-12-23 RX ADMIN — FUROSEMIDE 40 MG: 10 INJECTION, SOLUTION INTRAMUSCULAR; INTRAVENOUS at 17:19

## 2024-12-23 RX ADMIN — ENOXAPARIN SODIUM 40 MG: 40 INJECTION SUBCUTANEOUS at 08:01

## 2024-12-23 RX ADMIN — PERFLUTREN 0.8 ML/MIN: 6.52 INJECTION, SUSPENSION INTRAVENOUS at 09:01

## 2024-12-23 RX ADMIN — INSULIN LISPRO 1 UNITS: 100 INJECTION, SOLUTION INTRAVENOUS; SUBCUTANEOUS at 08:02

## 2024-12-23 RX ADMIN — FUROSEMIDE 40 MG: 10 INJECTION, SOLUTION INTRAMUSCULAR; INTRAVENOUS at 07:33

## 2024-12-23 RX ADMIN — PREGABALIN 150 MG: 75 CAPSULE ORAL at 08:00

## 2024-12-23 RX ADMIN — INSULIN LISPRO 3 UNITS: 100 INJECTION, SOLUTION INTRAVENOUS; SUBCUTANEOUS at 21:48

## 2024-12-23 RX ADMIN — CEFTRIAXONE 2000 MG: 2 INJECTION, SOLUTION INTRAVENOUS at 08:02

## 2024-12-23 RX ADMIN — BUPROPION HYDROCHLORIDE 150 MG: 150 TABLET, EXTENDED RELEASE ORAL at 08:01

## 2024-12-23 RX ADMIN — INSULIN LISPRO 3 UNITS: 100 INJECTION, SOLUTION INTRAVENOUS; SUBCUTANEOUS at 16:35

## 2024-12-23 RX ADMIN — LISINOPRIL 10 MG: 10 TABLET ORAL at 08:01

## 2024-12-23 RX ADMIN — ASPIRIN 81 MG 81 MG: 81 TABLET ORAL at 08:01

## 2024-12-23 RX ADMIN — AMLODIPINE BESYLATE 5 MG: 5 TABLET ORAL at 08:01

## 2024-12-23 RX ADMIN — DESMOPRESSIN ACETATE 80 MG: 0.2 TABLET ORAL at 16:35

## 2024-12-23 NOTE — ASSESSMENT & PLAN NOTE
70 yo male PMHx CAD s/p CABG, HTN, DM, CKD presents with worsening ALEXIS x 1 week.  Notable for orthopnea, increasing bilateral lower extremity edema and weight gain over the past few months.    Day #1 Lasix 40 mg IV twice daily  Low-salt diet, intake and output monitoring, daily weights  Follow-up 2D echo and cardiology consult

## 2024-12-23 NOTE — PROGRESS NOTES
Progress Note - Hospitalist   Name: Bryant Bustamante Jr. 69 y.o. male I MRN: [id number]  Unit/Bed#: 426-01 I Date of Admission: 12/22/2024   Date of Service: 12/23/2024 I Hospital Day: 1    Assessment & Plan  Pulmonary edema  70 yo male PMHx CAD s/p CABG, HTN, DM, CKD presents with worsening ALEXIS x 1 week.  Notable for orthopnea, increasing bilateral lower extremity edema and weight gain over the past few months.    Day #1 Lasix 40 mg IV twice daily  Low-salt diet, intake and output monitoring, daily weights  Follow-up 2D echo and cardiology consult  Atherosclerosis of native coronary artery of native heart without angina pectoris  Hx CABG in 2012. No longer follows with cardiology, historically saw Dr Beyer  Noted with moderate coronary artery calcifications on CT  Continue aspirin, statin, Toprol-XL  Cellulitis of left lower extremity  Noted to have pain and erythema of the left lower extremity received ceftriaxone in the ED, will continue  Hypertensive urgency  Resolved  Stage 3b chronic kidney disease (HCC)  Lab Results   Component Value Date    EGFR 40 12/22/2024    EGFR 44 12/22/2024    EGFR 44 (L) 08/08/2024    CREATININE 1.69 (H) 12/22/2024    CREATININE 1.56 (H) 12/22/2024    CREATININE 1.65 (H) 08/08/2024     Appears at baseline  Monitor closely with diuresis  Type 2 diabetes mellitus with polyneuropathy (HCC)  Lab Results   Component Value Date    HGBA1C 7.7 (H) 08/08/2024       Recent Labs     12/22/24  1555 12/22/24  2148 12/23/24  0743   POCGLU 187* 177* 157*       Blood Sugar Average: Last 72 hrs:  (P) [id number]  Update A1c   Hold oral medications  SSI & acu-checks ac/hs    Pulmonary nodules  Recommended CT chest w/ in 3-6 months then at 18-24 months for follow-up  Note, mild subcarinal lymphadenopathy suspected to be reactive that can also be re-eval at time of f/u CT chest wo as above    VTE Pharmacologic Prophylaxis:   Moderate Risk (Score 3-4) - Pharmacological DVT Prophylaxis Ordered:  heparin.    Mobility:   Basic Mobility Inpatient Raw Score: 24  JH-HLM Goal: 8: Walk 250 feet or more  JH-HLM Achieved: 5: Stand (1 or more minutes)  JH-HLM Goal achieved. Continue to encourage appropriate mobility.    Patient Centered Rounds: I performed bedside rounds with nursing staff today.   Discussions with Specialists or Other Care Team Provider:     Education and Discussions with Family / Patient:     Current Length of Stay: 1 day(s)  Current Patient Status: Inpatient   Certification Statement: The patient will continue to require additional inpatient hospital stay due to CHF  Discharge Plan: TBD    Code Status: Level 1 - Full Code    Subjective   Seen and examined, breathing improved  Sob with increasing edema over the past week     Objective :  Temp:  [97.4 °F (36.3 °C)-98.7 °F (37.1 °C)] 98.2 °F (36.8 °C)  HR:  [] 72  BP: (108-202)/() 124/66  Resp:  [16-26] 17  SpO2:  [87 %-99 %] 89 %  O2 Device: None (Room air)    Body mass index is 38.36 kg/m².     Input and Output Summary (last 24 hours):     Intake/Output Summary (Last 24 hours) at 12/23/2024 0749  Last data filed at 12/23/2024 0501  Gross per 24 hour   Intake 410 ml   Output 4125 ml   Net -3715 ml       Physical Exam  Vitals and nursing note reviewed.   Constitutional:       Appearance: Normal appearance.   Eyes:      General: No scleral icterus.  Cardiovascular:      Rate and Rhythm: Normal rate and regular rhythm.      Pulses: Normal pulses.      Heart sounds: No murmur heard.  Pulmonary:      Effort: Pulmonary effort is normal. No respiratory distress.      Breath sounds: Rales present.   Abdominal:      General: Abdomen is flat. Bowel sounds are normal. There is no distension.      Tenderness: There is no abdominal tenderness. There is no guarding.   Musculoskeletal:         General: Normal range of motion.      Right lower leg: Edema present.      Left lower leg: Edema present.   Skin:     Capillary Refill: Capillary refill takes 2  to 3 seconds.      Findings: Erythema present.      Comments: LLE erythema , no induration    Neurological:      General: No focal deficit present.      Mental Status: He is alert and oriented to person, place, and time.           Lines/Drains:        Telemetry:  Telemetry Orders (From admission, onward)               24 Hour Telemetry Monitoring  Continuous x 24 Hours (Telem)        Expiring   Question:  Reason for 24 Hour Telemetry  Answer:  PCI/EP study (including pacer and ICD implementation), Cardiac surgery, MI, abnormal cardiac cath, and chest pain- rule out MI                     Telemetry Reviewed: Normal Sinus Rhythm  Indication for Continued Telemetry Use: No indication for continued use. Will discontinue.                Lab Results: I have reviewed the following results:   Results from last 7 days   Lab Units 12/23/24  0418   WBC Thousand/uL 9.21   HEMOGLOBIN g/dL 10.3*   HEMATOCRIT % 32.4*   PLATELETS Thousands/uL 240   SEGS PCT % 60   LYMPHO PCT % 23   MONO PCT % 11   EOS PCT % 5     Results from last 7 days   Lab Units 12/22/24  1855   SODIUM mmol/L 138   POTASSIUM mmol/L 4.0   CHLORIDE mmol/L 105   CO2 mmol/L 21   BUN mg/dL 34*   CREATININE mg/dL 1.69*   ANION GAP mmol/L 12   CALCIUM mg/dL 9.1   ALBUMIN g/dL 4.0   TOTAL BILIRUBIN mg/dL 0.46   ALK PHOS U/L 71   ALT U/L 14   AST U/L 13   GLUCOSE RANDOM mg/dL 204*         Results from last 7 days   Lab Units 12/23/24  0743 12/22/24  2148 12/22/24  1555   POC GLUCOSE mg/dl 157* 177* 187*         Results from last 7 days   Lab Units 12/23/24  0418 12/22/24  0925   PROCALCITONIN ng/ml 0.08 0.07       Recent Cultures (last 7 days):         Imaging Results Review: I reviewed radiology reports from this admission including: CT chest.  Other Study Results Review: EKG was reviewed.     Last 24 Hours Medication List:     Current Facility-Administered Medications:     amLODIPine (NORVASC) tablet 5 mg, Daily **AND** lisinopril (ZESTRIL) tablet 10 mg, Daily     aspirin chewable tablet 81 mg, Daily    atorvastatin (LIPITOR) tablet 80 mg, Daily With Dinner    buPROPion (WELLBUTRIN XL) 24 hr tablet 150 mg, QAM    cefTRIAXone (ROCEPHIN) IVPB (premix in dextrose) 2,000 mg 50 mL, Q24H    enoxaparin (LOVENOX) subcutaneous injection 40 mg, Daily    furosemide (LASIX) injection 40 mg, BID (diuretic)    insulin lispro (HumALOG/ADMELOG) 100 units/mL subcutaneous injection 1-6 Units, TID AC **AND** Fingerstick Glucose (POCT), TID AC    insulin lispro (HumALOG/ADMELOG) 100 units/mL subcutaneous injection 1-6 Units, HS    metoprolol succinate (TOPROL-XL) 24 hr tablet 50 mg, Daily    nitroglycerin (NITROSTAT) SL tablet 0.4 mg, Once    oxyCODONE (ROXICODONE) IR tablet 5 mg, Q6H PRN    pregabalin (LYRICA) capsule 150 mg, BID    Insert peripheral IV, Once **AND** sodium chloride (PF) 0.9 % injection 3 mL, Q1H PRN    Administrative Statements   Today, Patient Was Seen By: Ezra Murrieta MD      **Please Note: This note may have been constructed using a voice recognition system.**

## 2024-12-23 NOTE — PLAN OF CARE
Problem: Potential for Falls  Goal: Patient will remain free of falls  Description: INTERVENTIONS:  - Educate patient/family on patient safety including physical limitations  - Instruct patient to call for assistance with activity   - Consult OT/PT to assist with strengthening/mobility   - Keep Call bell within reach  - Keep bed low and locked with side rails adjusted as appropriate  - Keep care items and personal belongings within reach  - Initiate and maintain comfort rounds  - Make Fall Risk Sign visible to staff  - Offer Toileting every 2 Hours, in advance of need  - Initiate/Maintain bed and chair alarm  - Obtain necessary fall risk management equipment: bed and chair alarm  - Apply yellow socks and bracelet for high fall risk patients  - Consider moving patient to room near nurses station  Outcome: Progressing     Problem: PAIN - ADULT  Goal: Verbalizes/displays adequate comfort level or baseline comfort level  Description: Interventions:  - Encourage patient to monitor pain and request assistance  - Assess pain using appropriate pain scale  - Administer analgesics based on type and severity of pain and evaluate response  - Implement non-pharmacological measures as appropriate and evaluate response  - Consider cultural and social influences on pain and pain management  - Notify physician/advanced practitioner if interventions unsuccessful or patient reports new pain  Outcome: Progressing     Problem: SAFETY ADULT  Goal: Patient will remain free of falls  Description: INTERVENTIONS:  - Educate patient/family on patient safety including physical limitations  - Instruct patient to call for assistance with activity   - Consult OT/PT to assist with strengthening/mobility   - Keep Call bell within reach  - Keep bed low and locked with side rails adjusted as appropriate  - Keep care items and personal belongings within reach  - Initiate and maintain comfort rounds  - Make Fall Risk Sign visible to staff  - Offer  Toileting every 2 Hours, in advance of need  - Initiate/Maintain bed and chair alarm  - Obtain necessary fall risk management equipment: bed and chair alarm  - Apply yellow socks and bracelet for high fall risk patients  - Consider moving patient to room near nurses station  Outcome: Progressing  Goal: Maintain or return to baseline ADL function  Description: INTERVENTIONS:  -  Assess patient's ability to carry out ADLs; assess patient's baseline for ADL function and identify physical deficits which impact ability to perform ADLs (bathing, care of mouth/teeth, toileting, grooming, dressing, etc.)  - Assess/evaluate cause of self-care deficits   - Assess range of motion  - Assess patient's mobility; develop plan if impaired  - Assess patient's need for assistive devices and provide as appropriate  - Encourage maximum independence but intervene and supervise when necessary  - Involve family in performance of ADLs  - Assess for home care needs following discharge   - Consider OT consult to assist with ADL evaluation and planning for discharge  - Provide patient education as appropriate  Outcome: Progressing  Goal: Maintains/Returns to pre admission functional level  Description: INTERVENTIONS:  - Perform AM-PAC 6 Click Basic Mobility/ Daily Activity assessment daily.  - Set and communicate daily mobility goal to care team and patient/family/caregiver.   - Collaborate with rehabilitation services on mobility goals if consulted  - Perform Range of Motion 3 times a day.  - Reposition patient every 2 hours.  - Dangle patient 3 times a day  - Stand patient 3 times a day  - Ambulate patient 3 times a day  - Out of bed to chair 3 times a day   - Out of bed for meals 3 times a day  - Out of bed for toileting  - Record patient progress and toleration of activity level   Outcome: Progressing     Problem: DISCHARGE PLANNING  Goal: Discharge to home or other facility with appropriate resources  Description: INTERVENTIONS:  -  Identify barriers to discharge w/patient and caregiver  - Arrange for needed discharge resources and transportation as appropriate  - Identify discharge learning needs (meds, wound care, etc.)  - Arrange for interpretive services to assist at discharge as needed  - Refer to Case Management Department for coordinating discharge planning if the patient needs post-hospital services based on physician/advanced practitioner order or complex needs related to functional status, cognitive ability, or social support system  Outcome: Progressing     Problem: Knowledge Deficit  Goal: Patient/family/caregiver demonstrates understanding of disease process, treatment plan, medications, and discharge instructions  Description: Complete learning assessment and assess knowledge base.  Interventions:  - Provide teaching at level of understanding  - Provide teaching via preferred learning methods  Outcome: Progressing

## 2024-12-23 NOTE — ASSESSMENT & PLAN NOTE
Hx CABG in 2012. No longer follows with cardiology, historically saw Dr Beyer  Noted with moderate coronary artery calcifications on CT  Continue aspirin, statin, Toprol-XL

## 2024-12-23 NOTE — PLAN OF CARE
Problem: Potential for Falls  Goal: Patient will remain free of falls  Description: INTERVENTIONS:  - Educate patient/family on patient safety including physical limitations  - Instruct patient to call for assistance with activity   - Consult OT/PT to assist with strengthening/mobility   - Keep Call bell within reach  - Keep bed low and locked with side rails adjusted as appropriate  - Keep care items and personal belongings within reach  - Initiate and maintain comfort rounds  - Make Fall Risk Sign visible to staff  - Apply yellow socks and bracelet for high fall risk patients  - Consider moving patient to room near nurses station  Outcome: Progressing     Problem: PAIN - ADULT  Goal: Verbalizes/displays adequate comfort level or baseline comfort level  Description: Interventions:  - Encourage patient to monitor pain and request assistance  - Assess pain using appropriate pain scale  - Administer analgesics based on type and severity of pain and evaluate response  - Implement non-pharmacological measures as appropriate and evaluate response  - Consider cultural and social influences on pain and pain management  - Notify physician/advanced practitioner if interventions unsuccessful or patient reports new pain  Outcome: Progressing     Problem: SAFETY ADULT  Goal: Patient will remain free of falls  Description: INTERVENTIONS:  - Educate patient/family on patient safety including physical limitations  - Instruct patient to call for assistance with activity   - Consult OT/PT to assist with strengthening/mobility   - Keep Call bell within reach  - Keep bed low and locked with side rails adjusted as appropriate  - Keep care items and personal belongings within reach  - Initiate and maintain comfort rounds  - Make Fall Risk Sign visible to staff  - Apply yellow socks and bracelet for high fall risk patients  - Consider moving patient to room near nurses station  Outcome: Progressing  Goal: Maintain or return to baseline  ADL function  Description: INTERVENTIONS:  -  Assess patient's ability to carry out ADLs; assess patient's baseline for ADL function and identify physical deficits which impact ability to perform ADLs (bathing, care of mouth/teeth, toileting, grooming, dressing, etc.)  - Assess/evaluate cause of self-care deficits   - Assess range of motion  - Assess patient's mobility; develop plan if impaired  - Assess patient's need for assistive devices and provide as appropriate  - Encourage maximum independence but intervene and supervise when necessary  - Involve family in performance of ADLs  - Assess for home care needs following discharge   - Consider OT consult to assist with ADL evaluation and planning for discharge  - Provide patient education as appropriate  Outcome: Progressing  Goal: Maintains/Returns to pre admission functional level  Description: INTERVENTIONS:  - Perform AM-PAC 6 Click Basic Mobility/ Daily Activity assessment daily.  - Set and communicate daily mobility goal to care team and patient/family/caregiver.   - Collaborate with rehabilitation services on mobility goals if consulted  - Out of bed for toileting  - Record patient progress and toleration of activity level   Outcome: Progressing     Problem: DISCHARGE PLANNING  Goal: Discharge to home or other facility with appropriate resources  Description: INTERVENTIONS:  - Identify barriers to discharge w/patient and caregiver  - Arrange for needed discharge resources and transportation as appropriate  - Identify discharge learning needs (meds, wound care, etc.)  - Arrange for interpretive services to assist at discharge as needed  - Refer to Case Management Department for coordinating discharge planning if the patient needs post-hospital services based on physician/advanced practitioner order or complex needs related to functional status, cognitive ability, or social support system  Outcome: Progressing

## 2024-12-23 NOTE — INCIDENTAL FINDINGS
The following findings require follow up:  Radiographic finding   Findin mm and smaller average diameter nodules    Follow up required: CT chest in 6 months then 18 months    Follow up should be done within 6 months and another CT at 18 months     Please notify the following clinician to assist with the follow up:   Dr. Holloway (Primary care provider)    Incidental finding results were discussed with the Patient by Ezra Murrieta MD on 24.   They expressed understanding and all questions answered.

## 2024-12-23 NOTE — CASE MANAGEMENT
Case Management Assessment & Discharge Planning Note    Patient name Bryant Bustamante Jr.  Location /426-01 MRN [id number]  : 1955 Date 2024       Current Admission Date: 2024  Current Admission Diagnosis:Pulmonary edema   Patient Active Problem List    Diagnosis Date Noted Date Diagnosed    Hypertensive urgency 2024     Pulmonary edema 2024     Pulmonary nodules 2024     Cellulitis of left lower extremity 2024     Pain of left lower extremity 10/30/2024     Pre-op examination 2024     Bee sting allergy 2024     Type 2 diabetes mellitus with polyneuropathy (HCC) 2024     Heart murmur 2024     Macrocytosis 2024     Severe recurrent major depression without psychotic features (HCC) 2024     Stage 3b chronic kidney disease (HCC) 10/25/2022     Chronic prescription opiate use 10/25/2022     Chronic pain syndrome 10/25/2022     Encounter for immunization 2021     Other fatigue 2021     Epigastric pain 2021     DDD (degenerative disc disease), lumbar      Contusion of right foot 2020     Obesity, morbid (HCC) 2020     Elevated PSA 2020     Immunization due 10/20/2020     Encounter for long-term (current) use of medications 2020     Neck pain 2020     Puncture wound of right foot 2020     Chronic low back pain without sciatica 2019     Primary osteoarthritis of both knees 2019     Essential hypertension, benign 2019     Mixed hyperlipidemia 2019     Atherosclerosis of native coronary artery of native heart without angina pectoris 2019       LOS (days): 1  Geometric Mean LOS (GMLOS) (days): 3  Days to GMLOS:1.9     OBJECTIVE:    Risk of Unplanned Readmission Score: 13.21         Current admission status: Inpatient  Referral Reason:  (discharge plan)    Preferred Pharmacy:   [id number] - [address] [place] [address]  STREET  [facility] [address]  Phone: [phone] Fax: [phone]    Primary Care Provider: Sonu Holloway DO    Primary Insurance: MEDICARE  Secondary Insurance: COMMERCIAL MISCELLANEOUS    ASSESSMENT:    CM met with patient at the bedside,baseline information  was obtained. CM discussed the role of CM in helping the patient develop a discharge plan and assist the patient in carry out their plan.    Patient lives with his son , first floor living. Using a walker. Sleeps in recliner.      Active Health [employer]xies       SONU MARTINEZ III Health Care Representative   Primary Phone: [phone] (Mobile)                 Readmission Root Cause  30 Day Readmission: No    Patient Information  Admitted from:: Home  Mental Status: Alert  During Assessment patient was accompanied by: Son  Assessment information provided by:: Son  Primary Caregiver: Family  Caregiver's Name:: Sonu arreguin  Caregiver's Relationship to Patient:: Family Member  Caregiver's Telephone Number:: [phone]  Support Systems: Son  County of Residence: [place]  What [place] you [place] in[place]  Home entry access options. Select all that apply.: Stairs  Number of steps to enter home.: 4  Do the steps have railings?: Yes  Type of Current Residence: [address] home  Upon entering residence, is there a bedroom on the main floor (no further steps)?: Yes (first floor living sleeps in recliner)  Upon entering residence, is there a bathroom on the main floor (no further steps)?: Yes  Living Arrangements: Lives w/ Son  Is patient a ?: No    Activities of Daily Living Prior to Admission  Functional Status: Assistance  Completes ADLs independently?: No  Level of ADL dependence: Assistance  Ambulates independently?: No  Level of ambulatory dependence: Assistance  Does patient use assisted devices?: Yes  Assisted Devices (DME) used: Walker, Straight Cane, Shower Chair  Does patient currently own DME?: Yes  What DME does the patient currently own?: Shower  Chair, Walker, Straight Cane  Does patient have a history of Outpatient Therapy (PT/OT)?: No  Does the patient have a history of Short-Term Rehab?: No  Does patient have a history of HHC?: No  Does patient currently have HHC?: No         Patient Information Continued  Income Source: Pension/intermediate  Does patient have prescription coverage?: Yes  Does patient receive dialysis treatments?: No  Does patient have a history of substance abuse?: No  Does patient have a history of Mental Health Diagnosis?: Yes (depression PCP manages)  Is patient receiving treatment for mental health?: Yes  Has patient received inpatient treatment related to mental health in the last 2 years?: No         Means of Transportation  Means of Transport to Appts:: Family transport          DISCHARGE DETAILS:    Discharge planning discussed with:: patient and son at bedside         Cm to follow for discharge needs.

## 2024-12-23 NOTE — ASSESSMENT & PLAN NOTE
Lab Results   Component Value Date    EGFR 40 12/22/2024    EGFR 44 12/22/2024    EGFR 44 (L) 08/08/2024    CREATININE 1.69 (H) 12/22/2024    CREATININE 1.56 (H) 12/22/2024    CREATININE 1.65 (H) 08/08/2024     Appears at baseline  Monitor closely with diuresis

## 2024-12-23 NOTE — CONSULTS
Consultation - Cardiology   Bryant Bustamante Jr. 69 y.o. male MRN: [id number]  Unit/Bed#: 426-01 Encounter: [id number]    Inpatient consult to Cardiology  Consult performed by: Tripp Levy PA-C  Consult ordered by: Chikis Rehman PA-C          Physician Requesting Consult: Ezra Murrieta MD  Reason for Consult / Principal Problem: Pulmonary edema    Assessment/Plan:  Acute Heart Failure with Preserved Ejection Fraction (HFpEF)  Patient presenting with signs/symptoms of volume overload   No prior history of CHF and not on home diuretics  CXR/CTA showed pulmonary edema and trace bilateral pleural effusions right > left    Continue IV Lasix 40 mg BID. Patient responded well to this dose yesterday with 3.715 L of diuresis, 2 lbs weight loss, and improvement of respiratory effort  Ensure low salt diet, strict I/O, fluid restriction    Coronary Artery Disease s/p CABG in 2014  Patient denies anginal symptoms and remains stable  Continue aspirin, statin, and beta-blocker therapy    Hypertension  Presented to ER with BP of 202/109 which lowered appropriately after sublingual and topical nitroglycerin administration.  BP is now adequately controlled and will continue to be treated with once daily Lisinopril 10 mg, Amlodipine 5 mg, and Metoprolol Succinate 50 mg.    Mixed Hyperlipidemia  Continue once daily Atorvastatin 80 mg    Type 2 Diabetes Mellitus  Continue management per primary team    Stage 3b Chronic Kidney Disease  Baseline Cr 1.5  Monitor with diuresis        History of Present Illness   HPI: Bryant Bustamante Jr. is a 69 y.o. year old male who has a history of CAD s/p CABG in 2014, hypertension, hyperlipidemia, T2DM, Stage 3 CKD, and obesity. He has seen cardiologist Dr. Beyer in the past but reports not following up routinely since his CABG in 2014.     His chief complaint is worsening exertional dyspnea that began about 1 week ago. He would become short of breath walking a short distance which  would resolve after resting for a few minutes. He had no exertional dyspnea or shortness of breath at rest prior to 1 week ago. He has noticed left lower extremity edema since he underwent a left total knee replacement in September. He saw his PCP with the complaint of a painful and swollen lower left extremity 10/30/2024, where a subsequent venous duplex was negative for DVT. He began noticing right lower extremity edema about 1 week ago. He fees he has gained weight consistently since his surgery. He denies chest pain, chest pressure, palpitations, orthopnea, PND. He reports taking his home medications as prescribed.     Due to his worsening exertional dyspnea he went to the ER yesterday 12/22/2024. An elevated D dimer resulted in a CTA of the chest where there was no evidence of pulmonary embolism, but showed pulmonary edema and small bilateral pleural effusions right > left. His BP was elevated upon arrival and he was given both sublingual and topical nitroglycerin which was effective in reducing BP. He was started on IV Lasix 40 mg BID resulting in 3.715 L of net diuresis yesterday 12/22/2024 and a weight loss of 2 lbs. Cardiology was consulted for further evaluation and management of volume status.      Review of Systems   Constitutional: Positive for weight gain.   Cardiovascular:  Positive for dyspnea on exertion and leg swelling.   All other systems reviewed and are negative.    Historical Information   Past Medical History:   Diagnosis Date    Anemia     last assessed: 1/11/2018    Arthritis     BPH without obstruction/lower urinary tract symptoms     last assessed: 12/3/2018    Coronary artery disease     last assessed: 7/22/2015    DDD (degenerative disc disease), lumbar     last assessed: 2/17/2014    Diabetes mellitus (HCC)     Generalized osteoarthritis     last assessed: 4/18/2019    Hx of fracture     ankle fracture(left)    Hyperlipidemia     last assessed: 7/22/2015    Hypertension     last  assessed: 2015    Knee injury     partial torn meniscus and acl    Lumbar spinal stenosis     last assessed: 2014    Pneumonia Dec 1994    Rotator cuff syndrome of shoulder and allied disorders     last assessed: 3/5/2014    Type 2 diabetes mellitus with diabetic polyneuropathy (HCC)     last assessed: 10/31/2018     Past Surgical History:   Procedure Laterality Date    CARDIAC SURGERY      CORONARY ARTERY BYPASS GRAFT      LUMBAR FUSION      SHOULDER ARTHROSCOPY Right     SHOULDER SURGERY      2 right/1 left rotator cuff repairs    SPINAL FUSION      spinal triple fusion L4-5     Social History     Substance and Sexual Activity   Alcohol Use Not Currently     Social History     Substance and Sexual Activity   Drug Use Not Currently    Types: Marijuana     Social History     Tobacco Use   Smoking Status Former    Current packs/day: 0.00    Average packs/day: 1 pack/day for 26.0 years (26.0 ttl pk-yrs)    Types: Cigarettes    Start date:     Quit date:     Years since quittin.9    Passive exposure: Past   Smokeless Tobacco Never     Family History: Family history non-contributory    Meds/Allergies   all current active meds have been reviewed       Allergies   Allergen Reactions    Naproxen GI Intolerance    Prednisone GI Intolerance       Objective   Vitals: Blood pressure 122/67, pulse 83, temperature 98.2 °F (36.8 °C), resp. rate 17, height 6' (1.829 m), weight 128 kg (282 lb 3 oz), SpO2 91%., Body mass index is 38.27 kg/m².,   Orthostatic Blood Pressures      Flowsheet Row Most Recent Value   Blood Pressure 122/67 filed at 2024 0819   Patient Position - Orthostatic VS Sitting filed at 2024 1409          Systolic (24hrs), Av , Min:108 , Max:169     Diastolic (24hrs), Av, Min:62, Max:86      Intake/Output Summary (Last 24 hours) at 2024 1040  Last data filed at 2024 0922  Gross per 24 hour   Intake 600 ml   Output 4425 ml   Net -3825 ml       Weight  (last 2 days)       Date/Time Weight    24 08:19:30 128 (282.19)    24 0550 128 (282.85)    24 0419 128 (282.85)    24 14:09:09 129 (284.83)    24 0913 136 (300.49)            Invasive Devices       Peripheral Intravenous Line  Duration             Peripheral IV 24 Right Antecubital 1 day                      Physical Exam  Constitutional:       General: He is not in acute distress.     Appearance: He is not diaphoretic.   HENT:      Head: Normocephalic and atraumatic.   Eyes:      Pupils: Pupils are equal, round, and reactive to light.   Neck:      Vascular: JVD present. No carotid bruit.   Cardiovascular:      Rate and Rhythm: Normal rate and regular rhythm.      Pulses:           Radial pulses are 2+ on the right side and 2+ on the left side.      Heart sounds: S1 normal and S2 normal. Murmur heard.      Crescendo decrescendo systolic murmur is present.   Pulmonary:      Effort: Pulmonary effort is normal.      Breath sounds: Examination of the right-lower field reveals decreased breath sounds. Examination of the left-lower field reveals decreased breath sounds. Decreased breath sounds present. No wheezing or rhonchi.   Abdominal:      General: There is no distension.      Tenderness: There is no abdominal tenderness.   Musculoskeletal:         General: Normal range of motion.      Cervical back: Normal range of motion.      Right lower le+ Pitting Edema present.      Left lower le+ Pitting Edema present.      Comments: Medial venous stasis ulcers present bilaterally   Skin:     General: Skin is warm and dry.      Coloration: Skin is not pale.      Findings: Erythema (Left lower extremity) present.   Neurological:      General: No focal deficit present.      Mental Status: He is alert and oriented to person, place, and time.      Gait: Gait normal.   Psychiatric:         Mood and Affect: Mood normal.         Behavior: Behavior normal.         Laboratory Results:         CBC with diff:   Results from last 7 days   Lab Units 12/23/24  0418 12/22/24  0925   WBC Thousand/uL 9.21 12.30*   HEMOGLOBIN g/dL 10.3* 11.7*   HEMATOCRIT % 32.4* 37.6   MCV fL 100* 103*   PLATELETS Thousands/uL 240 278   RBC Million/uL 3.23* 3.65*   MCH pg 31.9 32.1   MCHC g/dL 31.8 31.1*   RDW % 14.4 14.3   MPV fL 10.6 10.3   NRBC AUTO /100 WBCs 0 0         CMP:  Results from last 7 days   Lab Units 12/22/24  1855 12/22/24  0925   POTASSIUM mmol/L 4.0 3.9   CHLORIDE mmol/L 105 107   CO2 mmol/L 21 23   BUN mg/dL 34* 32*   CREATININE mg/dL 1.69* 1.56*   CALCIUM mg/dL 9.1 9.0   AST U/L 13  --    ALT U/L 14  --    ALK PHOS U/L 71  --    EGFR ml/min/1.73sq m 40 44         BMP:  Results from last 7 days   Lab Units 12/22/24  1855 12/22/24  0925   POTASSIUM mmol/L 4.0 3.9   CHLORIDE mmol/L 105 107   CO2 mmol/L 21 23   BUN mg/dL 34* 32*   CREATININE mg/dL 1.69* 1.56*   CALCIUM mg/dL 9.1 9.0       BNP:    Recent Labs     12/22/24  0925   *       Magnesium:   Results from last 7 days   Lab Units 12/23/24  0418 12/22/24  0925   MAGNESIUM mg/dL 1.9 1.8*       Coags:       TSH:       Hemoglobin A1C   Results from last 7 days   Lab Units 12/22/24  1430   HEMOGLOBIN A1C % 9.6*       Lipid Profile:         Cardiac testing:   No results found for this or any previous visit.    No results found for this or any previous visit.    No results found for this or any previous visit.    No results found for this or any previous visit.        Imaging: Results Review Statement: I reviewed radiology reports from this admission including: chest xray and CT chest.  CTA chest pe study  Result Date: 12/22/2024  Narrative: CTA - CHEST WITH IV CONTRAST - PULMONARY ANGIOGRAM INDICATION:   dyspnea x1 wk; elevated d-dimer. LLE swelling. no hx VTE. Per my review of the medical record, WBC 12.3, . COMPARISON: CXR 12/22/2024. TECHNIQUE: CT angiogram timed for optimal opacification of the pulmonary arteries.  Axial, sagittal, and  coronal 2D reformats created from source data.  Coronal 3D MIP postprocessing on the acquisition scanner. Radiation dose length product (DLP):  587.59 mGy-cm .  Radiation dose exposure minimized using iterative reconstruction and automated exposure control. IV Contrast:  85 mL of iohexol (OMNIPAQUE) FINDINGS: PULMONARY ARTERIES:  No pulmonary embolus. LUNGS: Moderate groundglass opacity and septal thickening due to pulmonary edema. 6 mm and smaller average diameter nodules, the largest in the left lower lobe (3/171) with others in the left upper lobe (3/51, 56) and the lateral segment right middle lobe (3/145). AIRWAYS: No significant filling defects. PLEURA: Small pleural effusions, slightly larger on the right. HEART/GREAT VESSELS: Mild cardiomegaly, CABG. Moderate calcification of the aortic valve leaflets which can contribute to aortic stenosis. Moderate coronary artery calcification indicating atherosclerotic heart disease. MEDIASTINUM AND JOSEFINA: Slightly enlarged subcarinal nodes. CHEST WALL AND LOWER NECK: Unremarkable. UPPER ABDOMEN: Cholelithiasis. OSSEOUS STRUCTURES: Mild degenerative disease in the spine.     Impression: No pulmonary embolus. Groundglass opacity and septal thickening due to pulmonary edema with small pleural effusions, larger on the right. 6 mm and smaller average diameter nodules, the largest in the left lower lobe. The patient is a former smoker and per 2017 Fleischner Society guidelines, follow-up should be obtained with a chest CT with no contrast at 3 to 6 months and then at 18 to 24 months. Mild subcarinal lymphadenopathy, likely reactive, but it can be reevaluated at the time of follow-up for the lung nodules on the chest CT with no contrast. This study was marked in Epic for immediate notification and follow-up. Workstation performed: [id number]     X-ray chest 1 view portable  Result Date: 12/22/2024  Narrative: XR CHEST PORTABLE INDICATION: Dyspnea. COMPARISON: Chest radiograph  1/15/2015 FINDINGS: Increased lung markings seen. Perihilar prominence No pneumothorax or pleural effusion. Enlarged cardiac silhouette. Sternotomy wires. Bones are unremarkable for age. Normal upper abdomen.     Impression: Increased lung markings and perihilar prominence, raise concern for congestion, mildly more pronounced No acute consolidation Resident: Michelle Murray I, the attending radiologist, have reviewed the images and agree with the final report above. Workstation performed: [id number]       EKG reviewed personally: Normal sinus rhythm with non-specific ST abnormalities  Telemetry reviewed personally: Sinus rhythm, no events    Assessment:  Principal Problem:    Pulmonary edema  Active Problems:    Atherosclerosis of native coronary artery of native heart without angina pectoris    Stage 3b chronic kidney disease (HCC)    Chronic pain syndrome    Type 2 diabetes mellitus with polyneuropathy (HCC)    Hypertensive urgency    Pulmonary nodules    Cellulitis of left lower extremity          Code Status: Level 1 - Full Code

## 2024-12-23 NOTE — ASSESSMENT & PLAN NOTE
Noted to have pain and erythema of the left lower extremity received ceftriaxone in the ED, will continue

## 2024-12-23 NOTE — ASSESSMENT & PLAN NOTE
Lab Results   Component Value Date    HGBA1C 7.7 (H) 08/08/2024       Recent Labs     12/22/24  1555 12/22/24  2148 12/23/24  0743   POCGLU 187* 177* 157*       Blood Sugar Average: Last 72 hrs:  (P) 173.6095405393375005  Update A1c   Hold oral medications  SSI & acu-checks ac/hs

## 2024-12-24 LAB
ANION GAP SERPL CALCULATED.3IONS-SCNC: 11 MMOL/L (ref 4–13)
BUN SERPL-MCNC: 33 MG/DL (ref 5–25)
CALCIUM SERPL-MCNC: 8.9 MG/DL (ref 8.4–10.2)
CHLORIDE SERPL-SCNC: 101 MMOL/L (ref 96–108)
CO2 SERPL-SCNC: 27 MMOL/L (ref 21–32)
CREAT SERPL-MCNC: 1.81 MG/DL (ref 0.6–1.3)
GFR SERPL CREATININE-BSD FRML MDRD: 37 ML/MIN/1.73SQ M
GLUCOSE SERPL-MCNC: 170 MG/DL (ref 65–140)
GLUCOSE SERPL-MCNC: 172 MG/DL (ref 65–140)
GLUCOSE SERPL-MCNC: 219 MG/DL (ref 65–140)
GLUCOSE SERPL-MCNC: 224 MG/DL (ref 65–140)
GLUCOSE SERPL-MCNC: 277 MG/DL (ref 65–140)
POTASSIUM SERPL-SCNC: 4 MMOL/L (ref 3.5–5.3)
SODIUM SERPL-SCNC: 139 MMOL/L (ref 135–147)

## 2024-12-24 PROCEDURE — 80048 BASIC METABOLIC PNL TOTAL CA: CPT | Performed by: FAMILY MEDICINE

## 2024-12-24 PROCEDURE — 99232 SBSQ HOSP IP/OBS MODERATE 35: CPT | Performed by: FAMILY MEDICINE

## 2024-12-24 PROCEDURE — 82948 REAGENT STRIP/BLOOD GLUCOSE: CPT

## 2024-12-24 RX ADMIN — PREGABALIN 150 MG: 75 CAPSULE ORAL at 08:39

## 2024-12-24 RX ADMIN — INSULIN LISPRO 2 UNITS: 100 INJECTION, SOLUTION INTRAVENOUS; SUBCUTANEOUS at 16:48

## 2024-12-24 RX ADMIN — CEFTRIAXONE 2000 MG: 2 INJECTION, SOLUTION INTRAVENOUS at 08:40

## 2024-12-24 RX ADMIN — DESMOPRESSIN ACETATE 80 MG: 0.2 TABLET ORAL at 16:48

## 2024-12-24 RX ADMIN — OXYCODONE HYDROCHLORIDE 5 MG: 5 TABLET ORAL at 14:19

## 2024-12-24 RX ADMIN — OXYCODONE HYDROCHLORIDE 5 MG: 5 TABLET ORAL at 06:05

## 2024-12-24 RX ADMIN — OXYCODONE HYDROCHLORIDE 5 MG: 5 TABLET ORAL at 20:29

## 2024-12-24 RX ADMIN — ENOXAPARIN SODIUM 40 MG: 40 INJECTION SUBCUTANEOUS at 08:39

## 2024-12-24 RX ADMIN — AMLODIPINE BESYLATE 5 MG: 5 TABLET ORAL at 08:39

## 2024-12-24 RX ADMIN — METOPROLOL SUCCINATE 50 MG: 50 TABLET, EXTENDED RELEASE ORAL at 08:39

## 2024-12-24 RX ADMIN — INSULIN LISPRO 1 UNITS: 100 INJECTION, SOLUTION INTRAVENOUS; SUBCUTANEOUS at 08:38

## 2024-12-24 RX ADMIN — FUROSEMIDE 40 MG: 10 INJECTION, SOLUTION INTRAMUSCULAR; INTRAVENOUS at 08:38

## 2024-12-24 RX ADMIN — FUROSEMIDE 40 MG: 10 INJECTION, SOLUTION INTRAMUSCULAR; INTRAVENOUS at 16:48

## 2024-12-24 RX ADMIN — LISINOPRIL 10 MG: 10 TABLET ORAL at 08:39

## 2024-12-24 RX ADMIN — BUPROPION HYDROCHLORIDE 150 MG: 150 TABLET, EXTENDED RELEASE ORAL at 08:39

## 2024-12-24 RX ADMIN — PREGABALIN 150 MG: 75 CAPSULE ORAL at 20:29

## 2024-12-24 RX ADMIN — INSULIN LISPRO 2 UNITS: 100 INJECTION, SOLUTION INTRAVENOUS; SUBCUTANEOUS at 22:05

## 2024-12-24 RX ADMIN — ASPIRIN 81 MG 81 MG: 81 TABLET ORAL at 08:39

## 2024-12-24 RX ADMIN — INSULIN LISPRO 4 UNITS: 100 INJECTION, SOLUTION INTRAVENOUS; SUBCUTANEOUS at 11:49

## 2024-12-24 NOTE — ASSESSMENT & PLAN NOTE
70 yo male PMHx CAD s/p CABG, HTN, DM, CKD presents with worsening ALEXIS x 1 week.  Notable for orthopnea, increasing bilateral lower extremity edema and weight gain over the past few months.    Day #2 Lasix 40 mg IV twice daily, weights declining, negative fluid balance  Renal function worsened, continue to monitor closely   Low-salt diet, intake and output monitoring, daily weights  Echo reviewed , recommend outpatient sleep study

## 2024-12-24 NOTE — ASSESSMENT & PLAN NOTE
Lab Results   Component Value Date    HGBA1C 9.6 (H) 12/22/2024       Recent Labs     12/23/24  1109 12/23/24  1646 12/23/24  2142 12/24/24  0743   POCGLU 230* 178* 239* 170*       Blood Sugar Average: Last 72 hrs:  (P) 191.9417171295146238  Update A1c   Hold oral medications  SSI & acu-checks ac/hs

## 2024-12-24 NOTE — PROGRESS NOTES
Progress Note - Hospitalist   Name: Bryant Bustamante Jr. 69 y.o. male I MRN: [id number]  Unit/Bed#: 426-01 I Date of Admission: 12/22/2024   Date of Service: 12/24/2024 I Hospital Day: 2    Assessment & Plan  Pulmonary edema  70 yo male PMHx CAD s/p CABG, HTN, DM, CKD presents with worsening ALEXIS x 1 week.  Notable for orthopnea, increasing bilateral lower extremity edema and weight gain over the past few months.    Day #2 Lasix 40 mg IV twice daily, weights declining, negative fluid balance  Renal function worsened, continue to monitor closely   Low-salt diet, intake and output monitoring, daily weights  Echo reviewed , recommend outpatient sleep study   Atherosclerosis of native coronary artery of native heart without angina pectoris  Hx CABG in 2012. No longer follows with cardiology, historically saw Dr Beyer  Noted with moderate coronary artery calcifications on CT  Continue aspirin, statin, Toprol-XL  Cellulitis of left lower extremity  Noted to have pain and erythema of the left lower extremity received ceftriaxone in the ED, will continue  Hypertensive urgency  Resolved  Stage 3b chronic kidney disease (HCC)  Lab Results   Component Value Date    EGFR 37 12/24/2024    EGFR 40 12/22/2024    EGFR 44 12/22/2024    CREATININE 1.81 (H) 12/24/2024    CREATININE 1.69 (H) 12/22/2024    CREATININE 1.56 (H) 12/22/2024     Appears at baseline  Monitor closely with diuresis  Type 2 diabetes mellitus with polyneuropathy (HCC)  Lab Results   Component Value Date    HGBA1C 9.6 (H) 12/22/2024       Recent Labs     12/23/24  1109 12/23/24  1646 12/23/24  2142 12/24/24  0743   POCGLU 230* 178* 239* 170*       Blood Sugar Average: Last 72 hrs:  (P) 191.7604636050497533  Update A1c   Hold oral medications  SSI & acu-checks ac/hs    Pulmonary nodules  Recommended CT chest w/ in 3-6 months then at 18-24 months for follow-up  Note, mild subcarinal lymphadenopathy suspected to be reactive that can also be re-eval at time of f/u CT  chest wo as above  Chronic pain syndrome  Tramadol as needed    VTE Pharmacologic Prophylaxis:   Moderate Risk (Score 3-4) - Pharmacological DVT Prophylaxis Ordered: enoxaparin (Lovenox).    Mobility:   Basic Mobility Inpatient Raw Score: 24  JH-HLM Goal: 8: Walk 250 feet or more  JH-HLM Achieved: 7: Walk 25 feet or more  JH-HLM Goal achieved. Continue to encourage appropriate mobility.    Patient Centered Rounds: I performed bedside rounds with nursing staff today.   Discussions with Specialists or Other Care Team Provider: cards    Education and Discussions with Family / Patient: Updated  (daughter) at bedside.    Current Length of Stay: 2 day(s)  Current Patient Status: Inpatient   Certification Statement: The patient will continue to require additional inpatient hospital stay due to CHF  Discharge Plan: Anticipate discharge in 24-48 hrs to home.    Code Status: Level 1 - Full Code    Subjective   Seen and examined, no acute complaints or events overnight     Objective :  Temp:  [98.1 °F (36.7 °C)-99.2 °F (37.3 °C)] 98.1 °F (36.7 °C)  HR:  [67-76] 70  BP: (105-124)/(59-72) 123/69  Resp:  [18-20] 19  SpO2:  [89 %-94 %] 94 %    Body mass index is 37.55 kg/m².     Input and Output Summary (last 24 hours):     Intake/Output Summary (Last 24 hours) at 12/24/2024 0950  Last data filed at 12/24/2024 0900  Gross per 24 hour   Intake 1073 ml   Output 625 ml   Net 448 ml       Physical Exam  Constitutional:       General: He is not in acute distress.     Appearance: Normal appearance.   Cardiovascular:      Rate and Rhythm: Normal rate and regular rhythm.      Pulses: Normal pulses.   Pulmonary:      Effort: Pulmonary effort is normal.      Breath sounds: Normal breath sounds.   Abdominal:      General: Abdomen is flat. Bowel sounds are normal. There is no distension.      Tenderness: There is no abdominal tenderness. There is no guarding.   Musculoskeletal:      Right lower leg: Edema present.      Left  lower leg: Edema present.   Skin:     Findings: Erythema present.      Comments: Erythema of LLE improving    Neurological:      General: No focal deficit present.      Mental Status: He is alert and oriented to person, place, and time.           Lines/Drains:              Lab Results: I have reviewed the following results:   Results from last 7 days   Lab Units 12/23/24  0418   WBC Thousand/uL 9.21   HEMOGLOBIN g/dL 10.3*   HEMATOCRIT % 32.4*   PLATELETS Thousands/uL 240   SEGS PCT % 60   LYMPHO PCT % 23   MONO PCT % 11   EOS PCT % 5     Results from last 7 days   Lab Units 12/24/24  0554 12/22/24  1855   SODIUM mmol/L 139 138   POTASSIUM mmol/L 4.0 4.0   CHLORIDE mmol/L 101 105   CO2 mmol/L 27 21   BUN mg/dL 33* 34*   CREATININE mg/dL 1.81* 1.69*   ANION GAP mmol/L 11 12   CALCIUM mg/dL 8.9 9.1   ALBUMIN g/dL  --  4.0   TOTAL BILIRUBIN mg/dL  --  0.46   ALK PHOS U/L  --  71   ALT U/L  --  14   AST U/L  --  13   GLUCOSE RANDOM mg/dL 172* 204*         Results from last 7 days   Lab Units 12/24/24  0743 12/23/24  2142 12/23/24  1646 12/23/24  1109 12/23/24  0743 12/22/24  2148 12/22/24  1555   POC GLUCOSE mg/dl 170* 239* 178* 230* 157* 177* 187*     Results from last 7 days   Lab Units 12/22/24  1430   HEMOGLOBIN A1C % 9.6*     Results from last 7 days   Lab Units 12/23/24  0418 12/22/24  0925   PROCALCITONIN ng/ml 0.08 0.07       Recent Cultures (last 7 days):         Imaging Results Review: I reviewed radiology reports from this admission including: echo.  Other Study Results Review: EKG was reviewed.     Last 24 Hours Medication List:     Current Facility-Administered Medications:     amLODIPine (NORVASC) tablet 5 mg, Daily **AND** lisinopril (ZESTRIL) tablet 10 mg, Daily    aspirin chewable tablet 81 mg, Daily    atorvastatin (LIPITOR) tablet 80 mg, Daily With Dinner    buPROPion (WELLBUTRIN XL) 24 hr tablet 150 mg, QAM    cefTRIAXone (ROCEPHIN) IVPB (premix in dextrose) 2,000 mg 50 mL, Q24H, Last Rate: 2,000 mg  (12/24/24 2054)    enoxaparin (LOVENOX) subcutaneous injection 40 mg, Daily    furosemide (LASIX) injection 40 mg, BID (diuretic)    insulin lispro (HumALOG/ADMELOG) 100 units/mL subcutaneous injection 1-6 Units, TID AC **AND** Fingerstick Glucose (POCT), TID AC    insulin lispro (HumALOG/ADMELOG) 100 units/mL subcutaneous injection 1-6 Units, HS    metoprolol succinate (TOPROL-XL) 24 hr tablet 50 mg, Daily    nitroglycerin (NITROSTAT) SL tablet 0.4 mg, Once    oxyCODONE (ROXICODONE) IR tablet 5 mg, Q6H PRN    pregabalin (LYRICA) capsule 150 mg, BID    Insert peripheral IV, Once **AND** sodium chloride (PF) 0.9 % injection 3 mL, Q1H PRN    Administrative Statements   Today, Patient Was Seen By: Ezra Murrieta MD      **Please Note: This note may have been constructed using a voice recognition system.**

## 2024-12-24 NOTE — ASSESSMENT & PLAN NOTE
Lab Results   Component Value Date    EGFR 37 12/24/2024    EGFR 40 12/22/2024    EGFR 44 12/22/2024    CREATININE 1.81 (H) 12/24/2024    CREATININE 1.69 (H) 12/22/2024    CREATININE 1.56 (H) 12/22/2024     Appears at baseline  Monitor closely with diuresis

## 2024-12-25 LAB
ANION GAP SERPL CALCULATED.3IONS-SCNC: 12 MMOL/L (ref 4–13)
BUN SERPL-MCNC: 40 MG/DL (ref 5–25)
CALCIUM SERPL-MCNC: 8.9 MG/DL (ref 8.4–10.2)
CHLORIDE SERPL-SCNC: 98 MMOL/L (ref 96–108)
CO2 SERPL-SCNC: 27 MMOL/L (ref 21–32)
CREAT SERPL-MCNC: 1.94 MG/DL (ref 0.6–1.3)
GFR SERPL CREATININE-BSD FRML MDRD: 34 ML/MIN/1.73SQ M
GLUCOSE SERPL-MCNC: 155 MG/DL (ref 65–140)
GLUCOSE SERPL-MCNC: 177 MG/DL (ref 65–140)
GLUCOSE SERPL-MCNC: 248 MG/DL (ref 65–140)
GLUCOSE SERPL-MCNC: 253 MG/DL (ref 65–140)
GLUCOSE SERPL-MCNC: 277 MG/DL (ref 65–140)
MAGNESIUM SERPL-MCNC: 1.9 MG/DL (ref 1.9–2.7)
POTASSIUM SERPL-SCNC: 3.7 MMOL/L (ref 3.5–5.3)
SODIUM SERPL-SCNC: 137 MMOL/L (ref 135–147)

## 2024-12-25 PROCEDURE — 80048 BASIC METABOLIC PNL TOTAL CA: CPT | Performed by: FAMILY MEDICINE

## 2024-12-25 PROCEDURE — 99232 SBSQ HOSP IP/OBS MODERATE 35: CPT | Performed by: FAMILY MEDICINE

## 2024-12-25 PROCEDURE — 83735 ASSAY OF MAGNESIUM: CPT | Performed by: FAMILY MEDICINE

## 2024-12-25 PROCEDURE — 82948 REAGENT STRIP/BLOOD GLUCOSE: CPT

## 2024-12-25 RX ORDER — HEPARIN SODIUM 5000 [USP'U]/ML
5000 INJECTION, SOLUTION INTRAVENOUS; SUBCUTANEOUS EVERY 8 HOURS SCHEDULED
Status: DISCONTINUED | OUTPATIENT
Start: 2024-12-25 | End: 2024-12-28 | Stop reason: HOSPADM

## 2024-12-25 RX ADMIN — HEPARIN SODIUM 5000 UNITS: 5000 INJECTION, SOLUTION INTRAVENOUS; SUBCUTANEOUS at 15:02

## 2024-12-25 RX ADMIN — INSULIN LISPRO 3 UNITS: 100 INJECTION, SOLUTION INTRAVENOUS; SUBCUTANEOUS at 21:08

## 2024-12-25 RX ADMIN — AMLODIPINE BESYLATE 5 MG: 5 TABLET ORAL at 09:07

## 2024-12-25 RX ADMIN — BUPROPION HYDROCHLORIDE 150 MG: 150 TABLET, EXTENDED RELEASE ORAL at 09:07

## 2024-12-25 RX ADMIN — OXYCODONE HYDROCHLORIDE 5 MG: 5 TABLET ORAL at 21:08

## 2024-12-25 RX ADMIN — INSULIN LISPRO 4 UNITS: 100 INJECTION, SOLUTION INTRAVENOUS; SUBCUTANEOUS at 16:44

## 2024-12-25 RX ADMIN — INSULIN LISPRO 3 UNITS: 100 INJECTION, SOLUTION INTRAVENOUS; SUBCUTANEOUS at 11:48

## 2024-12-25 RX ADMIN — PREGABALIN 150 MG: 75 CAPSULE ORAL at 21:08

## 2024-12-25 RX ADMIN — CEFTRIAXONE 2000 MG: 2 INJECTION, SOLUTION INTRAVENOUS at 09:07

## 2024-12-25 RX ADMIN — DESMOPRESSIN ACETATE 80 MG: 0.2 TABLET ORAL at 16:44

## 2024-12-25 RX ADMIN — OXYCODONE HYDROCHLORIDE 5 MG: 5 TABLET ORAL at 06:35

## 2024-12-25 RX ADMIN — HEPARIN SODIUM 5000 UNITS: 5000 INJECTION, SOLUTION INTRAVENOUS; SUBCUTANEOUS at 21:08

## 2024-12-25 RX ADMIN — PREGABALIN 150 MG: 75 CAPSULE ORAL at 09:07

## 2024-12-25 RX ADMIN — ASPIRIN 81 MG 81 MG: 81 TABLET ORAL at 09:08

## 2024-12-25 RX ADMIN — HEPARIN SODIUM 5000 UNITS: 5000 INJECTION, SOLUTION INTRAVENOUS; SUBCUTANEOUS at 09:07

## 2024-12-25 RX ADMIN — METOPROLOL SUCCINATE 50 MG: 50 TABLET, EXTENDED RELEASE ORAL at 09:07

## 2024-12-25 RX ADMIN — INSULIN LISPRO 1 UNITS: 100 INJECTION, SOLUTION INTRAVENOUS; SUBCUTANEOUS at 09:08

## 2024-12-25 RX ADMIN — OXYCODONE HYDROCHLORIDE 5 MG: 5 TABLET ORAL at 12:27

## 2024-12-25 NOTE — ASSESSMENT & PLAN NOTE
70 yo male PMHx CAD s/p CABG, HTN, DM, CKD presents with worsening ALEXIS x 1 week.  Notable for orthopnea, increasing bilateral lower extremity edema and weight gain over the past few months.    Day #2 Lasix 40 mg IV twice daily, weights declining, negative fluid balance 12/24  Hold diuretics for 24 hours 12/25  Renal function worsened, continue to monitor closely   Low-salt diet, intake and output monitoring, daily weights  Echo reviewed , recommend outpatient sleep study

## 2024-12-25 NOTE — ASSESSMENT & PLAN NOTE
Lab Results   Component Value Date    HGBA1C 9.6 (H) 12/22/2024       Recent Labs     12/24/24  1121 12/24/24  1623 12/24/24  2145 12/25/24  0716   POCGLU 277* 219* 224* 155*       Blood Sugar Average: Last 72 hrs:  (P) [id number]  Update A1c   Hold oral medications  SSI & acu-checks ac/hs

## 2024-12-25 NOTE — ASSESSMENT & PLAN NOTE
Noted to have pain and erythema of the left lower extremity received ceftriaxone in the ED, will continue  Area of erythema improved, continue ceftriaxone to complete 5-7 days of ab 12/25

## 2024-12-25 NOTE — ASSESSMENT & PLAN NOTE
Lab Results   Component Value Date    EGFR 34 12/25/2024    EGFR 37 12/24/2024    EGFR 40 12/22/2024    CREATININE 1.94 (H) 12/25/2024    CREATININE 1.81 (H) 12/24/2024    CREATININE 1.69 (H) 12/22/2024     Renal function worsened x 2 days, dc lisinopril and hold diuretics for 24 hours 12/25/24

## 2024-12-25 NOTE — PROGRESS NOTES
Progress Note - Hospitalist   Name: Bryant Bustamante Jr. 69 y.o. male I MRN: [id number]  Unit/Bed#: 426-01 I Date of Admission: 12/22/2024   Date of Service: 12/25/2024 I Hospital Day: 3    Assessment & Plan  Pulmonary edema  68 yo male PMHx CAD s/p CABG, HTN, DM, CKD presents with worsening ALEXIS x 1 week.  Notable for orthopnea, increasing bilateral lower extremity edema and weight gain over the past few months.    Day #2 Lasix 40 mg IV twice daily, weights declining, negative fluid balance 12/24  Hold diuretics for 24 hours 12/25  Renal function worsened, continue to monitor closely   Low-salt diet, intake and output monitoring, daily weights  Echo reviewed , recommend outpatient sleep study   Atherosclerosis of native coronary artery of native heart without angina pectoris  Hx CABG in 2012. No longer follows with cardiology, historically saw Dr Beyer  Noted with moderate coronary artery calcifications on CT  Continue aspirin, statin, Toprol-XL  Cellulitis of left lower extremity  Noted to have pain and erythema of the left lower extremity received ceftriaxone in the ED, will continue  Area of erythema improved, continue ceftriaxone to complete 5-7 days of ab 12/25  Hypertensive urgency  Resolved  Stage 3b chronic kidney disease (HCC)  Lab Results   Component Value Date    EGFR 34 12/25/2024    EGFR 37 12/24/2024    EGFR 40 12/22/2024    CREATININE 1.94 (H) 12/25/2024    CREATININE 1.81 (H) 12/24/2024    CREATININE 1.69 (H) 12/22/2024     Renal function worsened x 2 days, dc lisinopril and hold diuretics for 24 hours 12/25/24  Type 2 diabetes mellitus with polyneuropathy (HCC)  Lab Results   Component Value Date    HGBA1C 9.6 (H) 12/22/2024       Recent Labs     12/24/24  1121 12/24/24  1623 12/24/24  2145 12/25/24  0716   POCGLU 277* 219* 224* 155*       Blood Sugar Average: Last 72 hrs:  (P) [id number]  Update A1c   Hold oral medications  SSI & acu-checks ac/hs    Pulmonary nodules  Recommended CT chest  w/ in 3-6 months then at 18-24 months for follow-up  Note, mild subcarinal lymphadenopathy suspected to be reactive that can also be re-eval at time of f/u CT chest wo as above  Chronic pain syndrome  Tramadol as needed    VTE Pharmacologic Prophylaxis:   Moderate Risk (Score 3-4) - Pharmacological DVT Prophylaxis Ordered: heparin.    Mobility:   Basic Mobility Inpatient Raw Score: 24  JH-HLM Goal: 8: Walk 250 feet or more  JH-HLM Achieved: 7: Walk 25 feet or more  JH-HLM Goal achieved. Continue to encourage appropriate mobility.    Patient Centered Rounds: I performed bedside rounds with nursing staff today.   Discussions with Specialists or Other Care Team Provider:     Education and Discussions with Family / Patient: Updated  (daughter) via phone.    Current Length of Stay: 3 day(s)  Current Patient Status: Inpatient   Certification Statement: The patient will continue to require additional inpatient hospital stay due to CHF  Discharge Plan: Anticipate discharge in 48 hrs to home.    Code Status: Level 1 - Full Code    Subjective   Seen nad examined, feels better today. No SOB or cp     Objective :  Temp:  [97.9 °F (36.6 °C)-98.2 °F (36.8 °C)] 98 °F (36.7 °C)  HR:  [63-70] 64  BP: (123-126)/(70-71) 126/70  Resp:  [18-23] 18  SpO2:  [89 %-94 %] 94 %    Body mass index is 37.05 kg/m².     Input and Output Summary (last 24 hours):     Intake/Output Summary (Last 24 hours) at 12/25/2024 0934  Last data filed at 12/25/2024 0837  Gross per 24 hour   Intake 1273 ml   Output 2775 ml   Net -1502 ml       Physical Exam  Constitutional:       Appearance: Normal appearance.   Cardiovascular:      Rate and Rhythm: Normal rate and regular rhythm.      Pulses: Normal pulses.      Heart sounds: No murmur heard.  Pulmonary:      Effort: Pulmonary effort is normal. No respiratory distress.      Breath sounds: No wheezing or rales.   Abdominal:      General: Abdomen is flat. Bowel sounds are normal. There is no  distension.      Tenderness: There is no abdominal tenderness. There is no guarding.   Musculoskeletal:      Right lower leg: Edema present.      Left lower leg: Edema present.   Skin:     General: Skin is warm.      Capillary Refill: Capillary refill takes 2 to 3 seconds.      Coloration: Skin is not jaundiced.      Findings: Erythema present.   Neurological:      General: No focal deficit present.      Mental Status: He is alert and oriented to person, place, and time.           Lines/Drains:              Lab Results: I have reviewed the following results:   Results from last 7 days   Lab Units 12/23/24  0418   WBC Thousand/uL 9.21   HEMOGLOBIN g/dL 10.3*   HEMATOCRIT % 32.4*   PLATELETS Thousands/uL 240   SEGS PCT % 60   LYMPHO PCT % 23   MONO PCT % 11   EOS PCT % 5     Results from last 7 days   Lab Units 12/25/24  0532 12/24/24  0554 12/22/24  1855   SODIUM mmol/L 137   < > 138   POTASSIUM mmol/L 3.7   < > 4.0   CHLORIDE mmol/L 98   < > 105   CO2 mmol/L 27   < > 21   BUN mg/dL 40*   < > 34*   CREATININE mg/dL 1.94*   < > 1.69*   ANION GAP mmol/L 12   < > 12   CALCIUM mg/dL 8.9   < > 9.1   ALBUMIN g/dL  --   --  4.0   TOTAL BILIRUBIN mg/dL  --   --  0.46   ALK PHOS U/L  --   --  71   ALT U/L  --   --  14   AST U/L  --   --  13   GLUCOSE RANDOM mg/dL 177*   < > 204*    < > = values in this interval not displayed.         Results from last 7 days   Lab Units 12/25/24  0716 12/24/24  2145 12/24/24  1623 12/24/24  1121 12/24/24  0743 12/23/24  2142 12/23/24  1646 12/23/24  1109 12/23/24  0743 12/22/24  2148 12/22/24  1555   POC GLUCOSE mg/dl 155* 224* 219* 277* 170* 239* 178* 230* 157* 177* 187*     Results from last 7 days   Lab Units 12/22/24  1430   HEMOGLOBIN A1C % 9.6*     Results from last 7 days   Lab Units 12/23/24  0418 12/22/24  0925   PROCALCITONIN ng/ml 0.08 0.07       Recent Cultures (last 7 days):         Imaging Results Review: No pertinent imaging studies reviewed.  Other Study Results Review: No  additional pertinent studies reviewed.    Last 24 Hours Medication List:     Current Facility-Administered Medications:     amLODIPine (NORVASC) tablet 5 mg, Daily **AND** [DISCONTINUED] lisinopril (ZESTRIL) tablet 10 mg, Daily    aspirin chewable tablet 81 mg, Daily    atorvastatin (LIPITOR) tablet 80 mg, Daily With Dinner    buPROPion (WELLBUTRIN XL) 24 hr tablet 150 mg, QAM    cefTRIAXone (ROCEPHIN) IVPB (premix in dextrose) 2,000 mg 50 mL, Q24H, Last Rate: 2,000 mg (12/25/24 0907)    heparin (porcine) subcutaneous injection 5,000 Units, Q8H ALEJANDRO    insulin lispro (HumALOG/ADMELOG) 100 units/mL subcutaneous injection 1-6 Units, TID AC **AND** Fingerstick Glucose (POCT), TID AC    insulin lispro (HumALOG/ADMELOG) 100 units/mL subcutaneous injection 1-6 Units, HS    metoprolol succinate (TOPROL-XL) 24 hr tablet 50 mg, Daily    nitroglycerin (NITROSTAT) SL tablet 0.4 mg, Once    oxyCODONE (ROXICODONE) IR tablet 5 mg, Q6H PRN    pregabalin (LYRICA) capsule 150 mg, BID    Insert peripheral IV, Once **AND** sodium chloride (PF) 0.9 % injection 3 mL, Q1H PRN    Administrative Statements   Today, Patient Was Seen By: Ezra Murrieta MD      **Please Note: This note may have been constructed using a voice recognition system.**

## 2024-12-26 ENCOUNTER — APPOINTMENT (INPATIENT)
Dept: RADIOLOGY | Facility: HOSPITAL | Age: 69
DRG: 291 | End: 2024-12-26
Payer: MEDICARE

## 2024-12-26 ENCOUNTER — APPOINTMENT (INPATIENT)
Dept: ULTRASOUND IMAGING | Facility: HOSPITAL | Age: 69
DRG: 291 | End: 2024-12-26
Payer: MEDICARE

## 2024-12-26 LAB
ANION GAP SERPL CALCULATED.3IONS-SCNC: 8 MMOL/L (ref 4–13)
BUN SERPL-MCNC: 41 MG/DL (ref 5–25)
CALCIUM SERPL-MCNC: 9 MG/DL (ref 8.4–10.2)
CHLORIDE SERPL-SCNC: 99 MMOL/L (ref 96–108)
CO2 SERPL-SCNC: 28 MMOL/L (ref 21–32)
CREAT SERPL-MCNC: 1.95 MG/DL (ref 0.6–1.3)
GFR SERPL CREATININE-BSD FRML MDRD: 33 ML/MIN/1.73SQ M
GLUCOSE SERPL-MCNC: 187 MG/DL (ref 65–140)
GLUCOSE SERPL-MCNC: 213 MG/DL (ref 65–140)
GLUCOSE SERPL-MCNC: 244 MG/DL (ref 65–140)
GLUCOSE SERPL-MCNC: 245 MG/DL (ref 65–140)
GLUCOSE SERPL-MCNC: 269 MG/DL (ref 65–140)
MAGNESIUM SERPL-MCNC: 2.1 MG/DL (ref 1.9–2.7)
POTASSIUM SERPL-SCNC: 4.4 MMOL/L (ref 3.5–5.3)
SODIUM SERPL-SCNC: 135 MMOL/L (ref 135–147)

## 2024-12-26 PROCEDURE — 80048 BASIC METABOLIC PNL TOTAL CA: CPT | Performed by: FAMILY MEDICINE

## 2024-12-26 PROCEDURE — 71045 X-RAY EXAM CHEST 1 VIEW: CPT

## 2024-12-26 PROCEDURE — 99232 SBSQ HOSP IP/OBS MODERATE 35: CPT | Performed by: INTERNAL MEDICINE

## 2024-12-26 PROCEDURE — 99232 SBSQ HOSP IP/OBS MODERATE 35: CPT | Performed by: FAMILY MEDICINE

## 2024-12-26 PROCEDURE — 76770 US EXAM ABDO BACK WALL COMP: CPT

## 2024-12-26 PROCEDURE — 83735 ASSAY OF MAGNESIUM: CPT | Performed by: FAMILY MEDICINE

## 2024-12-26 PROCEDURE — 82948 REAGENT STRIP/BLOOD GLUCOSE: CPT

## 2024-12-26 RX ORDER — FUROSEMIDE 10 MG/ML
40 INJECTION INTRAMUSCULAR; INTRAVENOUS
Status: DISCONTINUED | OUTPATIENT
Start: 2024-12-26 | End: 2024-12-27

## 2024-12-26 RX ADMIN — METOPROLOL SUCCINATE 50 MG: 50 TABLET, EXTENDED RELEASE ORAL at 08:16

## 2024-12-26 RX ADMIN — AMLODIPINE BESYLATE 5 MG: 5 TABLET ORAL at 08:16

## 2024-12-26 RX ADMIN — FUROSEMIDE 40 MG: 10 INJECTION, SOLUTION INTRAMUSCULAR; INTRAVENOUS at 16:21

## 2024-12-26 RX ADMIN — FUROSEMIDE 40 MG: 10 INJECTION, SOLUTION INTRAMUSCULAR; INTRAVENOUS at 09:17

## 2024-12-26 RX ADMIN — INSULIN LISPRO 3 UNITS: 100 INJECTION, SOLUTION INTRAVENOUS; SUBCUTANEOUS at 16:21

## 2024-12-26 RX ADMIN — CEFTRIAXONE 2000 MG: 2 INJECTION, SOLUTION INTRAVENOUS at 08:16

## 2024-12-26 RX ADMIN — INSULIN LISPRO 2 UNITS: 100 INJECTION, SOLUTION INTRAVENOUS; SUBCUTANEOUS at 08:16

## 2024-12-26 RX ADMIN — PREGABALIN 150 MG: 75 CAPSULE ORAL at 21:55

## 2024-12-26 RX ADMIN — ASPIRIN 81 MG 81 MG: 81 TABLET ORAL at 08:16

## 2024-12-26 RX ADMIN — HEPARIN SODIUM 5000 UNITS: 5000 INJECTION, SOLUTION INTRAVENOUS; SUBCUTANEOUS at 21:55

## 2024-12-26 RX ADMIN — DESMOPRESSIN ACETATE 80 MG: 0.2 TABLET ORAL at 16:21

## 2024-12-26 RX ADMIN — INSULIN LISPRO 3 UNITS: 100 INJECTION, SOLUTION INTRAVENOUS; SUBCUTANEOUS at 12:06

## 2024-12-26 RX ADMIN — PREGABALIN 150 MG: 75 CAPSULE ORAL at 08:16

## 2024-12-26 RX ADMIN — HEPARIN SODIUM 5000 UNITS: 5000 INJECTION, SOLUTION INTRAVENOUS; SUBCUTANEOUS at 13:23

## 2024-12-26 RX ADMIN — HEPARIN SODIUM 5000 UNITS: 5000 INJECTION, SOLUTION INTRAVENOUS; SUBCUTANEOUS at 05:19

## 2024-12-26 RX ADMIN — INSULIN LISPRO 3 UNITS: 100 INJECTION, SOLUTION INTRAVENOUS; SUBCUTANEOUS at 21:55

## 2024-12-26 RX ADMIN — BUPROPION HYDROCHLORIDE 150 MG: 150 TABLET, EXTENDED RELEASE ORAL at 08:16

## 2024-12-26 NOTE — ASSESSMENT & PLAN NOTE
Lab Results   Component Value Date    EGFR 33 12/26/2024    EGFR 34 12/25/2024    EGFR 37 12/24/2024    CREATININE 1.95 (H) 12/26/2024    CREATININE 1.94 (H) 12/25/2024    CREATININE 1.81 (H) 12/24/2024     Renal function worsened x 2 days, dc lisinopril and hold diuretics for 24 hours 12/25/24

## 2024-12-26 NOTE — ASSESSMENT & PLAN NOTE
Lab Results   Component Value Date    HGBA1C 9.6 (H) 12/22/2024       Recent Labs     12/25/24  1100 12/25/24  1617 12/25/24  2103 12/26/24  0735   POCGLU 253* 277* 248* 213*       Blood Sugar Average: Last 72 hrs:  (P) [id number]  Update A1c   Hold oral medications  SSI & acu-checks ac/hs

## 2024-12-26 NOTE — PROGRESS NOTES
Cardiology Progress Note - Bryant Bustamante  69 y.o. male MRN: [id number]    Unit/Bed#: 426-01 Encounter: [id number]  Assessment and plan  #1  Acute on chronic diastolic congestive heart failure  #2 coronary artery disease history of CABG stable without angina  #3 hypertension  #4 CKD stage III  #5 hyperlipidemia  #6 type 2 diabetes mellitus  #7 chronic venous stasis ulcers with cellulitis    Recommendations: Lower extremities are still very swollen with significant edema.  Diuretics were held yesterday renal function remained stable would resume today.  Blood pressure is acceptable.  He tells me his baseline weight is around 250 pounds still has more fluid to go.  Goal 2 to 3 L negative by tomorrow.      Subjective:    No significant events overnight.  Patient states he is feeling better legs are starting to get a little bit soft.  Weight has come down about 12 pounds.  Denies any chest pain, shortness of breath improved    ROS    Objective:   Vitals: Blood pressure 122/68, pulse 66, temperature 98.6 °F (37 °C), resp. rate 17, height 6' (1.829 m), weight 124 kg (273 lb 5.9 oz), SpO2 90%., Body mass index is 37.08 kg/m².,   Orthostatic Blood Pressures      Flowsheet Row Most Recent Value   Blood Pressure 122/68 filed at 2024 0737   Patient Position - Orthostatic VS Sitting filed at 2024 2108           Systolic (24hrs), Av , Min:122 , Max:122     Diastolic (24hrs), Av, Min:68, Max:68      Intake/Output Summary (Last 24 hours) at 2024 0851  Last data filed at 2024 0532  Gross per 24 hour   Intake 600 ml   Output 1675 ml   Net -1075 ml     Weight (last 2 days)       Date/Time Weight    24 0537 124 (273.37)    24 0600 124 (273.15)    24 0554 126 (276.9)              Telemetry Review: No significant arrhythmias seen on telemetry review.   EKG personally reviewed by Triston Carolina DO.     Physical Exam  Vitals and nursing note reviewed.   Constitutional:        General: He is not in acute distress.     Appearance: He is well-developed.   HENT:      Head: Normocephalic and atraumatic.   Eyes:      Conjunctiva/sclera: Conjunctivae normal.      Pupils: Pupils are equal, round, and reactive to light.   Cardiovascular:      Rate and Rhythm: Normal rate and regular rhythm.      Pulses: Normal pulses.      Heart sounds: Normal heart sounds. No murmur heard.     No friction rub.   Pulmonary:      Effort: Pulmonary effort is normal. No respiratory distress.      Breath sounds: Normal breath sounds. No wheezing or rales.   Abdominal:      General: Bowel sounds are normal. There is no distension.      Palpations: Abdomen is soft.      Tenderness: There is no abdominal tenderness. There is no rebound.   Musculoskeletal:         General: No tenderness or deformity. Normal range of motion.      Cervical back: Neck supple.      Right lower leg: No edema.      Left lower leg: No edema.   Skin:     General: Skin is warm and dry.      Findings: No erythema.   Neurological:      Mental Status: He is alert and oriented to person, place, and time.      Cranial Nerves: No cranial nerve deficit.           Laboratory Results:        CBC with diff:   Results from last 7 days   Lab Units 12/23/24  0418 12/22/24  0925   WBC Thousand/uL 9.21 12.30*   HEMOGLOBIN g/dL 10.3* 11.7*   HEMATOCRIT % 32.4* 37.6   MCV fL 100* 103*   PLATELETS Thousands/uL 240 278   RBC Million/uL 3.23* 3.65*   MCH pg 31.9 32.1   MCHC g/dL 31.8 31.1*   RDW % 14.4 14.3   MPV fL 10.6 10.3   NRBC AUTO /100 WBCs 0 0         CMP:  Results from last 7 days   Lab Units 12/26/24  0519 12/25/24  0532 12/24/24  0554 12/22/24  1855 12/22/24  0925   POTASSIUM mmol/L 4.4 3.7 4.0 4.0 3.9   CHLORIDE mmol/L 99 98 101 105 107   CO2 mmol/L 28 27 27 21 23   BUN mg/dL 41* 40* 33* 34* 32*   CREATININE mg/dL 1.95* 1.94* 1.81* 1.69* 1.56*   CALCIUM mg/dL 9.0 8.9 8.9 9.1 9.0   AST U/L  --   --   --  13  --    ALT U/L  --   --   --  14  --    ALK PHOS  "U/L  --   --   --  71  --    EGFR ml/min/1.73sq m 33 34 37 40 44         BMP:  Results from last 7 days   Lab Units 12/26/24  0519 12/25/24  0532 12/24/24  0554 12/22/24  1855 12/22/24  0925   POTASSIUM mmol/L 4.4 3.7 4.0 4.0 3.9   CHLORIDE mmol/L 99 98 101 105 107   CO2 mmol/L 28 27 27 21 23   BUN mg/dL 41* 40* 33* 34* 32*   CREATININE mg/dL 1.95* 1.94* 1.81* 1.69* 1.56*   CALCIUM mg/dL 9.0 8.9 8.9 9.1 9.0       BNP: No results for input(s): \"BNP\" in the last 72 hours.    Magnesium:   Results from last 7 days   Lab Units 12/26/24  0519 12/25/24  0532 12/23/24  0418 12/22/24  0925   MAGNESIUM mg/dL 2.1 1.9 1.9 1.8*       Coags:       TSH:        Hemoglobin A1C   Results from last 7 days   Lab Units 12/22/24  1430   HEMOGLOBIN A1C % 9.6*       Lipid Profile:       Cardiac testing:   No results found for this or any previous visit.    No results found for this or any previous visit.    No results found for this or any previous visit.    No results found for this or any previous visit.      Meds/Allergies   all current active meds have been reviewed    Medications Prior to Admission:     amLODIPine-benazepril (LOTREL 5-10) 5-10 MG per capsule    aspirin 81 MG tablet    buPROPion (WELLBUTRIN XL) 150 mg 24 hr tablet    glipiZIDE (GLUCOTROL) 5 mg tablet    metFORMIN (GLUCOPHAGE) 1000 MG tablet    methocarbamol (Robaxin-750) 750 mg tablet    metoprolol succinate (TOPROL-XL) 50 mg 24 hr tablet    Multiple Vitamins-Minerals (MULTIVITAMIN ADULT PO)    nitroglycerin (NITROSTAT) 0.4 mg SL tablet    pregabalin (LYRICA) 150 mg capsule    rosuvastatin (CRESTOR) 40 MG tablet    traMADol (ULTRAM) 50 mg tablet    Blood Glucose Monitoring Suppl (FREESTYLE FREEDOM LITE) w/Device KIT    Blood Glucose Monitoring Suppl (FreeStyle Lite) MANISH    EPINEPHrine (EPIPEN) 0.3 mg/0.3 mL SOAJ    gabapentin (NEURONTIN) 300 mg capsule    glucose blood (FREESTYLE TEST STRIPS) test strip    Lancets (freestyle) lancets       Assessment:  Principal " Problem:    Pulmonary edema  Active Problems:    Atherosclerosis of native coronary artery of native heart without angina pectoris    Stage 3b chronic kidney disease (HCC)    Chronic pain syndrome    Type 2 diabetes mellitus with polyneuropathy (HCC)    Hypertensive urgency    Pulmonary nodules    Cellulitis of left lower extremity            Counseling / Coordination of Care  Total floor / unit time spent today 25 minutes.  Greater than 50% of total time was spent with the patient and / or family counseling and / or coordination of care.  A description of the counseling / coordination of care: .

## 2024-12-26 NOTE — PLAN OF CARE
Problem: Potential for Falls  Goal: Patient will remain free of falls  Description: INTERVENTIONS:  - Educate patient/family on patient safety including physical limitations  - Instruct patient to call for assistance with activity   - Consult OT/PT to assist with strengthening/mobility   - Keep Call bell within reach  - Keep bed low and locked with side rails adjusted as appropriate  - Keep care items and personal belongings within reach  - Initiate and maintain comfort rounds  - Make Fall Risk Sign visible to staff  - Offer Toileting every 2 Hours, in advance of need  - Initiate/Maintain bed alarm  - Obtain necessary fall risk management equipment: nonskid socks  - Apply yellow socks and bracelet for high fall risk patients  - Consider moving patient to room near nurses station  Outcome: Progressing     Problem: PAIN - ADULT  Goal: Verbalizes/displays adequate comfort level or baseline comfort level  Description: Interventions:  - Encourage patient to monitor pain and request assistance  - Assess pain using appropriate pain scale  - Administer analgesics based on type and severity of pain and evaluate response  - Implement non-pharmacological measures as appropriate and evaluate response  - Consider cultural and social influences on pain and pain management  - Notify physician/advanced practitioner if interventions unsuccessful or patient reports new pain  Outcome: Progressing     Problem: SAFETY ADULT  Goal: Patient will remain free of falls  Description: INTERVENTIONS:  - Educate patient/family on patient safety including physical limitations  - Instruct patient to call for assistance with activity   - Consult OT/PT to assist with strengthening/mobility   - Keep Call bell within reach  - Keep bed low and locked with side rails adjusted as appropriate  - Keep care items and personal belongings within reach  - Initiate and maintain comfort rounds  - Make Fall Risk Sign visible to staff  - Offer Toileting every 2  Hours, in advance of need  - Initiate/Maintain bed alarm  - Obtain necessary fall risk management equipment: nonskid socks  - Apply yellow socks and bracelet for high fall risk patients  - Consider moving patient to room near nurses station  Outcome: Progressing  Goal: Maintain or return to baseline ADL function  Description: INTERVENTIONS:  -  Assess patient's ability to carry out ADLs; assess patient's baseline for ADL function and identify physical deficits which impact ability to perform ADLs (bathing, care of mouth/teeth, toileting, grooming, dressing, etc.)  - Assess/evaluate cause of self-care deficits   - Assess range of motion  - Assess patient's mobility; develop plan if impaired  - Assess patient's need for assistive devices and provide as appropriate  - Encourage maximum independence but intervene and supervise when necessary  - Involve family in performance of ADLs  - Assess for home care needs following discharge   - Consider OT consult to assist with ADL evaluation and planning for discharge  - Provide patient education as appropriate  Outcome: Progressing  Goal: Maintains/Returns to pre admission functional level  Description: INTERVENTIONS:  - Perform AM-PAC 6 Click Basic Mobility/ Daily Activity assessment daily.  - Set and communicate daily mobility goal to care team and patient/family/caregiver.   - Collaborate with rehabilitation services on mobility goals if consulted  - Perform Range of Motion 2 times a day.  - Reposition patient every 2 hours.  - Dangle patient 2 times a day  - Stand patient 2 times a day  - Ambulate patient 2 times a day  - Out of bed to chair 2 times a day   - Out of bed for meals 2 times a day  - Out of bed for toileting  - Record patient progress and toleration of activity level   Outcome: Progressing     Problem: DISCHARGE PLANNING  Goal: Discharge to home or other facility with appropriate resources  Description: INTERVENTIONS:  - Identify barriers to discharge  w/patient and caregiver  - Arrange for needed discharge resources and transportation as appropriate  - Identify discharge learning needs (meds, wound care, etc.)  - Arrange for interpretive services to assist at discharge as needed  - Refer to Case Management Department for coordinating discharge planning if the patient needs post-hospital services based on physician/advanced practitioner order or complex needs related to functional status, cognitive ability, or social support system  Outcome: Progressing     Problem: Knowledge Deficit  Goal: Patient/family/caregiver demonstrates understanding of disease process, treatment plan, medications, and discharge instructions  Description: Complete learning assessment and assess knowledge base.  Interventions:  - Provide teaching at level of understanding  - Provide teaching via preferred learning methods  Outcome: Progressing

## 2024-12-26 NOTE — ASSESSMENT & PLAN NOTE
70 yo male PMHx CAD s/p CABG, HTN, DM, CKD presents with worsening ALEXIS x 1 week.  Notable for orthopnea, increasing bilateral lower extremity edema and weight gain over the past few months.    Day #2 Lasix 40 mg IV twice daily, weights declining, negative fluid balance 12/24  Held diuretics for 24 hours, will resume lasix 40mg IV BID 12/26  Renal function worsened, continue to monitor closely   Low-salt diet, intake and output monitoring, daily weights  Echo reviewed , recommend outpatient sleep study

## 2024-12-26 NOTE — PROGRESS NOTES
Progress Note - Hospitalist   Name: Bryant Bustamante Jr. 69 y.o. male I MRN: [id number]  Unit/Bed#: 426-01 I Date of Admission: 12/22/2024   Date of Service: 12/26/2024 I Hospital Day: 4    Assessment & Plan  Pulmonary edema  70 yo male PMHx CAD s/p CABG, HTN, DM, CKD presents with worsening ALEXIS x 1 week.  Notable for orthopnea, increasing bilateral lower extremity edema and weight gain over the past few months.    Day #2 Lasix 40 mg IV twice daily, weights declining, negative fluid balance 12/24  Held diuretics for 24 hours, will resume lasix 40mg IV BID 12/26  Renal function worsened, continue to monitor closely   Low-salt diet, intake and output monitoring, daily weights  Echo reviewed , recommend outpatient sleep study   Atherosclerosis of native coronary artery of native heart without angina pectoris  Hx CABG in 2012. No longer follows with cardiology, historically saw Dr Beyer  Noted with moderate coronary artery calcifications on CT  Continue aspirin, statin, Toprol-XL  Cellulitis of left lower extremity  Noted to have pain and erythema of the left lower extremity received ceftriaxone in the ED, will continue  Area of erythema improved, continue ceftriaxone to complete 5-7 days of ab 12/25  Hypertensive urgency  Resolved  Stage 3b chronic kidney disease (HCC)  Lab Results   Component Value Date    EGFR 33 12/26/2024    EGFR 34 12/25/2024    EGFR 37 12/24/2024    CREATININE 1.95 (H) 12/26/2024    CREATININE 1.94 (H) 12/25/2024    CREATININE 1.81 (H) 12/24/2024     Renal function worsened x 2 days, dc lisinopril and hold diuretics for 24 hours 12/25/24  Type 2 diabetes mellitus with polyneuropathy (HCC)  Lab Results   Component Value Date    HGBA1C 9.6 (H) 12/22/2024       Recent Labs     12/25/24  1100 12/25/24  1617 12/25/24  2103 12/26/24  0735   POCGLU 253* 277* 248* 213*       Blood Sugar Average: Last 72 hrs:  (P) 218.7734995591516120  Update A1c   Hold oral medications  SSI & acu-checks ac/hs    Pulmonary  nodules  Recommended CT chest w/ in 3-6 months then at 18-24 months for follow-up  Note, mild subcarinal lymphadenopathy suspected to be reactive that can also be re-eval at time of f/u CT chest wo as above  Chronic pain syndrome  Tramadol as needed    VTE Pharmacologic Prophylaxis:   Moderate Risk (Score 3-4) - Pharmacological DVT Prophylaxis Ordered: heparin.    Mobility:   Basic Mobility Inpatient Raw Score: 24  JH-HLM Goal: 8: Walk 250 feet or more  JH-HLM Achieved: 7: Walk 25 feet or more  JH-HLM Goal achieved. Continue to encourage appropriate mobility.    Patient Centered Rounds: I performed bedside rounds with nursing staff today.   Discussions with Specialists or Other Care Team Provider: Cards    Education and Discussions with Family / Patient:     Current Length of Stay: 4 day(s)  Current Patient Status: Inpatient   Certification Statement: The patient will continue to require additional inpatient hospital stay due to CHF  Discharge Plan: Anticipate discharge in 48 hrs to home.    Code Status: Level 1 - Full Code    Subjective   Seen and examined, no acute complaints or events overnight     Objective :  Temp:  [98.6 °F (37 °C)] 98.6 °F (37 °C)  HR:  [66] 66  BP: (122)/(68) 122/68  Resp:  [17-18] 17  SpO2:  [88 %-90 %] 90 %  O2 Device: None (Room air)    Body mass index is 37.08 kg/m².     Input and Output Summary (last 24 hours):     Intake/Output Summary (Last 24 hours) at 12/26/2024 0855  Last data filed at 12/26/2024 0532  Gross per 24 hour   Intake 600 ml   Output 1675 ml   Net -1075 ml       Physical Exam  Constitutional:       Appearance: Normal appearance.   Eyes:      General: No scleral icterus.     Pupils: Pupils are equal, round, and reactive to light.   Cardiovascular:      Rate and Rhythm: Normal rate and regular rhythm.      Pulses: Normal pulses.      Heart sounds: No murmur heard.  Pulmonary:      Effort: Pulmonary effort is normal. No respiratory distress.      Breath sounds: No wheezing  or rales.   Abdominal:      General: Abdomen is flat. Bowel sounds are normal. There is no distension.      Tenderness: There is no abdominal tenderness. There is no guarding.   Musculoskeletal:      Right lower leg: Edema present.      Left lower leg: Edema present.   Skin:     General: Skin is warm and dry.   Neurological:      General: No focal deficit present.      Mental Status: He is alert and oriented to person, place, and time.           Lines/Drains:              Lab Results: I have reviewed the following results:   Results from last 7 days   Lab Units 12/23/24  0418   WBC Thousand/uL 9.21   HEMOGLOBIN g/dL 10.3*   HEMATOCRIT % 32.4*   PLATELETS Thousands/uL 240   SEGS PCT % 60   LYMPHO PCT % 23   MONO PCT % 11   EOS PCT % 5     Results from last 7 days   Lab Units 12/26/24  0519 12/24/24  0554 12/22/24  1855   SODIUM mmol/L 135   < > 138   POTASSIUM mmol/L 4.4   < > 4.0   CHLORIDE mmol/L 99   < > 105   CO2 mmol/L 28   < > 21   BUN mg/dL 41*   < > 34*   CREATININE mg/dL 1.95*   < > 1.69*   ANION GAP mmol/L 8   < > 12   CALCIUM mg/dL 9.0   < > 9.1   ALBUMIN g/dL  --   --  4.0   TOTAL BILIRUBIN mg/dL  --   --  0.46   ALK PHOS U/L  --   --  71   ALT U/L  --   --  14   AST U/L  --   --  13   GLUCOSE RANDOM mg/dL 187*   < > 204*    < > = values in this interval not displayed.         Results from last 7 days   Lab Units 12/26/24  0735 12/25/24  2103 12/25/24  1617 12/25/24  1100 12/25/24  0716 12/24/24  2145 12/24/24  1623 12/24/24  1121 12/24/24  0743 12/23/24  2142 12/23/24  1646 12/23/24  1109   POC GLUCOSE mg/dl 213* 248* 277* 253* 155* 224* 219* 277* 170* 239* 178* 230*     Results from last 7 days   Lab Units 12/22/24  1430   HEMOGLOBIN A1C % 9.6*     Results from last 7 days   Lab Units 12/23/24  0418 12/22/24  0925   PROCALCITONIN ng/ml 0.08 0.07       Recent Cultures (last 7 days):         Imaging Results Review: I reviewed radiology reports from this admission including: chest xray.  Other Study  Results Review: No additional pertinent studies reviewed.    Last 24 Hours Medication List:     Current Facility-Administered Medications:     amLODIPine (NORVASC) tablet 5 mg, Daily **AND** [DISCONTINUED] lisinopril (ZESTRIL) tablet 10 mg, Daily    aspirin chewable tablet 81 mg, Daily    atorvastatin (LIPITOR) tablet 80 mg, Daily With Dinner    buPROPion (WELLBUTRIN XL) 24 hr tablet 150 mg, QAM    cefTRIAXone (ROCEPHIN) IVPB (premix in dextrose) 2,000 mg 50 mL, Q24H, Last Rate: 2,000 mg (12/26/24 0816)    furosemide (LASIX) injection 40 mg, BID (diuretic)    heparin (porcine) subcutaneous injection 5,000 Units, Q8H ALEJANDRO    insulin lispro (HumALOG/ADMELOG) 100 units/mL subcutaneous injection 1-6 Units, TID AC **AND** Fingerstick Glucose (POCT), TID AC    insulin lispro (HumALOG/ADMELOG) 100 units/mL subcutaneous injection 1-6 Units, HS    metoprolol succinate (TOPROL-XL) 24 hr tablet 50 mg, Daily    nitroglycerin (NITROSTAT) SL tablet 0.4 mg, Once    oxyCODONE (ROXICODONE) IR tablet 5 mg, Q6H PRN    pregabalin (LYRICA) capsule 150 mg, BID    Insert peripheral IV, Once **AND** sodium chloride (PF) 0.9 % injection 3 mL, Q1H PRN    Administrative Statements   Today, Patient Was Seen By: Ezra Murrieta MD      **Please Note: This note may have been constructed using a voice recognition system.**

## 2024-12-26 NOTE — PLAN OF CARE
Problem: Potential for Falls  Goal: Patient will remain free of falls  Description: INTERVENTIONS:  - Educate patient/family on patient safety including physical limitations  - Instruct patient to call for assistance with activity   - Consult OT/PT to assist with strengthening/mobility   - Keep Call bell within reach  - Keep bed low and locked with side rails adjusted as appropriate  - Keep care items and personal belongings within reach  - Initiate and maintain comfort rounds  - Make Fall Risk Sign visible to staff  - Offer Toileting every 2 Hours, in advance of need  - Initiate/Maintain bed/ chair alarm  - Obtain necessary fall risk management equipment:   - Apply yellow socks and bracelet for high fall risk patients  - Consider moving patient to room near nurses station  Outcome: Progressing     Problem: PAIN - ADULT  Goal: Verbalizes/displays adequate comfort level or baseline comfort level  Description: Interventions:  - Encourage patient to monitor pain and request assistance  - Assess pain using appropriate pain scale  - Administer analgesics based on type and severity of pain and evaluate response  - Implement non-pharmacological measures as appropriate and evaluate response  - Consider cultural and social influences on pain and pain management  - Notify physician/advanced practitioner if interventions unsuccessful or patient reports new pain  Outcome: Progressing     Problem: SAFETY ADULT  Goal: Patient will remain free of falls  Description: INTERVENTIONS:  - Educate patient/family on patient safety including physical limitations  - Instruct patient to call for assistance with activity   - Consult OT/PT to assist with strengthening/mobility   - Keep Call bell within reach  - Keep bed low and locked with side rails adjusted as appropriate  - Keep care items and personal belongings within reach  - Initiate and maintain comfort rounds  - Make Fall Risk Sign visible to staff  - Offer Toileting every 2 Hours,  in advance of need  - Initiate/Maintain bed/ chair alarm  - Obtain necessary fall risk management equipment: 3  - Apply yellow socks and bracelet for high fall risk patients  - Consider moving patient to room near nurses station  Outcome: Progressing  Goal: Maintain or return to baseline ADL function  Description: INTERVENTIONS:  -  Assess patient's ability to carry out ADLs; assess patient's baseline for ADL function and identify physical deficits which impact ability to perform ADLs (bathing, care of mouth/teeth, toileting, grooming, dressing, etc.)  - Assess/evaluate cause of self-care deficits   - Assess range of motion  - Assess patient's mobility; develop plan if impaired  - Assess patient's need for assistive devices and provide as appropriate  - Encourage maximum independence but intervene and supervise when necessary  - Involve family in performance of ADLs  - Assess for home care needs following discharge   - Consider OT consult to assist with ADL evaluation and planning for discharge  - Provide patient education as appropriate  Outcome: Progressing  Goal: Maintains/Returns to pre admission functional level  Description: INTERVENTIONS:  - Perform AM-PAC 6 Click Basic Mobility/ Daily Activity assessment daily.  - Set and communicate daily mobility goal to care team and patient/family/caregiver.   - Collaborate with rehabilitation services on mobility goals if consulted  - Perform Range of Motion 3 times a day.  - Reposition patient every 2 hours.  - Dangle patient 3 times a day  - Stand patient 3 times a day  - Ambulate patient 3 times a day  - Out of bed to chair 3 times a day   - Out of bed for meals 3 times a day  - Out of bed for toileting  - Record patient progress and toleration of activity level   Outcome: Progressing     Problem: DISCHARGE PLANNING  Goal: Discharge to home or other facility with appropriate resources  Description: INTERVENTIONS:  - Identify barriers to discharge w/patient and  caregiver  - Arrange for needed discharge resources and transportation as appropriate  - Identify discharge learning needs (meds, wound care, etc.)  - Arrange for interpretive services to assist at discharge as needed  - Refer to Case Management Department for coordinating discharge planning if the patient needs post-hospital services based on physician/advanced practitioner order or complex needs related to functional status, cognitive ability, or social support system  Outcome: Progressing     Problem: Knowledge Deficit  Goal: Patient/family/caregiver demonstrates understanding of disease process, treatment plan, medications, and discharge instructions  Description: Complete learning assessment and assess knowledge base.  Interventions:  - Provide teaching at level of understanding  - Provide teaching via preferred learning methods  Outcome: Progressing

## 2024-12-27 LAB
ANION GAP SERPL CALCULATED.3IONS-SCNC: 11 MMOL/L (ref 4–13)
BUN SERPL-MCNC: 43 MG/DL (ref 5–25)
CALCIUM SERPL-MCNC: 9.1 MG/DL (ref 8.4–10.2)
CHLORIDE SERPL-SCNC: 100 MMOL/L (ref 96–108)
CO2 SERPL-SCNC: 28 MMOL/L (ref 21–32)
CREAT SERPL-MCNC: 2.14 MG/DL (ref 0.6–1.3)
GFR SERPL CREATININE-BSD FRML MDRD: 30 ML/MIN/1.73SQ M
GLUCOSE SERPL-MCNC: 206 MG/DL (ref 65–140)
GLUCOSE SERPL-MCNC: 213 MG/DL (ref 65–140)
GLUCOSE SERPL-MCNC: 214 MG/DL (ref 65–140)
GLUCOSE SERPL-MCNC: 283 MG/DL (ref 65–140)
GLUCOSE SERPL-MCNC: 285 MG/DL (ref 65–140)
MAGNESIUM SERPL-MCNC: 2.1 MG/DL (ref 1.9–2.7)
POTASSIUM SERPL-SCNC: 4.1 MMOL/L (ref 3.5–5.3)
SODIUM SERPL-SCNC: 139 MMOL/L (ref 135–147)

## 2024-12-27 PROCEDURE — 80048 BASIC METABOLIC PNL TOTAL CA: CPT | Performed by: FAMILY MEDICINE

## 2024-12-27 PROCEDURE — 82948 REAGENT STRIP/BLOOD GLUCOSE: CPT

## 2024-12-27 PROCEDURE — 83735 ASSAY OF MAGNESIUM: CPT | Performed by: FAMILY MEDICINE

## 2024-12-27 PROCEDURE — 99232 SBSQ HOSP IP/OBS MODERATE 35: CPT | Performed by: FAMILY MEDICINE

## 2024-12-27 PROCEDURE — 99232 SBSQ HOSP IP/OBS MODERATE 35: CPT | Performed by: INTERNAL MEDICINE

## 2024-12-27 RX ORDER — FUROSEMIDE 40 MG/1
40 TABLET ORAL DAILY
Status: DISCONTINUED | OUTPATIENT
Start: 2024-12-28 | End: 2024-12-28 | Stop reason: HOSPADM

## 2024-12-27 RX ORDER — FUROSEMIDE 10 MG/ML
40 INJECTION INTRAMUSCULAR; INTRAVENOUS ONCE
Status: COMPLETED | OUTPATIENT
Start: 2024-12-27 | End: 2024-12-27

## 2024-12-27 RX ADMIN — INSULIN LISPRO 4 UNITS: 100 INJECTION, SOLUTION INTRAVENOUS; SUBCUTANEOUS at 21:37

## 2024-12-27 RX ADMIN — INSULIN LISPRO 2 UNITS: 100 INJECTION, SOLUTION INTRAVENOUS; SUBCUTANEOUS at 08:14

## 2024-12-27 RX ADMIN — BUPROPION HYDROCHLORIDE 150 MG: 150 TABLET, EXTENDED RELEASE ORAL at 08:14

## 2024-12-27 RX ADMIN — AMLODIPINE BESYLATE 5 MG: 5 TABLET ORAL at 08:14

## 2024-12-27 RX ADMIN — INSULIN LISPRO 2 UNITS: 100 INJECTION, SOLUTION INTRAVENOUS; SUBCUTANEOUS at 17:07

## 2024-12-27 RX ADMIN — FUROSEMIDE 40 MG: 10 INJECTION, SOLUTION INTRAMUSCULAR; INTRAVENOUS at 08:44

## 2024-12-27 RX ADMIN — ASPIRIN 81 MG 81 MG: 81 TABLET ORAL at 08:14

## 2024-12-27 RX ADMIN — INSULIN LISPRO 3 UNITS: 100 INJECTION, SOLUTION INTRAVENOUS; SUBCUTANEOUS at 12:08

## 2024-12-27 RX ADMIN — PREGABALIN 150 MG: 75 CAPSULE ORAL at 21:40

## 2024-12-27 RX ADMIN — CEFTRIAXONE 2000 MG: 2 INJECTION, SOLUTION INTRAVENOUS at 08:14

## 2024-12-27 RX ADMIN — DESMOPRESSIN ACETATE 80 MG: 0.2 TABLET ORAL at 17:08

## 2024-12-27 RX ADMIN — HEPARIN SODIUM 5000 UNITS: 5000 INJECTION, SOLUTION INTRAVENOUS; SUBCUTANEOUS at 21:40

## 2024-12-27 RX ADMIN — HEPARIN SODIUM 5000 UNITS: 5000 INJECTION, SOLUTION INTRAVENOUS; SUBCUTANEOUS at 13:15

## 2024-12-27 RX ADMIN — PREGABALIN 150 MG: 75 CAPSULE ORAL at 08:14

## 2024-12-27 RX ADMIN — HEPARIN SODIUM 5000 UNITS: 5000 INJECTION, SOLUTION INTRAVENOUS; SUBCUTANEOUS at 06:21

## 2024-12-27 RX ADMIN — METOPROLOL SUCCINATE 50 MG: 50 TABLET, EXTENDED RELEASE ORAL at 08:14

## 2024-12-27 NOTE — PLAN OF CARE
Problem: Potential for Falls  Goal: Patient will remain free of falls  Description: INTERVENTIONS:  - Educate patient/family on patient safety including physical limitations  - Instruct patient to call for assistance with activity   - Consult OT/PT to assist with strengthening/mobility   - Keep Call bell within reach  - Keep bed low and locked with side rails adjusted as appropriate  - Keep care items and personal belongings within reach  - Initiate and maintain comfort rounds  - Make Fall Risk Sign visible to staff  - Offer Toileting every 2 Hours, in advance of need  - Initiate/Maintain bedalarm  - Obtain necessary fall risk management equipment: socks, alarm  - Apply yellow socks and bracelet for high fall risk patients  - Consider moving patient to room near nurses station  Outcome: Progressing     Problem: PAIN - ADULT  Goal: Verbalizes/displays adequate comfort level or baseline comfort level  Description: Interventions:  - Encourage patient to monitor pain and request assistance  - Assess pain using appropriate pain scale  - Administer analgesics based on type and severity of pain and evaluate response  - Implement non-pharmacological measures as appropriate and evaluate response  - Consider cultural and social influences on pain and pain management  - Notify physician/advanced practitioner if interventions unsuccessful or patient reports new pain  Outcome: Progressing     Problem: SAFETY ADULT  Goal: Patient will remain free of falls  Description: INTERVENTIONS:  - Educate patient/family on patient safety including physical limitations  - Instruct patient to call for assistance with activity   - Consult OT/PT to assist with strengthening/mobility   - Keep Call bell within reach  - Keep bed low and locked with side rails adjusted as appropriate  - Keep care items and personal belongings within reach  - Initiate and maintain comfort rounds  - Make Fall Risk Sign visible to staff  - Offer Toileting every 2  Hours, in advance of need  - Initiate/Maintain bedalarm  - Obtain necessary fall risk management equipment: bracelets, socks, alarm  - Apply yellow socks and bracelet for high fall risk patients  - Consider moving patient to room near nurses station  Outcome: Progressing  Goal: Maintain or return to baseline ADL function  Description: INTERVENTIONS:  -  Assess patient's ability to carry out ADLs; assess patient's baseline for ADL function and identify physical deficits which impact ability to perform ADLs (bathing, care of mouth/teeth, toileting, grooming, dressing, etc.)  - Assess/evaluate cause of self-care deficits   - Assess range of motion  - Assess patient's mobility; develop plan if impaired  - Assess patient's need for assistive devices and provide as appropriate  - Encourage maximum independence but intervene and supervise when necessary  - Involve family in performance of ADLs  - Assess for home care needs following discharge   - Consider OT consult to assist with ADL evaluation and planning for discharge  - Provide patient education as appropriate  Outcome: Progressing  Goal: Maintains/Returns to pre admission functional level  Description: INTERVENTIONS:  - Perform AM-PAC 6 Click Basic Mobility/ Daily Activity assessment daily.  - Set and communicate daily mobility goal to care team and patient/family/caregiver.   - Collaborate with rehabilitation services on mobility goals if consulted  - Perform Range of Motion 4 times a day.  - Reposition patient every 2 hours.  - Dangle patient 3 times a day  - Stand patient 3 times a day  - Ambulate patient 3 times a day  - Out of bed to chair 3 times a day   - Out of bed for meals 3 times a day  - Out of bed for toileting  - Record patient progress and toleration of activity level   Outcome: Progressing

## 2024-12-27 NOTE — ASSESSMENT & PLAN NOTE
Recommended CT chest w/ in 3-6 months then at 18-24 months for follow-up  Note, mild subcarinal lymphadenopathy suspected to be reactive that can also be re-eval at time of f/u CT chest wo as above   stated

## 2024-12-27 NOTE — PROGRESS NOTES
Progress Note - Hospitalist   Name: Bryant Bustamante Jr. 69 y.o. male I MRN: [id number]  Unit/Bed#: 426-01 I Date of Admission: 12/22/2024   Date of Service: 12/27/2024 I Hospital Day: 5    Assessment & Plan  Pulmonary edema  70 yo male PMHx CAD s/p CABG, HTN, DM, CKD presents with worsening ALEXIS x 1 week.  Notable for orthopnea, increasing bilateral lower extremity edema and weight gain over the past few months.    Day #2 Lasix 40 mg IV twice daily, weights declining, negative fluid balance 12/24  Held diuretics for 24 hours, resumed on 12/26, will continue with 40mg IV BID through today   Renal function worsened, continue to monitor closely   Low-salt diet, intake and output monitoring, daily weights  Echo reviewed , recommend outpatient sleep study   Atherosclerosis of native coronary artery of native heart without angina pectoris  Hx CABG in 2012. No longer follows with cardiology, historically saw Dr Beyer  Noted with moderate coronary artery calcifications on CT  Continue aspirin, statin, Toprol-XL  Cellulitis of left lower extremity  Noted to have pain and erythema of the left lower extremity received ceftriaxone in the ED, will continue  Area of erythema improved, continue ceftriaxone to complete 5-7 days of ab 12/25  Hypertensive urgency  Resolved  Stage 3b chronic kidney disease (HCC)  Lab Results   Component Value Date    EGFR 33 12/26/2024    EGFR 34 12/25/2024    EGFR 37 12/24/2024    CREATININE 1.95 (H) 12/26/2024    CREATININE 1.94 (H) 12/25/2024    CREATININE 1.81 (H) 12/24/2024     Renal function worsened x 2 days, dc lisinopril and hold diuretics for 24 hours 12/25/24  Type 2 diabetes mellitus with polyneuropathy (HCC)  Lab Results   Component Value Date    HGBA1C 9.6 (H) 12/22/2024       Recent Labs     12/26/24  0735 12/26/24  1109 12/26/24  1614 12/26/24  2128   POCGLU 213* 245* 244* 269*       Blood Sugar Average: Last 72 hrs:  (P) 232.3497895640295871  Update A1c   Hold oral medications  SSI &  acu-checks ac/hs    Pulmonary nodules  Recommended CT chest w/ in 3-6 months then at 18-24 months for follow-up  Note, mild subcarinal lymphadenopathy suspected to be reactive that can also be re-eval at time of f/u CT chest wo as above  Chronic pain syndrome  Tramadol as needed    VTE Pharmacologic Prophylaxis:   Moderate Risk (Score 3-4) - Pharmacological DVT Prophylaxis Ordered: heparin.    Mobility:   Basic Mobility Inpatient Raw Score: 24  JH-HLM Goal: 8: Walk 250 feet or more  JH-HLM Achieved: 7: Walk 25 feet or more  JH-HLM Goal achieved. Continue to encourage appropriate mobility.    Patient Centered Rounds: I performed bedside rounds with nursing staff today.   Discussions with Specialists or Other Care Team Provider: cardiology    Education and Discussions with Family / Patient: Updated  (daughter) at bedside.    Current Length of Stay: 5 day(s)  Current Patient Status: Inpatient   Certification Statement: The patient will continue to require additional inpatient hospital stay due to    Discharge Plan: Anticipate discharge in 24-48 hrs to home.    Code Status: Level 1 - Full Code    Subjective   Seen and examined at bedside, feels better     Objective :  Temp:  [98 °F (36.7 °C)-98.3 °F (36.8 °C)] 98.3 °F (36.8 °C)  HR:  [63-78] 63  BP: (112-134)/(56-76) 112/56  Resp:  [18] 18  SpO2:  [91 %-95 %] 91 %    Body mass index is 36.18 kg/m².     Input and Output Summary (last 24 hours):     Intake/Output Summary (Last 24 hours) at 12/27/2024 0745  Last data filed at 12/27/2024 0700  Gross per 24 hour   Intake 1200 ml   Output 5315 ml   Net -4115 ml       Physical Exam  Constitutional:       Appearance: Normal appearance.   Cardiovascular:      Rate and Rhythm: Normal rate and regular rhythm.   Pulmonary:      Effort: Pulmonary effort is normal. No respiratory distress.      Breath sounds: No wheezing or rales.   Abdominal:      General: Abdomen is flat.      Palpations: Abdomen is soft.    Musculoskeletal:      Right lower leg: Edema present.      Left lower leg: Edema present.   Skin:     Capillary Refill: Capillary refill takes 2 to 3 seconds.      Findings: Erythema present.   Neurological:      General: No focal deficit present.      Mental Status: He is alert and oriented to person, place, and time.           Lines/Drains:              Lab Results: I have reviewed the following results:   Results from last 7 days   Lab Units 12/23/24  0418   WBC Thousand/uL 9.21   HEMOGLOBIN g/dL 10.3*   HEMATOCRIT % 32.4*   PLATELETS Thousands/uL 240   SEGS PCT % 60   LYMPHO PCT % 23   MONO PCT % 11   EOS PCT % 5     Results from last 7 days   Lab Units 12/26/24  0519 12/24/24  0554 12/22/24  1855   SODIUM mmol/L 135   < > 138   POTASSIUM mmol/L 4.4   < > 4.0   CHLORIDE mmol/L 99   < > 105   CO2 mmol/L 28   < > 21   BUN mg/dL 41*   < > 34*   CREATININE mg/dL 1.95*   < > 1.69*   ANION GAP mmol/L 8   < > 12   CALCIUM mg/dL 9.0   < > 9.1   ALBUMIN g/dL  --   --  4.0   TOTAL BILIRUBIN mg/dL  --   --  0.46   ALK PHOS U/L  --   --  71   ALT U/L  --   --  14   AST U/L  --   --  13   GLUCOSE RANDOM mg/dL 187*   < > 204*    < > = values in this interval not displayed.         Results from last 7 days   Lab Units 12/26/24  2128 12/26/24  1614 12/26/24  1109 12/26/24  0735 12/25/24  2103 12/25/24  1617 12/25/24  1100 12/25/24  0716 12/24/24  2145 12/24/24  1623 12/24/24  1121 12/24/24  0743   POC GLUCOSE mg/dl 269* 244* 245* 213* 248* 277* 253* 155* 224* 219* 277* 170*     Results from last 7 days   Lab Units 12/22/24  1430   HEMOGLOBIN A1C % 9.6*     Results from last 7 days   Lab Units 12/23/24  0418 12/22/24  0925   PROCALCITONIN ng/ml 0.08 0.07       Recent Cultures (last 7 days):         Imaging Results Review: No pertinent imaging studies reviewed.  Other Study Results Review: No additional pertinent studies reviewed.    Last 24 Hours Medication List:     Current Facility-Administered Medications:     amLODIPine  (NORVASC) tablet 5 mg, Daily **AND** [DISCONTINUED] lisinopril (ZESTRIL) tablet 10 mg, Daily    aspirin chewable tablet 81 mg, Daily    atorvastatin (LIPITOR) tablet 80 mg, Daily With Dinner    buPROPion (WELLBUTRIN XL) 24 hr tablet 150 mg, QAM    cefTRIAXone (ROCEPHIN) IVPB (premix in dextrose) 2,000 mg 50 mL, Q24H, Last Rate: 2,000 mg (12/26/24 0816)    furosemide (LASIX) injection 40 mg, BID (diuretic)    heparin (porcine) subcutaneous injection 5,000 Units, Q8H ALEJANDRO    insulin lispro (HumALOG/ADMELOG) 100 units/mL subcutaneous injection 1-6 Units, TID AC **AND** Fingerstick Glucose (POCT), TID AC    insulin lispro (HumALOG/ADMELOG) 100 units/mL subcutaneous injection 1-6 Units, HS    metoprolol succinate (TOPROL-XL) 24 hr tablet 50 mg, Daily    nitroglycerin (NITROSTAT) SL tablet 0.4 mg, Once    oxyCODONE (ROXICODONE) IR tablet 5 mg, Q6H PRN    pregabalin (LYRICA) capsule 150 mg, BID    Insert peripheral IV, Once **AND** sodium chloride (PF) 0.9 % injection 3 mL, Q1H PRN    Administrative Statements   Today, Patient Was Seen By: Ezra Murrieta MD      **Please Note: This note may have been constructed using a voice recognition system.**

## 2024-12-27 NOTE — ASSESSMENT & PLAN NOTE
Lab Results   Component Value Date    HGBA1C 9.6 (H) 12/22/2024       Recent Labs     12/26/24  0735 12/26/24  1109 12/26/24  1614 12/26/24  2128   POCGLU 213* 245* 244* 269*       Blood Sugar Average: Last 72 hrs:  (P) 232.9581635239799189  Update A1c   Hold oral medications  SSI & acu-checks ac/hs

## 2024-12-27 NOTE — PROGRESS NOTES
Cardiology Progress Note - Bryant Bustamante Jr. 69 y.o. male MRN: [id number]    Unit/Bed#: 426-01 Encounter: [id number]  Assessment and plan  #1  Acute on chronic diastolic congestive heart failure  #2 coronary artery disease history of CABG stable without angina  #3 hypertension  #4 CKD stage III  #5 hyperlipidemia  #6 type 2 diabetes mellitus  #7 chronic venous stasis ulcers with cellulitis     Recommendations: He had significant diuresis last night about 4 L negative.  Lower extremity edema is starting to significantly improve.  Recommend discontinuing IV Lasix today no evening dose to transition to oral 40 mg tomorrow.  Hopeful discharge on the weekend if renal function stabilizes.  Will need close outpatient heart failure follow-up.  I did do extensive teaching with him regarding his dietary habits.      Subjective:    No significant events overnight.  Denies any chest pain, shortness of breath, palpitations.  Lower extremity edema significantly improved.    ROS    Objective:   Vitals: Blood pressure 114/58, pulse 71, temperature 98.3 °F (36.8 °C), resp. rate 18, height 6' (1.829 m), weight 121 kg (266 lb 12.1 oz), SpO2 92%., Body mass index is 36.18 kg/m².,   Orthostatic Blood Pressures      Flowsheet Row Most Recent Value   Blood Pressure 114/58 filed at 2024 0843   Patient Position - Orthostatic VS Sitting filed at 2024 2108           Systolic (24hrs), Av , Min:112 , Max:134     Diastolic (24hrs), Av, Min:56, Max:76      Intake/Output Summary (Last 24 hours) at 2024 0858  Last data filed at 2024 0849  Gross per 24 hour   Intake 1260 ml   Output 5315 ml   Net -4055 ml     Weight (last 2 days)       Date/Time Weight    24 0600 121 (266.76)    24 0537 124 (273.37)    24 0600 124 (273.15)              Telemetry Review: No significant arrhythmias seen on telemetry review.   EKG personally reviewed by Triston Carolina DO.     Physical Exam  Vitals and nursing  note reviewed.   Constitutional:       General: He is not in acute distress.     Appearance: He is well-developed.   HENT:      Head: Normocephalic and atraumatic.   Eyes:      Conjunctiva/sclera: Conjunctivae normal.      Pupils: Pupils are equal, round, and reactive to light.   Cardiovascular:      Rate and Rhythm: Normal rate and regular rhythm.      Pulses: Normal pulses.      Heart sounds: Normal heart sounds. No murmur heard.     No friction rub.   Pulmonary:      Effort: Pulmonary effort is normal. No respiratory distress.      Breath sounds: Normal breath sounds. No wheezing or rales.   Abdominal:      General: Bowel sounds are normal. There is no distension.      Palpations: Abdomen is soft.      Tenderness: There is no abdominal tenderness. There is no rebound.   Musculoskeletal:         General: No tenderness or deformity. Normal range of motion.      Cervical back: Neck supple.      Right lower leg: Edema present.      Left lower leg: Edema present.   Skin:     General: Skin is warm and dry.      Findings: No erythema.   Neurological:      Mental Status: He is alert and oriented to person, place, and time.      Cranial Nerves: No cranial nerve deficit.           Laboratory Results:        CBC with diff:   Results from last 7 days   Lab Units 12/23/24  0418 12/22/24  0925   WBC Thousand/uL 9.21 12.30*   HEMOGLOBIN g/dL 10.3* 11.7*   HEMATOCRIT % 32.4* 37.6   MCV fL 100* 103*   PLATELETS Thousands/uL 240 278   RBC Million/uL 3.23* 3.65*   MCH pg 31.9 32.1   MCHC g/dL 31.8 31.1*   RDW % 14.4 14.3   MPV fL 10.6 10.3   NRBC AUTO /100 WBCs 0 0         CMP:  Results from last 7 days   Lab Units 12/27/24  0621 12/26/24  0519 12/25/24  0532 12/24/24  0554 12/22/24  1855 12/22/24  0925   POTASSIUM mmol/L 4.1 4.4 3.7 4.0 4.0 3.9   CHLORIDE mmol/L 100 99 98 101 105 107   CO2 mmol/L 28 28 27 27 21 23   BUN mg/dL 43* 41* 40* 33* 34* 32*   CREATININE mg/dL 2.14* 1.95* 1.94* 1.81* 1.69* 1.56*   CALCIUM mg/dL 9.1 9.0 8.9  "8.9 9.1 9.0   AST U/L  --   --   --   --  13  --    ALT U/L  --   --   --   --  14  --    ALK PHOS U/L  --   --   --   --  71  --    EGFR ml/min/1.73sq m 30 33 34 37 40 44         BMP:  Results from last 7 days   Lab Units 12/27/24  0621 12/26/24  0519 12/25/24  0532 12/24/24  0554 12/22/24  1855 12/22/24  0925   POTASSIUM mmol/L 4.1 4.4 3.7 4.0 4.0 3.9   CHLORIDE mmol/L 100 99 98 101 105 107   CO2 mmol/L 28 28 27 27 21 23   BUN mg/dL 43* 41* 40* 33* 34* 32*   CREATININE mg/dL 2.14* 1.95* 1.94* 1.81* 1.69* 1.56*   CALCIUM mg/dL 9.1 9.0 8.9 8.9 9.1 9.0       BNP: No results for input(s): \"BNP\" in the last 72 hours.    Magnesium:   Results from last 7 days   Lab Units 12/27/24  0621 12/26/24  0519 12/25/24  0532 12/23/24  0418 12/22/24  0925   MAGNESIUM mg/dL 2.1 2.1 1.9 1.9 1.8*       Coags:       TSH:        Hemoglobin A1C   Results from last 7 days   Lab Units 12/22/24  1430   HEMOGLOBIN A1C % 9.6*       Lipid Profile:       Cardiac testing:   No results found for this or any previous visit.    No results found for this or any previous visit.    No results found for this or any previous visit.    No results found for this or any previous visit.      Meds/Allergies   all current active meds have been reviewed    Medications Prior to Admission:     amLODIPine-benazepril (LOTREL 5-10) 5-10 MG per capsule    aspirin 81 MG tablet    buPROPion (WELLBUTRIN XL) 150 mg 24 hr tablet    glipiZIDE (GLUCOTROL) 5 mg tablet    metFORMIN (GLUCOPHAGE) 1000 MG tablet    methocarbamol (Robaxin-750) 750 mg tablet    metoprolol succinate (TOPROL-XL) 50 mg 24 hr tablet    Multiple Vitamins-Minerals (MULTIVITAMIN ADULT PO)    nitroglycerin (NITROSTAT) 0.4 mg SL tablet    pregabalin (LYRICA) 150 mg capsule    rosuvastatin (CRESTOR) 40 MG tablet    traMADol (ULTRAM) 50 mg tablet    Blood Glucose Monitoring Suppl (FREESTYLE FREEDOM LITE) w/Device KIT    Blood Glucose Monitoring Suppl (FreeStyle Lite) MANISH    EPINEPHrine (EPIPEN) 0.3 mg/0.3 " mL SOAJ    gabapentin (NEURONTIN) 300 mg capsule    glucose blood (FREESTYLE TEST STRIPS) test strip    Lancets (freestyle) lancets       Assessment:  Principal Problem:    Pulmonary edema  Active Problems:    Atherosclerosis of native coronary artery of native heart without angina pectoris    Stage 3b chronic kidney disease (HCC)    Chronic pain syndrome    Type 2 diabetes mellitus with polyneuropathy (HCC)    Hypertensive urgency    Pulmonary nodules    Cellulitis of left lower extremity            Counseling / Coordination of Care  Total floor / unit time spent today 25 minutes.  Greater than 50% of total time was spent with the patient and / or family counseling and / or coordination of care.  A description of the counseling / coordination of care: .

## 2024-12-27 NOTE — ASSESSMENT & PLAN NOTE
68 yo male PMHx CAD s/p CABG, HTN, DM, CKD presents with worsening ALEXIS x 1 week.  Notable for orthopnea, increasing bilateral lower extremity edema and weight gain over the past few months.    Day #2 Lasix 40 mg IV twice daily, weights declining, negative fluid balance 12/24  Held diuretics for 24 hours, resumed on 12/26, will continue with 40mg IV BID through today   Renal function worsened, continue to monitor closely   Low-salt diet, intake and output monitoring, daily weights  Echo reviewed , recommend outpatient sleep study

## 2024-12-28 VITALS
DIASTOLIC BLOOD PRESSURE: 64 MMHG | TEMPERATURE: 98.2 F | HEIGHT: 72 IN | RESPIRATION RATE: 16 BRPM | SYSTOLIC BLOOD PRESSURE: 122 MMHG | HEART RATE: 63 BPM | BODY MASS INDEX: 35.98 KG/M2 | WEIGHT: 265.65 LBS | OXYGEN SATURATION: 95 %

## 2024-12-28 LAB
ANION GAP SERPL CALCULATED.3IONS-SCNC: 10 MMOL/L (ref 4–13)
BUN SERPL-MCNC: 40 MG/DL (ref 5–25)
CALCIUM SERPL-MCNC: 9.5 MG/DL (ref 8.4–10.2)
CHLORIDE SERPL-SCNC: 99 MMOL/L (ref 96–108)
CO2 SERPL-SCNC: 29 MMOL/L (ref 21–32)
CREAT SERPL-MCNC: 1.96 MG/DL (ref 0.6–1.3)
GFR SERPL CREATININE-BSD FRML MDRD: 33 ML/MIN/1.73SQ M
GLUCOSE SERPL-MCNC: 195 MG/DL (ref 65–140)
GLUCOSE SERPL-MCNC: 202 MG/DL (ref 65–140)
GLUCOSE SERPL-MCNC: 322 MG/DL (ref 65–140)
POTASSIUM SERPL-SCNC: 4.2 MMOL/L (ref 3.5–5.3)
SODIUM SERPL-SCNC: 138 MMOL/L (ref 135–147)

## 2024-12-28 PROCEDURE — 82948 REAGENT STRIP/BLOOD GLUCOSE: CPT

## 2024-12-28 PROCEDURE — 80048 BASIC METABOLIC PNL TOTAL CA: CPT | Performed by: FAMILY MEDICINE

## 2024-12-28 PROCEDURE — 99239 HOSP IP/OBS DSCHRG MGMT >30: CPT | Performed by: FAMILY MEDICINE

## 2024-12-28 RX ORDER — AMLODIPINE BESYLATE 5 MG/1
5 TABLET ORAL DAILY
Qty: 30 TABLET | Refills: 0 | Status: SHIPPED | OUTPATIENT
Start: 2024-12-29

## 2024-12-28 RX ORDER — FUROSEMIDE 40 MG/1
40 TABLET ORAL DAILY
Qty: 30 TABLET | Refills: 0 | Status: SHIPPED | OUTPATIENT
Start: 2024-12-29 | End: 2024-12-31 | Stop reason: SDUPTHER

## 2024-12-28 RX ADMIN — PREGABALIN 150 MG: 75 CAPSULE ORAL at 08:21

## 2024-12-28 RX ADMIN — INSULIN LISPRO 2 UNITS: 100 INJECTION, SOLUTION INTRAVENOUS; SUBCUTANEOUS at 08:21

## 2024-12-28 RX ADMIN — BUPROPION HYDROCHLORIDE 150 MG: 150 TABLET, EXTENDED RELEASE ORAL at 08:21

## 2024-12-28 RX ADMIN — FUROSEMIDE 40 MG: 40 TABLET ORAL at 08:21

## 2024-12-28 RX ADMIN — ASPIRIN 81 MG 81 MG: 81 TABLET ORAL at 08:21

## 2024-12-28 RX ADMIN — AMLODIPINE BESYLATE 5 MG: 5 TABLET ORAL at 08:21

## 2024-12-28 RX ADMIN — CEFTRIAXONE 2000 MG: 2 INJECTION, SOLUTION INTRAVENOUS at 08:21

## 2024-12-28 RX ADMIN — METOPROLOL SUCCINATE 50 MG: 50 TABLET, EXTENDED RELEASE ORAL at 08:21

## 2024-12-28 RX ADMIN — HEPARIN SODIUM 5000 UNITS: 5000 INJECTION, SOLUTION INTRAVENOUS; SUBCUTANEOUS at 07:26

## 2024-12-28 RX ADMIN — INSULIN LISPRO 5 UNITS: 100 INJECTION, SOLUTION INTRAVENOUS; SUBCUTANEOUS at 11:49

## 2024-12-28 NOTE — PLAN OF CARE
Problem: Potential for Falls  Goal: Patient will remain free of falls  Description: INTERVENTIONS:  - Educate patient/family on patient safety including physical limitations  - Instruct patient to call for assistance with activity   - Consult OT/PT to assist with strengthening/mobility   - Keep Call bell within reach  - Keep bed low and locked with side rails adjusted as appropriate  - Keep care items and personal belongings within reach  - Initiate and maintain comfort rounds  - Make Fall Risk Sign visible to staff  - Offer Toileting every 2 Hours, in advance of need  - Initiate/Maintain bed/chair alarm  - Obtain necessary fall risk management equipment: non skid socks  - Apply yellow socks and bracelet for high fall risk patients  - Consider moving patient to room near nurses station  Outcome: Progressing     Problem: PAIN - ADULT  Goal: Verbalizes/displays adequate comfort level or baseline comfort level  Description: Interventions:  - Encourage patient to monitor pain and request assistance  - Assess pain using appropriate pain scale  - Administer analgesics based on type and severity of pain and evaluate response  - Implement non-pharmacological measures as appropriate and evaluate response  - Consider cultural and social influences on pain and pain management  - Notify physician/advanced practitioner if interventions unsuccessful or patient reports new pain  Outcome: Progressing     Problem: SAFETY ADULT  Goal: Patient will remain free of falls  Description: INTERVENTIONS:  - Educate patient/family on patient safety including physical limitations  - Instruct patient to call for assistance with activity   - Consult OT/PT to assist with strengthening/mobility   - Keep Call bell within reach  - Keep bed low and locked with side rails adjusted as appropriate  - Keep care items and personal belongings within reach  - Initiate and maintain comfort rounds  - Make Fall Risk Sign visible to staff  - Offer Toileting  every 2 Hours, in advance of need  - Initiate/Maintain bed/chair alarm  - Obtain necessary fall risk management equipment: non skid socks  - Apply yellow socks and bracelet for high fall risk patients  - Consider moving patient to room near nurses station  Outcome: Progressing  Goal: Maintain or return to baseline ADL function  Description: INTERVENTIONS:  -  Assess patient's ability to carry out ADLs; assess patient's baseline for ADL function and identify physical deficits which impact ability to perform ADLs (bathing, care of mouth/teeth, toileting, grooming, dressing, etc.)  - Assess/evaluate cause of self-care deficits   - Assess range of motion  - Assess patient's mobility; develop plan if impaired  - Assess patient's need for assistive devices and provide as appropriate  - Encourage maximum independence but intervene and supervise when necessary  - Involve family in performance of ADLs  - Assess for home care needs following discharge   - Consider OT consult to assist with ADL evaluation and planning for discharge  - Provide patient education as appropriate  Outcome: Progressing  Goal: Maintains/Returns to pre admission functional level  Description: INTERVENTIONS:  - Perform AM-PAC 6 Click Basic Mobility/ Daily Activity assessment daily.  - Set and communicate daily mobility goal to care team and patient/family/caregiver.   - Collaborate with rehabilitation services on mobility goals if consulted  - Perform Range of Motion 3 times a day.  - Reposition patient every 2 hours.  - Dangle patient 3 times a day  - Stand patient 3 times a day  - Ambulate patient 3 times a day  - Out of bed to chair 3 times a day   - Out of bed for meals 3 times a day  - Out of bed for toileting  - Record patient progress and toleration of activity level   Outcome: Progressing     Problem: DISCHARGE PLANNING  Goal: Discharge to home or other facility with appropriate resources  Description: INTERVENTIONS:  - Identify barriers to  discharge w/patient and caregiver  - Arrange for needed discharge resources and transportation as appropriate  - Identify discharge learning needs (meds, wound care, etc.)  - Arrange for interpretive services to assist at discharge as needed  - Refer to Case Management Department for coordinating discharge planning if the patient needs post-hospital services based on physician/advanced practitioner order or complex needs related to functional status, cognitive ability, or social support system  Outcome: Progressing     Problem: Knowledge Deficit  Goal: Patient/family/caregiver demonstrates understanding of disease process, treatment plan, medications, and discharge instructions  Description: Complete learning assessment and assess knowledge base.  Interventions:  - Provide teaching at level of understanding  - Provide teaching via preferred learning methods  Outcome: Progressing

## 2024-12-28 NOTE — NURSING NOTE
Patient was discharged in walking stable condition. IV was taken out and patients belongings were gathered. AVS was reviewed with patient by this nurse and a verbal understanding was given. Patient was transported to his home by family.

## 2024-12-28 NOTE — DISCHARGE SUMMARY
Discharge Summary - Hospitalist   Name: Bryant Bustamante Jr. 69 y.o. male I MRN: [id number]  Unit/Bed#: 426-01 I Date of Admission: 12/22/2024   Date of Service: 12/28/2024 I Hospital Day: 6     Assessment & Plan  Pulmonary edema  68 yo male PMHx CAD s/p CABG, HTN, DM, CKD presents with worsening ALEXIS x 1 week.  Notable for orthopnea, increasing bilateral lower extremity edema and weight gain over the past few months.    Day #2 Lasix 40 mg IV twice daily, weights declining, negative fluid balance 12/24  Held diuretics for 24 hours, resumed on 12/26, will continue with 40mg IV BID through today   Renal function worsened, continue to monitor closely   Low-salt diet, intake and output monitoring, daily weights  Echo reviewed , recommend outpatient sleep study   Transitioned to 40mg lasix on 12/27, renal function improved on day of discharge. Will dc on 40mg lasix and refer to nephro and encourage cardiology as well as PCP next week  Atherosclerosis of native coronary artery of native heart without angina pectoris  Hx CABG in 2012. No longer follows with cardiology, historically saw Dr Beyer  Noted with moderate coronary artery calcifications on CT  Continue aspirin, statin, Toprol-XL  Cellulitis of left lower extremity  Noted to have pain and erythema of the left lower extremity received ceftriaxone in the ED, will continue  Area of erythema improved, continue ceftriaxone to complete 5-7 days of ab 12/25  Hypertensive urgency  Resolved  Stage 3b chronic kidney disease (HCC)  Lab Results   Component Value Date    EGFR 33 12/28/2024    EGFR 30 12/27/2024    EGFR 33 12/26/2024    CREATININE 1.96 (H) 12/28/2024    CREATININE 2.14 (H) 12/27/2024    CREATININE 1.95 (H) 12/26/2024     Renal function worsened x 2 days, dc lisinopril and hold diuretics for 24 hours 12/25/24  Type 2 diabetes mellitus with polyneuropathy (HCC)  Lab Results   Component Value Date    HGBA1C 9.6 (H) 12/22/2024       Recent Labs     12/27/24  1110  12/27/24  1556 12/27/24 2029 12/28/24  0731   POCGLU 285* 213* 283* 195*       Blood Sugar Average: Last 72 hrs:  (P) 238  Update A1c   Hold oral medications  SSI & acu-checks ac/hs    Pulmonary nodules  Recommended CT chest w/ in 3-6 months then at 18-24 months for follow-up  Note, mild subcarinal lymphadenopathy suspected to be reactive that can also be re-eval at time of f/u CT chest wo as above  Chronic pain syndrome  Tramadol as needed     Medical Problems       Resolved Problems  Date Reviewed: 12/13/2019   None       Discharging Physician / Practitioner: Ezra Murrieta MD  PCP: Bryant Holloway DO  Admission Date:   Admission Orders (From admission, onward)       Ordered        12/22/24 1304  Inpatient Admission  Once                          Discharge Date: 12/28/24    Consultations During Hospital Stay:  Cardiology    Procedures Performed:   echo    Significant Findings / Test Results:   none    Incidental Findings:   Pulm nodule , patient aware to repeat imaging as instructed        Test Results Pending at Discharge (will require follow up):        Outpatient Tests Requested:  BMP in a week    Complications:  none    Reason for Admission: shortness of breath    Hospital Course:   Bryant Bustamante Jr. is a 69 y.o. male patient who originally presented to the hospital on 12/22/2024 due to shortness of breath. He has a history of CKD and has been having increased lower extremity edema.   Patient was initiated on IV Lasix and nitroglycerin.  Renal function was closely monitored.  He lost approximately 20-25 pounds with significant improvement of edema     Renal function bumped. Lasix were held and when resumed lasix 40mg renal function continued to improve.     He will be referred to nephro on discharge to establish care and was instructed to follow up with cardiology within 1 week of discharge         Please see above list of diagnoses and related plan for additional information.     Condition at Discharge:  good    Discharge Day Visit / Exam:   * Please refer to separate progress note for these details *    Discussion with Family: Updated  (daughter) via phone.    Discharge instructions/Information to patient and family:   See after visit summary for information provided to patient and family.      Provisions for Follow-Up Care:  See after visit summary for information related to follow-up care and any pertinent home health orders.      Mobility at time of Discharge:   Basic Mobility Inpatient Raw Score: 24  JH-HLM Goal: 8: Walk 250 feet or more  JH-HLM Achieved: 7: Walk 25 feet or more  HLM Goal achieved. Continue to encourage appropriate mobility.     Disposition:   Home    Planned Readmission: No    Discharge Medications:  See after visit summary for reconciled discharge medications provided to patient and/or family.      Administrative Statements   Discharge Statement:  I have spent a total time of 40 minutes in caring for this patient on the day of the visit/encounter. >30 minutes of time was spent on: Diagnostic results, Prognosis, Risks and benefits of tx options, Instructions for management, Patient and family education, and Documenting in the medical record.    **Please Note: This note may have been constructed using a voice recognition system**

## 2024-12-28 NOTE — ASSESSMENT & PLAN NOTE
Lab Results   Component Value Date    HGBA1C 9.6 (H) 12/22/2024       Recent Labs     12/27/24  1110 12/27/24  1556 12/27/24 2029 12/28/24  0731   POCGLU 285* 213* 283* 195*       Blood Sugar Average: Last 72 hrs:  (P) 238  Update A1c   Hold oral medications  SSI & acu-checks ac/hs

## 2024-12-28 NOTE — ASSESSMENT & PLAN NOTE
68 yo male PMHx CAD s/p CABG, HTN, DM, CKD presents with worsening ALEXIS x 1 week.  Notable for orthopnea, increasing bilateral lower extremity edema and weight gain over the past few months.    Day #2 Lasix 40 mg IV twice daily, weights declining, negative fluid balance 12/24  Held diuretics for 24 hours, resumed on 12/26, will continue with 40mg IV BID through today   Renal function worsened, continue to monitor closely   Low-salt diet, intake and output monitoring, daily weights  Echo reviewed , recommend outpatient sleep study   Transitioned to 40mg lasix on 12/27, renal function improved on day of discharge. Will dc on 40mg lasix and refer to nephro and encourage cardiology as well as PCP next week

## 2024-12-28 NOTE — ASSESSMENT & PLAN NOTE
Lab Results   Component Value Date    EGFR 33 12/28/2024    EGFR 30 12/27/2024    EGFR 33 12/26/2024    CREATININE 1.96 (H) 12/28/2024    CREATININE 2.14 (H) 12/27/2024    CREATININE 1.95 (H) 12/26/2024     Renal function worsened x 2 days, dc lisinopril and hold diuretics for 24 hours 12/25/24

## 2024-12-28 NOTE — CASE MANAGEMENT
Case Management Discharge Planning Note    Patient name Bryant Bustamante Jr.  Location /426-01 MRN [id number]  : 1955 Date 2024       Current Admission Date: 2024  Current Admission Diagnosis:Pulmonary edema   Patient Active Problem List    Diagnosis Date Noted Date Diagnosed    Hypertensive urgency 2024     Pulmonary edema 2024     Pulmonary nodules 2024     Cellulitis of left lower extremity 2024     Pain of left lower extremity 10/30/2024     Pre-op examination 2024     Bee sting allergy 2024     Type 2 diabetes mellitus with polyneuropathy (HCC) 2024     Heart murmur 2024     Macrocytosis 2024     Severe recurrent major depression without psychotic features (HCC) 2024     Stage 3b chronic kidney disease (HCC) 10/25/2022     Chronic prescription opiate use 10/25/2022     Chronic pain syndrome 10/25/2022     Encounter for immunization 2021     Other fatigue 2021     Epigastric pain 2021     DDD (degenerative disc disease), lumbar      Contusion of right foot 2020     Obesity, morbid (HCC) 2020     Elevated PSA 2020     Immunization due 10/20/2020     Encounter for long-term (current) use of medications 2020     Neck pain 2020     Puncture wound of right foot 2020     Chronic low back pain without sciatica 2019     Primary osteoarthritis of both knees 2019     Essential hypertension, benign 2019     Mixed hyperlipidemia 2019     Atherosclerosis of native coronary artery of native heart without angina pectoris 2019       LOS (days): 6  Geometric Mean LOS (GMLOS) (days): 3.9  Days to GMLOS:-2     OBJECTIVE:  Risk of Unplanned Readmission Score: 12.65         Current admission status: Inpatient   Preferred Pharmacy:   [id number] - [place] [address]  [place] [address]  Phone: [phone] Fax:  [phone]    Primary Care Provider: Bryant Holloway DO    Primary Insurance: MEDICARE  Secondary Insurance: COMMERCIAL MISCELLANEOUS    DISCHARGE DETAILS:    Discharge planning discussed with:: patient        CM contacted family/caregiver?: No- see comments (declined)  Were Treatment Team discharge recommendations reviewed with patient/caregiver?: Yes  Did patient/caregiver verbalize understanding of patient care needs?: Yes  Were patient/caregiver advised of the risks associated with not following Treatment Team discharge recommendations?: Yes       Treatment Team Recommendation: Home  Discharge Destination Plan:: Home     IMM Given (Date):: 12/28/24  IMM Given to:: Patient   Patient discharging home today. No discharge needs noted. Nurse to review AVS, all follow up providers listed. Son or granddaughter to transport home.

## 2024-12-30 ENCOUNTER — TRANSITIONAL CARE MANAGEMENT (OUTPATIENT)
Dept: FAMILY MEDICINE CLINIC | Facility: CLINIC | Age: 69
End: 2024-12-30

## 2024-12-30 ENCOUNTER — TELEPHONE (OUTPATIENT)
Dept: FAMILY MEDICINE CLINIC | Facility: CLINIC | Age: 69
End: 2024-12-30

## 2024-12-30 NOTE — TELEPHONE ENCOUNTER
I spoke to the patient c/o his 1/7/2025 tcm appointment.Patient stated that he could not do that day due to an appointment with [facility] he was rescheduled to 1/10/2025 Patient was instructed this appointment needed to be in a time frame.

## 2024-12-30 NOTE — TELEPHONE ENCOUNTER
I called the patient to schedule a TCM appointment.Patient was scheduled 1/7/2025 @ 11:00 am but need to verify this appointment with the patient.His phone is not set up with voicemail.

## 2024-12-30 NOTE — PROGRESS NOTES
Bryant Riveramariana Estrella   69 y.o.   male   MRN: [id number]  [facility]  [address]  [address]  [place] [id number] [phone]    PCP: Bryant Holloway DO  Cardiologist : CELIA, at [facility]    prior: Dr. Beyer      Virtual Regular Visit  Name: Bryant Bustamante Jr.      : 1955      MRN: [id number]  Encounter Provider: JUDY Vizcaino  Encounter Date: 2024   Encounter department: [facility]      Verification of patient location:  Patient is located at Home in the following state in which I hold an active license [place]       :  Assessment & Plan  Summary of Plan:  Low-sodium diet, Heart failure education as below  BMP, BNP this week  Follow up will be scheduled with MARIYA Velasquez at the [place] office   Urged to make a F/U with renal  Colon Ca screenin2013 -- not addressed        Impression/plan  Chronic HFpEF.  Recent admission for decompensation/acute pulmonary edema -24. New onset; no prior hx  Dry weight around 250 pounds  Wt Readings from Last 3 Encounters:   24 121 kg (265 lb 10.5 oz)   10/30/24 122 kg (267 lb 14.4 oz)   24 113 kg (249 lb 3.2 oz)     --beta-blocker: Toprol 50 mg daily  --Diuretic: Lasix 40 mg daily beginning ;  not on diuretics PTA  --ACE/ARB/ARNI:  Held due to renal fxn  --MRA:   --SGLT2i:  --2 g sodium diet, 1800 cc fluid restriction. Daily weights.   CAD status post CABG , stable without angina  On aspirin 81 mg/daily  Hypertension, essential.   BP  133/77.   On amlodipine 5 mg daily.  His ACE inhibitor was held pending repeat labs  Aortic stenosis, moderate.  NNEKA 1.27 cm².  Mean gradient 23 mmHg, echo 2024  Hyperlipidemia, severe.  Poorly controlled.   On rosuvastatin 40 mg daily.  Not addressed today   Latest Reference Range & Units 09/15/22 10:08 24 08:52   Cholesterol See Comment mg/dL 122 343 (H)   Triglycerides See Comment mg/dL 107 632 (H)   HDL  >=40 mg/dL 46 44   Non-HDL Cholesterol mg/dl 76    LDL Calculated 0 - 100 mg/dL 55 See Comment   LDL CHOLESTEROL DIRECT 0 - 100 mg/dl  168 (H)   CKD 3: baseline cr 1.5  As an inpatient with diuresis renal function worsening for 2 days.  Lisinopril discontinued and diuretics were held for 24 hours 12/25/2024  BMP  Type 2 diabetes mellitus with hyperglycemia, on long-term use of insulin.  Hemoglobin A1c 9.6  12/22/24  Chronic venous stasis ulcers lower extremities, with cellulitis, recently treated with antibiotics  Probable ANGELA.  Outpatient sleep study recommended.  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  EF 55%.  No RWMA.  Wall thickness mildly increased.  Grade 2 DD.  RV normal size and function.  Mild SINA.  Moderate aortic stenosis with calcified leaflets.  Mean gradient 23.  NNEKA 1.27 cm².  Mild TR.  Estimated RVSP 28 mmHg                HPI:   Bryant Riveramariana Estrella is a 69 y.o. male with CAD s/p CABG ( 2014), HTN, DM, CKD.  In the past he followed with Dr. Beyer, but has not been seen in the office since 2014      Adm 12/22-12/28/24. [facility]  CC: worsening ALEXIS x 1 week.  Notable for orthopnea, increasing bilateral lower extremity edema and weight gain over the past few months, since his elective TKR 10/30/24.  He had a LE duplex which was neg for DVT    He presented with hypertensive urgency: /109    Treated for ADHF/ pulmonary edema.  Also treated for lower extremity cellulitis with antibiotic  He was evaluated by Cardiology  Echo: EF 55%. No RWMA.  Mod AS  He was diuresed with Lasix 40 mg IV twice daily  His weight show declining with a negative fluid balance 1224 but developed a bump in creatinine.  Diuretics and his ACE inhibitor was held for 24 hours.  Discharge weight : 266 lb (adm wt 273 lb)  Discharge creatinine: 1.96  Discharge diuretics:  "Lasix 40 mg daily  Recommend follow-up with cardiology, nephrology    12/31/24  Hospital follow-up.  This is a telemedicine visit via phone call only.  He could not connect by smart phone.  Additionally, he thought this appointment was at the [facility] and he drove to [facility] Card[facility] office.  The visit was then delayed until he came home and we could connect by phone  Not weighing himself yet since he was home-- \"didn't know I had to\"  BP this am 133/77  Discussed the importance of weighing himself daily, avoiding salt in the diet and abiding by a 2 L fluid allotment  Likes Chinese, Pizza, high Na diet.  This was likely the reason for decompensation  Sounds like he did not  receive much in the way of HF education, or he did not retain it.  Remains off his ACE-I, as directed.  Reports he is taking the diuretic.  I recommend repeat labs, this week and follow-up with LATRELL Barraza at the [place] office Jan 17.  Encouraged him to make appointment with renal            Encounter provider JUDY Vizcaino    The patient was identified by name and date of birth. Bryant Bustamante Jr. was informed that this is a telemedicine visit and that the visit is being conducted through the Epic Embedded platform. He agrees to proceed..  My office door was closed. No one else was in the room.  He acknowledged consent and understanding of privacy and security of the video platform. The patient has agreed to participate and understands they can discontinue the visit at any time.    Patient is aware this is a billable service.     Physical Exam- unable, as this was via telephone; pt unable to connect to Video    Visit Time  Total Visit Duration: 10 mins  "

## 2024-12-31 ENCOUNTER — TELEMEDICINE (OUTPATIENT)
Dept: CARDIOLOGY CLINIC | Facility: CLINIC | Age: 69
End: 2024-12-31
Payer: MEDICARE

## 2024-12-31 VITALS — HEART RATE: 70 BPM | SYSTOLIC BLOOD PRESSURE: 133 MMHG | DIASTOLIC BLOOD PRESSURE: 77 MMHG

## 2024-12-31 DIAGNOSIS — I50.32 CHRONIC HEART FAILURE WITH PRESERVED EJECTION FRACTION (HFPEF) (HCC): ICD-10-CM

## 2024-12-31 DIAGNOSIS — J81.1 PULMONARY EDEMA: ICD-10-CM

## 2024-12-31 DIAGNOSIS — I10 ESSENTIAL HYPERTENSION, BENIGN: Primary | ICD-10-CM

## 2024-12-31 DIAGNOSIS — E78.2 MIXED HYPERLIPIDEMIA: ICD-10-CM

## 2024-12-31 DIAGNOSIS — N18.32 STAGE 3B CHRONIC KIDNEY DISEASE (HCC): ICD-10-CM

## 2024-12-31 DIAGNOSIS — E66.01 OBESITY, MORBID (HCC): ICD-10-CM

## 2024-12-31 PROCEDURE — 99213 OFFICE O/P EST LOW 20 MIN: CPT | Performed by: NURSE PRACTITIONER

## 2024-12-31 RX ORDER — FUROSEMIDE 40 MG/1
40 TABLET ORAL DAILY
Qty: 90 TABLET | Refills: 3 | Status: SHIPPED | OUTPATIENT
Start: 2024-12-31

## 2024-12-31 NOTE — ASSESSMENT & PLAN NOTE
Lab Results   Component Value Date    EGFR 33 12/28/2024    EGFR 30 12/27/2024    EGFR 33 12/26/2024    CREATININE 1.96 (H) 12/28/2024    CREATININE 2.14 (H) 12/27/2024    CREATININE 1.95 (H) 12/26/2024

## 2024-12-31 NOTE — LETTER
2024     Braynt Holloway DO  [address]  [address]  [place] [address]    Patient: Bryant Bustamnate Jr.   YOB: 1955   Date of Visit: 2024       Dear Dr. Holloway:    Thank you for referring Bryant Bustamante to me for evaluation. Below are my notes for this consultation.    If you have questions, please do not hesitate to call me. I look forward to following your patient along with you.         Sincerely,        JUDY Vizcaino        CC: DO Deysi Veras CRNP  2024 11:15 AM  Sign when Signing Visit  Bryant Bustamante Jr.   69 y.o.   male   MRN: [id number]  [facility]  [address] [place] [address] [address]  [facility] [id number]  [phone]  [phone]    PCP: Bryant Holloway DO  Cardiologist : CELIA, at [facility]    prior: Dr. Beyer      Virtual Regular Visit  Name: Bryant Bustamante Jr.      : 1955      MRN: [id number]  Encounter Provider: JUDY Vizcaino  Encounter Date: 2024   Encounter department: [facility]      Verification of patient location:  Patient is located at Home in the following state in which I hold an active license [place]       :  Assessment & Plan  Summary of Plan:  Low-sodium diet, Heart failure education as below  BMP, BNP this week  Follow up will be scheduled with MARIYA Velasquez at the [place] office   Urged to make a F/U with renal  Colon Ca screenin2013 -- not addressed        Impression/plan  Chronic HFpEF.  Recent admission for decompensation/acute pulmonary edema -24. New onset; no prior hx  Dry weight around 250 pounds  Wt Readings from Last 3 Encounters:   24 121 kg (265 lb 10.5 oz)   10/30/24 122 kg (267 lb 14.4 oz)   24 113 kg (249 lb 3.2 oz)     --beta-blocker: Toprol 50 mg daily  --Diuretic: Lasix 40 mg daily beginning ;  not on diuretics PTA  --ACE/ARB/ARNI:  Held due to renal fxn  --MRA:   --SGLT2i:  --2 g sodium diet,  1800 cc fluid restriction. Daily weights.   CAD status post CABG 2014, stable without angina  On aspirin 81 mg/daily  Hypertension, essential.   BP  133/77.   On amlodipine 5 mg daily.  His ACE inhibitor was held pending repeat labs  Aortic stenosis, moderate.  NNEKA 1.27 cm².  Mean gradient 23 mmHg, echo December 2024  Hyperlipidemia, severe.  Poorly controlled.   On rosuvastatin 40 mg daily.  Not addressed today   Latest Reference Range & Units 09/15/22 10:08 03/28/24 08:52   Cholesterol See Comment mg/dL 122 343 (H)   Triglycerides See Comment mg/dL 107 632 (H)   HDL >=40 mg/dL 46 44   Non-HDL Cholesterol mg/dl 76    LDL Calculated 0 - 100 mg/dL 55 See Comment   LDL CHOLESTEROL DIRECT 0 - 100 mg/dl  168 (H)   CKD 3: baseline cr 1.5  As an inpatient with diuresis renal function worsening for 2 days.  Lisinopril discontinued and diuretics were held for 24 hours 12/25/2024  BMP  Type 2 diabetes mellitus with hyperglycemia, on long-term use of insulin.  Hemoglobin A1c 9.6  12/22/24  Chronic venous stasis ulcers lower extremities, with cellulitis, recently treated with antibiotics  Probable ANGELA.  Outpatient sleep study recommended.  Not specifically discussed on the phone today  Pulmonary nodules, lymphadenopathy repeat CT chest 3 to 6 months recommended, per his PCP  DMITRI, S/P TKJR 10/30/24  Cardiac testing  TTE 12/23/24.  EF 55%.  No RWMA.  Wall thickness mildly increased.  Grade 2 DD.  RV normal size and function.  Mild SINA.  Moderate aortic stenosis with calcified leaflets.  Mean gradient 23.  NNEKA 1.27 cm².  Mild TR.  Estimated RVSP 28 mmHg                HPI:   Bryant Bustamante  is a 69 y.o. male with CAD s/p CABG ( 2014), HTN, DM, CKD.  In the past he followed with Dr. Beyer, but has not been seen in the office since 2014      Adm 12/22-12/28/24. [facility]  CC: worsening ALEXIS x 1 week.  Notable for orthopnea, increasing bilateral lower extremity edema and weight gain over the past few months, since his  "elective TKR 10/30/24.  He had a LE duplex which was neg for DVT    He presented with hypertensive urgency: /109    Treated for ADHF/ pulmonary edema.  Also treated for lower extremity cellulitis with antibiotic  He was evaluated by Cardiology  Echo: EF 55%. No RWMA.  Mod AS  He was diuresed with Lasix 40 mg IV twice daily  His weight show declining with a negative fluid balance 1224 but developed a bump in creatinine.  Diuretics and his ACE inhibitor was held for 24 hours.  Discharge weight : 266 lb (adm wt 273 lb)  Discharge creatinine: 1.96  Discharge diuretics: Lasix 40 mg daily  Recommend follow-up with cardiology, nephrology    12/31/24  Hospital follow-up.  This is a telemedicine visit via phone call only.  He could not connect by smart phone.  Additionally, he thought this appointment was at the [place] office and he drove to [facility] office.  The visit was then delayed until he came home and we could connect by phone  Not weighing himself yet since he was home-- \"didn't know I had to\"  BP this am 133/77  Discussed the importance of weighing himself daily, avoiding salt in the diet and abiding by a 2 L fluid allotment  Likes Chinese, Pizza, high Na diet.  This was likely the reason for decompensation  Sounds like he did not  receive much in the way of HF education, or he did not retain it.  Remains off his ACE-I, as directed.  Reports he is taking the diuretic.  I recommend repeat labs, this week and follow-up with LATRELL Barraza at the [place] office Jan 17.  Encouraged him to make appointment with renal            Encounter provider JUDY Vizcaino    The patient was identified by name and date of birth. Bryant Bustamante Jr. was informed that this is a telemedicine visit and that the visit is being conducted through the Epic Embedded platform. He agrees to proceed..  My office door was closed. No one else was in the room.  He acknowledged consent and understanding of privacy and security " of the video platform. The patient has agreed to participate and understands they can discontinue the visit at any time.    Patient is aware this is a billable service.     Physical Exam- unable, as this was via telephone; pt unable to connect to Video    Visit Time  Total Visit Duration: 10 mins

## 2025-01-03 ENCOUNTER — RA CDI HCC (OUTPATIENT)
Dept: OTHER | Facility: HOSPITAL | Age: 70
End: 2025-01-03

## 2025-01-16 ENCOUNTER — TELEPHONE (OUTPATIENT)
Age: 70
End: 2025-01-16

## 2025-01-16 NOTE — TELEPHONE ENCOUNTER
Patient called to cancel his appt for today due to very bad back pain. Patient says its hard for him to even get up and walk. Appt for today was a hospital follow up due to patient missing his TCM. Patient says he will call to r/s when feeling better. Please advise.

## 2025-01-27 DIAGNOSIS — J81.1 PULMONARY EDEMA: ICD-10-CM

## 2025-01-28 RX ORDER — AMLODIPINE BESYLATE 5 MG/1
5 TABLET ORAL DAILY
Qty: 30 TABLET | Refills: 0 | Status: SHIPPED | OUTPATIENT
Start: 2025-01-28

## 2025-02-02 DIAGNOSIS — G89.29 CHRONIC BILATERAL LOW BACK PAIN WITHOUT SCIATICA: ICD-10-CM

## 2025-02-02 DIAGNOSIS — M54.50 CHRONIC BILATERAL LOW BACK PAIN WITHOUT SCIATICA: ICD-10-CM

## 2025-02-03 RX ORDER — METHOCARBAMOL 750 MG/1
750 TABLET, FILM COATED ORAL 3 TIMES DAILY
Qty: 90 TABLET | Refills: 0 | Status: SHIPPED | OUTPATIENT
Start: 2025-02-03

## 2025-02-06 ENCOUNTER — TELEPHONE (OUTPATIENT)
Dept: FAMILY MEDICINE CLINIC | Facility: CLINIC | Age: 70
End: 2025-02-06

## 2025-02-14 DIAGNOSIS — E11.42 POORLY CONTROLLED TYPE 2 DIABETES MELLITUS WITH PERIPHERAL NEUROPATHY (HCC): ICD-10-CM

## 2025-02-14 DIAGNOSIS — E78.2 MIXED HYPERLIPIDEMIA: ICD-10-CM

## 2025-02-14 DIAGNOSIS — E11.65 POORLY CONTROLLED TYPE 2 DIABETES MELLITUS WITH PERIPHERAL NEUROPATHY (HCC): ICD-10-CM

## 2025-02-14 RX ORDER — ROSUVASTATIN CALCIUM 40 MG/1
40 TABLET, COATED ORAL DAILY
Qty: 90 TABLET | Refills: 1 | Status: SHIPPED | OUTPATIENT
Start: 2025-02-14

## 2025-02-14 RX ORDER — GLIPIZIDE 5 MG/1
5 TABLET ORAL
Qty: 180 TABLET | Refills: 1 | Status: SHIPPED | OUTPATIENT
Start: 2025-02-14

## 2025-02-22 DIAGNOSIS — J81.1 PULMONARY EDEMA: ICD-10-CM

## 2025-02-22 RX ORDER — AMLODIPINE BESYLATE 5 MG/1
5 TABLET ORAL DAILY
Qty: 30 TABLET | Refills: 0 | Status: SHIPPED | OUTPATIENT
Start: 2025-02-22

## 2025-03-02 DIAGNOSIS — G89.29 CHRONIC BILATERAL LOW BACK PAIN WITHOUT SCIATICA: ICD-10-CM

## 2025-03-02 DIAGNOSIS — M54.50 CHRONIC BILATERAL LOW BACK PAIN WITHOUT SCIATICA: ICD-10-CM

## 2025-03-03 ENCOUNTER — TELEPHONE (OUTPATIENT)
Dept: FAMILY MEDICINE CLINIC | Facility: CLINIC | Age: 70
End: 2025-03-03

## 2025-03-03 RX ORDER — METHOCARBAMOL 750 MG/1
750 TABLET, FILM COATED ORAL 3 TIMES DAILY
Qty: 90 TABLET | Refills: 0 | OUTPATIENT
Start: 2025-03-03

## 2025-03-03 NOTE — TELEPHONE ENCOUNTER
Patient requesting an appointment to have his wounds on bilateral lower legs checked. Called patient and got him scheduled for 3/6/25.

## 2025-03-04 ENCOUNTER — OFFICE VISIT (OUTPATIENT)
Dept: URGENT CARE | Facility: MEDICAL CENTER | Age: 70
End: 2025-03-04
Payer: MEDICARE

## 2025-03-04 VITALS
SYSTOLIC BLOOD PRESSURE: 118 MMHG | WEIGHT: 285.8 LBS | TEMPERATURE: 97.2 F | HEART RATE: 87 BPM | BODY MASS INDEX: 38.71 KG/M2 | HEIGHT: 72 IN | DIASTOLIC BLOOD PRESSURE: 66 MMHG | OXYGEN SATURATION: 98 % | RESPIRATION RATE: 14 BRPM

## 2025-03-04 DIAGNOSIS — S81.801A OPEN WOUND OF RIGHT LOWER LEG, INITIAL ENCOUNTER: ICD-10-CM

## 2025-03-04 DIAGNOSIS — L03.116 BILATERAL CELLULITIS OF LOWER LEG: Primary | ICD-10-CM

## 2025-03-04 DIAGNOSIS — S81.802A OPEN WOUND OF LEFT LOWER LEG, INITIAL ENCOUNTER: ICD-10-CM

## 2025-03-04 DIAGNOSIS — L03.115 BILATERAL CELLULITIS OF LOWER LEG: Primary | ICD-10-CM

## 2025-03-04 PROCEDURE — 99214 OFFICE O/P EST MOD 30 MIN: CPT

## 2025-03-04 PROCEDURE — G0463 HOSPITAL OUTPT CLINIC VISIT: HCPCS

## 2025-03-04 RX ORDER — SULFAMETHOXAZOLE AND TRIMETHOPRIM 800; 160 MG/1; MG/1
1 TABLET ORAL EVERY 12 HOURS SCHEDULED
Qty: 14 TABLET | Refills: 0 | Status: SHIPPED | OUTPATIENT
Start: 2025-03-04 | End: 2025-03-04 | Stop reason: ALTCHOICE

## 2025-03-04 RX ORDER — CEPHALEXIN 500 MG/1
500 CAPSULE ORAL EVERY 12 HOURS SCHEDULED
Qty: 14 CAPSULE | Refills: 0 | Status: SHIPPED | OUTPATIENT
Start: 2025-03-04 | End: 2025-03-06 | Stop reason: SDUPTHER

## 2025-03-04 NOTE — PROGRESS NOTES
[facility]        NAME: Bryant Bustamante Jr. is a 69 y.o. male  : 1955    MRN: [id number]  DATE: 2025  TIME: 11:36 AM    Assessment and Plan   Bilateral cellulitis of lower leg [L03.116, L03.115]  1. Bilateral cellulitis of lower leg  Ambulatory Referral to Podiatry    Ambulatory Referral to Wound Care    cephalexin (KEFLEX) 500 mg capsule    DISCONTINUED: sulfamethoxazole-trimethoprim (BACTRIM DS) 800-160 mg per tablet      2. Open wound of left lower leg, initial encounter  Ambulatory Referral to Podiatry    Ambulatory Referral to Wound Care    cephalexin (KEFLEX) 500 mg capsule    DISCONTINUED: sulfamethoxazole-trimethoprim (BACTRIM DS) 800-160 mg per tablet      3. Open wound of right lower leg, initial encounter  Ambulatory Referral to Podiatry    Ambulatory Referral to Wound Care    cephalexin (KEFLEX) 500 mg capsule    DISCONTINUED: sulfamethoxazole-trimethoprim (BACTRIM DS) 800-160 mg per tablet            Patient Instructions       Follow up with PCP in 3-5 days.  Proceed to  ER if symptoms worsen.    If tests are performed, our office will contact you with results only if changes need to made to the care plan discussed with you at the visit. You can review your full results on [facility].    Chief Complaint     Chief Complaint   Patient presents with   • BLE Sores     Started around end of December and getting worse.  Diabetic ulcerations. Painful, weeping, hot, red, +3 edema B/L.  Patient trying to manage wounds at home.          History of Present Illness       Patient was hospitalized around [place] due to SOB. He reports at that time he had started with wounds on his legs. Initially started in the LLE but has now progressed and is in B/L LE. He has not had any fevers or chills. He has been treating these at home. He has been applying lotions/creams to the wounds. He has had continued drainage. Has history of venous stasis ulcers years ago and had supplies left from  these which he has been using at home without improvement. He has had discomfort in b/l legs in area of wounds. He previously saw [place] wound care for his venous stasis ulcers. He does take Tylenol daily due to chronic pain he take x1-2tabs each-650mg Q8hr PRN general ortho type pain. History of edema of b/l lower legs reported history of lymph edema history and prior left knee replacement 9/2024. He has been having trouble with sleeping due to pain in b/l lower extremities left greater than right. Has had a sharp burning pain in the left greater than right lower extremity but gets pain b/l.     Wound care performed:   Area was cleansed with spray wound , petroleum dressing applied and covered with abd dressing. This was secured using Coban. Patient was provided with supplies for additional x1 dressing change and recommended to follow up with PCP/Wound/Podiatry. I have also discussed importance of keeping wound clean and dry. Also discussed elevation as well as strict ER precautions. Discussed risks and signs of sepsis.     12/22/2024: Hgb A1C was 9.6  12/28/2024: BUN/Creat/eGFR: 40/1.96/33 respectively.         Review of Systems   Review of Systems   Constitutional:  Negative for chills, fatigue and fever.   HENT:  Positive for congestion and postnasal drip.    Respiratory:  Negative for cough, chest tightness and shortness of breath.    Cardiovascular:  Positive for leg swelling. Negative for chest pain.   Gastrointestinal:  Negative for abdominal pain, constipation, diarrhea, nausea and vomiting.   Musculoskeletal:  Positive for arthralgias, gait problem and joint swelling.   Skin:  Positive for color change. Negative for rash.   Neurological:  Negative for dizziness, light-headedness and headaches.         Current Medications       Current Outpatient Medications:   •  amLODIPine (NORVASC) 5 mg tablet, take 1 tablet by mouth once daily, Disp: 30 tablet, Rfl: 0  •  aspirin 81 MG tablet, Take 81 mg by  mouth once, Disp: , Rfl:   •  Blood Glucose Monitoring Suppl (FREESTYLE FREEDOM LITE) w/Device KIT, , Disp: , Rfl:   •  Blood Glucose Monitoring Suppl (FreeStyle Lite) MANISH, Use to check blood sugar once a day, Disp: 100 each, Rfl: 3  •  buPROPion (WELLBUTRIN XL) 150 mg 24 hr tablet, take 1 tablet by mouth every morning, Disp: 90 tablet, Rfl: 1  •  cephalexin (KEFLEX) 500 mg capsule, Take 1 capsule (500 mg total) by mouth every 12 (twelve) hours for 7 days, Disp: 14 capsule, Rfl: 0  •  EPINEPHrine (EPIPEN) 0.3 mg/0.3 mL SOAJ, inject 0.3 milliliters ( 0.3 milligrams ) intramuscularly in OUTER THIGH and HOLD for 3 SECONDS use if needed for SEVERE ALLERGIC REACTION May repeat one time after 10 MINUTES if needed for maximum daily dose of 2 INJECTIONS, Disp: 2 each, Rfl: 0  •  furosemide (LASIX) 40 mg tablet, Take 1 tablet (40 mg total) by mouth daily, Disp: 90 tablet, Rfl: 3  •  glipiZIDE (GLUCOTROL) 5 mg tablet, take 1 tablet by mouth twice a day BEFORE MEALS, Disp: 180 tablet, Rfl: 1  •  glucose blood (FREESTYLE TEST STRIPS) test strip, Use to check blood sugars twice a day, Disp: 100 strip, Rfl: 5  •  Lancets (freestyle) lancets, Use to check sugars twice a day, Disp: 100 each, Rfl: 5  •  methocarbamol (ROBAXIN) 750 mg tablet, TAKE 1 TABLET 3 TIMES A DAY, Disp: 90 tablet, Rfl: 0  •  metoprolol succinate (TOPROL-XL) 50 mg 24 hr tablet, take 1 tablet by mouth once daily, Disp: 90 tablet, Rfl: 1  •  Multiple Vitamins-Minerals (MULTIVITAMIN ADULT PO), Take 1 tablet by mouth daily, Disp: , Rfl:   •  nitroglycerin (NITROSTAT) 0.4 mg SL tablet, Place 1 tablet (0.4 mg total) under the tongue every 5 (five) minutes as needed for chest pain, Disp: 25 tablet, Rfl: 0  •  pregabalin (LYRICA) 150 mg capsule, Take 1 capsule by mouth 2 (two) times a day, Disp: , Rfl:   •  rosuvastatin (CRESTOR) 40 MG tablet, take 1 tablet by mouth once daily, Disp: 90 tablet, Rfl: 1  •  traMADol (ULTRAM) 50 mg tablet, Take 50 mg by mouth every 6  (six) hours as needed for severe pain, Disp: , Rfl:     Current Allergies     Allergies as of 03/04/2025 - Reviewed 03/04/2025   Allergen Reaction Noted   • Naproxen GI Intolerance and Other (See Comments) 06/20/2019   • Prednisone GI Intolerance 06/20/2019            The following portions of the patient's history were reviewed and updated as appropriate: allergies, current medications, past family history, past medical history, past social history, past surgical history and problem list.     Past Medical History:   Diagnosis Date   • Anemia     last assessed: 1/11/2018   • Arthritis    • BPH without obstruction/lower urinary tract symptoms     last assessed: 12/3/2018   • Coronary artery disease     last assessed: 7/22/2015   • DDD (degenerative disc disease), lumbar     last assessed: 2/17/2014   • Diabetes mellitus (HCC)    • Generalized osteoarthritis     last assessed: 4/18/2019   • Hx of fracture     ankle fracture(left)   • Hyperlipidemia     last assessed: 7/22/2015   • Hypertension     last assessed: 7/22/2015   • Knee injury     partial torn meniscus and acl   • Lumbar spinal stenosis     last assessed: 2/17/2014   • Pneumonia Dec 1994   • Rotator cuff syndrome of shoulder and allied disorders     last assessed: 3/5/2014   • Type 2 diabetes mellitus with diabetic polyneuropathy (HCC)     last assessed: 10/31/2018       Past Surgical History:   Procedure Laterality Date   • CARDIAC SURGERY  2014   • CORONARY ARTERY BYPASS GRAFT  2014   • LUMBAR FUSION     • SHOULDER ARTHROSCOPY Right    • SHOULDER SURGERY      2 right/1 left rotator cuff repairs   • SPINAL FUSION      spinal triple fusion L4-5       Family History   Problem Relation Age of Onset   • Diabetes Mother         type 2   • Arthritis Mother    • Heart attack Father    • Stroke Father    • Post-traumatic stress disorder Son    • No Known Problems Daughter          Medications have been verified.        Objective   /66 (BP Location: Right  arm, Patient Position: Sitting, Cuff Size: Large)   Pulse 87   Temp (!) 97.2 °F (36.2 °C)   Resp 14   Ht 6' (1.829 m)   Wt 130 kg (285 lb 12.8 oz)   SpO2 98%   BMI 38.76 kg/m²        Physical Exam     Physical Exam  Vitals and nursing note reviewed.   Constitutional:       General: He is awake. He is not in acute distress.     Appearance: Normal appearance. He is obese. He is not ill-appearing.   HENT:      Head: Normocephalic and atraumatic.      Nose: Congestion present.      Mouth/Throat:      Lips: Pink.      Mouth: Mucous membranes are moist.      Pharynx: Oropharynx is clear.   Eyes:      Extraocular Movements: Extraocular movements intact.      Conjunctiva/sclera: Conjunctivae normal.      Pupils: Pupils are equal, round, and reactive to light.   Cardiovascular:      Rate and Rhythm: Normal rate and regular rhythm.      Pulses:           Dorsalis pedis pulses are 1+ on the right side and 1+ on the left side.      Heart sounds: Normal heart sounds. No murmur heard.  Pulmonary:      Effort: Pulmonary effort is normal.      Breath sounds: Normal breath sounds.   Abdominal:      General: Abdomen is flat. Bowel sounds are normal.      Palpations: Abdomen is soft.   Musculoskeletal:         General: Normal range of motion.      Cervical back: Full passive range of motion without pain, normal range of motion and neck supple.      Right lower leg: 3+ Pitting Edema present.      Left lower leg: 3+ Pitting Edema present.   Feet:      Comments: **SEE PHOTOS**  Patient had applied fresh dressing this AM which at time of arrival was saturated in clear/yellow drainage.   Skin:     General: Skin is warm and dry.      Capillary Refill: Capillary refill takes less than 2 seconds.      Findings: Erythema and wound present. No rash.   Neurological:      General: No focal deficit present.      Mental Status: He is alert and oriented to person, place, and time.   Psychiatric:         Mood and Affect: Mood normal.          Behavior: Behavior normal. Behavior is cooperative.

## 2025-03-04 NOTE — LETTER
March 4, 2025     Bryant Holloway DO  [address]  [place] [address]    Patient: Bryant Bustamante Jr.   YOB: 1955   Date of Visit: 3/4/2025        Dear Bryant Holloway DO:    Your patient, Bryant Bustamante was seen in our urgent care department on 3/4/2025. Enclosed is a full report of that visit.    If you have any questions or concerns, please don't hesitate to call.    *Dr. Pardo, I have referred this patient to you as he is diabetic and does not have a routine podiatrist for diabetic foot care.        Sincerely,        JUDY San      CC: [unfilled]    Enclosure

## 2025-03-04 NOTE — PATIENT INSTRUCTIONS
Please follow up with your primary provider in the next several days. Should you have any worsening of symptoms, or lack of improvement please be re-evaluated. If needed for significant concerns, consider 911 or ER evaluation.     Please keep wound clean and dry as best to your ability- follow up with podiatry and wound as soon as possible.     If you develop any fevers please

## 2025-03-06 ENCOUNTER — OFFICE VISIT (OUTPATIENT)
Dept: FAMILY MEDICINE CLINIC | Facility: CLINIC | Age: 70
End: 2025-03-06
Payer: MEDICARE

## 2025-03-06 VITALS
OXYGEN SATURATION: 98 % | TEMPERATURE: 96.6 F | BODY MASS INDEX: 37.61 KG/M2 | SYSTOLIC BLOOD PRESSURE: 112 MMHG | HEIGHT: 72 IN | DIASTOLIC BLOOD PRESSURE: 70 MMHG | WEIGHT: 277.7 LBS | HEART RATE: 78 BPM

## 2025-03-06 DIAGNOSIS — Z79.899 ENCOUNTER FOR LONG-TERM (CURRENT) USE OF MEDICATIONS: ICD-10-CM

## 2025-03-06 DIAGNOSIS — R91.8 PULMONARY NODULES: ICD-10-CM

## 2025-03-06 DIAGNOSIS — L03.116 BILATERAL CELLULITIS OF LOWER LEG: ICD-10-CM

## 2025-03-06 DIAGNOSIS — L97.929 VENOUS STASIS ULCER OF LEFT LOWER LEG WITH EDEMA OF LEFT LOWER LEG  (HCC): Primary | ICD-10-CM

## 2025-03-06 DIAGNOSIS — E66.01 OBESITY, MORBID (HCC): ICD-10-CM

## 2025-03-06 DIAGNOSIS — I50.32 CHRONIC DIASTOLIC HEART FAILURE (HCC): ICD-10-CM

## 2025-03-06 DIAGNOSIS — I83.029 VENOUS STASIS ULCER OF LEFT LOWER LEG WITH EDEMA OF LEFT LOWER LEG  (HCC): Primary | ICD-10-CM

## 2025-03-06 DIAGNOSIS — L97.919 VENOUS STASIS ULCER OF RIGHT LOWER LEG WITH EDEMA OF RIGHT LOWER LEG  (HCC): ICD-10-CM

## 2025-03-06 DIAGNOSIS — R60.0 VENOUS STASIS ULCER OF LEFT LOWER LEG WITH EDEMA OF LEFT LOWER LEG  (HCC): Primary | ICD-10-CM

## 2025-03-06 DIAGNOSIS — E78.2 MIXED HYPERLIPIDEMIA: ICD-10-CM

## 2025-03-06 DIAGNOSIS — I83.019 VENOUS STASIS ULCER OF RIGHT LOWER LEG WITH EDEMA OF RIGHT LOWER LEG  (HCC): ICD-10-CM

## 2025-03-06 DIAGNOSIS — R60.0 VENOUS STASIS ULCER OF RIGHT LOWER LEG WITH EDEMA OF RIGHT LOWER LEG  (HCC): ICD-10-CM

## 2025-03-06 DIAGNOSIS — I10 ESSENTIAL HYPERTENSION, BENIGN: ICD-10-CM

## 2025-03-06 DIAGNOSIS — L03.115 BILATERAL CELLULITIS OF LOWER LEG: ICD-10-CM

## 2025-03-06 DIAGNOSIS — N18.32 CHRONIC KIDNEY DISEASE, STAGE 3B (HCC): ICD-10-CM

## 2025-03-06 DIAGNOSIS — E11.42 TYPE 2 DIABETES MELLITUS WITH DIABETIC POLYNEUROPATHY, WITHOUT LONG-TERM CURRENT USE OF INSULIN (HCC): ICD-10-CM

## 2025-03-06 DIAGNOSIS — F33.2 SEVERE RECURRENT MAJOR DEPRESSION WITHOUT PSYCHOTIC FEATURES (HCC): ICD-10-CM

## 2025-03-06 DIAGNOSIS — I83.892 VENOUS STASIS ULCER OF LEFT LOWER LEG WITH EDEMA OF LEFT LOWER LEG  (HCC): Primary | ICD-10-CM

## 2025-03-06 DIAGNOSIS — I35.0 NONRHEUMATIC AORTIC VALVE STENOSIS: ICD-10-CM

## 2025-03-06 DIAGNOSIS — I83.891 VENOUS STASIS ULCER OF RIGHT LOWER LEG WITH EDEMA OF RIGHT LOWER LEG  (HCC): ICD-10-CM

## 2025-03-06 PROCEDURE — 99215 OFFICE O/P EST HI 40 MIN: CPT | Performed by: FAMILY MEDICINE

## 2025-03-06 PROCEDURE — G2211 COMPLEX E/M VISIT ADD ON: HCPCS | Performed by: FAMILY MEDICINE

## 2025-03-06 RX ORDER — CEPHALEXIN 500 MG/1
500 CAPSULE ORAL 3 TIMES DAILY
Qty: 21 CAPSULE | Refills: 0 | Status: SHIPPED | OUTPATIENT
Start: 2025-03-06 | End: 2025-03-13

## 2025-03-06 NOTE — PATIENT INSTRUCTIONS
"Patient Education     Foot care for people with diabetes   The Basics   Written by the doctors and editors at [facility]   Why is foot care important if I have diabetes? -- Diabetes can cause nerve damage if your blood sugar is high for a long time. The medical term for this is \"diabetic neuropathy.\"  If you have problems with the nerves in your feet, you might not be able to feel pain in your foot. Normally, people feel pain when they get a cut or a blister on their foot. The pain tells them that they need to treat their cut so it can heal. But people with nerve damage might not feel any pain when their feet get hurt. They might not even know that they have a cut, so they might not treat it. Problems that aren't treated right away can get much worse. For example, an untreated cut can get infected and turn into an open sore.  High blood sugar can also damage blood vessels and decrease blood flow to your feet. This can weaken your skin and make wounds take longer to heal. You are also more likely to get an infection if you have high blood sugar.  How do I take care of my feet? -- Taking good care of your feet can help prevent foot problems. You should:   Wash your feet every day with soap and warm water. Pat your feet dry, and be sure to dry the skin between your toes.   Keep your feet moisturized. Put lotion on the tops and bottoms of your feet, but not between your toes.   Check your feet every day (figure 1). Look for cuts, blisters, redness, or swelling. Use a mirror, or ask someone to help you check the bottoms of your feet. Check all parts of the foot, especially between the toes. Look for broken skin, ulcers, blisters, or redness.   Trim your toenails straight across when needed (figure 2). Do not cut the corners of your toenails. File rough edges. Do not cut your cuticles. Ask for help if you cannot see well or have problems reaching your feet.   Ask your doctor or nurse to check your feet at each visit. Take " your shoes and socks off for these checks.   See a foot care provider (such as a podiatrist) if you have an ingrown toenail, corn, or callus. Do not try to remove corns and calluses yourself.  How do I protect my feet from injury? -- There are several ways to protect your feet. You can:   Wear shoes and socks at all times, even at home. Do not walk barefoot. Wear swim shoes if you go to the beach or a swimming pool.   Choose shoes that fit well. They should not be not too tight or too loose. Your shoes should have plenty of room for your toes (figure 3). Your doctor might give you a prescription for special shoes. Check to see if they are covered by your insurance.   Check your shoes each time before you put them on to make sure that the lining is smooth. Also check to make sure that there is nothing inside the shoes before putting them on.   Do not wear shoes that expose any part of the foot, like sandals, thongs, or clogs.   Wear cotton socks that fit loosely. Do not wear shoes without socks.   Protect your feet from heat and cold. Test bath water before putting your feet in it to make sure that it is not too hot. Do not walk barefoot on hot ground. Take extra care when going outside in the cold and wear warm socks.  What else should I know? -- You can lower your risk for foot problems by keeping your blood sugar levels as close to your goal as possible. Other things you can do include:   Move your ankles and toes often to help with blood flow. You can wear a support stocking to help with swelling.   Walk often. Regular walking helps blood flow.   If you smoke, try to quit. Your doctor or nurse can help. Smoking causes poor blood flow to your feet and can damage your nerves.  When should I call the doctor? -- Call your doctor or nurse for advice if you have:   A fever of 100.4°F (38°C) or higher, chills, or a wound that will not heal   Swelling, redness, warmth around a wound, a foul smell coming from a wound, or  yellowish, greenish, or bloody discharge   Sores or blisters on your feet that hurt more or less than you would expect   Numbness or tingling in your foot or leg   Corns, calluses, blisters, or new sores on your foot   Very dry, scaly, or cracked skin on your feet   Changes in the way your foot joints or arch look  All topics are updated as new evidence becomes available and our peer review process is complete.  This topic retrieved from [employer] on: Mar 13, 2024.  Topic 622259 Version 2.0  Release: 32.2.4 - C32.71  © 2024 UpToDate, Inc. and/or its affiliates. All rights reserved.  figure 1: Foot check for people with diabetes     People with diabetes should check both of their feet every day. It is important to check your feet all over, including in between your toes. If you can't see the bottom of your foot, use a mirror or ask another person to check for you. Let your doctor or nurse know if you find any:  Redness   Cuts or cracks in the skin   Blisters   Swelling   Graphic 58326 Version 3.0  figure 2: Trim your toenails     Trim your toenails straight across and smooth them with a nail file.  Graphic 74821 Version 2.0  figure 3: Correct shoe shape     Choose shoes that fit the right way and are not too tight or too loose. Your shoes should have plenty of room for your toes.  Graphic 92716 Version 2.0  Consumer Information Use and Disclaimer   Disclaimer: This generalized information is a limited summary of diagnosis, treatment, and/or medication information. It is not meant to be comprehensive and should be used as a tool to help the user understand and/or assess potential diagnostic and treatment options. It does NOT include all information about conditions, treatments, medications, side effects, or risks that may apply to a specific patient. It is not intended to be medical advice or a substitute for the medical advice, diagnosis, or treatment of a health care provider based on the health care provider's  examination and assessment of a patient's specific and unique circumstances. Patients must speak with a health care provider for complete information about their health, medical questions, and treatment options, including any risks or benefits regarding use of medications. This information does not endorse any treatments or medications as safe, effective, or approved for treating a specific patient. UpToDate, Inc. and its affiliates disclaim any warranty or liability relating to this information or the use thereof.The use of this information is governed by the Terms of Use, available at https://www.woltersMyagiuwer.com/en/know/clinical-effectiveness-terms. 2024© UpToDate, Inc. and its affiliates and/or licensors. All rights reserved.  Copyright   © 2024 UpToDate, Inc. and/or its affiliates. All rights reserved.

## 2025-03-06 NOTE — PROGRESS NOTES
Name: Bryant Bustamante Jr.      : 1955      MRN: [id number]  Encounter Provider: Bryant Holloway DO  Encounter Date: 3/6/2025   Encounter department: [facility] PRIMARY [facility]  :  Assessment & Plan  Venous stasis ulcer of left lower leg with edema of left lower leg  (HCC)  I remove the dressing from both lower extremities.  The dressing on the left lower extremity was completely saturated.  His leg was malodorous.  Wounds were cleansed and dried.  I then applied Silvadene cream to the open wounds.  4 x 4 gauze was then applied followed by 4 inch Mariaa wrap.  I advised the patient he needs to follow-up with wound care.  He is going to need wound care in the interim as the patient is incapable of changing the dressings himself.  I am going to try to get home health care involved to soon as possible.  I told the patient if they cannot see him tomorrow and change his dressings and he is going to need to try to do it himself.    Orders:    EXTERNAL Referral to Home Health; Future    cephalexin (KEFLEX) 500 mg capsule; Take 1 capsule (500 mg total) by mouth 3 (three) times a day for 7 days    Venous stasis ulcer of right lower leg with edema of right lower leg  (HCC)  As noted above, I removed his old dressing.  Wounds were then dried and Silvadene cream was applied followed by 4 x 4 gauze and Mariaa wrap.    Orders:    EXTERNAL Referral to Home Health; Future    cephalexin (KEFLEX) 500 mg capsule; Take 1 capsule (500 mg total) by mouth 3 (three) times a day for 7 days    Bilateral cellulitis of lower leg  Patient has cellulitis of the bilateral lower extremities.  I increase his Keflex dose to 500 mg 3 times per day.  I sent in an additional 21 capsules.    Orders:    EXTERNAL Referral to Home Health; Future    cephalexin (KEFLEX) 500 mg capsule; Take 1 capsule (500 mg total) by mouth 3 (three) times a day for 7 days    Chronic diastolic heart failure (HCC)  Wt Readings from Last 3 Encounters:   25 126 kg  (277 lb 11.2 oz)   03/04/25 130 kg (285 lb 12.8 oz)   12/28/24 121 kg (265 lb 10.5 oz)     Patient never followed up with me after his discharge.  He had numerous appointments which she canceled.  He apparently never really followed up with cardiology.  He had a telemedicine cardiology follow-up in December 31.  He never got the BNP or BMP that they ordered for follow-up.  I advised him he has follow-up labs ordered.  He really needs to be seen in their office.  He reports weight gain since discharge.  On exam today, the patient's lungs were clear.  I did not make any change with his medic patients.               Severe recurrent major depression without psychotic features (HCC)           Obesity, morbid (HCC)           Type 2 diabetes mellitus with diabetic polyneuropathy, without long-term current use of insulin ([facility])    Lab Results   Component Value Date    HGBA1C 9.6 (H) 12/22/2024   Patient needs follow-up for his diabetes.  I reviewed his labs from his hospitalization.  His hemoglobin A1c is gone up again.  He tells me his blood sugars are now high when he checks it.  He discontinued his metformin, likely because of his renal function.  He remains on glipizide 5 mg twice daily.  I ordered fasting labs.  Hopefully, home health care can do this for him.  Otherwise, patient will need to try to go out for the labs.    Orders:    Comprehensive metabolic panel; Future    Hemoglobin A1C; Future    Chronic kidney disease, stage 3b (Former[facility])  Lab Results   Component Value Date    EGFR 33 12/28/2024    EGFR 30 12/27/2024    EGFR 33 12/26/2024    CREATININE 1.96 (H) 12/28/2024    CREATININE 2.14 (H) 12/27/2024    CREATININE 1.95 (H) 12/26/2024   I reviewed the patient's renal function.  The patient never followed up with nephrology.  He does have an appointment in June to see them.  He needs to have his renal function checked before then.  Labs were ordered.         Mixed hyperlipidemia  Fasting lipid panel was ordered.  The  patient's goal LDL cholesterol is less than 70.    Orders:    Lipid Panel with Direct LDL reflex; Future    Encounter for long-term (current) use of medications    Orders:    CBC and differential; Future    Essential hypertension, benign  Blood pressure today was actually well-controlled.  He is currently on amlodipine 5 mg daily.  During his hospitalization, his ACE inhibitor was discontinued.  Today, blood pressure is 112/70.         Nonrheumatic aortic valve stenosis  Repeat echocardiogram again shows moderate aortic stenosis.  I discussed this with the patient.         Pulmonary nodules  Incidental finding of pulmonary nodules was discovered during the patient's hospitalization.  I discussed that with him.  Radiologist recommended a follow-up CT in 3 to 6 months.  I discussed this with the patient.  I am going to schedule the patient a follow-up visit with me in a month and I will reorder CT scan at that time.  Hopefully, patient will follow-up with me.                History of Present Illness   This is a 69-year-old white male who had not seen for quite a while.  He canceled his last 4 appointments with me.  He reports that he developed redness and swelling of his legs in December.  He was admitted to the hospital in December with congestive heart failure.  He tells me they started him on antibiotics but no one ever looked at his legs again afterwards.  He tells me his legs continued to get worse after discharge.  Of note, he never kept any of his follow-up appointments.  He went to the urgent care earlier this week and was started on antibiotics.  His legs were dressed.  He has an appointment to see wound care next week.  He is unable to change his bandages his own so he came in today.      Review of Systems   Constitutional:  Positive for unexpected weight change.   Respiratory:  Negative for cough and shortness of breath.    Cardiovascular:  Positive for leg swelling. Negative for chest pain and  palpitations.   Gastrointestinal:  Negative for abdominal distention, abdominal pain, blood in stool, constipation, diarrhea and nausea.   Endocrine:        Reports sugars are up and elevated   Skin:  Positive for color change and wound.        Reports left leg is draining       Objective   /70 (BP Location: Left arm, Patient Position: Sitting, Cuff Size: Large)   Pulse 78   Temp (!) 96.6 °F (35.9 °C)   Ht 6' (1.829 m)   Wt 126 kg (277 lb 11.2 oz)   SpO2 98%   BMI 37.66 kg/m²      Physical Exam  Vitals reviewed.   Constitutional:       Comments: The patient is a 69-year-old white male who appears his stated age.  He was nonseptic in appearance and in no apparent distress   HENT:      Head: Normocephalic and atraumatic.      Right Ear: Tympanic membrane, ear canal and external ear normal. There is no impacted cerumen.      Left Ear: Tympanic membrane, ear canal and external ear normal. There is no impacted cerumen.      Mouth/Throat:      Mouth: Mucous membranes are moist.      Pharynx: Oropharynx is clear. No oropharyngeal exudate or posterior oropharyngeal erythema.   Eyes:      General: No scleral icterus.        Right eye: No discharge.         Left eye: No discharge.      Conjunctiva/sclera: Conjunctivae normal.      Pupils: Pupils are equal, round, and reactive to light.   Neck:      Comments: No thyromegaly  Cardiovascular:      Rate and Rhythm: Normal rate and regular rhythm.      Heart sounds: Normal heart sounds. No murmur heard.     No friction rub. No gallop.   Pulmonary:      Effort: Pulmonary effort is normal. No respiratory distress.      Breath sounds: Normal breath sounds. No stridor. No wheezing, rhonchi or rales.   Abdominal:      General: Bowel sounds are normal. There is no distension.      Palpations: Abdomen is soft. There is no mass.      Tenderness: There is no abdominal tenderness. There is no guarding.   Musculoskeletal:      Cervical back: Neck supple.      Right lower leg:  Edema (There is +2/4 lower extremity edema noted bilaterally) present.      Left lower leg: Edema present.   Lymphadenopathy:      Cervical: No cervical adenopathy.   Skin:     Comments: Chronic venous stasis changes are present in both lower extremities.  The patient has erythema and heat to the touch of the right leg going up to the knee.  Multiple venous stasis ulcer present on the right leg.  These were not draining.  On the left, the patient has a large venous stasis ulcers where the skin is just sloughing off his leg.  He was draining a clear fluid.   Psychiatric:         Mood and Affect: Mood normal.         Behavior: Behavior normal.         Thought Content: Thought content normal.         Judgment: Judgment normal.

## 2025-03-07 NOTE — ASSESSMENT & PLAN NOTE
As noted above, I removed his old dressing.  Wounds were then dried and Silvadene cream was applied followed by 4 x 4 gauze and Mariaa wrap.    Orders:    EXTERNAL Referral to Home Health; Future    cephalexin (KEFLEX) 500 mg capsule; Take 1 capsule (500 mg total) by mouth 3 (three) times a day for 7 days

## 2025-03-07 NOTE — ASSESSMENT & PLAN NOTE
I remove the dressing from both lower extremities.  The dressing on the left lower extremity was completely saturated.  His leg was malodorous.  Wounds were cleansed and dried.  I then applied Silvadene cream to the open wounds.  4 x 4 gauze was then applied followed by 4 inch Mariaa wrap.  I advised the patient he needs to follow-up with wound care.  He is going to need wound care in the interim as the patient is incapable of changing the dressings himself.  I am going to try to get home health care involved to soon as possible.  I told the patient if they cannot see him tomorrow and change his dressings and he is going to need to try to do it himself.    Orders:    EXTERNAL Referral to Home Health; Future    cephalexin (KEFLEX) 500 mg capsule; Take 1 capsule (500 mg total) by mouth 3 (three) times a day for 7 days

## 2025-03-07 NOTE — ASSESSMENT & PLAN NOTE
Blood pressure today was actually well-controlled.  He is currently on amlodipine 5 mg daily.  During his hospitalization, his ACE inhibitor was discontinued.  Today, blood pressure is 112/70.

## 2025-03-07 NOTE — ASSESSMENT & PLAN NOTE
Repeat echocardiogram again shows moderate aortic stenosis.  I discussed this with the patient.

## 2025-03-07 NOTE — ASSESSMENT & PLAN NOTE
Lab Results   Component Value Date    EGFR 33 12/28/2024    EGFR 30 12/27/2024    EGFR 33 12/26/2024    CREATININE 1.96 (H) 12/28/2024    CREATININE 2.14 (H) 12/27/2024    CREATININE 1.95 (H) 12/26/2024   I reviewed the patient's renal function.  The patient never followed up with nephrology.  He does have an appointment in June to see them.  He needs to have his renal function checked before then.  Labs were ordered.

## 2025-03-07 NOTE — ASSESSMENT & PLAN NOTE
Patient has cellulitis of the bilateral lower extremities.  I increase his Keflex dose to 500 mg 3 times per day.  I sent in an additional 21 capsules.    Orders:    EXTERNAL Referral to Home Health; Future    cephalexin (KEFLEX) 500 mg capsule; Take 1 capsule (500 mg total) by mouth 3 (three) times a day for 7 days

## 2025-03-07 NOTE — ASSESSMENT & PLAN NOTE
Incidental finding of pulmonary nodules was discovered during the patient's hospitalization.  I discussed that with him.  Radiologist recommended a follow-up CT in 3 to 6 months.  I discussed this with the patient.  I am going to schedule the patient a follow-up visit with me in a month and I will reorder CT scan at that time.  Hopefully, patient will follow-up with me.

## 2025-03-07 NOTE — ASSESSMENT & PLAN NOTE
Wt Readings from Last 3 Encounters:   03/06/25 126 kg (277 lb 11.2 oz)   03/04/25 130 kg (285 lb 12.8 oz)   12/28/24 121 kg (265 lb 10.5 oz)     Patient never followed up with me after his discharge.  He had numerous appointments which she canceled.  He apparently never really followed up with cardiology.  He had a telemedicine cardiology follow-up in December 31.  He never got the BNP or BMP that they ordered for follow-up.  I advised him he has follow-up labs ordered.  He really needs to be seen in their office.  He reports weight gain since discharge.  On exam today, the patient's lungs were clear.  I did not make any change with his medic patients.

## 2025-03-07 NOTE — ASSESSMENT & PLAN NOTE
Fasting lipid panel was ordered.  The patient's goal LDL cholesterol is less than 70.    Orders:    Lipid Panel with Direct LDL reflex; Future

## 2025-03-07 NOTE — ASSESSMENT & PLAN NOTE
Lab Results   Component Value Date    HGBA1C 9.6 (H) 12/22/2024   Patient needs follow-up for his diabetes.  I reviewed his labs from his hospitalization.  His hemoglobin A1c is gone up again.  He tells me his blood sugars are now high when he checks it.  He discontinued his metformin, likely because of his renal function.  He remains on glipizide 5 mg twice daily.  I ordered fasting labs.  Hopefully, home health care can do this for him.  Otherwise, patient will need to try to go out for the labs.    Orders:    Comprehensive metabolic panel; Future    Hemoglobin A1C; Future

## 2025-03-10 ENCOUNTER — PATIENT MESSAGE (OUTPATIENT)
Dept: FAMILY MEDICINE CLINIC | Facility: CLINIC | Age: 70
End: 2025-03-10

## 2025-03-10 ENCOUNTER — HOME HEALTH ADMISSION (OUTPATIENT)
Dept: HOME HEALTH SERVICES | Facility: HOME HEALTHCARE | Age: 70
End: 2025-03-10
Payer: MEDICARE

## 2025-03-10 NOTE — PATIENT COMMUNICATION
Called patient to make him aware that I reached out to [facility] to discuss his referral. The referral was marked as outgoing so we fixed it and they will be reaching out to the patient to get services set up. Patient is aware. He states he has run out of the stuff to wrap his feet. I gave him the phone number to reach out to them if he does not hear from them today. He was in agreement to this.

## 2025-03-11 ENCOUNTER — HOME CARE VISIT (OUTPATIENT)
Dept: HOME HEALTH SERVICES | Facility: HOME HEALTHCARE | Age: 70
End: 2025-03-11
Payer: MEDICARE

## 2025-03-11 PROCEDURE — 400013 VN SOC

## 2025-03-11 PROCEDURE — G0299 HHS/HOSPICE OF RN EA 15 MIN: HCPCS

## 2025-03-11 PROCEDURE — 10330081 VN NO-PAY CLAIM PROCEDURE

## 2025-03-12 VITALS
SYSTOLIC BLOOD PRESSURE: 140 MMHG | RESPIRATION RATE: 18 BRPM | HEART RATE: 95 BPM | TEMPERATURE: 98 F | OXYGEN SATURATION: 98 % | DIASTOLIC BLOOD PRESSURE: 68 MMHG

## 2025-03-13 ENCOUNTER — OFFICE VISIT (OUTPATIENT)
Dept: WOUND CARE | Facility: CLINIC | Age: 70
End: 2025-03-13
Payer: MEDICARE

## 2025-03-13 ENCOUNTER — RESULTS FOLLOW-UP (OUTPATIENT)
Dept: FAMILY MEDICINE CLINIC | Facility: CLINIC | Age: 70
End: 2025-03-13

## 2025-03-13 ENCOUNTER — APPOINTMENT (OUTPATIENT)
Dept: LAB | Facility: HOSPITAL | Age: 70
End: 2025-03-13
Payer: MEDICARE

## 2025-03-13 VITALS
WEIGHT: 285 LBS | RESPIRATION RATE: 18 BRPM | DIASTOLIC BLOOD PRESSURE: 79 MMHG | TEMPERATURE: 98 F | HEART RATE: 72 BPM | HEIGHT: 72 IN | BODY MASS INDEX: 38.6 KG/M2 | SYSTOLIC BLOOD PRESSURE: 140 MMHG

## 2025-03-13 DIAGNOSIS — I83.891 VENOUS STASIS ULCER OF RIGHT LOWER LEG WITH EDEMA OF RIGHT LOWER LEG  (HCC): ICD-10-CM

## 2025-03-13 DIAGNOSIS — R60.0 VENOUS STASIS ULCER OF LEFT LOWER LEG WITH EDEMA OF LEFT LOWER LEG  (HCC): Primary | ICD-10-CM

## 2025-03-13 DIAGNOSIS — E78.2 MIXED HYPERLIPIDEMIA: ICD-10-CM

## 2025-03-13 DIAGNOSIS — E11.42 TYPE 2 DIABETES MELLITUS WITH DIABETIC POLYNEUROPATHY, WITHOUT LONG-TERM CURRENT USE OF INSULIN (HCC): ICD-10-CM

## 2025-03-13 DIAGNOSIS — L97.929 VENOUS STASIS ULCER OF LEFT LOWER LEG WITH EDEMA OF LEFT LOWER LEG  (HCC): Primary | ICD-10-CM

## 2025-03-13 DIAGNOSIS — Z79.899 ENCOUNTER FOR LONG-TERM (CURRENT) USE OF MEDICATIONS: ICD-10-CM

## 2025-03-13 DIAGNOSIS — R60.0 VENOUS STASIS ULCER OF RIGHT LOWER LEG WITH EDEMA OF RIGHT LOWER LEG  (HCC): ICD-10-CM

## 2025-03-13 DIAGNOSIS — I83.029 VENOUS STASIS ULCER OF LEFT LOWER LEG WITH EDEMA OF LEFT LOWER LEG  (HCC): Primary | ICD-10-CM

## 2025-03-13 DIAGNOSIS — I83.892 VENOUS STASIS ULCER OF LEFT LOWER LEG WITH EDEMA OF LEFT LOWER LEG  (HCC): Primary | ICD-10-CM

## 2025-03-13 DIAGNOSIS — E11.9 TYPE 2 DIABETES MELLITUS WITHOUT COMPLICATION, WITHOUT LONG-TERM CURRENT USE OF INSULIN (HCC): ICD-10-CM

## 2025-03-13 DIAGNOSIS — L97.919 VENOUS STASIS ULCER OF RIGHT LOWER LEG WITH EDEMA OF RIGHT LOWER LEG  (HCC): ICD-10-CM

## 2025-03-13 DIAGNOSIS — I83.019 VENOUS STASIS ULCER OF RIGHT LOWER LEG WITH EDEMA OF RIGHT LOWER LEG  (HCC): ICD-10-CM

## 2025-03-13 DIAGNOSIS — R97.20 ELEVATED PSA: ICD-10-CM

## 2025-03-13 DIAGNOSIS — I50.32 CHRONIC HEART FAILURE WITH PRESERVED EJECTION FRACTION (HFPEF) (HCC): ICD-10-CM

## 2025-03-13 LAB
ALBUMIN SERPL BCG-MCNC: 3.9 G/DL (ref 3.5–5)
ALP SERPL-CCNC: 84 U/L (ref 34–104)
ALT SERPL W P-5'-P-CCNC: 27 U/L (ref 7–52)
ANION GAP SERPL CALCULATED.3IONS-SCNC: 10 MMOL/L (ref 4–13)
AST SERPL W P-5'-P-CCNC: 21 U/L (ref 13–39)
BASOPHILS # BLD AUTO: 0.09 THOUSANDS/ÂΜL (ref 0–0.1)
BASOPHILS NFR BLD AUTO: 1 % (ref 0–1)
BILIRUB SERPL-MCNC: 0.32 MG/DL (ref 0.2–1)
BNP SERPL-MCNC: 81 PG/ML (ref 0–100)
BUN SERPL-MCNC: 30 MG/DL (ref 5–25)
CALCIUM SERPL-MCNC: 9.7 MG/DL (ref 8.4–10.2)
CHLORIDE SERPL-SCNC: 98 MMOL/L (ref 96–108)
CHOLEST SERPL-MCNC: 90 MG/DL (ref ?–200)
CO2 SERPL-SCNC: 28 MMOL/L (ref 21–32)
CREAT SERPL-MCNC: 1.63 MG/DL (ref 0.6–1.3)
CREAT UR-MCNC: 50.3 MG/DL
EOSINOPHIL # BLD AUTO: 0.7 THOUSAND/ÂΜL (ref 0–0.61)
EOSINOPHIL NFR BLD AUTO: 6 % (ref 0–6)
ERYTHROCYTE [DISTWIDTH] IN BLOOD BY AUTOMATED COUNT: 13.3 % (ref 11.6–15.1)
EST. AVERAGE GLUCOSE BLD GHB EST-MCNC: 318 MG/DL
GFR SERPL CREATININE-BSD FRML MDRD: 42 ML/MIN/1.73SQ M
GLUCOSE P FAST SERPL-MCNC: 266 MG/DL (ref 65–99)
HBA1C MFR BLD: 12.7 %
HCT VFR BLD AUTO: 37.3 % (ref 36.5–49.3)
HDLC SERPL-MCNC: 30 MG/DL
HGB BLD-MCNC: 12.1 G/DL (ref 12–17)
IMM GRANULOCYTES # BLD AUTO: 0.08 THOUSAND/UL (ref 0–0.2)
IMM GRANULOCYTES NFR BLD AUTO: 1 % (ref 0–2)
LDLC SERPL CALC-MCNC: 19 MG/DL (ref 0–100)
LYMPHOCYTES # BLD AUTO: 2.36 THOUSANDS/ÂΜL (ref 0.6–4.47)
LYMPHOCYTES NFR BLD AUTO: 20 % (ref 14–44)
MCH RBC QN AUTO: 31.6 PG (ref 26.8–34.3)
MCHC RBC AUTO-ENTMCNC: 32.4 G/DL (ref 31.4–37.4)
MCV RBC AUTO: 97 FL (ref 82–98)
MICROALBUMIN UR-MCNC: 214.9 MG/L
MICROALBUMIN/CREAT 24H UR: 427 MG/G CREATININE (ref 0–30)
MONOCYTES # BLD AUTO: 0.88 THOUSAND/ÂΜL (ref 0.17–1.22)
MONOCYTES NFR BLD AUTO: 8 % (ref 4–12)
NEUTROPHILS # BLD AUTO: 7.47 THOUSANDS/ÂΜL (ref 1.85–7.62)
NEUTS SEG NFR BLD AUTO: 64 % (ref 43–75)
NRBC BLD AUTO-RTO: 0 /100 WBCS
PLATELET # BLD AUTO: 445 THOUSANDS/UL (ref 149–390)
PMV BLD AUTO: 9.2 FL (ref 8.9–12.7)
POTASSIUM SERPL-SCNC: 4.2 MMOL/L (ref 3.5–5.3)
PROT SERPL-MCNC: 8.3 G/DL (ref 6.4–8.4)
PSA FREE MFR SERPL: 9.31 %
PSA FREE SERPL-MCNC: 1.77 NG/ML
PSA SERPL-MCNC: 19.06 NG/ML (ref 0–4)
RBC # BLD AUTO: 3.83 MILLION/UL (ref 3.88–5.62)
SODIUM SERPL-SCNC: 136 MMOL/L (ref 135–147)
TRIGL SERPL-MCNC: 203 MG/DL (ref ?–150)
WBC # BLD AUTO: 11.58 THOUSAND/UL (ref 4.31–10.16)

## 2025-03-13 PROCEDURE — 80061 LIPID PANEL: CPT

## 2025-03-13 PROCEDURE — 87205 SMEAR GRAM STAIN: CPT | Performed by: SURGERY

## 2025-03-13 PROCEDURE — 97598 DBRDMT OPN WND ADDL 20CM/<: CPT | Performed by: SURGERY

## 2025-03-13 PROCEDURE — 97597 DBRDMT OPN WND 1ST 20 CM/<: CPT | Performed by: SURGERY

## 2025-03-13 PROCEDURE — 99204 OFFICE O/P NEW MOD 45 MIN: CPT | Performed by: SURGERY

## 2025-03-13 PROCEDURE — 87070 CULTURE OTHR SPECIMN AEROBIC: CPT | Performed by: SURGERY

## 2025-03-13 PROCEDURE — 85025 COMPLETE CBC W/AUTO DIFF WBC: CPT

## 2025-03-13 PROCEDURE — 87186 SC STD MICRODIL/AGAR DIL: CPT | Performed by: SURGERY

## 2025-03-13 PROCEDURE — 80053 COMPREHEN METABOLIC PANEL: CPT

## 2025-03-13 PROCEDURE — 82043 UR ALBUMIN QUANTITATIVE: CPT

## 2025-03-13 PROCEDURE — 83036 HEMOGLOBIN GLYCOSYLATED A1C: CPT

## 2025-03-13 PROCEDURE — 36415 COLL VENOUS BLD VENIPUNCTURE: CPT

## 2025-03-13 PROCEDURE — 84154 ASSAY OF PSA FREE: CPT

## 2025-03-13 PROCEDURE — 84153 ASSAY OF PSA TOTAL: CPT

## 2025-03-13 PROCEDURE — 83880 ASSAY OF NATRIURETIC PEPTIDE: CPT

## 2025-03-13 PROCEDURE — 82570 ASSAY OF URINE CREATININE: CPT

## 2025-03-13 PROCEDURE — 87077 CULTURE AEROBIC IDENTIFY: CPT | Performed by: SURGERY

## 2025-03-13 RX ORDER — SODIUM HYPOCHLORITE 2.5 MG/ML
1 SOLUTION TOPICAL SEE ADMIN INSTRUCTIONS
Qty: 473 ML | Refills: 0 | Status: SHIPPED | OUTPATIENT
Start: 2025-03-13

## 2025-03-13 RX ORDER — LIDOCAINE HYDROCHLORIDE 40 MG/ML
5 SOLUTION TOPICAL ONCE
Status: COMPLETED | OUTPATIENT
Start: 2025-03-13 | End: 2025-03-13

## 2025-03-13 RX ADMIN — LIDOCAINE HYDROCHLORIDE 5 ML: 40 SOLUTION TOPICAL at 11:22

## 2025-03-13 NOTE — PROGRESS NOTES
Name: Bryant Bustamante Jr.      : 1955      MRN: [id number]  Encounter Provider: Pavan Rock DO  Encounter Date: 3/13/2025   Encounter department: [facility]  :  Assessment & Plan  Venous stasis ulcer of left lower leg with edema of left lower leg  (HCC)  Venous Stasis disease of the left lower extremity with noted skin changes in addition to ulcerations.  There is some noted greenish drainage with some greenish discoloration consistent with likely underlying Pseudomonas infection.  Patient currently on Keflex as prescribed as outpatient.  Wound cultures taken.  Selective debridement completed to remove slough.  ABIs evaluated.  Patient will be started on Unna boot.  Unna boot will be changed twice a week.  Patient will follow-up with me in 1 week.    Orders:    Wound cleansing and dressings; Future    lidocaine (XYLOCAINE) 4 % topical solution 5 mL    Wound Procedure Treatment    sodium hypochlorite (DAKIN'S HALF-STRENGTH) external solution; Apply 1 Application topically see administration instructions Apply to gauze for Dakins wash for 10 minutes during leg wrap changes.    Wound culture and Gram stain; Future    Wound culture and Gram stain; Future    Debridement    Venous stasis ulcer of right lower leg with edema of right lower leg  (HCC)  Venous Stasis disease of the right lower extremity with noted skin changes in addition to ulcerations.  There is some noted greenish drainage with some greenish discoloration consistent with likely underlying Pseudomonas infection.  Patient currently on Keflex as prescribed as outpatient.  Wound cultures taken.  Selective debridement completed to remove slough.  ABIs evaluated.  Patient will be started on Unna boot.  Unna boot will be changed twice a week.  Patient will follow-up with me in 1 week.    Orders:    Wound cleansing and dressings; Future    lidocaine (XYLOCAINE) 4 % topical solution 5 mL    Wound Procedure Treatment    sodium  hypochlorite (DAKIN'S HALF-STRENGTH) external solution; Apply 1 Application topically see administration instructions Apply to gauze for Dakins wash for 10 minutes during leg wrap changes.    Wound culture and Gram stain; Future    Wound culture and Gram stain; Future    Debridement        History of Present Illness   Chief Complaint   Patient presents with    New Patient Visit     Patient stated he has open wounds on bilateral lower legs since December of 2024 and he finally went to urgent care last week and he is here now for evaluation    Here for wound follow up.   69-year-old gentleman multiple comorbidities including diabetes, CHF, hypertension, CAD, presents today for evaluation of bilateral lower extremity venous stasis disease with ulcerations.  Patient states a few weeks prior he noticed some small ulcerations on his leg and since that time he tried to treat them himself and they just got worse.  He does state that he had had venous stasis with ulcerations in the past on each leg both of which were treated and healed pretty quickly.  Denies any other associate symptoms.  No fevers or chills.  No nausea vomit.       Objective   /79   Pulse 72   Temp 98 °F (36.7 °C)   Resp 18   Ht 6' (1.829 m)   Wt 129 kg (285 lb)   BMI 38.65 kg/m²     Physical Exam  Vitals reviewed.   Constitutional:       General: He is not in acute distress.     Appearance: Normal appearance. He is not ill-appearing, toxic-appearing or diaphoretic.   HENT:      Head: Normocephalic and atraumatic.      Right Ear: External ear normal.      Left Ear: External ear normal.   Eyes:      General: No scleral icterus.        Right eye: No discharge.         Left eye: No discharge.   Cardiovascular:      Rate and Rhythm: Normal rate.   Pulmonary:      Effort: Pulmonary effort is normal. No respiratory distress.   Musculoskeletal:         General: Swelling present.      Cervical back: Normal range of motion.      Right lower leg: Edema  present.      Left lower leg: Edema present.   Skin:     General: Skin is warm.      Coloration: Skin is not jaundiced or pale.      Findings: No bruising.      Comments:  Right and left lower extremity with noted edema, venous stasis skin changes and noted ulcerations along the lower aspect of the leg into the foot.  There is noted slough.  This was selectively debrided.  There is also some greenish drainage and bleeding staining of the healing skin.   Neurological:      General: No focal deficit present.      Mental Status: He is alert and oriented to person, place, and time.      Cranial Nerves: No cranial nerve deficit.   Psychiatric:         Mood and Affect: Mood normal.         Behavior: Behavior normal.         Thought Content: Thought content normal.         Judgment: Judgment normal.       Wound 03/11/25 Venous Ulcer Other (Comments) Leg Lower;Right (Active)   Wound Image    03/13/25 1004   Wound Description Epithelialization;Slough;Pink 03/13/25 1008   Non-staged Wound Description Full thickness 03/13/25 1008   Wound Length (cm) 18.5 cm 03/13/25 1008   Wound Width (cm) 10 cm 03/13/25 1008   Wound Depth (cm) 0.1 cm 03/13/25 1008   Wound Surface Area (cm^2) 185 cm^2 03/13/25 1008   Wound Volume (cm^3) 18.5 cm^3 03/13/25 1008   Calculated Wound Volume (cm^3) 18.5 cm^3 03/13/25 1008   Drainage Amount Large 03/13/25 1008   Drainage Description Sanguineous;Foul smelling;Green 03/13/25 1008   Kimberlyn-wound Assessment Scaly;Other (Comment) 03/13/25 1008       Wound 03/11/25 Venous Ulcer Other (Comments) Leg Left;Lower (Active)   Wound Image     03/13/25 1006   Wound Description Epithelialization;Pink;Slough 03/13/25 1006   Non-staged Wound Description Full thickness 03/13/25 1006   Wound Length (cm) 27.5 cm 03/13/25 1006   Wound Width (cm) 25.8 cm 03/13/25 1006   Wound Depth (cm) 0.1 cm 03/13/25 1006   Wound Surface Area (cm^2) 709.5 cm^2 03/13/25 1006   Wound Volume (cm^3) 70.95 cm^3 03/13/25 1006   Calculated Wound  "Volume (cm^3) 70.95 cm^3 03/13/25 1006   Drainage Amount Large 03/13/25 1006   Drainage Description Green;Foul smelling;Sanguineous 03/13/25 1006   Kimberlyn-wound Assessment Excoriated;Scaly;Other (Comment) 03/13/25 1006       Debridement   Wound 03/11/25 Venous Ulcer Other (Comments) Leg Left;Lower     Date/Time: 3/13/2025 10:15 AM  Universal Protocol:  procedure performed by consultantConsent: Verbal consent obtained.  Risks and benefits: risks, benefits and alternatives were discussed  Consent given by: patient  Time out: Immediately prior to procedure a \"time out\" was called to verify the correct patient, procedure, equipment, support staff and site/side marked as required.  Patient understanding: patient states understanding of the procedure being performed  Patient consent: the patient's understanding of the procedure matches consent given  Patient identity confirmed: verbally with patient    Debridement Details  Performed by: physician  Debridement type: selective  Pain control: lidocaine 4%    Post-debridement measurements  Length (cm): 27.5  Width (cm): 25.8  Depth (cm): 0.1  Percent debrided: 50%  Surface Area (cm^2): 709.5  Area Debrided (cm^2): 354.75  Volume (cm^3): 70.95    Devitalized tissue debrided: biofilm and slough  Instrument(s) utilized: curette  Bleeding: small  Hemostasis obtained with: pressure  Response to treatment: procedure was tolerated well    Debridement   Wound 03/11/25 Venous Ulcer Other (Comments) Leg Lower;Right     Date/Time: 3/13/2025 10:15 AM  Universal Protocol:  procedure performed by consultantConsent: Verbal consent obtained.  Risks and benefits: risks, benefits and alternatives were discussed  Consent given by: patient  Time out: Immediately prior to procedure a \"time out\" was called to verify the correct patient, procedure, equipment, support staff and site/side marked as required.  Patient understanding: patient states understanding of the procedure being performed  Patient " consent: the patient's understanding of the procedure matches consent given  Patient identity confirmed: verbally with patient    Debridement Details  Performed by: physician  Debridement type: selective    Post-debridement measurements  Length (cm): 18.5  Width (cm): 10  Depth (cm): 0.1  Percent debrided: 85%  Surface Area (cm^2): 185  Area Debrided (cm^2): 157.25  Volume (cm^3): 18.5    Devitalized tissue debrided: biofilm and slough  Instrument(s) utilized: curette  Bleeding: small  Hemostasis obtained with: pressure  Response to treatment: procedure was tolerated well

## 2025-03-13 NOTE — TELEPHONE ENCOUNTER
----- Message from Bryant Holloway DO sent at 3/13/2025  1:18 PM EDT -----  Still waiting for some of his blood work. However, his kidney function has improved and he can restart metformin. Take 1/2 tablet twice a day to start.

## 2025-03-13 NOTE — ASSESSMENT & PLAN NOTE
Venous Stasis disease of the right lower extremity with noted skin changes in addition to ulcerations.  There is some noted greenish drainage with some greenish discoloration consistent with likely underlying Pseudomonas infection.  Patient currently on Keflex as prescribed as outpatient.  Wound cultures taken.  Selective debridement completed to remove slough.  ABIs evaluated.  Patient will be started on Unna boot.  Unna boot will be changed twice a week.  Patient will follow-up with me in 1 week.    Orders:    Wound cleansing and dressings; Future    lidocaine (XYLOCAINE) 4 % topical solution 5 mL    Wound Procedure Treatment    sodium hypochlorite (DAKIN'S HALF-STRENGTH) external solution; Apply 1 Application topically see administration instructions Apply to gauze for Dakins wash for 10 minutes during leg wrap changes.    Wound culture and Gram stain; Future    Wound culture and Gram stain; Future    Debridement

## 2025-03-13 NOTE — ASSESSMENT & PLAN NOTE
Venous Stasis disease of the left lower extremity with noted skin changes in addition to ulcerations.  There is some noted greenish drainage with some greenish discoloration consistent with likely underlying Pseudomonas infection.  Patient currently on Keflex as prescribed as outpatient.  Wound cultures taken.  Selective debridement completed to remove slough.  ABIs evaluated.  Patient will be started on Unna boot.  Unna boot will be changed twice a week.  Patient will follow-up with me in 1 week.    Orders:    Wound cleansing and dressings; Future    lidocaine (XYLOCAINE) 4 % topical solution 5 mL    Wound Procedure Treatment    sodium hypochlorite (DAKIN'S HALF-STRENGTH) external solution; Apply 1 Application topically see administration instructions Apply to gauze for Dakins wash for 10 minutes during leg wrap changes.    Wound culture and Gram stain; Future    Wound culture and Gram stain; Future    Debridement

## 2025-03-13 NOTE — PROGRESS NOTES
Wound Procedure Treatment    Performed by: Morelia Alonzo RN  Authorized by: Pavan Rock DO  Associated wounds:   Wound 03/11/25 Venous Ulcer Other (Comments) Leg Lower;Right  Wound 03/11/25 Venous Ulcer Other (Comments) Leg Left;Lower    Wound cleansed with:  Dakin's 0.125% and Soap and water   Applied primary dressing:  Silver and Calcium alginate   Applied secondary dressing:  Superabsorber   Dressing secured with:  Mariaa, Tape and Compression wrap   Verbal consent obtained?:  Yes    Risks and benefits:  Risks, benefits and alternatives were discussed    Consent given by:  Patient   Time Out:       Time out:  Immediately prior to the procedure a time out was called    Patient states understanding of procedure being performed:  Yes    Patient's understanding of procedure matches consent:  Yes    Procedure consent matches procedure scheduled:  Yes    Patient identity confirmed:  Verbally with patient   Pre-procedure details:       Sensation:  Normal      Skin color:  Wnl   Procedure details:       Laterality:  Bilateral      Location:  Leg      Splint composition:  static       Supplies:  2 layer wrap     UNNA BOOT wrap procedure     Before application, ROSS and/or TBI determined to be adequate for healing and application of compression. Lower extremity washed prior to application of compression wrap. With the foot in dorsiflexed position, unna boot was applied as per physician orders without complications or complaint of pain.  The procedure was tolerated well. Toes warm & pink post application.  Patient provided education & reinforced to observe toes for any discoloration, swelling or tingling and instructed when to report to the Wound Center or to remove compression

## 2025-03-13 NOTE — PATIENT INSTRUCTIONS
Orders Placed This Encounter   Procedures    Wound cleansing and dressings     Wash your hands with soap and water.  Remove old dressing, discard into plastic bag and place in trash.  Cleanse the wound with mild soap and water prior to applying a clean dressing. Do not use tissue or cotton balls. Do not scrub the wound. Pat dry using gauze.  Shower no do not get dressings wet  may purchase and use cast cover on [employer]     Please wash with dakin's prior to wound care   Apply silver alginate to the bilateral lower leg wounds.  Cover with gauze /ABD or super absorbant   Secure with tasha and tape  Apply unna boot   Change dressing 2 times a week  Unna Boot/ Multi-layer compression wrap Instructions    Keep compression wrap/wraps clean and dry. If wraps are too tight and you experience numbness/tingling, call the wound center. If after hours, remove wraps or proceed to nearest E.R. and call wound center in AM.    Wrap will be changed 2x weekly    Avoid prolonged standing in one place.    Elevate leg(s) above the level of the heart when sitting or as much as possible.     If you develop fever, chills, nausea or vomiting, increase in drainage or foul smelling drainage go to the closest emergency department for evaluation.      Please try to consume 3-4 servings of protein (30g) each day.   - Each serving of protein should contain about 30 mg protein.   - Good sources of protein include, lean meats (fish, chicken, etc), eggs, dairy products (yogurt, cheese), tofu, legumes (chickpeas, lentils, peas, black beans), nuts (cashew, walnut, peanuts, etc), & quinoa.       Follow up 1 week     Home health for wound care     Standing Status:   Future     Expected Date:   3/20/2025     Expiration Date:   3/20/2025

## 2025-03-13 NOTE — LETTER
[facility]  [address] [facility] [address]  Phone#  [phone]  Fax#  [phone]    Patient:  Bryant Bustamante Jr.  YOB: 1955  Phone:  [phone]  Date of Visit:  3/13/2025    Orders Placed This Encounter   Procedures   • Wound cleansing and dressings     Wash your hands with soap and water.  Remove old dressing, discard into plastic bag and place in trash.  Cleanse the wound with mild soap and water prior to applying a clean dressing. Do not use tissue or cotton balls. Do not scrub the wound. Pat dry using gauze.  Shower no do not get dressings wet  may purchase and use cast cover on [employer]     Please wash with dakin's prior to wound care   Apply silver alginate to the bilateral lower leg wounds.  Cover with gauze /ABD or super absorbant   Secure with tasha and tape  Apply unna boot   Change dressing 2 times a week  Unna Boot/ Multi-layer compression wrap Instructions    Keep compression wrap/wraps clean and dry. If wraps are too tight and you experience numbness/tingling, call the wound center. If after hours, remove wraps or proceed to nearest E.R. and call wound center in AM.    Wrap will be changed 2x weekly    Avoid prolonged standing in one place.    Elevate leg(s) above the level of the heart when sitting or as much as possible.     If you develop fever, chills, nausea or vomiting, increase in drainage or foul smelling drainage go to the closest emergency department for evaluation.      Please try to consume 3-4 servings of protein (30g) each day.   - Each serving of protein should contain about 30 mg protein.   - Good sources of protein include, lean meats (fish, chicken, etc), eggs, dairy products (yogurt, cheese), tofu, legumes (chickpeas, lentils, peas, black beans), nuts (cashew, walnut, peanuts, etc), & quinoa.       Follow up 1 week     Home health for wound care     Standing Status:   Future     Expected Date:   3/20/2025     Expiration Date:    3/20/2025         Electronically signed by Pavan Rock DO

## 2025-03-14 ENCOUNTER — TELEPHONE (OUTPATIENT)
Age: 70
End: 2025-03-14

## 2025-03-14 ENCOUNTER — OFFICE VISIT (OUTPATIENT)
Dept: PODIATRY | Facility: CLINIC | Age: 70
End: 2025-03-14
Payer: MEDICARE

## 2025-03-14 VITALS
HEART RATE: 75 BPM | BODY MASS INDEX: 38.6 KG/M2 | WEIGHT: 285 LBS | OXYGEN SATURATION: 98 % | RESPIRATION RATE: 16 BRPM | TEMPERATURE: 98.7 F | HEIGHT: 72 IN

## 2025-03-14 DIAGNOSIS — I83.019 VENOUS STASIS ULCER OF RIGHT LOWER LEG WITH EDEMA OF RIGHT LOWER LEG  (HCC): ICD-10-CM

## 2025-03-14 DIAGNOSIS — L97.929 VENOUS STASIS ULCER OF LEFT LOWER LEG WITH EDEMA OF LEFT LOWER LEG  (HCC): ICD-10-CM

## 2025-03-14 DIAGNOSIS — I83.029 VENOUS STASIS ULCER OF LEFT LOWER LEG WITH EDEMA OF LEFT LOWER LEG  (HCC): ICD-10-CM

## 2025-03-14 DIAGNOSIS — R60.0 VENOUS STASIS ULCER OF RIGHT LOWER LEG WITH EDEMA OF RIGHT LOWER LEG  (HCC): ICD-10-CM

## 2025-03-14 DIAGNOSIS — R97.20 ELEVATED PSA: Primary | ICD-10-CM

## 2025-03-14 DIAGNOSIS — L03.115 BILATERAL CELLULITIS OF LOWER LEG: ICD-10-CM

## 2025-03-14 DIAGNOSIS — L97.919 VENOUS STASIS ULCER OF RIGHT LOWER LEG WITH EDEMA OF RIGHT LOWER LEG  (HCC): ICD-10-CM

## 2025-03-14 DIAGNOSIS — L03.116 BILATERAL CELLULITIS OF LOWER LEG: ICD-10-CM

## 2025-03-14 DIAGNOSIS — R60.0 VENOUS STASIS ULCER OF LEFT LOWER LEG WITH EDEMA OF LEFT LOWER LEG  (HCC): ICD-10-CM

## 2025-03-14 DIAGNOSIS — E11.42 TYPE 2 DIABETES MELLITUS WITH DIABETIC POLYNEUROPATHY, WITHOUT LONG-TERM CURRENT USE OF INSULIN (HCC): ICD-10-CM

## 2025-03-14 DIAGNOSIS — B35.1 ONYCHOMYCOSIS: Primary | ICD-10-CM

## 2025-03-14 DIAGNOSIS — I83.891 VENOUS STASIS ULCER OF RIGHT LOWER LEG WITH EDEMA OF RIGHT LOWER LEG  (HCC): ICD-10-CM

## 2025-03-14 DIAGNOSIS — L85.3 XEROSIS OF SKIN: ICD-10-CM

## 2025-03-14 DIAGNOSIS — I83.892 VENOUS STASIS ULCER OF LEFT LOWER LEG WITH EDEMA OF LEFT LOWER LEG  (HCC): ICD-10-CM

## 2025-03-14 PROCEDURE — 99203 OFFICE O/P NEW LOW 30 MIN: CPT | Performed by: PODIATRIST

## 2025-03-14 RX ORDER — CICLOPIROX 80 MG/ML
SOLUTION TOPICAL
Qty: 6 ML | Refills: 2 | Status: SHIPPED | OUTPATIENT
Start: 2025-03-14

## 2025-03-14 NOTE — TELEPHONE ENCOUNTER
: Please call pt back [phone] or the mobile if needed      New Patient     Appointment Scheduling:  What office location does the patient prefer?:[place]  What is the reason for the patient's appointment?:  R97.20 (ICD-10-CM) - Elevated PSA       Have patient records been requested?:  If No, are the records showing in Epic: in epic   Appointment Details: Date    Time:    Location:   Provider:    Does the appointment need further review? (Reason)     Denial Instructions   Send to nurse triage.              HISTORY:   Is the patient having active symptoms? If so, describe symptoms:no symptoms   Has the patient had any previous Urologist(s)?:  Was the patient seen in the ED?:no  Has the patient had any outside testing done?:no  Does patient have Imaging/Lab Results:  PSA =19.056  Does the patient have a personal history of any cancer? No :     INSURANCE:   Have you confirmed Patient's insurance? yes  Is the insurance accepted?  yes  Is the insurance active? yes

## 2025-03-14 NOTE — PROGRESS NOTES
Name: Bryant Bustamante Jr.      : 1955      MRN: [id number]  Encounter Provider: Florecita Pardo DPM  Encounter Date: 3/14/2025   Encounter department: [facility] [place]  :  Assessment & Plan  Bilateral cellulitis of lower leg    Orders:    Ambulatory Referral to Podiatry    Onychomycosis    Orders:    ciclopirox (PENLAC) 8 % solution; Apply topically daily at bedtime    Type 2 diabetes mellitus with diabetic polyneuropathy, without long-term current use of insulin (HCC)    Lab Results   Component Value Date    HGBA1C 12.7 (H) 2025            Xerosis of skin         Venous stasis ulcer of right lower leg with edema of right lower leg  (HCC)         Venous stasis ulcer of left lower leg with edema of left lower leg  (HCC)             IMPRESSION:  Diabetic footcare DM2 A1c 12.7% 3/13/2025  Onychomycosis  Xerosis  Bilateral venous stasis ulcerations     PLAN:  I reviewed clinical exam and radiographic imaging (XR) with patient in detail today. I have discussed with the patient the pathophysiology of this diagnosis and reviewed how the examination correlates with this diagnosis.  Wound care note reviewed 3/13/2025  PCP note reviewed 3/6/2025  Physical evaluation limited due to intact dressings  Labs reviewed, patient has extremely elevated A1c.  Patient counseled on importance of glycemic control for wound healing and foot health  Diabetic footcare reviewed  Elongated painful onychomycotic nails thinned and shortened to patient's tolerance without incident using large nail nipper and electric bur  Patient counseled on onychomycosis and different treatment options including palliative nail care, topical treatment, and oral treatment.  Risks and benefits reviewed of each modality.  Patient would like to initiate topical treatment.  Counseled it takes approximately 9 months of treatment and no guarantees were given for improvement  Rx ciclopirox 8% nail lacquer  Maintaining skin barrier reviewed,  patient currently under the care of Dr. Dr. Rock for bilateral venous stasis ulcerations, continue at the wound care center  Patient counseled on appropriate diabetic shoes including shoes with wide toebox and mesh upper.  Shoe gear reviewed recommend purchasing new shoes based on current shoe deterioration  Diabetic neuropathy reviewed.  Patient counseled at length on the importance of evaluation of his feet every day.  Patient may use a mirror to evaluate the plantar aspect of his feet  Follow-up 9 to 10 weeks for routine diabetic footcare    History of Present Illness   HPI  Bryant Bustamante Jr. is a 69 y.o. male who presents for evaluation management of bilateral painful elongated nails.  Patient is being seen at the wound care center by Dr. Rock for bilateral venous stasis ulcerations.  Unna boots remained intact during today's evaluation.  Patient states his nails have become elongated, painful, and thickened.  He used to see a podiatrist however they moved their practice and he has not been seen or treated in a long time.  Patient states that difficult for him to take care of his nails and rubbing causes pain and irritation on his shoes.  He denies any redness, swelling, drainage from the level of the nails      Review of Systems   Constitutional: Negative.    Eyes: Negative.    Respiratory: Negative.     Cardiovascular:  Positive for leg swelling.   Musculoskeletal:  Positive for arthralgias and gait problem.   Skin:  Positive for wound.          Objective   Pulse 75   Temp 98.7 °F (37.1 °C)   Resp 16   Ht 6' (1.829 m)   Wt 129 kg (285 lb)   SpO2 98%   BMI 38.65 kg/m²      Physical Exam  Constitutional:       General: He is not in acute distress.     Appearance: He is not ill-appearing.   Cardiovascular:      Comments: Unable to evaluate pulses as Unna boots were intact  CRT to distal digits less than 3 seconds  Absent pedal hair growth  Skin:     Capillary Refill: Capillary refill takes 2 to 3  seconds.      Comments: Skin xerotic at level of toes, Unna boots intact  Nails elongated, thickened, discolored, yellow, subungual debris.    Bilateral venous stasis wounds documented in prior notes however dressings remain intact.  Patient declined removal   Neurological:      Sensory: Sensory deficit present.

## 2025-03-14 NOTE — TELEPHONE ENCOUNTER
Called patient in regards to Dr Holloway's message. Patient stated he has not had time to check his messages. I read the message to him and provided him with Dr. Mathews's phone number. Patient states he will call them today to get an appointment.

## 2025-03-14 NOTE — PROGRESS NOTES
I called the patient.  He did not answer the phone.  I left a message on his answering machine.  I advised the patient that I had checked another PSA and it had gone up even higher now.  He is now greater than 19.  I again told the patient that I have referred him to urology numerous times and have been trying to get him to see a urologist for the past several years.  The patient continues to ignore my recommendations.  I told the patient that I think he has prostate cancer and he needs to get this taken care of.  I told the patient I am going to place another referral for urology into the computer.  In the past, the patient has been referred to Dr. Garcia multiple times.  I am going to place a referral to see Dr. Mathews.

## 2025-03-14 NOTE — TELEPHONE ENCOUNTER
Called and left message for patient to return call to the office to schedule appointment.    When patient returns call, please transfer to CTS.

## 2025-03-14 NOTE — TELEPHONE ENCOUNTER
----- Message from Bryant Holloway DO sent at 3/14/2025  8:34 AM EDT -----  Please contact the patient and make sure he got the message I left on his answering machine about his elevated PSA. Document this.

## 2025-03-15 ENCOUNTER — HOME CARE VISIT (OUTPATIENT)
Dept: HOME HEALTH SERVICES | Facility: HOME HEALTHCARE | Age: 70
End: 2025-03-15
Payer: MEDICARE

## 2025-03-16 LAB
BACTERIA WND AEROBE CULT: ABNORMAL
GRAM STN SPEC: ABNORMAL

## 2025-03-17 ENCOUNTER — HOME CARE VISIT (OUTPATIENT)
Dept: HOME HEALTH SERVICES | Facility: HOME HEALTHCARE | Age: 70
End: 2025-03-17
Payer: MEDICARE

## 2025-03-17 VITALS
TEMPERATURE: 98.5 F | DIASTOLIC BLOOD PRESSURE: 70 MMHG | HEART RATE: 88 BPM | OXYGEN SATURATION: 95 % | SYSTOLIC BLOOD PRESSURE: 146 MMHG | RESPIRATION RATE: 18 BRPM

## 2025-03-17 PROCEDURE — 10330064 PAD, ABD 5X9 STR LF (1/PK 20PK/BX) MGM1"

## 2025-03-17 PROCEDURE — 10330064 TAPE, RETENTION 2X10YDS (1/BX24BX/CS)"

## 2025-03-17 PROCEDURE — 10330064 DRESSING, CALCIUM ALGINATE AG SHEET 4X4"

## 2025-03-17 PROCEDURE — 10330064 BANDAGE, CNFRM 4X4.1YDS N/S LF (12RL/BG"

## 2025-03-17 PROCEDURE — 10330064 CLEANSER, WND SEA-CLEANS 6OZ  COLPLT

## 2025-03-17 PROCEDURE — 10330064 UNNA BOOT, 4 (1/BX 12BX/CS)"

## 2025-03-17 PROCEDURE — 10330064 DRESSING, CALCIUM ALGINATE SHEET 4X4.75"

## 2025-03-17 PROCEDURE — 10330064

## 2025-03-17 PROCEDURE — G0299 HHS/HOSPICE OF RN EA 15 MIN: HCPCS

## 2025-03-17 PROCEDURE — 10330064 DRESSING, CALCIUM ALGINATE AG SHEET 4X8"

## 2025-03-17 PROCEDURE — 10330064 SPONGE, GAUZE 8PLY N/S 4X4" (200/PK 20P"

## 2025-03-17 NOTE — TELEPHONE ENCOUNTER
Patient called back and was scheduled for 04/17 at [facility] as Niall's schedule was out until June.

## 2025-03-18 ENCOUNTER — RESULTS FOLLOW-UP (OUTPATIENT)
Dept: OTHER | Facility: HOSPITAL | Age: 70
End: 2025-03-18

## 2025-03-18 DIAGNOSIS — I83.892 VENOUS STASIS ULCER OF LEFT LOWER LEG WITH EDEMA OF LEFT LOWER LEG  (HCC): Primary | ICD-10-CM

## 2025-03-18 DIAGNOSIS — I83.891 VENOUS STASIS ULCER OF RIGHT LOWER LEG WITH EDEMA OF RIGHT LOWER LEG  (HCC): ICD-10-CM

## 2025-03-18 DIAGNOSIS — L97.919 VENOUS STASIS ULCER OF RIGHT LOWER LEG WITH EDEMA OF RIGHT LOWER LEG  (HCC): ICD-10-CM

## 2025-03-18 DIAGNOSIS — I83.029 VENOUS STASIS ULCER OF LEFT LOWER LEG WITH EDEMA OF LEFT LOWER LEG  (HCC): Primary | ICD-10-CM

## 2025-03-18 DIAGNOSIS — R60.0 VENOUS STASIS ULCER OF RIGHT LOWER LEG WITH EDEMA OF RIGHT LOWER LEG  (HCC): ICD-10-CM

## 2025-03-18 DIAGNOSIS — L97.929 VENOUS STASIS ULCER OF LEFT LOWER LEG WITH EDEMA OF LEFT LOWER LEG  (HCC): Primary | ICD-10-CM

## 2025-03-18 DIAGNOSIS — R60.0 VENOUS STASIS ULCER OF LEFT LOWER LEG WITH EDEMA OF LEFT LOWER LEG  (HCC): Primary | ICD-10-CM

## 2025-03-18 DIAGNOSIS — I83.019 VENOUS STASIS ULCER OF RIGHT LOWER LEG WITH EDEMA OF RIGHT LOWER LEG  (HCC): ICD-10-CM

## 2025-03-18 PROCEDURE — 10330064

## 2025-03-18 PROCEDURE — G0180 MD CERTIFICATION HHA PATIENT: HCPCS | Performed by: FAMILY MEDICINE

## 2025-03-18 PROCEDURE — 10330064 UNNA BOOT, 4 (1/BX 12BX/CS)"

## 2025-03-18 RX ORDER — CIPROFLOXACIN 500 MG/1
500 TABLET, FILM COATED ORAL EVERY 12 HOURS SCHEDULED
Qty: 14 TABLET | Refills: 0 | Status: SHIPPED | OUTPATIENT
Start: 2025-03-18 | End: 2025-03-25

## 2025-03-18 RX ORDER — AMPICILLIN 500 MG/1
500 CAPSULE ORAL 4 TIMES DAILY
Qty: 28 CAPSULE | Refills: 0 | Status: SHIPPED | OUTPATIENT
Start: 2025-03-18 | End: 2025-03-25

## 2025-03-20 ENCOUNTER — HOME CARE VISIT (OUTPATIENT)
Dept: HOME HEALTH SERVICES | Facility: HOME HEALTHCARE | Age: 70
End: 2025-03-20
Payer: MEDICARE

## 2025-03-20 ENCOUNTER — OFFICE VISIT (OUTPATIENT)
Dept: WOUND CARE | Facility: CLINIC | Age: 70
End: 2025-03-20
Payer: MEDICARE

## 2025-03-20 VITALS
TEMPERATURE: 97.1 F | DIASTOLIC BLOOD PRESSURE: 70 MMHG | HEART RATE: 90 BPM | SYSTOLIC BLOOD PRESSURE: 146 MMHG | RESPIRATION RATE: 18 BRPM

## 2025-03-20 DIAGNOSIS — I83.029 VENOUS STASIS ULCER OF LEFT LOWER LEG WITH EDEMA OF LEFT LOWER LEG  (HCC): Primary | ICD-10-CM

## 2025-03-20 DIAGNOSIS — L97.919 VENOUS STASIS ULCER OF RIGHT LOWER LEG WITH EDEMA OF RIGHT LOWER LEG  (HCC): ICD-10-CM

## 2025-03-20 DIAGNOSIS — L97.929 VENOUS STASIS ULCER OF LEFT LOWER LEG WITH EDEMA OF LEFT LOWER LEG  (HCC): Primary | ICD-10-CM

## 2025-03-20 DIAGNOSIS — J81.1 PULMONARY EDEMA: ICD-10-CM

## 2025-03-20 DIAGNOSIS — R60.0 VENOUS STASIS ULCER OF RIGHT LOWER LEG WITH EDEMA OF RIGHT LOWER LEG  (HCC): ICD-10-CM

## 2025-03-20 DIAGNOSIS — I10 ESSENTIAL HYPERTENSION, BENIGN: ICD-10-CM

## 2025-03-20 DIAGNOSIS — E11.42 TYPE 2 DIABETES MELLITUS WITH DIABETIC POLYNEUROPATHY, WITHOUT LONG-TERM CURRENT USE OF INSULIN (HCC): ICD-10-CM

## 2025-03-20 DIAGNOSIS — I83.019 VENOUS STASIS ULCER OF RIGHT LOWER LEG WITH EDEMA OF RIGHT LOWER LEG  (HCC): ICD-10-CM

## 2025-03-20 DIAGNOSIS — I83.891 VENOUS STASIS ULCER OF RIGHT LOWER LEG WITH EDEMA OF RIGHT LOWER LEG  (HCC): ICD-10-CM

## 2025-03-20 DIAGNOSIS — R60.0 VENOUS STASIS ULCER OF LEFT LOWER LEG WITH EDEMA OF LEFT LOWER LEG  (HCC): Primary | ICD-10-CM

## 2025-03-20 DIAGNOSIS — I83.892 VENOUS STASIS ULCER OF LEFT LOWER LEG WITH EDEMA OF LEFT LOWER LEG  (HCC): Primary | ICD-10-CM

## 2025-03-20 PROCEDURE — 97597 DBRDMT OPN WND 1ST 20 CM/<: CPT | Performed by: SURGERY

## 2025-03-20 PROCEDURE — 97598 DBRDMT OPN WND ADDL 20CM/<: CPT | Performed by: SURGERY

## 2025-03-20 PROCEDURE — 99214 OFFICE O/P EST MOD 30 MIN: CPT | Performed by: SURGERY

## 2025-03-20 RX ORDER — LIDOCAINE HYDROCHLORIDE 20 MG/ML
JELLY TOPICAL AS NEEDED
Qty: 30 ML | Refills: 2 | Status: SHIPPED | OUTPATIENT
Start: 2025-03-20 | End: 2025-03-20

## 2025-03-20 RX ORDER — LIDOCAINE 50 MG/G
OINTMENT TOPICAL AS NEEDED
Qty: 50 G | Refills: 2 | Status: SHIPPED | OUTPATIENT
Start: 2025-03-20 | End: 2025-03-27

## 2025-03-20 RX ORDER — AMLODIPINE BESYLATE 5 MG/1
5 TABLET ORAL DAILY
Qty: 30 TABLET | Refills: 0 | Status: SHIPPED | OUTPATIENT
Start: 2025-03-20 | End: 2025-03-21

## 2025-03-20 RX ORDER — LIDOCAINE HYDROCHLORIDE 40 MG/ML
5 SOLUTION TOPICAL ONCE
Status: COMPLETED | OUTPATIENT
Start: 2025-03-20 | End: 2025-03-20

## 2025-03-20 RX ADMIN — LIDOCAINE HYDROCHLORIDE 5 ML: 40 SOLUTION TOPICAL at 10:58

## 2025-03-20 NOTE — LETTER
[facility]  [address] [address]  Phone#  [phone]  Fax#  [phone]    Patient:  Bryant Bustamante Jr.  YOB: 1955  Phone:  [phone]  Date of Visit:  3/20/2025    Orders Placed This Encounter   Procedures   • Wound cleansing and dressings Venous Ulcer Lower;Right Leg     Wash your hands with soap and water.  Remove old dressing, discard into plastic bag and place in trash.  Cleanse the wound with mild soap and water prior to applying a clean dressing. Do not use tissue or cotton balls. Do not scrub the wound. Pat dry using gauze.  Shower no do not get dressings wet  may purchase and use cast cover on [employer]      Please wash with dakin's prior to wound care   Apply silver alginate to the bilateral lower leg wounds.  Cover with gauze /ABD or super absorbant   Secure with tasha and tape  Apply unna boot   Change dressing 2 times a week      Unna Boot compression wrap Instructions     Keep compression wrap/wraps clean and dry. If wraps are too tight and you experience numbness/tingling, call the wound center. If after hours, remove wraps or proceed to nearest E.R. and call wound center in AM.     Wrap will be changed 2x weekly home care to complete dressing changes between visits      Avoid prolonged standing in one place.     Elevate leg(s) above the level of the heart when sitting or as much as possible.      If you develop fever, chills, nausea or vomiting, increase in drainage or foul smelling drainage go to the closest emergency department for evaluation.       Please try to consume 3-4 servings of protein (30g) each day.   - Each serving of protein should contain about 30 mg protein.   - Good sources of protein include, lean meats (fish, chicken, etc), eggs, dairy products (yogurt, cheese), tofu, legumes (chickpeas, lentils, peas, black beans), nuts (cashew, walnut, peanuts, etc), & quinoa.        Follow up 1 week     Standing Status:   Future      Expected Date:   3/27/2025     Expiration Date:   3/27/2025   • Wound cleansing and dressings Venous Ulcer Left;Lower Leg     LEFT LEG     Please wash with dakin's prior to wound care   Apply silver alginate to the bilateral lower leg wounds.  Cover with gauze /ABD or super absorbant   Secure with kerlix and tape  Change dressing twice weekly       Spandagrip G to LEFT LEG until studies are done     Elastic Tubular Stocking    Tubular elastic bandage: Apply from base of toes to behind the knee. Apply in AM, may remove for sleep.    Avoid prolonged standing in one place.    Elevate leg(s) above the level of the heart when sitting or as much as possible.      Please obtain vascular studies as ordered     Standing Status:   Future     Expected Date:   3/27/2025     Expiration Date:   3/27/2025   • Wound Procedure Treatment Venous Ulcer Lower;Right Leg     This order was created via procedure documentation   • Wound Procedure Treatment Venous Ulcer Left;Lower Leg     This order was created via procedure documentation         Electronically signed by Pavan Rock DO

## 2025-03-20 NOTE — PROGRESS NOTES
Wound Procedure Treatment Venous Ulcer Lower;Right Leg    Performed by: Liliana Molina RN  Authorized by: Pavan Rock DO  Associated wounds:   Wound 03/11/25 Venous Ulcer Other (Comments) Leg Lower;Right    Wound cleansed with:  Soap and water   Applied primary dressing:  Silver and Calcium alginate   Applied secondary dressing:  Superabsorber   Dressing secured with:  Mariaa and Tape   Verbal consent obtained?:  Yes    Consent given by:  Patient   Time Out:       Time out:  Immediately prior to the procedure a time out was called    Patient states understanding of procedure being performed:  Yes    Patient's understanding of procedure matches consent:  Yes    Procedure consent matches procedure scheduled:  Yes    Patient identity confirmed:  Verbally with patient   Pre-procedure details:       Sensation:  Normal      Skin color:  WNL   Procedure details:       Laterality:  Right      Location:  Leg      Cast type:  Unna boot      Splint composition:  static       Supplies:  2 layer wrap     UNNA BOOT wrap procedure     Before application, ROSS and/or TBI determined to be adequate for healing and application of compression. Lower extremity washed prior to application of compression wrap. With the foot in dorsiflexed position, UNNA BOOT was applied as per physician orders without complications or complaint of pain.  The procedure was tolerated well. Toes warm & pink post application.  Patient provided education & reinforced to observe toes for any discoloration, swelling or tingling and instructed when to report to the Wound Center or to remove compression

## 2025-03-20 NOTE — PATIENT INSTRUCTIONS
Orders Placed This Encounter   Procedures    Wound cleansing and dressings Venous Ulcer Lower;Right Leg     Wash your hands with soap and water.  Remove old dressing, discard into plastic bag and place in trash.  Cleanse the wound with mild soap and water prior to applying a clean dressing. Do not use tissue or cotton balls. Do not scrub the wound. Pat dry using gauze.  Shower no do not get dressings wet  may purchase and use cast cover on [employer]      Please wash with dakin's prior to wound care   Apply silver alginate to the bilateral lower leg wounds.  Cover with gauze /ABD or super absorbant   Secure with tasha and tape  Apply unna boot   Change dressing 2 times a week      Unna Boot compression wrap Instructions     Keep compression wrap/wraps clean and dry. If wraps are too tight and you experience numbness/tingling, call the wound center. If after hours, remove wraps or proceed to nearest E.R. and call wound center in AM.     Wrap will be changed 2x weekly home care to complete dressing changes between visits      Avoid prolonged standing in one place.     Elevate leg(s) above the level of the heart when sitting or as much as possible.      If you develop fever, chills, nausea or vomiting, increase in drainage or foul smelling drainage go to the closest emergency department for evaluation.       Please try to consume 3-4 servings of protein (30g) each day.   - Each serving of protein should contain about 30 mg protein.   - Good sources of protein include, lean meats (fish, chicken, etc), eggs, dairy products (yogurt, cheese), tofu, legumes (chickpeas, lentils, peas, black beans), nuts (cashew, walnut, peanuts, etc), & quinoa.        Follow up 1 week     Standing Status:   Future     Expected Date:   3/27/2025     Expiration Date:   3/27/2025    Wound cleansing and dressings Venous Ulcer Left;Lower Leg     LEFT LEG     Please wash with dakin's prior to wound care   Apply silver alginate to the bilateral lower  leg wounds.  Cover with gauze /ABD or super absorbant   Secure with kerlix and tape  Change dressing twice weekly       Spandagrip G to LEFT LEG until studies are done     Elastic Tubular Stocking    Tubular elastic bandage: Apply from base of toes to behind the knee. Apply in AM, may remove for sleep.    Avoid prolonged standing in one place.    Elevate leg(s) above the level of the heart when sitting or as much as possible.      Please obtain vascular studies as ordered     Standing Status:   Future     Expected Date:   3/27/2025     Expiration Date:   3/27/2025    Wound Procedure Treatment Venous Ulcer Lower;Right Leg     This order was created via procedure documentation    Wound Procedure Treatment Venous Ulcer Left;Lower Leg     This order was created via procedure documentation

## 2025-03-21 RX ORDER — AMLODIPINE BESYLATE 5 MG/1
5 TABLET ORAL DAILY
Qty: 100 TABLET | Refills: 1 | Status: SHIPPED | OUTPATIENT
Start: 2025-03-21

## 2025-03-21 NOTE — ASSESSMENT & PLAN NOTE
Blood pressure reviewed on today's office visit.  Appears stable.  Continue current medical management as per PCP.

## 2025-03-21 NOTE — ASSESSMENT & PLAN NOTE
Right lower extremity venous stasis with edema noted.  Ulcerations appear somewhat improved.  Selective debridement completed to remove slough.  Continue current wound management.  Continue Unna boot therapy.  Follow-up 1 week.    Orders:    lidocaine (XYLOCAINE) 4 % topical solution 5 mL    Wound cleansing and dressings Venous Ulcer Left;Lower Leg; Future    Wound Procedure Treatment Venous Ulcer Lower;Right Leg    Debridement Venous Ulcer Lower;Right Leg

## 2025-03-21 NOTE — ASSESSMENT & PLAN NOTE
Lab Results   Component Value Date    HGBA1C 12.7 (H) 03/13/2025     Diabetes not well-controlled with recent hemoglobin A1c noted to be 12.7.  Patient needs tighter glycemic control to help with overall wound healing.  Encouraged follow-up PCP and endocrinology as needed

## 2025-03-21 NOTE — ASSESSMENT & PLAN NOTE
Left lower extremity edema and venous stasis changes with ulceration.  Also significant drainage.  There is some distal greenish drainage noted.  Patient recently changed antibiotics to fall more in line with the culture and sensitivities which did grow Pseudomonas and Enterococcus.  He does have persistent left posterior calf pain.  This been going on for quite some time.  He did have a venous duplex in the past that was negative.  However we will repeat venous duplex to rule out acute DVT.  Will also obtain arterial duplex and ROSS to make sure there is adequate blood flow.  For now given the significant pain we will hold off on compression wrap which was causing significant discomfort.  Will continue local wound care with calcium alginate in addition to Tubigrip.  Selective debridement was completed to remove slough.  Follow-up 1 week    Orders:    Wound cleansing and dressings Venous Ulcer Lower;Right Leg; Future    lidocaine (XYLOCAINE) 4 % topical solution 5 mL    VAS VENOUS DUPLEX -LOWER LIMB UNILATERAL; Future    VAS ARTERIAL DUPLEX- LOWER LIMB BILATERAL; Future    Wound Procedure Treatment Venous Ulcer Left;Lower Leg    lidocaine (XYLOCAINE) 5 % ointment; Apply topically as needed for mild pain Apply to left calf as needed for pain    Debridement Venous Ulcer Left;Lower Leg

## 2025-03-21 NOTE — PROGRESS NOTES
Name: Bryant Bustamante Jr.      : 1955      MRN: [id number]  Encounter Provider: Pavan Rock DO  Encounter Date: 3/20/2025   Encounter department: [facility]  :  Assessment & Plan  Venous stasis ulcer of left lower leg with edema of left lower leg  (HCC)  Left lower extremity edema and venous stasis changes with ulceration.  Also significant drainage.  There is some distal greenish drainage noted.  Patient recently changed antibiotics to fall more in line with the culture and sensitivities which did grow Pseudomonas and Enterococcus.  He does have persistent left posterior calf pain.  This been going on for quite some time.  He did have a venous duplex in the past that was negative.  However we will repeat venous duplex to rule out acute DVT.  Will also obtain arterial duplex and ROSS to make sure there is adequate blood flow.  For now given the significant pain we will hold off on compression wrap which was causing significant discomfort.  Will continue local wound care with calcium alginate in addition to Tubigrip.  Selective debridement was completed to remove slough.  Follow-up 1 week    Orders:    Wound cleansing and dressings Venous Ulcer Lower;Right Leg; Future    lidocaine (XYLOCAINE) 4 % topical solution 5 mL    VAS VENOUS DUPLEX -LOWER LIMB UNILATERAL; Future    VAS ARTERIAL DUPLEX- LOWER LIMB BILATERAL; Future    Wound Procedure Treatment Venous Ulcer Left;Lower Leg    lidocaine (XYLOCAINE) 5 % ointment; Apply topically as needed for mild pain Apply to left calf as needed for pain    Debridement Venous Ulcer Left;Lower Leg    Venous stasis ulcer of right lower leg with edema of right lower leg  (HCC)  Right lower extremity venous stasis with edema noted.  Ulcerations appear somewhat improved.  Selective debridement completed to remove slough.  Continue current wound management.  Continue Unna boot therapy.  Follow-up 1 week.    Orders:    lidocaine (XYLOCAINE) 4 %  topical solution 5 mL    Wound cleansing and dressings Venous Ulcer Left;Lower Leg; Future    Wound Procedure Treatment Venous Ulcer Lower;Right Leg    Debridement Venous Ulcer Lower;Right Leg    Type 2 diabetes mellitus with diabetic polyneuropathy, without long-term current use of insulin ([facility])    Lab Results   Component Value Date    HGBA1C 12.7 (H) 03/13/2025     Diabetes not well-controlled with recent hemoglobin A1c noted to be 12.7.  Patient needs tighter glycemic control to help with overall wound healing.  Encouraged follow-up PCP and endocrinology as needed         Essential hypertension, benign  Blood pressure reviewed on today's office visit.  Appears stable.  Continue current medical management as per PCP.             History of Present Illness   Chief Complaint   Patient presents with    Follow Up Wound Care Visit     LLE wound    Here for wound follow up.  69-year-old gentleman with bilateral lower extremity stasis disease with ulceration presents today for follow-up evaluation and treatment.  Patient was recently started on bilateral compression wraps in the form of Unna boot.  He also was finished a course of antibiotics for suspected ulcer infection.  The ulcers were cultured and is now growing Pseudomonas Enterococcus.  Patient recently switched to different antibiotic with accordance of the sensitivities.  Still complains of some left lower extremity pain specifically in the left calf.  Drainage is better controlled still greenish in character      Objective   /70   Pulse 90   Temp (!) 97.1 °F (36.2 °C)   Resp 18     Physical Exam  Vitals reviewed. Exam conducted with a chaperone present.   Constitutional:       General: He is not in acute distress.     Appearance: Normal appearance. He is not ill-appearing, toxic-appearing or diaphoretic.   HENT:      Head: Normocephalic.      Right Ear: External ear normal.      Left Ear: External ear normal.   Eyes:      General: No scleral icterus.         Right eye: No discharge.         Left eye: No discharge.   Cardiovascular:      Rate and Rhythm: Normal rate.   Pulmonary:      Effort: Pulmonary effort is normal. No respiratory distress.   Musculoskeletal:      Cervical back: Normal range of motion.      Right lower leg: Edema present.      Left lower leg: Edema present.   Skin:     General: Skin is warm.      Coloration: Skin is not jaundiced or pale.      Findings: No bruising or erythema.      Comments: I will extremity with no edema venous disease ulcerations.  Mild greenish drainage noted.  Selective debridement completed remove slough.    Left lower extremity also with specific edema venous stasis disease and significant ulcerations.  Greenish drainage noted.  Selective debridement completed.   Neurological:      General: No focal deficit present.      Mental Status: He is alert and oriented to person, place, and time.      Cranial Nerves: No cranial nerve deficit.   Psychiatric:         Mood and Affect: Mood normal.         Behavior: Behavior normal.         Thought Content: Thought content normal.         Judgment: Judgment normal.       Wound 03/11/25 Venous Ulcer Other (Comments) Leg Lower;Right (Active)   Wound Image   03/20/25 1100   Wound Description Epithelialization;Slough;Pink;Yellow;Hypergranulation 03/20/25 1057   Non-staged Wound Description Full thickness 03/13/25 1008   Wound Length (cm) 19 cm 03/20/25 1057   Wound Width (cm) 12 cm 03/20/25 1057   Wound Depth (cm) 0.1 cm 03/20/25 1057   Wound Surface Area (cm^2) 228 cm^2 03/20/25 1057   Wound Volume (cm^3) 22.8 cm^3 03/20/25 1057   Calculated Wound Volume (cm^3) 22.8 cm^3 03/20/25 1057   Change in Wound Size % -23.24 03/20/25 1057   Drainage Amount Large 03/20/25 1057   Drainage Description Foul smelling;Green;Serosanguineous 03/20/25 1057   Kimberlyn-wound Assessment Erythema;Dry;Scaly;Other (Comment) 03/20/25 1057       Wound 03/11/25 Venous Ulcer Other (Comments) Leg Left;Lower (Active)  "  Wound Image     03/20/25 1059   Wound Description Epithelialization;Pink;Slough;Yellow 03/20/25 1055   Non-staged Wound Description Full thickness 03/20/25 1055   Wound Length (cm) 22 cm 03/20/25 1055   Wound Width (cm) 25 cm 03/20/25 1055   Wound Depth (cm) 0.1 cm 03/20/25 1055   Wound Surface Area (cm^2) 550 cm^2 03/20/25 1055   Wound Volume (cm^3) 55 cm^3 03/20/25 1055   Calculated Wound Volume (cm^3) 55 cm^3 03/20/25 1055   Change in Wound Size % 22.48 03/20/25 1055   Drainage Amount Copious 03/20/25 1055   Drainage Description Green;Foul smelling;Serosanguineous;Other (Comment) 03/20/25 1055   Kimberlyn-wound Assessment Brown;Dry;Scaly;Erythema 03/20/25 1055       Debridement   Wound 03/11/25 Venous Ulcer Other (Comments) Leg Left;Lower     Date/Time: 3/20/2025 10:45 AM  Universal Protocol:  Consent: Verbal consent obtained. Written consent obtained.  Risks and benefits: risks, benefits and alternatives were discussed  Consent given by: patient  Time out: Immediately prior to procedure a \"time out\" was called to verify the correct patient, procedure, equipment, support staff and site/side marked as required.  Patient understanding: patient states understanding of the procedure being performed  Patient consent: the patient's understanding of the procedure matches consent given  Patient identity confirmed: verbally with patient    Debridement Details  Debridement type: selective  Pain control: lidocaine 4%    Post-debridement measurements  Length (cm): 22  Width (cm): 25  Depth (cm): 0.1  Percent debrided: 70%  Surface Area (cm^2): 550  Area Debrided (cm^2): 385  Volume (cm^3): 55    Devitalized tissue debrided: biofilm and slough  Instrument(s) utilized: curette  Bleeding: small  Hemostasis obtained with: pressure  Response to treatment: procedure was tolerated well    Debridement   Wound 03/11/25 Venous Ulcer Other (Comments) Leg Lower;Right     Date/Time: 3/20/2025 10:45 AM  Universal Protocol:  procedure " "performed by consultantConsent: Verbal consent obtained.  Risks and benefits: risks, benefits and alternatives were discussed  Consent given by: patient  Time out: Immediately prior to procedure a \"time out\" was called to verify the correct patient, procedure, equipment, support staff and site/side marked as required.  Patient understanding: patient states understanding of the procedure being performed  Patient consent: the patient's understanding of the procedure matches consent given  Patient identity confirmed: verbally with patient    Debridement Details  Performed by: physician  Debridement type: selective  Pain control: lidocaine 4%    Post-debridement measurements  Length (cm): 19  Width (cm): 12  Depth (cm): 0.1  Percent debrided: 90%  Surface Area (cm^2): 228  Area Debrided (cm^2): 205.2  Volume (cm^3): 22.8    Devitalized tissue debrided: biofilm and slough  Instrument(s) utilized: curette  Bleeding: small  Hemostasis obtained with: pressure  Response to treatment: procedure was tolerated well       Results from last 6 Months   Lab Units 03/13/25  1120   WOUND CULTURE  4+ Growth of Pseudomonas aeruginosa*  4+ Growth of Enterococcus faecalis*         "

## 2025-03-24 ENCOUNTER — HOME CARE VISIT (OUTPATIENT)
Dept: HOME HEALTH SERVICES | Facility: HOME HEALTHCARE | Age: 70
End: 2025-03-24
Payer: MEDICARE

## 2025-03-24 PROCEDURE — G0299 HHS/HOSPICE OF RN EA 15 MIN: HCPCS

## 2025-03-26 VITALS
DIASTOLIC BLOOD PRESSURE: 72 MMHG | OXYGEN SATURATION: 93 % | HEART RATE: 80 BPM | RESPIRATION RATE: 16 BRPM | TEMPERATURE: 96.8 F | SYSTOLIC BLOOD PRESSURE: 182 MMHG

## 2025-03-27 ENCOUNTER — OFFICE VISIT (OUTPATIENT)
Dept: WOUND CARE | Facility: CLINIC | Age: 70
End: 2025-03-27
Payer: MEDICARE

## 2025-03-27 ENCOUNTER — HOSPITAL ENCOUNTER (OUTPATIENT)
Facility: CLINIC | Age: 70
Discharge: HOME | End: 2025-03-27
Payer: MEDICARE

## 2025-03-27 ENCOUNTER — RESULTS FOLLOW-UP (OUTPATIENT)
Dept: SURGERY | Facility: CLINIC | Age: 70
End: 2025-03-27

## 2025-03-27 ENCOUNTER — HOME CARE VISIT (OUTPATIENT)
Dept: HOME HEALTH SERVICES | Facility: HOME HEALTHCARE | Age: 70
End: 2025-03-27
Payer: MEDICARE

## 2025-03-27 VITALS
DIASTOLIC BLOOD PRESSURE: 66 MMHG | RESPIRATION RATE: 18 BRPM | HEART RATE: 70 BPM | TEMPERATURE: 96 F | SYSTOLIC BLOOD PRESSURE: 120 MMHG

## 2025-03-27 DIAGNOSIS — R60.0 VENOUS STASIS ULCER OF RIGHT LOWER LEG WITH EDEMA OF RIGHT LOWER LEG  (HCC): ICD-10-CM

## 2025-03-27 DIAGNOSIS — R60.0 VENOUS STASIS ULCER OF LEFT LOWER LEG WITH EDEMA OF LEFT LOWER LEG  (HCC): ICD-10-CM

## 2025-03-27 DIAGNOSIS — I83.029 VENOUS STASIS ULCER OF LEFT LOWER LEG WITH EDEMA OF LEFT LOWER LEG  (HCC): Primary | ICD-10-CM

## 2025-03-27 DIAGNOSIS — I83.891 VENOUS STASIS ULCER OF RIGHT LOWER LEG WITH EDEMA OF RIGHT LOWER LEG  (HCC): ICD-10-CM

## 2025-03-27 DIAGNOSIS — L97.929 VENOUS STASIS ULCER OF LEFT LOWER LEG WITH EDEMA OF LEFT LOWER LEG  (HCC): Primary | ICD-10-CM

## 2025-03-27 DIAGNOSIS — I83.029 VENOUS STASIS ULCER OF LEFT LOWER LEG WITH EDEMA OF LEFT LOWER LEG  (HCC): ICD-10-CM

## 2025-03-27 DIAGNOSIS — L97.929 VENOUS STASIS ULCER OF LEFT LOWER LEG WITH EDEMA OF LEFT LOWER LEG  (HCC): ICD-10-CM

## 2025-03-27 DIAGNOSIS — I83.892 VENOUS STASIS ULCER OF LEFT LOWER LEG WITH EDEMA OF LEFT LOWER LEG  (HCC): Primary | ICD-10-CM

## 2025-03-27 DIAGNOSIS — L97.919 VENOUS STASIS ULCER OF RIGHT LOWER LEG WITH EDEMA OF RIGHT LOWER LEG  (HCC): ICD-10-CM

## 2025-03-27 DIAGNOSIS — R60.0 VENOUS STASIS ULCER OF LEFT LOWER LEG WITH EDEMA OF LEFT LOWER LEG  (HCC): Primary | ICD-10-CM

## 2025-03-27 DIAGNOSIS — I83.019 VENOUS STASIS ULCER OF RIGHT LOWER LEG WITH EDEMA OF RIGHT LOWER LEG  (HCC): ICD-10-CM

## 2025-03-27 DIAGNOSIS — I83.892 VENOUS STASIS ULCER OF LEFT LOWER LEG WITH EDEMA OF LEFT LOWER LEG  (HCC): ICD-10-CM

## 2025-03-27 PROBLEM — Z23 ENCOUNTER FOR IMMUNIZATION: Status: RESOLVED | Noted: 2021-12-01 | Resolved: 2025-03-27

## 2025-03-27 PROBLEM — Z79.899 ENCOUNTER FOR LONG-TERM (CURRENT) USE OF MEDICATIONS: Status: RESOLVED | Noted: 2020-09-21 | Resolved: 2025-03-27

## 2025-03-27 PROBLEM — R53.83 OTHER FATIGUE: Status: RESOLVED | Noted: 2021-07-01 | Resolved: 2025-03-27

## 2025-03-27 PROBLEM — M54.2 NECK PAIN: Status: RESOLVED | Noted: 2020-08-30 | Resolved: 2025-03-27

## 2025-03-27 PROBLEM — Z23 IMMUNIZATION DUE: Status: RESOLVED | Noted: 2020-10-20 | Resolved: 2025-03-27

## 2025-03-27 PROBLEM — Z01.818 PRE-OP EXAMINATION: Status: RESOLVED | Noted: 2024-08-13 | Resolved: 2025-03-27

## 2025-03-27 PROBLEM — R10.13 EPIGASTRIC PAIN: Status: RESOLVED | Noted: 2021-02-22 | Resolved: 2025-03-27

## 2025-03-27 PROCEDURE — 93971 EXTREMITY STUDY: CPT

## 2025-03-27 PROCEDURE — 93971 EXTREMITY STUDY: CPT | Performed by: SURGERY

## 2025-03-27 PROCEDURE — 97597 DBRDMT OPN WND 1ST 20 CM/<: CPT | Performed by: SURGERY

## 2025-03-27 PROCEDURE — 99214 OFFICE O/P EST MOD 30 MIN: CPT | Performed by: SURGERY

## 2025-03-27 PROCEDURE — 97598 DBRDMT OPN WND ADDL 20CM/<: CPT | Performed by: SURGERY

## 2025-03-27 RX ORDER — LIDOCAINE HYDROCHLORIDE 40 MG/ML
5 SOLUTION TOPICAL ONCE
Status: COMPLETED | OUTPATIENT
Start: 2025-03-27 | End: 2025-03-27

## 2025-03-27 RX ORDER — LIDOCAINE 40 MG/G
CREAM TOPICAL AS NEEDED
Qty: 60 G | Refills: 2 | Status: SHIPPED | OUTPATIENT
Start: 2025-03-27

## 2025-03-27 RX ADMIN — LIDOCAINE HYDROCHLORIDE 5 ML: 40 SOLUTION TOPICAL at 08:36

## 2025-03-27 NOTE — LETTER
[facility]  [address] [address]  Phone#  [phone]  Fax#  [phone]    Patient:  Bryant Bustamante Jr.  YOB: 1955  Phone:  [phone]  Date of Visit:  3/27/2025    Orders Placed This Encounter   Procedures   • Wound cleansing and dressings Venous Ulcer Lower;Right Leg     Venous Ulcer Lower;Right Leg         Wash your hands with soap and water.  Remove old dressing, discard into plastic bag and place in trash.  Cleanse the wound with mild soap and water prior to applying a clean dressing. Do not use tissue or cotton balls. Do not scrub the wound. Pat dry using gauze.  Shower no do not get dressings wet  may purchase and use cast cover on [employer]      Please wash with dakin's prior to wound care   Apply gelling fiber to the right lower leg wounds.  Cover with gauze /ABD or super absorbant   Secure with tasha and tape  Apply unna boot   Change dressing 2 times a week        Unna Boot compression wrap Instructions     Keep compression wrap/wraps clean and dry. If wraps are too tight and you experience numbness/tingling, call the wound center. If after hours, remove wraps or proceed to nearest E.R. and call wound center in AM.     Wrap will be changed 2x weekly home care to complete dressing changes between visits      Avoid prolonged standing in one place.     Elevate leg(s) above the level of the heart when sitting or as much as possible.      If you develop fever, chills, nausea or vomiting, increase in drainage or foul smelling drainage go to the closest emergency department for evaluation.       Please try to consume 3-4 servings of protein (30g) each day.   - Each serving of protein should contain about 30 mg protein.   - Good sources of protein include, lean meats (fish, chicken, etc), eggs, dairy products (yogurt, cheese), tofu, legumes (chickpeas, lentils, peas, black beans), nuts (cashew, walnut, peanuts, etc), & quinoa.        Follow up 1  week     Standing Status:   Future     Expected Date:   4/3/2025     Expiration Date:   4/3/2025   • Wound cleansing and dressings Venous Ulcer Left;Lower Leg     Venous Ulcer Left;Lower Leg        LEFT LEG      Please wash with dakin's prior to wound care   Apply gelling fiber to the left lower leg wounds.  Cover with gauze /ABD or super absorbant   Secure with kerlix and tape  Change dressing twice weekly         Spandagrip G to LEFT LEG until studies are done      Elastic Tubular Stocking     Tubular elastic bandage: Apply from base of toes to behind the knee. Apply in AM, may remove for sleep.     Avoid prolonged standing in one place.     Elevate leg(s) above the level of the heart when sitting or as much as possible.        Please obtain vascular studies as ordered     Standing Status:   Future     Expected Date:   4/3/2025     Expiration Date:   4/3/2025   • Wound Procedure Treatment Venous Ulcer Lower;Right Leg     This order was created via procedure documentation   • Wound Procedure Treatment Venous Ulcer Left;Lower Leg     This order was created via procedure documentation         Electronically signed by Pavan Rock DO

## 2025-03-27 NOTE — PROGRESS NOTES
Wound Procedure Treatment Venous Ulcer Left;Lower Leg    Performed by: Liliana Molina RN  Authorized by: Pavan Rock DO  Associated wounds:   Wound 03/11/25 Venous Ulcer Other (Comments) Leg Left;Lower    Wound cleansed with:  Soap and water   Applied to periwound:  Moisture lotion   Applied primary dressing:  Gelling fiber   Applied secondary dressing:  ABD   Dressing secured with:  Mariaa, Tape, Elastic tubular stocking and Size G

## 2025-03-27 NOTE — ASSESSMENT & PLAN NOTE
Right lower extremity venous stasis disease with associated edema and ulceration.  Overall leg looks improved.  Less erythema.  Patient did complete his antibiotics to treat the bacterial infection of the leg.  Selective debridement completed today to remove slough.  Continue with Unna boot to the right lower extremity.   Additionally we will stop the calcium alginate and switch to a gelling fiber to prevent sticking of the dressing.Follow-up 1 week    Orders:    Wound cleansing and dressings Venous Ulcer Lower;Right Leg; Future    lidocaine (XYLOCAINE) 4 % topical solution 5 mL    Wound Procedure Treatment Venous Ulcer Lower;Right Leg    Debridement

## 2025-03-27 NOTE — PROGRESS NOTES
Name: Bryant Bustamante Jr.      : 1955      MRN: [id number]  Encounter Provider: Pavan Rock DO  Encounter Date: 3/27/2025   Encounter department: [facility] WOUND CARE  :  Assessment & Plan  Venous stasis ulcer of left lower leg with edema of left lower leg  (HCC)  Left lower extremity venous stasis with noted edema.  There is notable ulceration.  There is persistent pain.  Patient has not yet gotten his venous duplex thus this was reordered as stat due to scheduling issues.  Low threshold that there is blood clots as this pains been going for some time however he did be helpful to know specifically if there is any lingering chronic or acute venous blood clots noted.  The ulcers themselves do look improved.  Selective debridement was completed.  I given the significant pain we will hold off on wrapping the patient.  Will be using Tubigrip.  Additionally we will stop the calcium alginate and switch to a gelling fiber to prevent sticking of the dressing.    Orders:    Wound cleansing and dressings Venous Ulcer Left;Lower Leg; Future    lidocaine (XYLOCAINE) 4 % topical solution 5 mL    Wound Procedure Treatment Venous Ulcer Left;Lower Leg    VAS VENOUS DUPLEX -LOWER LIMB UNILATERAL; Future    lidocaine (LMX) 4 % cream; Apply topically as needed for mild pain Apply to pain are of leg as needed.    Debridement    Venous stasis ulcer of right lower leg with edema of right lower leg  (HCC)  Right lower extremity venous stasis disease with associated edema and ulceration.  Overall leg looks improved.  Less erythema.  Patient did complete his antibiotics to treat the bacterial infection of the leg.  Selective debridement completed today to remove slough.  Continue with Unna boot to the right lower extremity.   Additionally we will stop the calcium alginate and switch to a gelling fiber to prevent sticking of the dressing.Follow-up 1 week    Orders:    Wound cleansing and dressings Venous Ulcer  Lower;Right Leg; Future    lidocaine (XYLOCAINE) 4 % topical solution 5 mL    Wound Procedure Treatment Venous Ulcer Lower;Right Leg    Debridement        History of Present Illness   Chief Complaint   Patient presents with    Follow Up Wound Care Visit     BLLE wounds    Here for wound follow up.  69-year-old gentleman with numerous comorbidities presents today for follow-up evaluation and treatment of bilateral lower extremity edema, venous stasis disease with ulceration.  Patient persistently has left lower extremity pain.  Again this has been going on since December.  Patient states he has been using a lidocaine ointment which is 5% without much relief.  He did complete his antibiotics.      Objective   /66   Pulse 70   Temp (!) 96 °F (35.6 °C)   Resp 18     Physical Exam  Vitals reviewed. Exam conducted with a chaperone present.   Constitutional:       General: He is not in acute distress.     Appearance: Normal appearance. He is not ill-appearing, toxic-appearing or diaphoretic.   HENT:      Head: Normocephalic and atraumatic.      Right Ear: External ear normal.      Left Ear: External ear normal.   Eyes:      General: No scleral icterus.        Right eye: No discharge.         Left eye: No discharge.   Cardiovascular:      Rate and Rhythm: Normal rate.   Pulmonary:      Effort: Pulmonary effort is normal. No respiratory distress.   Musculoskeletal:         General: Swelling present.      Cervical back: Normal range of motion.      Right lower leg: Edema present.      Left lower leg: Edema present.   Skin:     General: Skin is warm.      Coloration: Skin is not jaundiced or pale.      Findings: No bruising or erythema.      Comments: Right lower extremity venous disease with ulceration noted.  Noted edema.  Slough noted.  Slight debridement completed.  Erythema improved.    Left lower extremity also with venous stasis and edema.  Ulcerations look improved.  There is new epithelialization.  Noted some  dry skin and slough which was debrided selectively.  Erythema also looks improved.   Neurological:      General: No focal deficit present.      Mental Status: He is alert and oriented to person, place, and time.      Cranial Nerves: No cranial nerve deficit.   Psychiatric:         Mood and Affect: Mood normal.         Behavior: Behavior normal.         Thought Content: Thought content normal.         Judgment: Judgment normal.       Wound 03/11/25 Venous Ulcer Other (Comments) Leg Lower;Right (Active)   Wound Image   03/27/25 0831   Wound Description Epithelialization;Slough;Pink;Yellow 03/27/25 0834   Non-staged Wound Description Full thickness 03/13/25 1008   Wound Length (cm) 15 cm 03/27/25 0834   Wound Width (cm) 3.5 cm 03/27/25 0834   Wound Depth (cm) 0.1 cm 03/27/25 0834   Wound Surface Area (cm^2) 52.5 cm^2 03/27/25 0834   Wound Volume (cm^3) 5.25 cm^3 03/27/25 0834   Calculated Wound Volume (cm^3) 5.25 cm^3 03/27/25 0834   Change in Wound Size % 71.62 03/27/25 0834   Drainage Amount Small 03/27/25 0834   Drainage Description Serosanguineous 03/27/25 0834   Kimberlyn-wound Assessment Erythema;Dry;Scaly 03/27/25 0834       Wound 03/11/25 Venous Ulcer Other (Comments) Leg Left;Lower (Active)   Wound Image    03/27/25 0830   Wound Description Epithelialization;Pink;Slough;Yellow 03/27/25 0830   Non-staged Wound Description Full thickness 03/27/25 0830   Wound Length (cm) 20 cm 03/27/25 0830   Wound Width (cm) 18 cm 03/27/25 0830   Wound Depth (cm) 0.1 cm 03/27/25 0830   Wound Surface Area (cm^2) 360 cm^2 03/27/25 0830   Wound Volume (cm^3) 36 cm^3 03/27/25 0830   Calculated Wound Volume (cm^3) 36 cm^3 03/27/25 0830   Change in Wound Size % 49.26 03/27/25 0830   Drainage Amount Moderate 03/27/25 0830   Drainage Description Serosanguineous 03/27/25 0830   Kimberlyn-wound Assessment Brown;Dry;Scaly;Erythema 03/27/25 0830       Debridement   Wound 03/11/25 Venous Ulcer Other (Comments) Leg Left;Lower     Date/Time: 3/27/2025  "8:30 AM  Universal Protocol:  procedure performed by consultantConsent: Verbal consent obtained.  Risks and benefits: risks, benefits and alternatives were discussed  Consent given by: patient  Time out: Immediately prior to procedure a \"time out\" was called to verify the correct patient, procedure, equipment, support staff and site/side marked as required.  Patient understanding: patient states understanding of the procedure being performed  Patient consent: the patient's understanding of the procedure matches consent given  Patient identity confirmed: verbally with patient    Debridement Details  Performed by: physician  Debridement type: selective  Pain control: lidocaine 4%    Post-debridement measurements  Length (cm): 20  Width (cm): 18  Depth (cm): 0.1  Percent debrided: 20%  Surface Area (cm^2): 360  Area Debrided (cm^2): 72  Volume (cm^3): 36    Devitalized tissue debrided: biofilm, necrotic debris and slough  Instrument(s) utilized: forceps, scissors and curette  Bleeding: small  Hemostasis obtained with: pressure  Response to treatment: procedure was tolerated well    Debridement   Wound 03/11/25 Venous Ulcer Other (Comments) Leg Lower;Right     Date/Time: 3/27/2025 8:30 AM  Universal Protocol:  procedure performed by consultantConsent: Verbal consent obtained.  Risks and benefits: risks, benefits and alternatives were discussed  Consent given by: patient  Time out: Immediately prior to procedure a \"time out\" was called to verify the correct patient, procedure, equipment, support staff and site/side marked as required.  Patient understanding: patient states understanding of the procedure being performed  Patient consent: the patient's understanding of the procedure matches consent given  Patient identity confirmed: verbally with patient    Debridement Details  Performed by: physician  Debridement type: selective    Post-debridement measurements  Length (cm): 15  Width (cm): 3.5  Depth (cm): 0.1  Percent " debrided: 90%  Surface Area (cm^2): 52.5  Area Debrided (cm^2): 47.25  Volume (cm^3): 5.25    Devitalized tissue debrided: biofilm and slough  Instrument(s) utilized: curette  Bleeding: small  Hemostasis obtained with: pressure  Response to treatment: procedure was tolerated well       Results from last 6 Months   Lab Units 03/13/25  1120   WOUND CULTURE  4+ Growth of Pseudomonas aeruginosa*  4+ Growth of Enterococcus faecalis*

## 2025-03-27 NOTE — PATIENT INSTRUCTIONS
Orders Placed This Encounter   Procedures    Wound cleansing and dressings Venous Ulcer Lower;Right Leg     Venous Ulcer Lower;Right Leg         Wash your hands with soap and water.  Remove old dressing, discard into plastic bag and place in trash.  Cleanse the wound with mild soap and water prior to applying a clean dressing. Do not use tissue or cotton balls. Do not scrub the wound. Pat dry using gauze.  Shower no do not get dressings wet  may purchase and use cast cover on [employer]      Please wash with dakin's prior to wound care   Apply gelling fiber to the right lower leg wounds.  Cover with gauze /ABD or super absorbant   Secure with tasha and tape  Apply unna boot   Change dressing 2 times a week        Unna Boot compression wrap Instructions     Keep compression wrap/wraps clean and dry. If wraps are too tight and you experience numbness/tingling, call the wound center. If after hours, remove wraps or proceed to nearest E.R. and call wound center in AM.     Wrap will be changed 2x weekly home care to complete dressing changes between visits      Avoid prolonged standing in one place.     Elevate leg(s) above the level of the heart when sitting or as much as possible.      If you develop fever, chills, nausea or vomiting, increase in drainage or foul smelling drainage go to the closest emergency department for evaluation.       Please try to consume 3-4 servings of protein (30g) each day.   - Each serving of protein should contain about 30 mg protein.   - Good sources of protein include, lean meats (fish, chicken, etc), eggs, dairy products (yogurt, cheese), tofu, legumes (chickpeas, lentils, peas, black beans), nuts (cashew, walnut, peanuts, etc), & quinoa.        Follow up 1 week     Standing Status:   Future     Expected Date:   4/3/2025     Expiration Date:   4/3/2025    Wound cleansing and dressings Venous Ulcer Left;Lower Leg     Venous Ulcer Left;Lower Leg        LEFT LEG      Please wash with dakin's  prior to wound care   Apply gelling fiber to the left lower leg wounds.  Cover with gauze /ABD or super absorbant   Secure with kerlix and tape  Change dressing twice weekly         Spandagrip G to LEFT LEG until studies are done      Elastic Tubular Stocking     Tubular elastic bandage: Apply from base of toes to behind the knee. Apply in AM, may remove for sleep.     Avoid prolonged standing in one place.     Elevate leg(s) above the level of the heart when sitting or as much as possible.        Please obtain vascular studies as ordered     Standing Status:   Future     Expected Date:   4/3/2025     Expiration Date:   4/3/2025    Wound Procedure Treatment Venous Ulcer Lower;Right Leg     This order was created via procedure documentation    Wound Procedure Treatment Venous Ulcer Left;Lower Leg     This order was created via procedure documentation

## 2025-03-27 NOTE — ASSESSMENT & PLAN NOTE
Left lower extremity venous stasis with noted edema.  There is notable ulceration.  There is persistent pain.  Patient has not yet gotten his venous duplex thus this was reordered as stat due to scheduling issues.  Low threshold that there is blood clots as this pains been going for some time however he did be helpful to know specifically if there is any lingering chronic or acute venous blood clots noted.  The ulcers themselves do look improved.  Selective debridement was completed.  I given the significant pain we will hold off on wrapping the patient.  Will be using Tubigrip.  Additionally we will stop the calcium alginate and switch to a gelling fiber to prevent sticking of the dressing.    Orders:    Wound cleansing and dressings Venous Ulcer Left;Lower Leg; Future    lidocaine (XYLOCAINE) 4 % topical solution 5 mL    Wound Procedure Treatment Venous Ulcer Left;Lower Leg    VAS VENOUS DUPLEX -LOWER LIMB UNILATERAL; Future    lidocaine (LMX) 4 % cream; Apply topically as needed for mild pain Apply to pain are of leg as needed.    Debridement

## 2025-03-27 NOTE — PROGRESS NOTES
Wound Procedure Treatment Venous Ulcer Lower;Right Leg    Performed by: Liliana Molina RN  Authorized by: Pavan Rock DO  Associated wounds:   Wound 03/11/25 Venous Ulcer Other (Comments) Leg Lower;Right    Wound cleansed with:  Soap and water   Applied to periwound:  Moisture lotion   Applied primary dressing:  Gelling fiber   Applied secondary dressing:  ABD   Dressing secured with:  Mariaa and Tape   Verbal consent obtained?:  Yes    Consent given by:  Patient   Time Out:       Time out:  Immediately prior to the procedure a time out was called    Patient states understanding of procedure being performed:  Yes    Patient's understanding of procedure matches consent:  Yes    Procedure consent matches procedure scheduled:  Yes    Patient identity confirmed:  Verbally with patient   Pre-procedure details:       Sensation:  Normal      Skin color:  WNL   Procedure details:       Laterality:  Right      Location:  Leg      Cast type:  Unna boot      Splint composition:  static       Supplies:  2 layer wrap     UNNA BOOT wrap procedure     Before application, ROSS and/or TBI determined to be adequate for healing and application of compression. Lower extremity washed prior to application of compression wrap. With the foot in dorsiflexed position, UNNA BOOT was applied as per physician orders without complications or complaint of pain.  The procedure was tolerated well. Toes warm & pink post application.  Patient provided education & reinforced to observe toes for any discoloration, swelling or tingling and instructed when to report to the Wound Center or to remove compression

## 2025-03-28 DIAGNOSIS — N18.32 CHRONIC KIDNEY DISEASE, STAGE 3B (HCC): Primary | ICD-10-CM

## 2025-03-31 ENCOUNTER — TELEPHONE (OUTPATIENT)
Dept: NEPHROLOGY | Facility: CLINIC | Age: 70
End: 2025-03-31

## 2025-03-31 ENCOUNTER — HOME CARE VISIT (OUTPATIENT)
Dept: HOME HEALTH SERVICES | Facility: HOME HEALTHCARE | Age: 70
End: 2025-03-31
Payer: MEDICARE

## 2025-03-31 VITALS
RESPIRATION RATE: 18 BRPM | SYSTOLIC BLOOD PRESSURE: 126 MMHG | HEART RATE: 98 BPM | OXYGEN SATURATION: 98 % | DIASTOLIC BLOOD PRESSURE: 68 MMHG | TEMPERATURE: 98.5 F

## 2025-03-31 PROCEDURE — G0299 HHS/HOSPICE OF RN EA 15 MIN: HCPCS

## 2025-04-01 ENCOUNTER — RA CDI HCC (OUTPATIENT)
Dept: OTHER | Facility: HOSPITAL | Age: 70
End: 2025-04-01

## 2025-04-01 PROCEDURE — 10330064 PAD, ABD 5X9 STR LF (1/PK 20PK/BX) MGM1"

## 2025-04-01 PROCEDURE — 10330064 DRESSING, CALCIUM ALGINATE AG SHEET 4X8"

## 2025-04-01 PROCEDURE — 10330064

## 2025-04-01 PROCEDURE — 10330064 CLEANSER, WND SEA-CLEANS 6OZ  COLPLT

## 2025-04-01 PROCEDURE — 10330064 UNNA BOOT, 4 (1/BX 12BX/CS)"

## 2025-04-01 PROCEDURE — 10330064 TAPE, ADHSV TRANSPORE WHT 2 (6RL/BX 10B"

## 2025-04-01 PROCEDURE — 10330064 BANDAGE, CNFRM 4X4.1YDS N/S LF (12RL/BG"

## 2025-04-01 PROCEDURE — 10330064 SPONGE, GAUZE 8PLY N/S 4X4" (200/PK 20P"

## 2025-04-03 ENCOUNTER — OFFICE VISIT (OUTPATIENT)
Dept: WOUND CARE | Facility: CLINIC | Age: 70
End: 2025-04-03
Payer: MEDICARE

## 2025-04-03 VITALS
HEART RATE: 72 BPM | RESPIRATION RATE: 18 BRPM | SYSTOLIC BLOOD PRESSURE: 144 MMHG | DIASTOLIC BLOOD PRESSURE: 88 MMHG | TEMPERATURE: 97.2 F

## 2025-04-03 DIAGNOSIS — R60.0 VENOUS STASIS ULCER OF LEFT LOWER LEG WITH EDEMA OF LEFT LOWER LEG  (HCC): Primary | ICD-10-CM

## 2025-04-03 DIAGNOSIS — L97.929 VENOUS STASIS ULCER OF LEFT LOWER LEG WITH EDEMA OF LEFT LOWER LEG  (HCC): Primary | ICD-10-CM

## 2025-04-03 DIAGNOSIS — L97.919 VENOUS STASIS ULCER OF RIGHT LOWER LEG WITH EDEMA OF RIGHT LOWER LEG  (HCC): ICD-10-CM

## 2025-04-03 DIAGNOSIS — R60.0 VENOUS STASIS ULCER OF RIGHT LOWER LEG WITH EDEMA OF RIGHT LOWER LEG  (HCC): ICD-10-CM

## 2025-04-03 DIAGNOSIS — I83.029 VENOUS STASIS ULCER OF LEFT LOWER LEG WITH EDEMA OF LEFT LOWER LEG  (HCC): Primary | ICD-10-CM

## 2025-04-03 DIAGNOSIS — I83.892 VENOUS STASIS ULCER OF LEFT LOWER LEG WITH EDEMA OF LEFT LOWER LEG  (HCC): Primary | ICD-10-CM

## 2025-04-03 DIAGNOSIS — I83.891 VENOUS STASIS ULCER OF RIGHT LOWER LEG WITH EDEMA OF RIGHT LOWER LEG  (HCC): ICD-10-CM

## 2025-04-03 DIAGNOSIS — I83.019 VENOUS STASIS ULCER OF RIGHT LOWER LEG WITH EDEMA OF RIGHT LOWER LEG  (HCC): ICD-10-CM

## 2025-04-03 PROCEDURE — 99214 OFFICE O/P EST MOD 30 MIN: CPT | Performed by: SURGERY

## 2025-04-03 PROCEDURE — 29580 STRAPPING UNNA BOOT: CPT

## 2025-04-03 RX ORDER — LIDOCAINE HYDROCHLORIDE 40 MG/ML
5 SOLUTION TOPICAL ONCE
Status: COMPLETED | OUTPATIENT
Start: 2025-04-03 | End: 2025-04-03

## 2025-04-03 RX ADMIN — LIDOCAINE HYDROCHLORIDE 5 ML: 40 SOLUTION TOPICAL at 08:29

## 2025-04-03 NOTE — PROGRESS NOTES
Name: Bryant Bustamante Jr.      : 1955      MRN: [id number]  Encounter Provider: Pavan Rock DO  Encounter Date: 4/3/2025   Encounter department: [facility] WOUND CARE  :  Assessment & Plan  Venous stasis ulcer of left lower leg with edema of left lower leg  (HCC)  Left  lower extremity venous stasis disease with edema and noted ulcerations.  Overall improved.  More epithelialization.  Healthy granulation tissue noted.  No significant slough.  No debridement required.  Also noted some dry scaly skin.  Overall drainage markedly improved.  Will switch to Acticoat and start  Unna boot.  Follow-up 1 week.    Orders:    Wound cleansing and dressings Venous Ulcer Lower;Right Leg; Future    lidocaine (XYLOCAINE) 4 % topical solution 5 mL    Wound Procedure Treatment    Cast Application    Venous stasis ulcer of right lower leg with edema of right lower leg  (HCC)  Right lower extremity venous stasis disease with edema and noted ulcerations.  Overall improved.  More epithelialization.  Healthy granulation tissue noted.  No significant slough.  No debridement required.  Also noted some dry scaly skin.  Overall drainage markedly improved.  Will switch to Acticoat and continue with Unna boot.  Follow-up 1 week.    Orders:    Wound cleansing and dressings Venous Ulcer Lower;Right Leg; Future    lidocaine (XYLOCAINE) 4 % topical solution 5 mL    Wound Procedure Treatment    Cast Application        Imaging:    Recent venous duplex left lower extremity report dated 2025 was personally viewed by me.    History of Present Illness   Chief Complaint   Patient presents with    Follow Up Wound Care Visit   Here for wound follow up.  69-year-old gentleman who presents today for follow-up evaluation of bilateral lower extremity venous stasis disease with ulceration.  Overall doing pretty well.  He did obtain his venous duplex of the left lower extremity which did not show any acute DVT.  He is scheduled  for April 9 for his arterial duplex bilaterally.  Currently undergoing Unna boot therapy on the right and Ace bandage wrap on the left.  Notes drainage is well-controlled.  No other associate symptoms.      Objective   /88   Pulse 72   Temp (!) 97.2 °F (36.2 °C)   Resp 18     Physical Exam  Vitals reviewed. Exam conducted with a chaperone present.   Constitutional:       General: He is not in acute distress.     Appearance: Normal appearance. He is not ill-appearing, toxic-appearing or diaphoretic.   HENT:      Head: Normocephalic and atraumatic.      Right Ear: External ear normal.      Left Ear: External ear normal.   Eyes:      General: No scleral icterus.        Right eye: No discharge.         Left eye: No discharge.   Cardiovascular:      Rate and Rhythm: Normal rate.   Pulmonary:      Effort: Pulmonary effort is normal. No respiratory distress.   Musculoskeletal:      Cervical back: Normal range of motion.   Skin:     General: Skin is warm.      Coloration: Skin is not jaundiced or pale.      Findings: No bruising or erythema.      Comments: Right lower extremity venous stasis with ulceration.  There is an overall decrease in drainage.  There is some dry scaly skin noted.  There is new epithelialization noted as well.  No significant slough.  No debridement required.    Left lower extremity venous stasis with ulceration.  There is an overall decrease in drainage.  There is some dry scaly skin noted.  There is  new epithelialization noted as well.  No significant slough.  No debridement required.   Neurological:      General: No focal deficit present.      Mental Status: He is alert and oriented to person, place, and time.      Cranial Nerves: No cranial nerve deficit.   Psychiatric:         Mood and Affect: Mood normal.         Behavior: Behavior normal.         Thought Content: Thought content normal.         Judgment: Judgment normal.       Wound 03/11/25 Venous Ulcer Other (Comments) Leg  Lower;Right (Active)   Wound Image   04/03/25 0816   Wound Description Epithelialization;Pink;Yellow;Dry;White 04/03/25 0824   Non-staged Wound Description Full thickness 04/03/25 0824   Wound Length (cm) 7 cm 04/03/25 0824   Wound Width (cm) 3.5 cm 04/03/25 0824   Wound Depth (cm) 0.1 cm 04/03/25 0824   Wound Surface Area (cm^2) 24.5 cm^2 04/03/25 0824   Wound Volume (cm^3) 2.45 cm^3 04/03/25 0824   Calculated Wound Volume (cm^3) 2.45 cm^3 04/03/25 0824   Change in Wound Size % 86.76 04/03/25 0824   Drainage Amount Moderate 04/03/25 0824   Drainage Description Serosanguineous 04/03/25 0824   Kimberlyn-wound Assessment Dry;Scaly;Yellow-brown;White 04/03/25 0824       Wound 03/11/25 Venous Ulcer Other (Comments) Leg Left;Lower (Active)   Wound Image      04/03/25 0815   Wound Description Epithelialization;Pink;Yellow;White 04/03/25 0825   Non-staged Wound Description Full thickness 04/03/25 0825   Wound Length (cm) 16.5 cm 04/03/25 0825   Wound Width (cm) 15 cm 04/03/25 0825   Wound Depth (cm) 0.1 cm 04/03/25 0825   Wound Surface Area (cm^2) 247.5 cm^2 04/03/25 0825   Wound Volume (cm^3) 24.75 cm^3 04/03/25 0825   Calculated Wound Volume (cm^3) 24.75 cm^3 04/03/25 0825   Change in Wound Size % 65.12 04/03/25 0825   Drainage Amount Moderate 04/03/25 0825   Drainage Description Serosanguineous 04/03/25 0825   Kimberlyn-wound Assessment Brown;Dry;Scaly;Erythema;Yellow-brown 04/03/25 0825       Procedures   Results from last 6 Months   Lab Units 03/13/25  1120   WOUND CULTURE  4+ Growth of Pseudomonas aeruginosa*  4+ Growth of Enterococcus faecalis*

## 2025-04-03 NOTE — PROGRESS NOTES
Wound Procedure Treatment    Performed by: Genevieve Ruiz RN  Authorized by: Pavan Rock DO  Associated wounds:   Wound 03/11/25 Venous Ulcer Other (Comments) Leg Lower;Right  Wound 03/11/25 Venous Ulcer Other (Comments) Leg Left;Lower    Wound cleansed with:  Soap and water   Applied secondary dressing:  ABD   Dressing secured with:  Tasha, Tape and Tubifast   Comments:  Acticoat 3   Cast Application    Date/Time: 4/3/2025 8:30 AM    Performed by: Genevieve Ruiz RN  Authorized by: Pavan Rock DO    Other Assisting Provider: Yes (comment) (Morelia MACIEL)    Verbal consent obtained?: Yes    Risks and benefits: Risks, benefits and alternatives were discussed    Consent given by:  Patient  Time Out:     Time out: Immediately prior to the procedure a time out was called    Patient states understanding of procedure being performed: Yes    Patient's understanding of procedure matches consent: Yes    Procedure consent matches procedure scheduled: Yes    Patient identity confirmed:  Verbally with patient  Pre-procedure details:     Sensation:  Normal    Skin color:  WNL  Procedure details:     Laterality:  Bilateral    Location:  Leg    Leg:  L lower leg and R lower leg    Cast type:  Unna boot    Supplies used: acticoat 3, ABD, roll gauze, tasha, tape, unna boot, tubifast.  Post-procedure details:     Pain:  Unchanged    Sensation:  Normal    Skin color:  WNL    Patient tolerance of procedure:  Tolerated well, no immediate complications  Comments:      UNNA BOOT wrap procedure     Before application, ROSS and/or TBI determined to be adequate for healing and application of compression. Lower extremity washed prior to application of compression wrap. With the foot in dorsiflexed position, Unna boot was applied as per physician orders without complications or complaint of pain.  The procedure was tolerated well. Toes warm & pink post application.  Patient provided education & reinforced to observe toes for any discoloration,  swelling or tingling and instructed when to report to the Wound Center or to remove compression. Wound care as per providers orders, patient tolerated well.

## 2025-04-03 NOTE — ASSESSMENT & PLAN NOTE
Left  lower extremity venous stasis disease with edema and noted ulcerations.  Overall improved.  More epithelialization.  Healthy granulation tissue noted.  No significant slough.  No debridement required.  Also noted some dry scaly skin.  Overall drainage markedly improved.  Will switch to Acticoat and start  Unna boot.  Follow-up 1 week.    Orders:    Wound cleansing and dressings Venous Ulcer Lower;Right Leg; Future    lidocaine (XYLOCAINE) 4 % topical solution 5 mL    Wound Procedure Treatment    Cast Application

## 2025-04-03 NOTE — PATIENT INSTRUCTIONS
Orders Placed This Encounter   Procedures    Wound cleansing and dressings Venous Ulcer Lower;Right Leg     Wound cleansing and dressings                               Wash your hands with soap and water.  Remove old dressing, discard into plastic bag and place in trash.  Cleanse the wound with mild soap and water prior to applying a clean dressing. Do not use tissue or cotton balls. Do not scrub the wound. Pat dry using gauze.    Shower no do not get dressings wet  may purchase and use cast cover on [employer]      Apply acticoat 3 to bilateral lower leg wounds.  Cover with ABD   Secure with tasha and tape  Apply unna boot   Change dressing 2 times a week        Unna Boot compression wrap Instructions     Keep compression wrap/wraps clean and dry. If wraps are too tight and you experience numbness/tingling, call the wound center. If after hours, remove wraps or proceed to nearest E.R. and call wound center in AM.  Wrap will be changed 2x weekly home care to complete dressing changes between visits   Avoid prolonged standing in one place.  Elevate leg(s) above the level of the heart when sitting or as much as possible.      If you develop fever, chills, nausea or vomiting, increase in drainage or foul smelling drainage go to the closest emergency department for evaluation.       Please try to consume 3-4 servings of protein (30g) each day.   - Each serving of protein should contain about 30 mg protein.   - Good sources of protein include, lean meats (fish, chicken, etc), eggs, dairy products (yogurt, cheese), tofu, legumes (chickpeas, lentils, peas, black beans), nuts (cashew, walnut, peanuts, etc), & quinoa.        Follow up 1 week     Standing Status:   Future     Expiration Date:   4/10/2025

## 2025-04-04 ENCOUNTER — HOME CARE VISIT (OUTPATIENT)
Dept: HOME HEALTH SERVICES | Facility: HOME HEALTHCARE | Age: 70
End: 2025-04-04
Payer: MEDICARE

## 2025-04-04 PROCEDURE — 10330064 DRESSING, ACTICOAT FLEX 3 ANTIMICRO 4X4

## 2025-04-07 ENCOUNTER — HOME CARE VISIT (OUTPATIENT)
Dept: HOME HEALTH SERVICES | Facility: HOME HEALTHCARE | Age: 70
End: 2025-04-07
Payer: MEDICARE

## 2025-04-07 VITALS
HEART RATE: 92 BPM | DIASTOLIC BLOOD PRESSURE: 74 MMHG | TEMPERATURE: 98.1 F | RESPIRATION RATE: 18 BRPM | OXYGEN SATURATION: 92 % | SYSTOLIC BLOOD PRESSURE: 148 MMHG

## 2025-04-07 DIAGNOSIS — G89.29 CHRONIC BILATERAL LOW BACK PAIN WITHOUT SCIATICA: ICD-10-CM

## 2025-04-07 DIAGNOSIS — M54.50 CHRONIC BILATERAL LOW BACK PAIN WITHOUT SCIATICA: ICD-10-CM

## 2025-04-07 PROCEDURE — G0299 HHS/HOSPICE OF RN EA 15 MIN: HCPCS

## 2025-04-08 ENCOUNTER — OFFICE VISIT (OUTPATIENT)
Dept: FAMILY MEDICINE CLINIC | Facility: CLINIC | Age: 70
End: 2025-04-08
Payer: MEDICARE

## 2025-04-08 VITALS
DIASTOLIC BLOOD PRESSURE: 80 MMHG | HEIGHT: 72 IN | BODY MASS INDEX: 38.36 KG/M2 | OXYGEN SATURATION: 95 % | TEMPERATURE: 96.3 F | HEART RATE: 71 BPM | WEIGHT: 283.2 LBS | SYSTOLIC BLOOD PRESSURE: 163 MMHG

## 2025-04-08 DIAGNOSIS — I50.32 CHRONIC DIASTOLIC CONGESTIVE HEART FAILURE (HCC): ICD-10-CM

## 2025-04-08 DIAGNOSIS — F33.2 SEVERE RECURRENT MAJOR DEPRESSION WITHOUT PSYCHOTIC FEATURES (HCC): ICD-10-CM

## 2025-04-08 DIAGNOSIS — E11.42 TYPE 2 DIABETES MELLITUS WITH DIABETIC POLYNEUROPATHY, WITHOUT LONG-TERM CURRENT USE OF INSULIN (HCC): Primary | ICD-10-CM

## 2025-04-08 DIAGNOSIS — Z00.00 ENCOUNTER FOR MEDICARE ANNUAL WELLNESS EXAM: ICD-10-CM

## 2025-04-08 DIAGNOSIS — Z12.11 COLON CANCER SCREENING: ICD-10-CM

## 2025-04-08 DIAGNOSIS — I10 ESSENTIAL HYPERTENSION, BENIGN: ICD-10-CM

## 2025-04-08 PROCEDURE — G2211 COMPLEX E/M VISIT ADD ON: HCPCS | Performed by: FAMILY MEDICINE

## 2025-04-08 PROCEDURE — G0439 PPPS, SUBSEQ VISIT: HCPCS | Performed by: FAMILY MEDICINE

## 2025-04-08 PROCEDURE — 99214 OFFICE O/P EST MOD 30 MIN: CPT | Performed by: FAMILY MEDICINE

## 2025-04-08 RX ORDER — LISINOPRIL 10 MG/1
10 TABLET ORAL DAILY
Qty: 100 TABLET | Refills: 3 | Status: SHIPPED | OUTPATIENT
Start: 2025-04-08

## 2025-04-08 RX ORDER — FLUTICASONE PROPIONATE 50 MCG
2 SPRAY, SUSPENSION (ML) NASAL DAILY
COMMUNITY

## 2025-04-08 RX ORDER — CETIRIZINE HYDROCHLORIDE 10 MG/1
10 TABLET ORAL DAILY
COMMUNITY

## 2025-04-08 RX ORDER — GLIPIZIDE 10 MG/1
10 TABLET ORAL
Qty: 180 TABLET | Refills: 1 | Status: SHIPPED | OUTPATIENT
Start: 2025-04-08

## 2025-04-08 RX ORDER — METHOCARBAMOL 750 MG/1
750 TABLET, FILM COATED ORAL 3 TIMES DAILY
Qty: 90 TABLET | Refills: 1 | Status: SHIPPED | OUTPATIENT
Start: 2025-04-08

## 2025-04-08 RX ORDER — AMLODIPINE BESYLATE 5 MG/1
5 TABLET ORAL DAILY
Qty: 100 TABLET | Refills: 1 | Status: SHIPPED | OUTPATIENT
Start: 2025-04-08

## 2025-04-08 NOTE — ASSESSMENT & PLAN NOTE
Depression Screening Follow-up Plan: Patient's depression screening was positive with a PHQ-9 score of 14. Patient with underlying depression and was advised to continue current medications as prescribed.  I did not want to make too many medication changes all at 1 time.  As such, going to have the patient continue bupropion  mg once a day.  I am considering increasing the dose of this to 300 mg daily once the patient's medical problems are stabilized.

## 2025-04-08 NOTE — PATIENT INSTRUCTIONS
Medicare Preventive Visit Patient Instructions  Thank you for completing your Welcome to Medicare Visit or Medicare Annual Wellness Visit today. Your next wellness visit will be due in one year (4/9/2026).  The screening/preventive services that you may require over the next 5-10 years are detailed below. Some tests may not apply to you based off risk factors and/or age. Screening tests ordered at today's visit but not completed yet may show as past due. Also, please note that scanned in results may not display below.  Preventive Screenings:  Service Recommendations Previous Testing/Comments   Colorectal Cancer Screening  Colonoscopy    Fecal Occult Blood Test (FOBT)/Fecal Immunochemical Test (FIT)  Fecal DNA/Cologuard Test  Flexible Sigmoidoscopy Age: 45-75 years old   Colonoscopy: every 10 years (May be performed more frequently if at higher risk)  OR  FOBT/FIT: every 1 year  OR  Cologuard: every 3 years  OR  Sigmoidoscopy: every 5 years  Screening may be recommended earlier than age 45 if at higher risk for colorectal cancer. Also, an individualized decision between you and your healthcare provider will decide whether screening between the ages of 76-85 would be appropriate. Colonoscopy: 09/24/2013  FOBT/FIT: 04/03/2024  Cologuard: Not on file  Sigmoidoscopy: Not on file          Prostate Cancer Screening Individualized decision between patient and health care provider in men between ages of 55-69   Medicare will cover every 12 months beginning on the day after your 50th birthday PSA: 19.056 ng/mL     Screening Current     Hepatitis C Screening Once for adults born between 1945 and 1965  More frequently in patients at high risk for Hepatitis C Hep C Antibody: 10/15/2019    Screening Current   Diabetes Screening 1-2 times per year if you're at risk for diabetes or have pre-diabetes Fasting glucose: 266 mg/dL (3/13/2025)  A1C: 12.7 % (3/13/2025)  Screening Not Indicated  History Diabetes   Cholesterol Screening  Once every 5 years if you don't have a lipid disorder. May order more often based on risk factors. Lipid panel: 03/13/2025  Screening Not Indicated  History Lipid Disorder      Other Preventive Screenings Covered by Medicare:  Abdominal Aortic Aneurysm (AAA) Screening: covered once if your at risk. You're considered to be at risk if you have a family history of AAA or a male between the age of 65-75 who smoking at least 100 cigarettes in your lifetime.  Lung Cancer Screening: covers low dose CT scan once per year if you meet all of the following conditions: (1) Age 55-77; (2) No signs or symptoms of lung cancer; (3) Current smoker or have quit smoking within the last 15 years; (4) You have a tobacco smoking history of at least 20 pack years (packs per day x number of years you smoked); (5) You get a written order from a healthcare provider.  Glaucoma Screening: covered annually if you're considered high risk: (1) You have diabetes OR (2) Family history of glaucoma OR (3)  aged 50 and older OR (4)  American aged 65 and older  Osteoporosis Screening: covered every 2 years if you meet one of the following conditions: (1) Have a vertebral abnormality; (2) On glucocorticoid therapy for more than 3 months; (3) Have primary hyperparathyroidism; (4) On osteoporosis medications and need to assess response to drug therapy.  HIV Screening: covered annually if you're between the age of 15-65. Also covered annually if you are younger than 15 and older than 65 with risk factors for HIV infection. For pregnant patients, it is covered up to 3 times per pregnancy.    Immunizations:  Immunization Recommendations   Influenza Vaccine Annual influenza vaccination during flu season is recommended for all persons aged >= 6 months who do not have contraindications   Pneumococcal Vaccine   * Pneumococcal conjugate vaccine = PCV13 (Prevnar 13), PCV15 (Vaxneuvance), PCV20 (Prevnar 20)  * Pneumococcal polysaccharide  vaccine = PPSV23 (Pneumovax) Adults 19-65 yo with certain risk factors or if 65+ yo  If never received any pneumonia vaccine: recommend Prevnar 20 (PCV20)  Give PCV20 if previously received 1 dose of PCV13 or PPSV23   Hepatitis B Vaccine 3 dose series if at intermediate or high risk (ex: diabetes, end stage renal disease, liver disease)   Respiratory syncytial virus (RSV) Vaccine - COVERED BY MEDICARE PART D  * RSVPreF3 (Arexvy) CDC recommends that adults 60 years of age and older may receive a single dose of RSV vaccine using shared clinical decision-making (SCDM)   Tetanus (Td) Vaccine - COST NOT COVERED BY MEDICARE PART B Following completion of primary series, a booster dose should be given every 10 years to maintain immunity against tetanus. Td may also be given as tetanus wound prophylaxis.   Tdap Vaccine - COST NOT COVERED BY MEDICARE PART B Recommended at least once for all adults. For pregnant patients, recommended with each pregnancy.   Shingles Vaccine (Shingrix) - COST NOT COVERED BY MEDICARE PART B  2 shot series recommended in those 19 years and older who have or will have weakened immune systems or those 50 years and older     Health Maintenance Due:      Topic Date Due   • Colorectal Cancer Screening  04/04/2024   • Hepatitis C Screening  Completed     Immunizations Due:      Topic Date Due   • COVID-19 Vaccine (5 - 2024-25 season) 09/01/2024     Advance Directives   What are advance directives?  Advance directives are legal documents that state your wishes and plans for medical care. These plans are made ahead of time in case you lose your ability to make decisions for yourself. Advance directives can apply to any medical decision, such as the treatments you want, and if you want to donate organs.   What are the types of advance directives?  There are many types of advance directives, and each state has rules about how to use them. You may choose a combination of any of the following:  Living will:   This is a written record of the treatment you want. You can also choose which treatments you do not want, which to limit, and which to stop at a certain time. This includes surgery, medicine, IV fluid, and tube feedings.   Durable power of  for healthcare (DPAHC):  This is a written record that states who you want to make healthcare choices for you when you are unable to make them for yourself. This person, called a proxy, is usually a family member or a friend. You may choose more than 1 proxy.  Do not resuscitate (DNR) order:  A DNR order is used in case your heart stops beating or you stop breathing. It is a request not to have certain forms of treatment, such as CPR. A DNR order may be included in other types of advance directives.  Medical directive:  This covers the care that you want if you are in a coma, near death, or unable to make decisions for yourself. You can list the treatments you want for each condition. Treatment may include pain medicine, surgery, blood transfusions, dialysis, IV or tube feedings, and a ventilator (breathing machine).  Values history:  This document has questions about your views, beliefs, and how you feel and think about life. This information can help others choose the care that you would choose.  Why are advance directives important?  An advance directive helps you control your care. Although spoken wishes may be used, it is better to have your wishes written down. Spoken wishes can be misunderstood, or not followed. Treatments may be given even if you do not want them. An advance directive may make it easier for your family to make difficult choices about your care.   Fall Prevention    Fall prevention  includes ways to make your home and other areas safer. It also includes ways you can move more carefully to prevent a fall. Health conditions that cause changes in your blood pressure, vision, or muscle strength and coordination may increase your risk for falls.  Medicines may also increase your risk for falls if they make you dizzy, weak, or sleepy.   Fall prevention tips:   Stand or sit up slowly.    Use assistive devices as directed.    Wear shoes that fit well and have soles that .    Wear a personal alarm.    Stay active.    Manage your medical conditions.    Home Safety Tips:  Add items to prevent falls in the bathroom.    Keep paths clear.    Install bright lights in your home.    Keep items you use often on shelves within reach.    Paint or place reflective tape on the edges of your stairs.    Weight Management   Why it is important to manage your weight:  Being overweight increases your risk of health conditions such as heart disease, high blood pressure, type 2 diabetes, and certain types of cancer. It can also increase your risk for osteoarthritis, sleep apnea, and other respiratory problems. Aim for a slow, steady weight loss. Even a small amount of weight loss can lower your risk of health problems.  How to lose weight safely:  A safe and healthy way to lose weight is to eat fewer calories and get regular exercise. You can lose up about 1 pound a week by decreasing the number of calories you eat by 500 calories each day.   Healthy meal plan for weight management:  A healthy meal plan includes a variety of foods, contains fewer calories, and helps you stay healthy. A healthy meal plan includes the following:  Eat whole-grain foods more often.  A healthy meal plan should contain fiber. Fiber is the part of grains, fruits, and vegetables that is not broken down by your body. Whole-grain foods are healthy and provide extra fiber in your diet. Some examples of whole-grain foods are whole-wheat breads and pastas, oatmeal, brown rice, and bulgur.  Eat a variety of vegetables every day.  Include dark, leafy greens such as spinach, kale, mello greens, and mustard greens. Eat yellow and orange vegetables such as carrots, sweet potatoes, and winter squash.   Eat a  variety of fruits every day.  Choose fresh or canned fruit (canned in its own juice or light syrup) instead of juice. Fruit juice has very little or no fiber.  Eat low-fat dairy foods.  Drink fat-free (skim) milk or 1% milk. Eat fat-free yogurt and low-fat cottage cheese. Try low-fat cheeses such as mozzarella and other reduced-fat cheeses.  Choose meat and other protein foods that are low in fat.  Choose beans or other legumes such as split peas or lentils. Choose fish, skinless poultry (chicken or turkey), or lean cuts of red meat (beef or pork). Before you cook meat or poultry, cut off any visible fat.   Use less fat and oil.  Try baking foods instead of frying them. Add less fat, such as margarine, sour cream, regular salad dressing and mayonnaise to foods. Eat fewer high-fat foods. Some examples of high-fat foods include french fries, doughnuts, ice cream, and cakes.  Eat fewer sweets.  Limit foods and drinks that are high in sugar. This includes candy, cookies, regular soda, and sweetened drinks.  Exercise:  Exercise at least 30 minutes per day on most days of the week. Some examples of exercise include walking, biking, dancing, and swimming. You can also fit in more physical activity by taking the stairs instead of the elevator or parking farther away from stores. Ask your healthcare provider about the best exercise plan for you.      © Copyright [employer] 2018 Information is for End User's use only and may not be sold, redistributed or otherwise used for commercial purposes. All illustrations and images included in CareNotes® are the copyrighted property of [employer] Inc[employer] or [employer]

## 2025-04-08 NOTE — PROGRESS NOTES
Name: Bryant Bustamante Jr.      : 1955      MRN: [id number]  Encounter Provider: Bryant Holloway DO  Encounter Date: 2025   Encounter department[facility]  :  Assessment & Plan  Type 2 diabetes mellitus with diabetic polyneuropathy, without long-term current use of insulin (HCC)  Patient has type 2 diabetes mellitus which is poorly controlled.  I reviewed the patient's Accu-Cheks which were all quite high.  I increased his glipizide to 10 mg twice daily.  Continue metformin 500 mg twice daily.  I also placed a referral to see the certified diabetic educator.  If the patient will see the diabetic educator, I think this will certainly help in improving his glycemic control.  I asked the patient to continue to check his blood sugars and to bring his glucometer with him when he returns.  I would like to review his Accu-Cheks.  Lab Results   Component Value Date    HGBA1C 12.7 (H) 2025       Orders:    glipiZIDE (GLUCOTROL) 10 mg tablet; Take 1 tablet (10 mg total) by mouth 2 (two) times a day before meals    Ambulatory referral to Diabetic Education - use to refer for diabetes group classes, individual diabetes education, medical nutrition therapy, device training; Future    Essential hypertension, benign  I checked the patient's blood pressure today.  I found his blood pressure to be elevated.  Blood pressure was 160/80.  I am going to start him back on a low-dose lisinopril.  He was started on 10 mg daily.  Continue amlodipine 5 mg daily.  I do not want to increase the dose of his amlodipine as I fear he will have worsening lower extremity edema.  I am going to schedule a follow-up visit in 1 month to reassess his blood pressure as well as his blood sugars.  Orders:    lisinopril (ZESTRIL) 10 mg tablet; Take 1 tablet (10 mg total) by mouth daily    Severe recurrent major depression without psychotic features (HCC)  Depression Screening Follow-up Plan: Patient's depression screening  was positive with a PHQ-9 score of 14. Patient with underlying depression and was advised to continue current medications as prescribed.  I did not want to make too many medication changes all at 1 time.  As such, going to have the patient continue bupropion  mg once a day.  I am considering increasing the dose of this to 300 mg daily once the patient's medical problems are stabilized.         Chronic diastolic congestive heart failure (HCC)  Patient has chronic diastolic congestive heart failure.  On exam, he appears to be euvolemic.  Patient is currently on 40 mg of Lasix daily.  I will have the patient continue this dosage.  Orders:    amLODIPine (NORVASC) 5 mg tablet; Take 1 tablet (5 mg total) by mouth daily    Colon cancer screening    Orders:    Occult Blood, Fecal, IA; Future    Encounter for Medicare annual wellness exam           Depression Screening and Follow-up Plan: Patient's depression screening was positive with a PHQ-9 score of 14.   Patient with underlying depression and was advised to continue current medications as prescribed.     Falls Plan of Care: balance, strength, and gait training instructions were provided.       Preventive health issues were discussed with patient, and age appropriate screening tests were ordered as noted in patient's After Visit Summary. Personalized health advice and appropriate referrals for health education or preventive services given if needed, as noted in patient's After Visit Summary.    History of Present Illness     This is a 69-year-old white male who presents to the office today for his routine checkup as well as for his annual Medicare wellness exam.  The patient tells me he is seeing Dr. Rock for wound care.  He is also being treated by home health care.  He tells me his legs are currently wrapped and he is unable to do his foot exam.  He complains of the cost of his wound care treatments.  He tells me that he is agitated all the time and that  everything bothers him.  He has been compliant with his medication.  He has not had any further shortness of breath.  He tells me he has an upcoming appointment to see nephrology.  He tells me his blood sugars are still running high.  He did bring his glucometer with him for me to review his blood sugars.  He tells me he cannot seem to get them down.  He is not following a diabetic diet and upon questioning, really does not know what he supposed to be doing.       Patient Care Team:  Bryant Holloway DO as PCP - General (Family Medicine)  David Nova DO (Nephrology)    Review of Systems   Respiratory:  Negative for cough and shortness of breath.    Cardiovascular:  Negative for chest pain, palpitations and leg swelling.   Gastrointestinal:  Negative for abdominal distention, abdominal pain, blood in stool, constipation, diarrhea and nausea.   Skin:  Positive for wound.     Medical History Reviewed by provider this encounter:       Annual Wellness Visit Questionnaire   Bryant is here for his Subsequent Wellness visit. Last Medicare Wellness visit information reviewed, patient interviewed and updates made to the record today.      Health Risk Assessment:   Patient rates overall health as poor. Patient feels that their physical health rating is slightly worse. Patient is satisfied with their life. Eyesight was rated as slightly worse. Hearing was rated as slightly worse. Patient feels that their emotional and mental health rating is much worse. Patients states they are sometimes angry. Patient states they are often unusually tired/fatigued. Pain experienced in the last 7 days has been some. Patient's pain rating has been 8/10. Patient states that he has experienced no weight loss or gain in last 6 months.     Depression Screening:   PHQ-9 Score: 14      Fall Risk Screening:   In the past year, patient has experienced: history of falling in past year    Number of falls: 2 or more  Injured during fall?: No    Feels  unsteady when standing or walking?: Yes    Worried about falling?: Yes      Home Safety:  Patient has trouble with stairs inside or outside of their home. Patient has working smoke alarms and has working carbon monoxide detector. Home safety hazards include: none.     Nutrition:   Current diet is Regular. Advised to follow a low sodium diabetic diet    Medications:   Patient is currently taking over-the-counter supplements. OTC medications include: see medication list. Patient is able to manage medications.     Activities of Daily Living (ADLs)/Instrumental Activities of Daily Living (IADLs):   Walk and transfer into and out of bed and chair?: Yes  Dress and groom yourself?: Yes    Bathe or shower yourself?: Yes    Feed yourself? Yes  Do your laundry/housekeeping?: Yes  Manage your money, pay your bills and track your expenses?: Yes  Make your own meals?: Yes    Do your own shopping?: Yes    Previous Hospitalizations:   Any hospitalizations or ED visits within the last 12 months?: Yes    How many hospitalizations have you had in the last year?: 1-2    Advance Care Planning:   Living will: No    Durable POA for healthcare: No    Advanced directive: No      Cognitive Screening:   Provider or family/friend/caregiver concerned regarding cognition?: No    Preventive Screenings      Cardiovascular Screening:    General: Screening Not Indicated and History Lipid Disorder      Diabetes Screening:     General: Screening Not Indicated and History Diabetes      Colorectal Cancer Screening:       Due for: FOBT/FIT      Prostate Cancer Screening:    General: Screening Current      Osteoporosis Screening:    General: Screening Not Indicated      Abdominal Aortic Aneurysm (AAA) Screening:    Risk factors include: age between 65-74 yo and tobacco use      Due for: Screening AAA Ultrasound      Lung Cancer Screening:     General: Screening Not Indicated      Hepatitis C Screening:    General: Screening Current    Immunizations:  -  Immunizations due: Zoster (Shingrix)    Screening, Brief Intervention, and Referral to Treatment (SBIRT)     Screening  Typical number of drinks in a day: 0  Typical number of drinks in a week: 0  Interpretation: Low risk drinking behavior.    AUDIT-C Screenin) How often did you have a drink containing alcohol in the past year? never  2) How many drinks did you have on a typical day when you were drinking in the past year? 0  3) How often did you have 6 or more drinks on one occasion in the past year? never    AUDIT-C Score: 0  Interpretation: Score 0-3 (male): Negative screen for alcohol misuse    Single Item Drug Screening:  How often have you used an illegal drug (including marijuana) or a prescription medication for non-medical reasons in the past year? never    Single Item Drug Screen Score: 0  Interpretation: Negative screen for possible drug use disorder    Social Drivers of Health     Financial Resource Strain: Low Risk  (10/25/2022)    Overall Financial Resource Strain (CARDIA)     Difficulty of Paying Living Expenses: Not hard at all   Food Insecurity: No Food Insecurity (2025)    Hunger Vital Sign     Worried About Running Out of Food in the Last Year: Never true     Ran Out of Food in the Last Year: Never true   Transportation Needs: No Transportation Needs (2025)    PRAPARE - Transportation     Lack of Transportation (Medical): No     Lack of Transportation (Non-Medical): No   Housing Stability: Low Risk  (2025)    Housing Stability Vital Sign     Unable to Pay for Housing in the Last Year: No     Number of Times Moved in the Last Year: 0     Homeless in the Last Year: No   Utilities: Not At Risk (2025)    [facility] Utilities     Threatened with loss of utilities: No     No results found.    Objective   /80   Pulse 71   Temp (!) 96.3 °F (35.7 °C)   Ht 6' (1.829 m)   Wt 128 kg (283 lb 3.2 oz)   SpO2 95%   BMI 38.41 kg/m²     Physical Exam  Vitals reviewed.   Constitutional:        Comments: Patient is a 69-year-old white male who appears his stated age.  Patient is nonseptic in appearance and in no apparent distress   HENT:      Head: Normocephalic and atraumatic.      Right Ear: Tympanic membrane, ear canal and external ear normal. There is no impacted cerumen.      Left Ear: Tympanic membrane, ear canal and external ear normal. There is no impacted cerumen.      Mouth/Throat:      Mouth: Mucous membranes are moist.      Pharynx: Oropharynx is clear. No oropharyngeal exudate or posterior oropharyngeal erythema.   Eyes:      General: No scleral icterus.        Right eye: No discharge.         Left eye: No discharge.      Conjunctiva/sclera: Conjunctivae normal.      Pupils: Pupils are equal, round, and reactive to light.   Cardiovascular:      Rate and Rhythm: Normal rate and regular rhythm.      Heart sounds: Normal heart sounds. No murmur heard.     No friction rub. No gallop.   Pulmonary:      Effort: Pulmonary effort is normal. No respiratory distress.      Breath sounds: Normal breath sounds. No stridor. No wheezing, rhonchi or rales.   Musculoskeletal:      Cervical back: Neck supple.   Lymphadenopathy:      Cervical: No cervical adenopathy.   Psychiatric:      Comments: Patient again has a blunted affect and appears to be depressed.     Extremities: Without cyanosis or clubbing of the digits of the hands.  Both legs were wrapped.  It appears she still has edema present in the left ankle.

## 2025-04-08 NOTE — ASSESSMENT & PLAN NOTE
Patient has type 2 diabetes mellitus which is poorly controlled.  I reviewed the patient's Accu-Cheks which were all quite high.  I increased his glipizide to 10 mg twice daily.  Continue metformin 500 mg twice daily.  I also placed a referral to see the certified diabetic educator.  If the patient will see the diabetic educator, I think this will certainly help in improving his glycemic control.  I asked the patient to continue to check his blood sugars and to bring his glucometer with him when he returns.  I would like to review his Accu-Cheks.  Lab Results   Component Value Date    HGBA1C 12.7 (H) 03/13/2025       Orders:    glipiZIDE (GLUCOTROL) 10 mg tablet; Take 1 tablet (10 mg total) by mouth 2 (two) times a day before meals    Ambulatory referral to Diabetic Education - use to refer for diabetes group classes, individual diabetes education, medical nutrition therapy, device training; Future

## 2025-04-08 NOTE — ASSESSMENT & PLAN NOTE
I checked the patient's blood pressure today.  I found his blood pressure to be elevated.  Blood pressure was 160/80.  I am going to start him back on a low-dose lisinopril.  He was started on 10 mg daily.  Continue amlodipine 5 mg daily.  I do not want to increase the dose of his amlodipine as I fear he will have worsening lower extremity edema.  I am going to schedule a follow-up visit in 1 month to reassess his blood pressure as well as his blood sugars.  Orders:    lisinopril (ZESTRIL) 10 mg tablet; Take 1 tablet (10 mg total) by mouth daily

## 2025-04-08 NOTE — ASSESSMENT & PLAN NOTE
Patient has chronic diastolic congestive heart failure.  On exam, he appears to be euvolemic.  Patient is currently on 40 mg of Lasix daily.  I will have the patient continue this dosage.  Orders:    amLODIPine (NORVASC) 5 mg tablet; Take 1 tablet (5 mg total) by mouth daily

## 2025-04-09 ENCOUNTER — APPOINTMENT (OUTPATIENT)
Dept: NON INVASIVE DIAGNOSTICS | Facility: HOSPITAL | Age: 70
End: 2025-04-09
Attending: SURGERY
Payer: MEDICARE

## 2025-04-09 ENCOUNTER — HOSPITAL ENCOUNTER (OUTPATIENT)
Dept: NON INVASIVE DIAGNOSTICS | Facility: HOSPITAL | Age: 70
Discharge: HOME | End: 2025-04-09
Attending: SURGERY
Payer: MEDICARE

## 2025-04-09 DIAGNOSIS — I83.892 VENOUS STASIS ULCER OF LEFT LOWER LEG WITH EDEMA OF LEFT LOWER LEG  (HCC): ICD-10-CM

## 2025-04-09 DIAGNOSIS — I83.029 VENOUS STASIS ULCER OF LEFT LOWER LEG WITH EDEMA OF LEFT LOWER LEG  (HCC): ICD-10-CM

## 2025-04-09 DIAGNOSIS — L97.929 VENOUS STASIS ULCER OF LEFT LOWER LEG WITH EDEMA OF LEFT LOWER LEG  (HCC): ICD-10-CM

## 2025-04-09 DIAGNOSIS — R60.0 VENOUS STASIS ULCER OF LEFT LOWER LEG WITH EDEMA OF LEFT LOWER LEG  (HCC): ICD-10-CM

## 2025-04-09 PROCEDURE — 93923 UPR/LXTR ART STDY 3+ LVLS: CPT

## 2025-04-09 PROCEDURE — 93922 UPR/L XTREMITY ART 2 LEVELS: CPT | Performed by: SURGERY

## 2025-04-09 PROCEDURE — 93925 LOWER EXTREMITY STUDY: CPT | Performed by: SURGERY

## 2025-04-09 PROCEDURE — 93925 LOWER EXTREMITY STUDY: CPT

## 2025-04-10 ENCOUNTER — RESULTS FOLLOW-UP (OUTPATIENT)
Dept: SURGERY | Facility: CLINIC | Age: 70
End: 2025-04-10

## 2025-04-10 ENCOUNTER — OFFICE VISIT (OUTPATIENT)
Dept: WOUND CARE | Facility: CLINIC | Age: 70
End: 2025-04-10
Payer: MEDICARE

## 2025-04-10 VITALS
TEMPERATURE: 96.7 F | HEART RATE: 76 BPM | SYSTOLIC BLOOD PRESSURE: 140 MMHG | RESPIRATION RATE: 18 BRPM | DIASTOLIC BLOOD PRESSURE: 78 MMHG

## 2025-04-10 DIAGNOSIS — R60.0 VENOUS STASIS ULCER OF RIGHT LOWER LEG WITH EDEMA OF RIGHT LOWER LEG  (HCC): ICD-10-CM

## 2025-04-10 DIAGNOSIS — I83.891 VENOUS STASIS ULCER OF RIGHT LOWER LEG WITH EDEMA OF RIGHT LOWER LEG  (HCC): ICD-10-CM

## 2025-04-10 DIAGNOSIS — L97.919 VENOUS STASIS ULCER OF RIGHT LOWER LEG WITH EDEMA OF RIGHT LOWER LEG  (HCC): ICD-10-CM

## 2025-04-10 DIAGNOSIS — R60.0 VENOUS STASIS ULCER OF LEFT LOWER LEG WITH EDEMA OF LEFT LOWER LEG  (HCC): Primary | ICD-10-CM

## 2025-04-10 DIAGNOSIS — I83.892 VENOUS STASIS ULCER OF LEFT LOWER LEG WITH EDEMA OF LEFT LOWER LEG  (HCC): Primary | ICD-10-CM

## 2025-04-10 DIAGNOSIS — L97.929 VENOUS STASIS ULCER OF LEFT LOWER LEG WITH EDEMA OF LEFT LOWER LEG  (HCC): Primary | ICD-10-CM

## 2025-04-10 DIAGNOSIS — I83.019 VENOUS STASIS ULCER OF RIGHT LOWER LEG WITH EDEMA OF RIGHT LOWER LEG  (HCC): ICD-10-CM

## 2025-04-10 DIAGNOSIS — I10 ESSENTIAL HYPERTENSION, BENIGN: ICD-10-CM

## 2025-04-10 DIAGNOSIS — I83.029 VENOUS STASIS ULCER OF LEFT LOWER LEG WITH EDEMA OF LEFT LOWER LEG  (HCC): Primary | ICD-10-CM

## 2025-04-10 PROCEDURE — 29580 STRAPPING UNNA BOOT: CPT

## 2025-04-10 RX ORDER — LIDOCAINE HYDROCHLORIDE 40 MG/ML
5 SOLUTION TOPICAL ONCE
Status: COMPLETED | OUTPATIENT
Start: 2025-04-10 | End: 2025-04-10

## 2025-04-10 RX ADMIN — LIDOCAINE HYDROCHLORIDE 5 ML: 40 SOLUTION TOPICAL at 10:49

## 2025-04-10 NOTE — PROGRESS NOTES
Wound Procedure Treatment    Performed by: Genevieve Ruiz RN  Authorized by: Pavan Rock DO  Associated wounds:   Wound 03/11/25 Venous Ulcer Other (Comments) Leg Lower;Right  Wound 03/11/25 Venous Ulcer Other (Comments) Leg Left;Lower    Wound cleansed with:  Soap and water   Applied to periwound:  Ammonium lactate   Applied primary dressing:  Acticoat   Applied secondary dressing:  ABD   Dressing secured with:  Tubifast   Comments:  Acticoat 3 to wound bases    Cast Application    Date/Time: 4/10/2025 11:00 AM    Performed by: Genevieve Ruiz RN  Authorized by: Pavan Rock DO    Other Assisting Provider: Yes (comment) (Morelia MORRIS)    Verbal consent obtained?: Yes    Risks and benefits: Risks, benefits and alternatives were discussed    Consent given by:  Patient  Patient states understanding of procedure being performed: Yes    Patient's understanding of procedure matches consent: Yes    Procedure consent matches procedure scheduled: Yes    Patient identity confirmed:  Verbally with patient  Pre-procedure details:     Sensation:  Normal    Skin color:  WNL  Procedure details:     Laterality:  Bilateral    Location:  Leg    Leg:  L lower leg and R lower leg    Cast type:  Unna boot    Supplies used: lac hydrin, acticoat 3, ABD, unna boot, tubifast.  Post-procedure details:     Pain:  Unchanged    Sensation:  Normal    Skin color:  WNL    Patient tolerance of procedure:  Tolerated well, no immediate complications  Comments:      UNNA BOOT wrap procedure     Before application, ROSS and/or TBI determined to be adequate for healing and application of compression. Lower extremity washed prior to application of compression wrap. With the foot in dorsiflexed position, Unna boot was applied as per physician orders without complications or complaint of pain.  The procedure was tolerated well. Toes warm & pink post application.  Patient provided education & reinforced to observe toes for any discoloration, swelling or  tingling and instructed when to report to the Wound Center or to remove compression. Wound care as per providers orders, patient tolerated well.

## 2025-04-10 NOTE — PATIENT INSTRUCTIONS
Orders Placed This Encounter   Procedures    Wound cleansing and dressings     Wound cleansing and dressings Venous Ulcer Lower;Right Leg    Wound cleansing and dressings                               Wash your hands with soap and water.  Remove old dressing, discard into plastic bag and place in trash.  Cleanse the wound with mild soap and water prior to applying a clean dressing. Do not use tissue or cotton balls. Do not scrub the wound. Pat dry using gauze.     Shower no do not get dressings wet  may purchase and use cast cover on [employer]        Apple Lac Hydrin to dry scaly periwound   Apply acticoat 3 to only the open areas on bilateral lower extremity.  Cover with ABD   Secure with tasha and tape  Apply unna boot   Change dressing 2 times a week        Unna Boot compression wrap Instructions     Keep compression wrap/wraps clean and dry. If wraps are too tight and you experience numbness/tingling, call the wound center. If after hours, remove wraps or proceed to nearest E.R. and call wound center in AM.  Wrap will be changed 2x weekly home care to complete dressing changes between visits   Avoid prolonged standing in one place.  Elevate leg(s) above the level of the heart when sitting or as much as possible.      If you develop fever, chills, nausea or vomiting, increase in drainage or foul smelling drainage go to the closest emergency department for evaluation.       Please try to consume 3-4 servings of protein (30g) each day.   - Each serving of protein should contain about 30 mg protein.   - Good sources of protein include, lean meats (fish, chicken, etc), eggs, dairy products (yogurt, cheese), tofu, legumes (chickpeas, lentils, peas, black beans), nuts (cashew, walnut, peanuts, etc), & quinoa.        Follow up 1 week     Standing Status:   Future     Expiration Date:   4/17/2025

## 2025-04-11 ENCOUNTER — HOME CARE VISIT (OUTPATIENT)
Dept: HOME HEALTH SERVICES | Facility: HOME HEALTHCARE | Age: 70
End: 2025-04-11

## 2025-04-11 NOTE — ASSESSMENT & PLAN NOTE
Right lower extremity venous stasis with associated ulcerations overall improved almost completely healed.  Significant amount of skales noted.  Skales trimmed.  Continue with the current compression wrap treatment with Unna boot and Acticoat.  Also will apply Left Lac-Hydrin to the periwound and scales.  Apply Lac-Hydrin during wrap changes.  Follow-up 1 week    Orders:    Wound cleansing and dressings; Future    lidocaine (XYLOCAINE) 4 % topical solution 5 mL    Wound Procedure Treatment    Cast Application

## 2025-04-11 NOTE — ASSESSMENT & PLAN NOTE
Left lower extremity venous stasis with associated ulcerations overall improved almost completely healed.  Significant amount of skales noted.  Skales trimmed.  Continue with the current compression wrap treatment with Unna boot and Acticoat.  Also will apply Lac-Hydrin to the periwound and scales.  Apply Lac-Hydrin during wrap changes.  Follow-up 1 week    Orders:    Wound cleansing and dressings; Future    lidocaine (XYLOCAINE) 4 % topical solution 5 mL    Wound Procedure Treatment    Cast Application

## 2025-04-11 NOTE — PROGRESS NOTES
Name: Bryant Bustamante Jr.      : 1955      MRN: [id number]  Encounter Provider: Pavan Rock DO  Encounter Date: 4/10/2025   Encounter department: [facility]  :  Assessment & Plan  Venous stasis ulcer of left lower leg with edema of left lower leg  (HCC)  Left lower extremity venous stasis with associated ulcerations overall improved almost completely healed.  Significant amount of skales noted.  Skales trimmed.  Continue with the current compression wrap treatment with Unna boot and Acticoat.  Also will apply Lac-Hydrin to the periwound and scales.  Apply Lac-Hydrin during wrap changes.  Follow-up 1 week    Orders:    Wound cleansing and dressings; Future    lidocaine (XYLOCAINE) 4 % topical solution 5 mL    Wound Procedure Treatment    Cast Application    Venous stasis ulcer of right lower leg with edema of right lower leg  (HCC)  Right lower extremity venous stasis with associated ulcerations overall improved almost completely healed.  Significant amount of skales noted.  Skales trimmed.  Continue with the current compression wrap treatment with Unna boot and Acticoat.  Also will apply Left Lac-Hydrin to the periwound and scales.  Apply Lac-Hydrin during wrap changes.  Follow-up 1 week    Orders:    Wound cleansing and dressings; Future    lidocaine (XYLOCAINE) 4 % topical solution 5 mL    Wound Procedure Treatment    Cast Application    Essential hypertension, benign  Pressure evaluate today's office visit.  Appears stable.  Improved from his recent PCP visit.  Patient was restarted back on low-dose lisinopril.  Continue current medications as per PCP.         Notes:    Recent PCP office visit of the dated 2025 was personally reviewed by me.    History of Present Illness   Chief Complaint   Patient presents with    Follow Up Wound Care Visit   Here for wound follow up.  69-year-old gentleman with bilateral lower extremity venous stasis disease and ulceration presents  today for further follow-up and treatment.  Currently undergoing bilateral Unna boot therapy.  Doing well.  Drainage controlled.  No new complaints.      Objective   /78   Pulse 76   Temp (!) 96.7 °F (35.9 °C)   Resp 18     Physical Exam  Vitals reviewed. Exam conducted with a chaperone present.   Constitutional:       General: He is not in acute distress.     Appearance: Normal appearance. He is not ill-appearing, toxic-appearing or diaphoretic.   HENT:      Head: Normocephalic and atraumatic.      Right Ear: External ear normal.      Left Ear: External ear normal.   Eyes:      General: No scleral icterus.        Right eye: No discharge.         Left eye: No discharge.   Cardiovascular:      Rate and Rhythm: Normal rate.   Pulmonary:      Effort: Pulmonary effort is normal. No respiratory distress.   Musculoskeletal:         General: Swelling present.      Cervical back: Normal range of motion.      Right lower leg: Edema present.      Left lower leg: Edema present.   Skin:     General: Skin is warm and dry.      Coloration: Skin is not jaundiced or pale.      Findings: No bruising or erythema.   Neurological:      General: No focal deficit present.      Mental Status: He is alert and oriented to person, place, and time.      Cranial Nerves: No cranial nerve deficit.   Psychiatric:         Mood and Affect: Mood normal.         Behavior: Behavior normal.         Thought Content: Thought content normal.         Judgment: Judgment normal.       Wound 03/11/25 Venous Ulcer Other (Comments) Leg Lower;Right (Active)   Wound Image   04/10/25 1045   Wound Description Epithelialization;Pink;Yellow;Dry;White 04/10/25 1047   Non-staged Wound Description Full thickness 04/03/25 0824   Wound Length (cm) 2 cm 04/10/25 1047   Wound Width (cm) 3.5 cm 04/10/25 1047   Wound Depth (cm) 0.1 cm 04/10/25 1047   Wound Surface Area (cm^2) 7 cm^2 04/10/25 1047   Wound Volume (cm^3) 0.7 cm^3 04/10/25 1047   Calculated Wound Volume  (cm^3) 0.7 cm^3 04/10/25 1047   Change in Wound Size % 96.22 04/10/25 1047   Drainage Amount Moderate 04/10/25 1047   Drainage Description Tan;Brown;Yellow 04/10/25 1047   Kimberlyn-wound Assessment Dry;Scaly;Yellow-brown;White;Callus 04/10/25 1047       Wound 03/11/25 Venous Ulcer Other (Comments) Leg Left;Lower (Active)   Wound Image    04/10/25 1044   Wound Description Epithelialization;Pink;Yellow;White;Dry;Brown 04/10/25 1046   Non-staged Wound Description Full thickness 04/10/25 1046   Wound Length (cm) 13 cm 04/10/25 1046   Wound Width (cm) 2.5 cm 04/10/25 1046   Wound Depth (cm) 0.1 cm 04/10/25 1046   Wound Surface Area (cm^2) 32.5 cm^2 04/10/25 1046   Wound Volume (cm^3) 3.25 cm^3 04/10/25 1046   Calculated Wound Volume (cm^3) 3.25 cm^3 04/10/25 1046   Change in Wound Size % 95.42 04/10/25 1046   Drainage Amount Moderate 04/10/25 1046   Drainage Description Brown;Powers;Yellow 04/10/25 1046   Kimberlyn-wound Assessment Brown;Dry;Scaly;Erythema;Yellow-brown;Callus 04/10/25 1046       Procedures   Results from last 6 Months   Lab Units 03/13/25  1120   WOUND CULTURE  4+ Growth of Pseudomonas aeruginosa*  4+ Growth of Enterococcus faecalis*

## 2025-04-11 NOTE — ASSESSMENT & PLAN NOTE
Pressure evaluate today's office visit.  Appears stable.  Improved from his recent PCP visit.  Patient was restarted back on low-dose lisinopril.  Continue current medications as per PCP.

## 2025-04-14 ENCOUNTER — HOME CARE VISIT (OUTPATIENT)
Dept: HOME HEALTH SERVICES | Facility: HOME HEALTHCARE | Age: 70
End: 2025-04-14
Payer: MEDICARE

## 2025-04-14 DIAGNOSIS — F33.2 SEVERE RECURRENT MAJOR DEPRESSION WITHOUT PSYCHOTIC FEATURES (HCC): ICD-10-CM

## 2025-04-14 PROCEDURE — G0299 HHS/HOSPICE OF RN EA 15 MIN: HCPCS

## 2025-04-15 VITALS
TEMPERATURE: 97.7 F | HEART RATE: 72 BPM | SYSTOLIC BLOOD PRESSURE: 112 MMHG | RESPIRATION RATE: 18 BRPM | OXYGEN SATURATION: 94 % | DIASTOLIC BLOOD PRESSURE: 64 MMHG

## 2025-04-15 DIAGNOSIS — L97.919 VENOUS STASIS ULCER OF RIGHT LOWER LEG WITH EDEMA OF RIGHT LOWER LEG  (HCC): ICD-10-CM

## 2025-04-15 DIAGNOSIS — L97.929 VENOUS STASIS ULCER OF LEFT LOWER LEG WITH EDEMA OF LEFT LOWER LEG  (HCC): Primary | ICD-10-CM

## 2025-04-15 DIAGNOSIS — I83.892 VENOUS STASIS ULCER OF LEFT LOWER LEG WITH EDEMA OF LEFT LOWER LEG  (HCC): Primary | ICD-10-CM

## 2025-04-15 DIAGNOSIS — I83.019 VENOUS STASIS ULCER OF RIGHT LOWER LEG WITH EDEMA OF RIGHT LOWER LEG  (HCC): ICD-10-CM

## 2025-04-15 DIAGNOSIS — R60.0 VENOUS STASIS ULCER OF LEFT LOWER LEG WITH EDEMA OF LEFT LOWER LEG  (HCC): Primary | ICD-10-CM

## 2025-04-15 DIAGNOSIS — I83.891 VENOUS STASIS ULCER OF RIGHT LOWER LEG WITH EDEMA OF RIGHT LOWER LEG  (HCC): ICD-10-CM

## 2025-04-15 DIAGNOSIS — I83.029 VENOUS STASIS ULCER OF LEFT LOWER LEG WITH EDEMA OF LEFT LOWER LEG  (HCC): Primary | ICD-10-CM

## 2025-04-15 DIAGNOSIS — R60.0 VENOUS STASIS ULCER OF RIGHT LOWER LEG WITH EDEMA OF RIGHT LOWER LEG  (HCC): ICD-10-CM

## 2025-04-15 RX ORDER — BUPROPION HYDROCHLORIDE 150 MG/1
150 TABLET ORAL EVERY MORNING
Qty: 90 TABLET | Refills: 1 | Status: SHIPPED | OUTPATIENT
Start: 2025-04-15

## 2025-04-15 RX ORDER — AMMONIUM LACTATE 12 G/100G
LOTION TOPICAL 2 TIMES DAILY PRN
Qty: 225 G | Refills: 2 | Status: SHIPPED | OUTPATIENT
Start: 2025-04-15

## 2025-04-17 ENCOUNTER — OFFICE VISIT (OUTPATIENT)
Dept: WOUND CARE | Facility: CLINIC | Age: 70
End: 2025-04-17
Payer: MEDICARE

## 2025-04-17 ENCOUNTER — HOME CARE VISIT (OUTPATIENT)
Dept: HOME HEALTH SERVICES | Facility: HOME HEALTHCARE | Age: 70
End: 2025-04-17
Payer: MEDICARE

## 2025-04-17 VITALS
DIASTOLIC BLOOD PRESSURE: 62 MMHG | TEMPERATURE: 97 F | HEART RATE: 80 BPM | SYSTOLIC BLOOD PRESSURE: 132 MMHG | RESPIRATION RATE: 18 BRPM

## 2025-04-17 DIAGNOSIS — I83.892 VENOUS STASIS ULCER OF LEFT LOWER LEG WITH EDEMA OF LEFT LOWER LEG  (HCC): Primary | ICD-10-CM

## 2025-04-17 DIAGNOSIS — E11.42 TYPE 2 DIABETES MELLITUS WITH DIABETIC POLYNEUROPATHY, WITHOUT LONG-TERM CURRENT USE OF INSULIN (HCC): ICD-10-CM

## 2025-04-17 DIAGNOSIS — I83.019 VENOUS STASIS ULCER OF RIGHT LOWER LEG WITH EDEMA OF RIGHT LOWER LEG  (HCC): ICD-10-CM

## 2025-04-17 DIAGNOSIS — I83.029 VENOUS STASIS ULCER OF LEFT LOWER LEG WITH EDEMA OF LEFT LOWER LEG  (HCC): Primary | ICD-10-CM

## 2025-04-17 DIAGNOSIS — I83.891 VENOUS STASIS ULCER OF RIGHT LOWER LEG WITH EDEMA OF RIGHT LOWER LEG  (HCC): ICD-10-CM

## 2025-04-17 DIAGNOSIS — R60.0 VENOUS STASIS ULCER OF LEFT LOWER LEG WITH EDEMA OF LEFT LOWER LEG  (HCC): Primary | ICD-10-CM

## 2025-04-17 DIAGNOSIS — L97.929 VENOUS STASIS ULCER OF LEFT LOWER LEG WITH EDEMA OF LEFT LOWER LEG  (HCC): Primary | ICD-10-CM

## 2025-04-17 DIAGNOSIS — R60.0 VENOUS STASIS ULCER OF RIGHT LOWER LEG WITH EDEMA OF RIGHT LOWER LEG  (HCC): ICD-10-CM

## 2025-04-17 DIAGNOSIS — L97.919 VENOUS STASIS ULCER OF RIGHT LOWER LEG WITH EDEMA OF RIGHT LOWER LEG  (HCC): ICD-10-CM

## 2025-04-17 PROCEDURE — 29580 STRAPPING UNNA BOOT: CPT

## 2025-04-17 PROCEDURE — 97597 DBRDMT OPN WND 1ST 20 CM/<: CPT | Performed by: FAMILY MEDICINE

## 2025-04-17 RX ORDER — LIDOCAINE 40 MG/G
CREAM TOPICAL ONCE
Status: COMPLETED | OUTPATIENT
Start: 2025-04-17 | End: 2025-04-17

## 2025-04-17 RX ADMIN — LIDOCAINE: 40 CREAM TOPICAL at 10:09

## 2025-04-17 NOTE — PATIENT INSTRUCTIONS
Orders Placed This Encounter   Procedures    Wound cleansing and dressings     Wound cleansing and dressings Venous Ulcer Lower;Right and left Legs         Wash your hands with soap and water.  Remove old dressing, discard into plastic bag and place in trash.  Cleanse the wound with mild soap and water prior to applying a clean dressing. Do not use tissue or cotton balls. Do not scrub the wound. Pat dry using gauze.     Shower no do not get dressings wet  may purchase and use cast cover on [employer]       Apply Lac Hydrin to dry scaly periwound   Apply acticoat 3 to only the open areas on bilateral lower extremity.  Cover with ABD   Secure with tasha and tape  Apply unna boot   Change dressing 2 times a week      Unna Boot compression wrap Instructions     Keep compression wrap/wraps clean and dry. If wraps are too tight and you experience numbness/tingling, call the wound center. If after hours, remove wraps or proceed to nearest E.R. and call wound center in AM.  Wrap will be changed 2x weekly home care to complete dressing changes between visits   Avoid prolonged standing in one place.  Elevate leg(s) above the level of the heart when sitting or as much as possible.      If you develop fever, chills, nausea or vomiting, increase in drainage or foul smelling drainage go to the closest emergency department for evaluation.       Please try to consume 3-4 servings of protein (30g) each day.   - Each serving of protein should contain about 30 mg protein.   - Good sources of protein include, lean meats (fish, chicken, etc), eggs, dairy products (yogurt, cheese), tofu, legumes (chickpeas, lentils, peas, black beans), nuts (cashew, walnut, peanuts, etc), & quinoa.        Home Health to complete wound care weekly    Follow up 1 week     Standing Status:   Future     Expiration Date:   4/24/2025

## 2025-04-17 NOTE — PROGRESS NOTES
Cast Application    Date/Time: 4/17/2025 9:30 AM    Performed by: Morelia Alonzo RN  Authorized by: Stephanie Dowell MD    Verbal consent obtained?: Yes    Consent given by:  Patient  Patient states understanding of procedure being performed: Yes    Patient's understanding of procedure matches consent: Yes    Procedure consent matches procedure scheduled: Yes    Patient identity confirmed:  Verbally with patient  Pre-procedure details:     Sensation:  Normal    Skin color:  Wnl  Procedure details:     Laterality:  Right    Location:  Leg    Leg:  R lower leg    Cast type:  Unna boot    Splint composition: static      Supplies:  2 layer wrap  Post-procedure details:     Pain:  Improved    Sensation:  Normal    Patient tolerance of procedure:  Tolerated well, no immediate complications  Comments:      UNNA BOOT wrap procedure     Before application, ROSS and/or TBI determined to be adequate for healing and application of compression. Lower extremity washed prior to application of compression wrap. With the foot in dorsiflexed position, unna boot was applied as per physician orders without complications or complaint of pain.  The procedure was tolerated well. Toes warm & pink post application.  Patient provided education & reinforced to observe toes for any discoloration, swelling or tingling and instructed when to report to the Wound Center or to remove compression. Wound care as per providers orders, patient tolerated well.

## 2025-04-17 NOTE — PROGRESS NOTES
Wound Procedure Treatment    Performed by: Morleia Alonzo RN  Authorized by: Florecita Pardo DPM  Associated wounds:   Wound 03/11/25 Venous Ulcer Other (Comments) Ankle Lower;Right;Medial  Wound 03/11/25 Venous Ulcer Other (Comments) Ankle Left;Lower;Medial  Wound 04/17/25 Ankle Left;Lateral    Wound cleansed with:  Soap and water   Applied to periwound:  Ammonium lactate   Applied primary dressing:  Acticoat   Applied secondary dressing:  ABD   Dressing secured with:  Mariaa, Tape and Compression wrap   Comments:  Acticoat 3

## 2025-04-17 NOTE — PROGRESS NOTES
Patient ID: Bryant Bustamante Jr. is a 69 y.o. male Date of Birth 1955       Chief Complaint   Patient presents with   • Follow Up Wound Care Visit     Wound BLE       Allergies:  Naproxen and Prednisone    Diagnosis:      Diagnosis ICD-10-CM Associated Orders   1. Venous stasis ulcer of left lower leg with edema of left lower leg  (Formerly [facility])  I83.029 lidocaine (LMX) 4 % cream    I83.892 Wound cleansing and dressings    L97.929 Wound Procedure Treatment    R60.0 Debridement Left;Lateral Ankle      2. Venous stasis ulcer of right lower leg with edema of right lower leg  (Formerly [facility])  I83.019 lidocaine (LMX) 4 % cream    I83.891 Wound cleansing and dressings    L97.919 Wound Procedure Treatment    R60.0 Cast Application      3. Type 2 diabetes mellitus with diabetic polyneuropathy, without long-term current use of insulin (Formerly [facility])  E11.42               Assessment & Plan:  VSU of bilateral LE: L ankle wounds  into two wound measurements d/t improvement (previously measured as one wound).  Left lateral ankle wound with adherent gel-like biofilm layer requiring selective debridement.  Right and left medial ankle wounds with granular tissue.  All areas are improved and near healed.  Periwound is with very thick scaling skin which was debulked today.  No acute erythema, increased warmth, lymphangitic streaking.  No purulence or malodor.  No induration, fluctuance, crepitus.  Bilateral lower extremities with evidence of chronic venous insufficiency/venous stasis with areas of hyperpigmentation/hemosiderin staining, dry flaking skin, scattered varicosities, lower extremity edema.  Selective debridement of lateral ankle wound only   Continue current wound care with Acticoat and ABD.  Periwound Lac-Hydrin for scales  Compression: Unna boots bilaterally.  Patient has had these wraps for the past few weeks and tolerates well.  Bedside ABIs at wound center 3/13/2025 - R 1.01 / L 0.92  Reviewed importance of regular lower extremity  "elevation above the level of heart  Avoid prolonged periods of time with legs in dependent position  T2DM:  A1C results reviewed with the patient today. 12.7 as of 3/13/2025.  Reviewed extreme importance of achieving improved diabetic control and risks of uncontrolled diabetes.  He expressed understanding and states he is working on this and has recently had some medications changed.  He states he was told to go to diabetic education center which he plans to do.  Reviewed extreme importance of very close monitoring of wounds. Discussed that diabetes places patients at increased risk for wound complications despite adequate cleansing and care.  Should patient experience any acute change or any of the following symptoms including but not limited to fever, chills, increased pain at the wound, swelling, purulent drainage, foul odor, etc... to immediately proceed to emergency department. Pt expresses understanding.   Follow-up in 1 week w/ Dr. Rock or sooner if needed    Portions of the record may have been created with voice recognition software. Occasional wrong word or \"sound alike\" substitutions may have occurred due to the inherent limitations of voice recognition software. Read the chart carefully and recognize, using context, where substitutions have occurred.    Subjective:   4/17/2025: Bryant is a 69-year-old male with a past medical history including but not limited to uncontrolled diabetes, hypertension, who presents to the wound center today for follow-up of bilateral lower extremity wounds.  He primarily follows with my colleague Dr. Rock and was last seen on 4/10/2025.  Current management with Acticoat and Unna boot's which he tolerates well.  He denies new acute complaints today.  Denies fever, chills.      The following portions of the patient's history were reviewed and updated as appropriate:   Patient Active Problem List   Diagnosis   • Chronic low back pain without sciatica   • Primary " osteoarthritis of both knees   • Essential hypertension, benign   • Mixed hyperlipidemia   • Atherosclerosis of native coronary artery of native heart without angina pectoris   • Encounter for Medicare annual wellness exam   • Puncture wound of right foot   • Obesity, morbid ([facility])   • Elevated PSA   • Colon cancer screening   • Contusion of right foot   • DDD (degenerative disc disease), lumbar   • Chronic kidney disease, stage 3b ([facility])   • Chronic prescription opiate use   • Chronic pain syndrome   • Macrocytosis   • Severe recurrent major depression without psychotic features ([facility])   • Heart murmur   • Bee sting allergy   • Type 2 diabetes mellitus with diabetic polyneuropathy, without long-term current use of insulin ([facility])   • Pain of left lower extremity   • Hypertensive urgency   • Pulmonary edema   • Pulmonary nodules   • Bilateral cellulitis of lower leg   • Venous stasis ulcer of left lower leg with edema of left lower leg  ([facility])   • Venous stasis ulcer of right lower leg with edema of right lower leg  ([facility])   • Nonrheumatic aortic valve stenosis     Past Medical History:   Diagnosis Date   • Anemia     last assessed: 1/11/2018   • Arthritis    • BPH without obstruction/lower urinary tract symptoms     last assessed: 12/3/2018   • Coronary artery disease     last assessed: 7/22/2015   • DDD (degenerative disc disease), lumbar     last assessed: 2/17/2014   • Diabetes mellitus ([facility])    • Generalized osteoarthritis     last assessed: 4/18/2019   • Hx of fracture     ankle fracture(left)   • Hyperlipidemia     last assessed: 7/22/2015   • Hypertension     last assessed: 7/22/2015   • Knee injury     partial torn meniscus and acl   • Lumbar spinal stenosis     last assessed: 2/17/2014   • Pneumonia Dec 1994   • Rotator cuff syndrome of shoulder and allied disorders     last assessed: 3/5/2014   • Type 2 diabetes mellitus with diabetic polyneuropathy ([facility])     last assessed: 10/31/2018     Past Surgical History:    Procedure Laterality Date   • CARDIAC SURGERY     • CORONARY ARTERY BYPASS GRAFT     • KNEE SURGERY Left 2024   • LUMBAR FUSION     • SHOULDER ARTHROSCOPY Right    • SHOULDER SURGERY      2 right/1 left rotator cuff repairs   • SPINAL FUSION      spinal triple fusion L4-5     Family History   Problem Relation Age of Onset   • Diabetes Mother         type 2   • Arthritis Mother    • Asthma Mother    • Heart attack Father    • Stroke Father    • Heart disease Father    • Post-traumatic stress disorder Son    • No Known Problems Daughter       Social History     Socioeconomic History   • Marital status:      Spouse name: Not on file   • Number of children: Not on file   • Years of education: Not on file   • Highest education level: Not on file   Occupational History   • Not on file   Tobacco Use   • Smoking status: Former     Current packs/day: 0.00     Average packs/day: 1 pack/day for 26.0 years (26.0 ttl pk-yrs)     Types: Cigarettes     Start date:      Quit date:      Years since quittin.3     Passive exposure: Past   • Smokeless tobacco: Never   Vaping Use   • Vaping status: Never Used   Substance and Sexual Activity   • Alcohol use: Not Currently   • Drug use: Not Currently     Types: Marijuana   • Sexual activity: Not Currently     Partners: Female     Birth control/protection: Condom Male   Other Topics Concern   • Not on file   Social History Narrative   • Not on file     Social Drivers of Health     Financial Resource Strain: Low Risk  (10/25/2022)    Overall Financial Resource Strain (CARDIA)    • Difficulty of Paying Living Expenses: Not hard at all   Food Insecurity: No Food Insecurity (2025)    Hunger Vital Sign    • Worried About Running Out of Food in the Last Year: Never true    • Ran Out of Food in the Last Year: Never true   Transportation Needs: No Transportation Needs (2025)    PRAPARE - Transportation    • Lack of Transportation (Medical): No    • Lack  of Transportation (Non-Medical): No   Physical Activity: Not on file   Stress: Not on file   Social Connections: Not on file   Intimate Partner Violence: Unknown (12/22/2024)    Nursing IPS    • Feels Physically and Emotionally Safe: Not on file    • Physically Hurt by Someone: Not on file    • Humiliated or Emotionally Abused by Someone: Not on file    • Physically Hurt by Someone: No    • Hurt or Threatened by Someone: No   Housing Stability: Low Risk  (4/8/2025)    Housing Stability Vital Sign    • Unable to Pay for Housing in the Last Year: No    • Number of Times Moved in the Last Year: 0    • Homeless in the Last Year: No        Current Outpatient Medications:   •  amLODIPine (NORVASC) 5 mg tablet, Take 1 tablet (5 mg total) by mouth daily, Disp: 100 tablet, Rfl: 1  •  ammonium lactate (LAC-HYDRIN) 12 % lotion, Apply topically 2 (two) times a day as needed for dry skin Apply to dry skin / scales of both legs, Disp: 225 g, Rfl: 2  •  aspirin (Aspirin 81) 81 mg EC tablet, Take 81 mg by mouth daily., Disp: , Rfl:   •  aspirin 81 MG tablet, Take 81 mg by mouth once, Disp: , Rfl:   •  B Complex Vitamins (B COMPLEX 1 PO), Take 1 tablet by mouth daily., Disp: , Rfl:   •  Blood Glucose Monitoring Suppl (FREESTYLE FREEDOM LITE) w/Device KIT, , Disp: , Rfl:   •  Blood Glucose Monitoring Suppl (FreeStyle Lite) MANISH, Use to check blood sugar once a day, Disp: 100 each, Rfl: 3  •  buPROPion (WELLBUTRIN XL) 150 mg 24 hr tablet, take 1 tablet by mouth every morning, Disp: 90 tablet, Rfl: 1  •  cetirizine (ZyrTEC) 10 mg tablet, Take 10 mg by mouth daily, Disp: , Rfl:   •  ciclopirox (PENLAC) 8 % solution, Apply topically daily at bedtime, Disp: 6 mL, Rfl: 2  •  EPINEPHrine (EPIPEN) 0.3 mg/0.3 mL SOAJ, inject 0.3 milliliters ( 0.3 milligrams ) intramuscularly in OUTER THIGH and HOLD for 3 SECONDS use if needed for SEVERE ALLERGIC REACTION May repeat one time after 10 MINUTES if needed for maximum daily dose of 2 INJECTIONS,  Disp: 2 each, Rfl: 0  •  fluticasone (FLONASE) 50 mcg/act nasal spray, 2 sprays into each nostril daily, Disp: , Rfl:   •  furosemide (LASIX) 40 mg tablet, Take 1 tablet (40 mg total) by mouth daily, Disp: 90 tablet, Rfl: 3  •  glipiZIDE (GLUCOTROL) 10 mg tablet, Take 1 tablet (10 mg total) by mouth 2 (two) times a day before meals, Disp: 180 tablet, Rfl: 1  •  glucose blood (FREESTYLE TEST STRIPS) test strip, Use to check blood sugars twice a day, Disp: 100 strip, Rfl: 5  •  Lancets (freestyle) lancets, Use to check sugars twice a day, Disp: 100 each, Rfl: 5  •  lidocaine (LMX) 4 % cream, Apply topically as needed for mild pain Apply to pain are of leg as needed., Disp: 60 g, Rfl: 2  •  lisinopril (ZESTRIL) 10 mg tablet, Take 1 tablet (10 mg total) by mouth daily, Disp: 100 tablet, Rfl: 3  •  metFORMIN (GLUCOPHAGE) 1000 MG tablet, Take 500 mg by mouth 2 (two) times a day with meals, Disp: , Rfl:   •  methocarbamol (ROBAXIN) 750 mg tablet, TAKE 1 TABLET 3 TIMES A DAY, Disp: 90 tablet, Rfl: 1  •  metoprolol succinate (TOPROL-XL) 50 mg 24 hr tablet, take 1 tablet by mouth once daily, Disp: 90 tablet, Rfl: 1  •  Multiple Vitamins-Minerals (MULTIVITAMIN ADULT PO), Take 1 tablet by mouth daily, Disp: , Rfl:   •  nitroglycerin (NITROSTAT) 0.4 mg SL tablet, Place 1 tablet (0.4 mg total) under the tongue every 5 (five) minutes as needed for chest pain, Disp: 25 tablet, Rfl: 0  •  pregabalin (LYRICA) 150 mg capsule, Take 1 capsule by mouth 2 (two) times a day, Disp: , Rfl:   •  rosuvastatin (CRESTOR) 40 MG tablet, take 1 tablet by mouth once daily, Disp: 90 tablet, Rfl: 1  •  sodium hypochlorite (DAKIN'S HALF-STRENGTH) external solution, Apply 1 Application topically see administration instructions Apply to gauze for Dakins wash for 10 minutes during leg wrap changes., Disp: 473 mL, Rfl: 0  No current facility-administered medications for this visit.    Review of Systems   Constitutional:  Negative for chills and fever.    Respiratory:  Negative for shortness of breath.    Cardiovascular:  Negative for chest pain and palpitations.   Skin:  Positive for wound.   Psychiatric/Behavioral:  The patient is not nervous/anxious.        Objective:  /62   Pulse 80   Temp (!) 97 °F (36.1 °C)   Resp 18   Pain Score: 0-No pain     Physical Exam  Vitals and nursing note reviewed.   Constitutional:       General: He is not in acute distress.     Appearance: He is not ill-appearing, toxic-appearing or diaphoretic.   Eyes:      General: No scleral icterus.  Cardiovascular:      Rate and Rhythm: Normal rate.      Comments: DP/PT palpable bilaterally  Pulmonary:      Effort: Pulmonary effort is normal. No respiratory distress.   Musculoskeletal:      Right lower leg: Pitting Edema (Mild) present.      Left lower leg: Pitting Edema (Mild) present.   Skin:     General: Skin is warm.      Findings: Wound present.             Comments: 1/2/3 - VSU of bilateral LE: L ankle wounds  into two wound measurements d/t improvement (previously measured as one wound).  Left lateral ankle wound with adherent gel-like biofilm layer requiring selective debridement.  Right and left medial ankle wounds with granular tissue.  All areas are improved and near healed.  Periwound is with very thick scaling skin which was debulked today.  No acute erythema, increased warmth, lymphangitic streaking.  No purulence or malodor.  No induration, fluctuance, crepitus.  Bilateral lower extremities with evidence of chronic venous insufficiency/venous stasis with areas of hyperpigmentation/hemosiderin staining, dry flaking skin, scattered varicosities, lower extremity edema.   Neurological:      Mental Status: He is alert and oriented to person, place, and time.   Psychiatric:         Mood and Affect: Mood normal.         Behavior: Behavior normal.         Wound 03/11/25 Venous Ulcer Other (Comments) Ankle Lower;Right;Medial (Active)   Wound Image   04/17/25 0941    Wound Description Epithelialization;Pink;Yellow;Dry;White 04/17/25 0947   Non-staged Wound Description Full thickness 04/17/25 0947   Wound Length (cm) 1 cm 04/17/25 0947   Wound Width (cm) 1.5 cm 04/17/25 0947   Wound Depth (cm) 0.1 cm 04/17/25 0947   Wound Surface Area (cm^2) 1.5 cm^2 04/17/25 0947   Wound Volume (cm^3) 0.15 cm^3 04/17/25 0947   Calculated Wound Volume (cm^3) 0.15 cm^3 04/17/25 0947   Change in Wound Size % 99.19 04/17/25 0947   Drainage Amount Moderate 04/17/25 0947   Drainage Description Serosanguineous 04/17/25 0947   Kimberlyn-wound Assessment Dry;Scaly;Yellow-brown;White;Callus 04/17/25 0947       Wound 03/11/25 Venous Ulcer Other (Comments) Ankle Left;Lower;Medial (Active)   Wound Image   04/17/25 0935   Wound Description Epithelialization;Pink;Yellow;White;Dry;Brown 04/17/25 0948   Non-staged Wound Description Full thickness 04/17/25 0948   Wound Length (cm) 1 cm 04/17/25 0948   Wound Width (cm) 0.5 cm 04/17/25 0948   Wound Depth (cm) 0.1 cm 04/17/25 0948   Wound Surface Area (cm^2) 0.5 cm^2 04/17/25 0948   Wound Volume (cm^3) 0.05 cm^3 04/17/25 0948   Calculated Wound Volume (cm^3) 0.05 cm^3 04/17/25 0948   Change in Wound Size % 99.93 04/17/25 0948   Drainage Amount Moderate 04/17/25 0948   Drainage Description Serosanguineous 04/17/25 0948   Kimberlyn-wound Assessment Brown;Dry;Scaly;Erythema;Yellow-brown;Callus 04/17/25 0948       Wound 04/17/25 Ankle Left;Lateral (Active)   Wound Image   04/17/25 0939   Wound Description Epithelialization;Pink;White 04/17/25 0950   Non-staged Wound Description Full thickness 04/17/25 0950   Wound Length (cm) 1 cm 04/17/25 0950   Wound Width (cm) 0.5 cm 04/17/25 0950   Wound Depth (cm) 0.1 cm 04/17/25 0950   Wound Surface Area (cm^2) 0.5 cm^2 04/17/25 0950   Wound Volume (cm^3) 0.05 cm^3 04/17/25 0950   Calculated Wound Volume (cm^3) 0.05 cm^3 04/17/25 0950   Drainage Amount Moderate 04/17/25 0950   Drainage Description Serosanguineous 04/17/25 0950  "  Kimberlyn-wound Assessment Pink;White;Brown;Scaly 04/17/25 0950       Debridement   Wound 04/17/25 Ankle Left;Lateral     Date/Time: 4/17/2025 9:30 AM  Universal Protocol:  procedure performed by consultantConsent: Verbal consent obtained. Written consent obtained.  Consent given by: patient  Time out: Immediately prior to procedure a \"time out\" was called to verify the correct patient, procedure, equipment, support staff and site/side marked as required.  Patient understanding: patient states understanding of the procedure being performed  Patient identity confirmed: verbally with patient    Debridement Details  Performed by: physician  Debridement type: selective  Pain control: lidocaine 4%    Post-debridement measurements  Length (cm): 1  Width (cm): 0.5  Depth (cm): 0.1  Percent debrided: 100%  Surface Area (cm^2): 0.5  Area Debrided (cm^2): 0.5  Volume (cm^3): 0.05    Devitalized tissue debrided: biofilm  Instrument(s) utilized: blade  Bleeding: small  Hemostasis obtained with: pressure  Procedural pain (0-10): 0  Post-procedural pain: 0   Response to treatment: procedure was tolerated well         Results from last 6 Months   Lab Units 03/13/25  1120   WOUND CULTURE  4+ Growth of Pseudomonas aeruginosa*  4+ Growth of Enterococcus faecalis*         Lab Results   Component Value Date    HGBA1C 12.7 (H) 03/13/2025       Wound Instructions:  Orders Placed This Encounter   Procedures   • Wound cleansing and dressings     Wound cleansing and dressings Venous Ulcer Lower;Right and left Legs         Wash your hands with soap and water.  Remove old dressing, discard into plastic bag and place in trash.  Cleanse the wound with mild soap and water prior to applying a clean dressing. Do not use tissue or cotton balls. Do not scrub the wound. Pat dry using gauze.     Shower no do not get dressings wet  may purchase and use cast cover on [employer]       Apply Lac Hydrin to dry scaly periwound   Apply acticoat 3 to only the open " areas on bilateral lower extremity.  Cover with ABD   Secure with tasha and tape  Apply unna boot   Change dressing 2 times a week      Unna Boot compression wrap Instructions     Keep compression wrap/wraps clean and dry. If wraps are too tight and you experience numbness/tingling, call the wound center. If after hours, remove wraps or proceed to nearest E.R. and call wound center in AM.  Wrap will be changed 2x weekly home care to complete dressing changes between visits   Avoid prolonged standing in one place.  Elevate leg(s) above the level of the heart when sitting or as much as possible.      If you develop fever, chills, nausea or vomiting, increase in drainage or foul smelling drainage go to the closest emergency department for evaluation.       Please try to consume 3-4 servings of protein (30g) each day.   - Each serving of protein should contain about 30 mg protein.   - Good sources of protein include, lean meats (fish, chicken, etc), eggs, dairy products (yogurt, cheese), tofu, legumes (chickpeas, lentils, peas, black beans), nuts (cashew, walnut, peanuts, etc), & quinoa.        Home Health to complete wound care weekly    Follow up 1 week     Standing Status:   Future     Expiration Date:   4/24/2025   • Wound Procedure Treatment     This order was created via procedure documentation   • Debridement Left;Lateral Ankle     This order was created via procedure documentation   • Cast Application     This order was created via procedure documentation       Stephanie Dowell MD

## 2025-04-21 ENCOUNTER — HOME CARE VISIT (OUTPATIENT)
Dept: HOME HEALTH SERVICES | Facility: HOME HEALTHCARE | Age: 70
End: 2025-04-21
Payer: MEDICARE

## 2025-04-21 VITALS
RESPIRATION RATE: 18 BRPM | OXYGEN SATURATION: 92 % | DIASTOLIC BLOOD PRESSURE: 74 MMHG | TEMPERATURE: 98.3 F | HEART RATE: 93 BPM | SYSTOLIC BLOOD PRESSURE: 126 MMHG

## 2025-04-21 PROCEDURE — G0299 HHS/HOSPICE OF RN EA 15 MIN: HCPCS

## 2025-04-23 ENCOUNTER — OFFICE VISIT (OUTPATIENT)
Dept: UROLOGY | Facility: CLINIC | Age: 70
End: 2025-04-23
Attending: FAMILY MEDICINE
Payer: MEDICARE

## 2025-04-23 VITALS
OXYGEN SATURATION: 94 % | DIASTOLIC BLOOD PRESSURE: 82 MMHG | HEIGHT: 73 IN | TEMPERATURE: 98.2 F | HEART RATE: 82 BPM | BODY MASS INDEX: 37.91 KG/M2 | WEIGHT: 286 LBS | SYSTOLIC BLOOD PRESSURE: 148 MMHG

## 2025-04-23 DIAGNOSIS — R97.20 ELEVATED PSA: Primary | ICD-10-CM

## 2025-04-23 PROCEDURE — 99203 OFFICE O/P NEW LOW 30 MIN: CPT

## 2025-04-23 NOTE — ASSESSMENT & PLAN NOTE
PSA Trend  19.056 (3/13/2025)  PSA % free 19.309  13.3 (3/28/2024)  10.4 (9/15/2022)  4.7 (10/24/2020)  4.0 (10/15/2019)  3.8 (2/11/2019)  No known family history of prostate cancer  Rectal exam was denied today  I had a lengthy discussion with the patient regarding the significance of his elevated PSA.  We discussed that the only way to confirm prostate cancer would be with tissue sampling through biopsy.  Given his PSA trend there is certainly concern for likely underlying prostate malignancy.  We discussed options to continue with surveillance which is not my strong recommendation given his PSA trend.  We also discussed options to proceed with a multiparametric MRI of the prostate with anticipated need for MRI fusion biopsy if positive.  And finally we discussed options to proceed with a transperineal ultrasound-guided biopsy of prostate without MRI fusion.  The patient has elected to proceed with a multiparametric MRI of the prostate with need for fusion biopsy if positive.  I did  the patient that this could potentially delay timing of his biopsy until we receive the results of his MRI and then can subsequently schedule him for his fusion biopsy.  Due to large demand for these biopsies we are scheduling in some instances more than 3 months.  I personally recommended proceeding with a transperineal ultrasound-guided biopsy prostate without MRI fusion.  The patient verbalized understanding but prefers to undergo a multiparametric MRI of the prostate first.  We will assist with scheduling this.  In the event that his MRI shows significant findings, we can potentially expedite a TRUS biopsy in the office without MRI fusion.  Follow-up in 6 to 8 weeks, I will call him with results of his MRI once those are finalized.    Orders:    Ambulatory Referral to Urology    MRI prostate multiparametric wo w contrast; Future

## 2025-04-23 NOTE — PROGRESS NOTES
Name: Bryant Bustamante Jr.      : 1955      MRN: [id number]  Encounter Provider: JUDY Garcia  Encounter Date: 2025   Encounter department: [facility] [place]    :  Assessment & Plan  Elevated PSA  PSA Trend  19.056 (3/13/2025)  PSA % free 19.309  13.3 (3/28/2024)  10.4 (9/15/2022)  4.7 (10/24/2020)  4.0 (10/15/2019)  3.8 (2019)  No known family history of prostate cancer  Rectal exam was denied today  I had a lengthy discussion with the patient regarding the significance of his elevated PSA.  We discussed that the only way to confirm prostate cancer would be with tissue sampling through biopsy.  Given his PSA trend there is certainly concern for likely underlying prostate malignancy.  We discussed options to continue with surveillance which is not my strong recommendation given his PSA trend.  We also discussed options to proceed with a multiparametric MRI of the prostate with anticipated need for MRI fusion biopsy if positive.  And finally we discussed options to proceed with a transperineal ultrasound-guided biopsy of prostate without MRI fusion.  The patient has elected to proceed with a multiparametric MRI of the prostate with need for fusion biopsy if positive.  I did  the patient that this could potentially delay timing of his biopsy until we receive the results of his MRI and then can subsequently schedule him for his fusion biopsy.  Due to large demand for these biopsies we are scheduling in some instances more than 3 months.  I personally recommended proceeding with a transperineal ultrasound-guided biopsy prostate without MRI fusion.  The patient verbalized understanding but prefers to undergo a multiparametric MRI of the prostate first.  We will assist with scheduling this.  In the event that his MRI shows significant findings, we can potentially expedite a TRUS biopsy in the office without MRI fusion.  Follow-up in 6 to 8 weeks, I will call him with results  of his MRI once those are finalized.    Orders:    Ambulatory Referral to Urology    MRI prostate multiparametric wo w contrast; Future        History of Present Illness     New patient to office with PMHx significant for hypertension, coronary artery disease, pulmonary edema, pulmonary nodules, T2DM, CKD, chronic pain, obesity, mixed hyperlipidemia, heart murmur    Patient presents today for evaluation of elevated PSA.  Current PSA is 19.056 as of 3/13/2025.  His PSA has been on a upward trajectory since 2021 was 4.7.  He denies any family history of prostate cancer to his knowledge.  He states that his father had something done but he was very young at that time.  He denies any significant bothersome urinary symptoms.  He does report urinary frequency but attributes this to Lasix.  He otherwise feels he has a good urinary stream and empties well.  Denies any history of UTIs or prostatitis in the past.      PSA Trend  19.056 (3/13/2025)  PSA % free 19.309  13.3 (3/28/2024)  10.4 (9/15/2022)  4.7 (10/24/2020)  4.0 (10/15/2019)  3.8 (2/11/2019)        Review of Systems   Constitutional:  Negative for chills and fever.   HENT:  Negative for congestion and sore throat.    Respiratory:  Negative for cough and shortness of breath.    Cardiovascular:  Negative for chest pain and leg swelling.   Gastrointestinal:  Negative for abdominal pain, constipation and diarrhea.   Genitourinary:  Positive for frequency. Negative for difficulty urinating, dysuria, hematuria and urgency.   Musculoskeletal:  Negative for back pain and gait problem.   Skin:  Negative for wound.   Allergic/Immunologic: Negative for immunocompromised state.   Hematological:  Does not bruise/bleed easily.     Objective   There were no vitals taken for this visit.    Physical Exam  Vitals and nursing note reviewed.   Constitutional:       General: He is not in acute distress.     Appearance: He is well-developed.   HENT:      Head: Normocephalic and  atraumatic.   Eyes:      Conjunctiva/sclera: Conjunctivae normal.   Cardiovascular:      Rate and Rhythm: Normal rate and regular rhythm.      Heart sounds: No murmur heard.  Pulmonary:      Effort: Pulmonary effort is normal. No respiratory distress.      Breath sounds: Normal breath sounds.   Abdominal:      Palpations: Abdomen is soft.      Tenderness: There is no abdominal tenderness.   Musculoskeletal:         General: No swelling.      Cervical back: Neck supple.   Skin:     General: Skin is warm and dry.      Capillary Refill: Capillary refill takes less than 2 seconds.   Neurological:      Mental Status: He is alert.   Psychiatric:         Mood and Affect: Mood normal.           Imagin2024    RENAL ULTRASOUND WITH PVR     INDICATION: yesi with diuresis, had PVR > 500 x 1.     COMPARISON: None     TECHNIQUE: Ultrasound of the retroperitoneum was performed with a curvilinear transducer utilizing volumetric sweeps and still imaging techniques.     FINDINGS:     KIDNEYS:  Symmetric and normal size.  Right kidney: 10.4 x 6.4 x 4.6 cm. Volume 160.8 mL  Left kidney: 11.0 x 6.6 x 5.3 cm. Volume 202.0 mL     Right kidney  Normal echogenicity and contour.  No mass is identified.  No hydronephrosis.  No shadowing calculi.  No perinephric fluid collections.     Left kidney  Normal echogenicity and contour.  No mass is identified.  No hydronephrosis.  No shadowing calculi.  No perinephric fluid collections.     URETERS:  Nonvisualized.     BLADDER:  Under distended  No focal thickening or mass lesions.  Bilateral ureteral jets not detected.  Prevoid: Patient voided immediately before the examination.  Post void: 35.5 mL        IMPRESSION:     No hydronephrosis.     Postvoid residual 36 mL.          Please Note:  Voice dictation software has been used to create this document. There may be inadvertent transcriptions errors.     JUDY Garcia 24

## 2025-04-24 ENCOUNTER — OFFICE VISIT (OUTPATIENT)
Dept: WOUND CARE | Facility: CLINIC | Age: 70
End: 2025-04-24
Payer: MEDICARE

## 2025-04-24 ENCOUNTER — HOME CARE VISIT (OUTPATIENT)
Dept: HOME HEALTH SERVICES | Facility: HOME HEALTHCARE | Age: 70
End: 2025-04-24
Payer: MEDICARE

## 2025-04-24 VITALS
RESPIRATION RATE: 18 BRPM | SYSTOLIC BLOOD PRESSURE: 136 MMHG | HEART RATE: 90 BPM | TEMPERATURE: 96.9 F | DIASTOLIC BLOOD PRESSURE: 78 MMHG

## 2025-04-24 DIAGNOSIS — I83.019 VENOUS STASIS ULCER OF RIGHT LOWER LEG WITH EDEMA OF RIGHT LOWER LEG  (HCC): ICD-10-CM

## 2025-04-24 DIAGNOSIS — L97.929 VENOUS STASIS ULCER OF LEFT LOWER LEG WITH EDEMA OF LEFT LOWER LEG  (HCC): Primary | ICD-10-CM

## 2025-04-24 DIAGNOSIS — R60.0 VENOUS STASIS ULCER OF LEFT LOWER LEG WITH EDEMA OF LEFT LOWER LEG  (HCC): Primary | ICD-10-CM

## 2025-04-24 DIAGNOSIS — L97.919 VENOUS STASIS ULCER OF RIGHT LOWER LEG WITH EDEMA OF RIGHT LOWER LEG  (HCC): ICD-10-CM

## 2025-04-24 DIAGNOSIS — I83.891 VENOUS STASIS ULCER OF RIGHT LOWER LEG WITH EDEMA OF RIGHT LOWER LEG  (HCC): ICD-10-CM

## 2025-04-24 DIAGNOSIS — R60.0 VENOUS STASIS ULCER OF RIGHT LOWER LEG WITH EDEMA OF RIGHT LOWER LEG  (HCC): ICD-10-CM

## 2025-04-24 DIAGNOSIS — I83.892 VENOUS STASIS ULCER OF LEFT LOWER LEG WITH EDEMA OF LEFT LOWER LEG  (HCC): Primary | ICD-10-CM

## 2025-04-24 DIAGNOSIS — I83.029 VENOUS STASIS ULCER OF LEFT LOWER LEG WITH EDEMA OF LEFT LOWER LEG  (HCC): Primary | ICD-10-CM

## 2025-04-24 PROCEDURE — 99213 OFFICE O/P EST LOW 20 MIN: CPT | Performed by: SURGERY

## 2025-04-24 PROCEDURE — 29580 STRAPPING UNNA BOOT: CPT

## 2025-04-24 RX ORDER — LIDOCAINE HYDROCHLORIDE 40 MG/ML
5 SOLUTION TOPICAL ONCE
Status: COMPLETED | OUTPATIENT
Start: 2025-04-24 | End: 2025-04-24

## 2025-04-24 RX ADMIN — LIDOCAINE HYDROCHLORIDE 5 ML: 40 SOLUTION TOPICAL at 08:36

## 2025-04-24 NOTE — ASSESSMENT & PLAN NOTE
Right lower extremity venous stasis disease, edema with ulceration.  Overall looks fantastic.  The periwound looks great.  Very healthy.  Minimal scaling which was trimmed.  Small opening with minimal drainage noted.  No debridement required.  Continue current treatment with Unna boot twice a week.  Follow-up next week.    Orders:    Wound cleansing and dressings; Future    lidocaine (XYLOCAINE) 4 % topical solution 5 mL    Cast Application    Wound Procedure Treatment

## 2025-04-24 NOTE — ASSESSMENT & PLAN NOTE
Left lower extremity venous stasis disease, edema with ulceration.  Overall looks fantastic.  The periwound looks great.  Very healthy.  Minimal scaling which was trimmed.  Small opening with minimal drainage noted.  No debridement required.  Continue current treatment with Unna boot twice a week.  Follow-up next week.    Orders:    Wound cleansing and dressings; Future    lidocaine (XYLOCAINE) 4 % topical solution 5 mL    Cast Application    Wound Procedure Treatment

## 2025-04-24 NOTE — PROGRESS NOTES
Wound Procedure Treatment    Performed by: Liliana Molina RN  Authorized by: Pavan Rock DO  Associated wounds:   Wound 03/11/25 Venous Ulcer Other (Comments) Ankle Lower;Right;Medial  Wound 03/11/25 Venous Ulcer Other (Comments) Ankle Left;Lower;Medial  Wound 04/17/25 Ankle Left;Lateral    Wound cleansed with:  Soap and water   Applied to periwound:  Ammonium lactate   Applied primary dressing:  Acticoat   Applied secondary dressing:  ABD   Dressing secured with:  Mariaa and Tape

## 2025-04-24 NOTE — PROGRESS NOTES
Name: Bryant Bustamante Jr.      : 1955      MRN: [id number]  Encounter Provider: Pavan Rock DO  Encounter Date: 2025   Encounter department: [facility]  :  Assessment & Plan  Venous stasis ulcer of left lower leg with edema of left lower leg  (HCC)  Left lower extremity venous stasis disease, edema with ulceration.  Overall looks fantastic.  The periwound looks great.  Very healthy.  Minimal scaling which was trimmed.  Small opening with minimal drainage noted.  No debridement required.  Continue current treatment with Unna boot twice a week.  Follow-up next week.    Orders:    Wound cleansing and dressings; Future    lidocaine (XYLOCAINE) 4 % topical solution 5 mL    Cast Application    Wound Procedure Treatment    Venous stasis ulcer of right lower leg with edema of right lower leg  (HCC)  Right lower extremity venous stasis disease, edema with ulceration.  Overall looks fantastic.  The periwound looks great.  Very healthy.  Minimal scaling which was trimmed.  Small opening with minimal drainage noted.  No debridement required.  Continue current treatment with Unna boot twice a week.  Follow-up next week.    Orders:    Wound cleansing and dressings; Future    lidocaine (XYLOCAINE) 4 % topical solution 5 mL    Cast Application    Wound Procedure Treatment        History of Present Illness   Chief Complaint   Patient presents with    Follow Up Wound Care Visit     BLLE wounds    Here for wound follow up.  69-year-old gentleman with bilateral lower extremity edema, venous stasis disease with ulceration presents today for follow-up evaluation and treatment.  Patient currently undergoing bilateral Unna boot therapy.  Overall doing well.  No new complaints.      Objective   /78   Pulse 90   Temp (!) 96.9 °F (36.1 °C)   Resp 18     Physical Exam  Vitals reviewed. Exam conducted with a chaperone present.   Constitutional:       General: He is not in acute distress.      Appearance: Normal appearance. He is not ill-appearing or toxic-appearing.   HENT:      Head: Normocephalic and atraumatic.   Eyes:      General: No scleral icterus.        Right eye: No discharge.         Left eye: No discharge.   Cardiovascular:      Rate and Rhythm: Normal rate.   Pulmonary:      Effort: Pulmonary effort is normal. No respiratory distress.   Musculoskeletal:         General: Swelling present.      Cervical back: Normal range of motion.      Right lower leg: Edema present.      Left lower leg: Edema present.   Skin:     General: Skin is warm.      Coloration: Skin is not jaundiced or pale.      Findings: No bruising or erythema.      Comments: Right lower extremity with small open wound consistent with remaining venous stasis ulcer.  No debridement required.  Periwound looks great.  The scaling which was significant prior is just minimal.  These wounds trimmed.    Left lower extremity also with small open wound consistent with venous stasis ulceration.  No significant drainage noted.  No debridement required.  Periwound also looks well.  Scaly was trimmed.   Neurological:      General: No focal deficit present.      Mental Status: He is alert and oriented to person, place, and time.      Cranial Nerves: No cranial nerve deficit.   Psychiatric:         Mood and Affect: Mood normal.         Behavior: Behavior normal.         Thought Content: Thought content normal.         Judgment: Judgment normal.       Wound 03/11/25 Venous Ulcer Other (Comments) Ankle Lower;Right;Medial (Active)   Wound Image   04/24/25 0829   Wound Description Epithelialization;Pink;Yellow;Dry;White;Granulation tissue 04/24/25 0832   Non-staged Wound Description Partial thickness 04/24/25 0832   Wound Length (cm) 0.1 cm 04/24/25 0832   Wound Width (cm) 0.1 cm 04/24/25 0832   Wound Depth (cm) 0.1 cm 04/24/25 0832   Wound Surface Area (cm^2) 0.01 cm^2 04/24/25 0832   Wound Volume (cm^3) 0.001 cm^3 04/24/25 0832   Calculated Wound  Volume (cm^3) 0 cm^3 04/24/25 0832   Change in Wound Size % 100 04/24/25 0832   Drainage Amount Small 04/24/25 0832   Drainage Description Serosanguineous 04/24/25 0832   Kimberlyn-wound Assessment Dry;Scaly;Yellow-brown;White;Callus 04/24/25 0832   Treatments Cleansed 04/24/25 0832       Wound 03/11/25 Venous Ulcer Other (Comments) Ankle Left;Lower;Medial (Active)   Wound Image   04/24/25 0830   Wound Description Epithelialization;Pink;Yellow;White 04/24/25 0833   Non-staged Wound Description Partial thickness 04/24/25 0833   Wound Length (cm) 0.9 cm 04/24/25 0833   Wound Width (cm) 0.7 cm 04/24/25 0833   Wound Depth (cm) 0.1 cm 04/24/25 0833   Wound Surface Area (cm^2) 0.63 cm^2 04/24/25 0833   Wound Volume (cm^3) 0.063 cm^3 04/24/25 0833   Calculated Wound Volume (cm^3) 0.06 cm^3 04/24/25 0833   Change in Wound Size % 99.92 04/24/25 0833   Drainage Amount Small 04/24/25 0833   Drainage Description Serosanguineous 04/24/25 0833   Kimberlyn-wound Assessment Brown;Dry;Scaly;Erythema;Yellow-brown;Callus 04/24/25 0833   Treatments Cleansed 04/24/25 0833       Wound 04/17/25 Ankle Left;Lateral (Active)   Wound Image   04/24/25 0829   Wound Description Epithelialization;Pink;White 04/24/25 0833   Non-staged Wound Description Partial thickness 04/24/25 0833   Wound Length (cm) 0.1 cm 04/24/25 0833   Wound Width (cm) 0.1 cm 04/24/25 0833   Wound Depth (cm) 0.1 cm 04/24/25 0833   Wound Surface Area (cm^2) 0.01 cm^2 04/24/25 0833   Wound Volume (cm^3) 0.001 cm^3 04/24/25 0833   Calculated Wound Volume (cm^3) 0 cm^3 04/24/25 0833   Change in Wound Size % 100 04/24/25 0833   Drainage Amount Small 04/24/25 0833   Drainage Description Serosanguineous 04/24/25 0833   Kimberlyn-wound Assessment Pink;White;Brown;Scaly;Dry 04/24/25 0833       Procedures   Results from last 6 Months   Lab Units 03/13/25  1120   WOUND CULTURE  4+ Growth of Pseudomonas aeruginosa*  4+ Growth of Enterococcus faecalis*

## 2025-04-24 NOTE — PROGRESS NOTES
Cast Application    Date/Time: 4/24/2025 8:30 AM    Performed by: Liliana Molina RN  Authorized by: Pavan Rock,     Other Assisting Provider: No    Verbal consent obtained?: Yes    Consent given by:  Patient  Time Out:     Time out: Immediately prior to the procedure a time out was called    Patient states understanding of procedure being performed: Yes    Patient's understanding of procedure matches consent: Yes    Procedure consent matches procedure scheduled: Yes    Patient identity confirmed:  Verbally with patient  Pre-procedure details:     Sensation:  Normal    Skin color:  WDL  Procedure details:     Laterality:  Bilateral    Location:  Leg    Leg:  L lower leg and R lower leg    Cast type:  Unna boot    Splint composition: static      Supplies:  2 layer wrap  Post-procedure details:     Pain:  Unchanged    Sensation:  Normal    Skin color:  WDL    Patient tolerance of procedure:  Tolerated well, no immediate complications  Comments:      UNNA BOOT procedure     Before application, ROSS and/or TBI determined to be adequate for healing and application of compression. Lower extremity washed prior to application of compression wrap. With the foot in dorsiflexed position, Unna Boot was applied as per physician orders without complications or complaint of pain.  The procedure was tolerated well. Toes warm & pink post application.  Patient provided education & reinforced to observe toes for any discoloration, swelling or tingling and instructed when to report to the Wound Center or to remove compression

## 2025-04-24 NOTE — PATIENT INSTRUCTIONS
Orders Placed This Encounter   Procedures    Wound cleansing and dressings     Venous Ulcer Lower;Right and Left Legs           Wash your hands with soap and water.  Remove old dressing, discard into plastic bag and place in trash.  Cleanse the wound with mild soap and water prior to applying a clean dressing. Do not use tissue or cotton balls. Do not scrub the wound. Pat dry using gauze.     Shower no do not get dressings wet  may purchase and use cast cover on [employer]       Apply Lac Hydrin to dry scaly periwound   Apply acticoat 3 to only the open areas on bilateral lower extremity.  Cover with ABD   Secure with tasha and tape  Apply unna boot   Change dressing 2 times a week      Unna Boot compression wrap Instructions     Keep compression wrap/wraps clean and dry. If wraps are too tight and you experience numbness/tingling, call the wound center. If after hours, remove wraps or proceed to nearest E.R. and call wound center in AM.  Wrap will be changed 2x weekly home care to complete dressing changes between visits   Avoid prolonged standing in one place.  Elevate leg(s) above the level of the heart when sitting or as much as possible.      If you develop fever, chills, nausea or vomiting, increase in drainage or foul smelling drainage go to the closest emergency department for evaluation.       Please try to consume 3-4 servings of protein (30g) each day.   - Each serving of protein should contain about 30 mg protein.   - Good sources of protein include, lean meats (fish, chicken, etc), eggs, dairy products (yogurt, cheese), tofu, legumes (chickpeas, lentils, peas, black beans), nuts (cashew, walnut, peanuts, etc), & quinoa.        Home Health to complete wound care weekly    Recommend compression stockings from pharmacy (20-30 mmHg)  Recommend two pairs   Good for 6 months   Hand wash and left to air dry     Measurements:  RIGHT  Length 50.5 cm   Ankle 24.5 cm  Calf 40.1 cm   LEFT  Length 51 cm  Ankle 24.4  cm  Calf 39.9 cm    Follow up 1 week     Standing Status:   Future     Expected Date:   5/1/2025     Expiration Date:   5/1/2025    Cast Application     This order was created via procedure documentation    Wound Procedure Treatment     This order was created via procedure documentation

## 2025-04-24 NOTE — LETTER
[facility]  [address] [address]  Phone#  [phone]  Fax#  [phone]    Patient:  Bryant Bustamante Jr.  YOB: 1955  Phone:  [phone]  Date of Visit:  4/24/2025    Orders Placed This Encounter   Procedures   • Wound cleansing and dressings     Venous Ulcer Lower;Right and Left Legs           Wash your hands with soap and water.  Remove old dressing, discard into plastic bag and place in trash.  Cleanse the wound with mild soap and water prior to applying a clean dressing. Do not use tissue or cotton balls. Do not scrub the wound. Pat dry using gauze.     Shower no do not get dressings wet  may purchase and use cast cover on [employer]       Apply Lac Hydrin to dry scaly periwound   Apply acticoat 3 to only the open areas on bilateral lower extremity.  Cover with ABD   Secure with tasha and tape  Apply unna boot   Change dressing 2 times a week      Unna Boot compression wrap Instructions     Keep compression wrap/wraps clean and dry. If wraps are too tight and you experience numbness/tingling, call the wound center. If after hours, remove wraps or proceed to nearest E.R. and call wound center in AM.  Wrap will be changed 2x weekly home care to complete dressing changes between visits   Avoid prolonged standing in one place.  Elevate leg(s) above the level of the heart when sitting or as much as possible.      If you develop fever, chills, nausea or vomiting, increase in drainage or foul smelling drainage go to the closest emergency department for evaluation.       Please try to consume 3-4 servings of protein (30g) each day.   - Each serving of protein should contain about 30 mg protein.   - Good sources of protein include, lean meats (fish, chicken, etc), eggs, dairy products (yogurt, cheese), tofu, legumes (chickpeas, lentils, peas, black beans), nuts (cashew, walnut, peanuts, etc), & quinoa.        Home Health to complete wound care  weekly    Recommend compression stockings from pharmacy (20-30 mmHg)  Recommend two pairs   Good for 6 months   Hand wash and left to air dry     Measurements:  RIGHT  Length 50.5 cm   Ankle 24.5 cm  Calf 40.1 cm   LEFT  Length 51 cm  Ankle 24.4 cm  Calf 39.9 cm    Follow up 1 week     Standing Status:   Future     Expected Date:   5/1/2025     Expiration Date:   5/1/2025   • Cast Application     This order was created via procedure documentation   • Wound Procedure Treatment     This order was created via procedure documentation         Electronically signed by Pavan Rock DO

## 2025-04-25 ENCOUNTER — HOME CARE VISIT (OUTPATIENT)
Dept: HOME HEALTH SERVICES | Facility: HOME HEALTHCARE | Age: 70
End: 2025-04-25
Payer: MEDICARE

## 2025-04-28 ENCOUNTER — HOME CARE VISIT (OUTPATIENT)
Dept: HOME HEALTH SERVICES | Facility: HOME HEALTHCARE | Age: 70
End: 2025-04-28
Payer: MEDICARE

## 2025-04-28 VITALS
SYSTOLIC BLOOD PRESSURE: 154 MMHG | RESPIRATION RATE: 18 BRPM | OXYGEN SATURATION: 92 % | HEART RATE: 87 BPM | TEMPERATURE: 98 F | DIASTOLIC BLOOD PRESSURE: 78 MMHG

## 2025-04-28 PROCEDURE — G0299 HHS/HOSPICE OF RN EA 15 MIN: HCPCS

## 2025-05-01 ENCOUNTER — OFFICE VISIT (OUTPATIENT)
Dept: WOUND CARE | Facility: CLINIC | Age: 70
End: 2025-05-01
Payer: MEDICARE

## 2025-05-01 ENCOUNTER — HOME CARE VISIT (OUTPATIENT)
Dept: HOME HEALTH SERVICES | Facility: HOME HEALTHCARE | Age: 70
End: 2025-05-01
Payer: MEDICARE

## 2025-05-01 VITALS
SYSTOLIC BLOOD PRESSURE: 140 MMHG | TEMPERATURE: 97.3 F | HEART RATE: 80 BPM | RESPIRATION RATE: 18 BRPM | DIASTOLIC BLOOD PRESSURE: 66 MMHG

## 2025-05-01 DIAGNOSIS — I83.892 VENOUS STASIS ULCER OF LEFT LOWER LEG WITH EDEMA OF LEFT LOWER LEG  (HCC): Primary | ICD-10-CM

## 2025-05-01 DIAGNOSIS — R60.0 VENOUS STASIS ULCER OF LEFT LOWER LEG WITH EDEMA OF LEFT LOWER LEG  (HCC): Primary | ICD-10-CM

## 2025-05-01 DIAGNOSIS — L97.919 VENOUS STASIS ULCER OF RIGHT LOWER LEG WITH EDEMA OF RIGHT LOWER LEG  (HCC): ICD-10-CM

## 2025-05-01 DIAGNOSIS — I83.891 VENOUS STASIS ULCER OF RIGHT LOWER LEG WITH EDEMA OF RIGHT LOWER LEG  (HCC): ICD-10-CM

## 2025-05-01 DIAGNOSIS — R60.0 VENOUS STASIS ULCER OF RIGHT LOWER LEG WITH EDEMA OF RIGHT LOWER LEG  (HCC): ICD-10-CM

## 2025-05-01 DIAGNOSIS — I83.029 VENOUS STASIS ULCER OF LEFT LOWER LEG WITH EDEMA OF LEFT LOWER LEG  (HCC): Primary | ICD-10-CM

## 2025-05-01 DIAGNOSIS — L97.929 VENOUS STASIS ULCER OF LEFT LOWER LEG WITH EDEMA OF LEFT LOWER LEG  (HCC): Primary | ICD-10-CM

## 2025-05-01 DIAGNOSIS — I83.019 VENOUS STASIS ULCER OF RIGHT LOWER LEG WITH EDEMA OF RIGHT LOWER LEG  (HCC): ICD-10-CM

## 2025-05-01 PROCEDURE — 29580 STRAPPING UNNA BOOT: CPT

## 2025-05-01 PROCEDURE — 99213 OFFICE O/P EST LOW 20 MIN: CPT | Performed by: SURGERY

## 2025-05-01 NOTE — PROGRESS NOTES
Wound Procedure Treatment    Performed by: Tanai Chen RN  Authorized by: Pavan Rock DO  Associated wounds:   Wound 03/11/25 Venous Ulcer Other (Comments) Ankle Lower;Right;Medial  Wound 03/11/25 Venous Ulcer Other (Comments) Ankle Left;Lower;Medial  Wound 04/17/25 Ankle Left;Lateral    Wound cleansed with:  Soap and water   Applied to periwound:  Ammonium lactate   Applied primary dressing:  Acticoat   Applied secondary dressing:  ABD   Dressing secured with:  Compression wrap

## 2025-05-01 NOTE — PATIENT INSTRUCTIONS
Orders Placed This Encounter   Procedures    Wound cleansing and dressings     Venous Ulcer Lower;Right and Left Legs           Wash your hands with soap and water.  Remove old dressing, discard into plastic bag and place in trash.  Cleanse the wound with mild soap and water prior to applying a clean dressing. Do not use tissue or cotton balls. Do not scrub the wound. Pat dry using gauze.     Shower no do not get dressings wet  may purchase and use cast cover on [employer]       Apply Lac Hydrin to dry scaly periwound   Apply acticoat 3 to only the open areas on bilateral lower extremity.  Cover with ABD   Secure with tasha and tape  Apply unna boot   Change dressing 2 times a week      Unna Boot compression wrap Instructions     Keep compression wrap/wraps clean and dry. If wraps are too tight and you experience numbness/tingling, call the wound center. If after hours, remove wraps or proceed to nearest E.R. and call wound center in AM.  Wrap will be changed 2x weekly home care to complete dressing changes between visits   Avoid prolonged standing in one place.  Elevate leg(s) above the level of the heart when sitting or as much as possible.      If you develop fever, chills, nausea or vomiting, increase in drainage or foul smelling drainage go to the closest emergency department for evaluation.       Please try to consume 3-4 servings of protein (30g) each day.   - Each serving of protein should contain about 30 mg protein.   - Good sources of protein include, lean meats (fish, chicken, etc), eggs, dairy products (yogurt, cheese), tofu, legumes (chickpeas, lentils, peas, black beans), nuts (cashew, walnut, peanuts, etc), & quinoa.        Home Health to complete wound care weekly     Recommend compression stockings from pharmacy (20-30 mmHg)  Recommend two pairs   Good for 6 months   Hand wash and left to air dry      Measurements:  RIGHT  Length 50.5 cm   Ankle 24.5 cm  Calf 40.1 cm   LEFT  Length 51 cm  Ankle 24.4  cm  Calf 39.9 cm     Follow up 1 week     Standing Status:   Future     Expiration Date:   5/8/2025

## 2025-05-01 NOTE — PROGRESS NOTES
Cast Application    Date/Time: 5/1/2025 11:15 AM    Performed by: Tania Chen RN  Authorized by: Pavan Rock,     Other Assisting Provider: No    Verbal consent obtained?: Yes    Risks and benefits: Risks, benefits and alternatives were discussed    Consent given by:  Patient  Time Out:     Time out: Immediately prior to the procedure a time out was called    Patient states understanding of procedure being performed: Yes    Patient's understanding of procedure matches consent: Yes    Procedure consent matches procedure scheduled: Yes    Patient identity confirmed:  Verbally with patient  Pre-procedure details:     Sensation:  Normal    Skin color:  WDL  Procedure details:     Laterality:  Bilateral    Location:  Leg    Leg:  L lower leg and R lower leg    Cast type:  Unna boot    Supplies:  2 layer wrap  Post-procedure details:     Pain:  Improved    Sensation:  Normal    Skin color:  WDL    Patient tolerance of procedure:  Tolerated well, no immediate complications  Comments:      UNNA BOOT wrap procedure     Before application, ROSS and/or TBI determined to be adequate for healing and application of compression. Lower extremity washed prior to application of compression wrap. With the foot in dorsiflexed position, Unna boot was applied as per physician orders without complications or complaint of pain.  The procedure was tolerated well. Toes warm & pink post application.  Patient provided education & reinforced to observe toes for any discoloration, swelling or tingling and instructed when to report to the Wound Center or to remove compression. Wound care as per providers orders, patient tolerated well.

## 2025-05-02 ENCOUNTER — RA CDI HCC (OUTPATIENT)
Dept: OTHER | Facility: HOSPITAL | Age: 70
End: 2025-05-02

## 2025-05-02 NOTE — PROGRESS NOTES
HCC coding opportunities          Chart Reviewed number of suggestions sent to Provider: 3     Patients Insurance     Medicare Insurance: Medicare        E11.22  E11.65  I13.0

## 2025-05-04 NOTE — ASSESSMENT & PLAN NOTE
Continued significant proved venous stasis disease with ulceration of left lower extremity.  Edema improved.  Ulcerations also smaller.  Continue current local wound care with compression wrap therapy.  Follow-up 1 week.    Orders:    Wound cleansing and dressings; Future    Cast Application    Wound Procedure Treatment

## 2025-05-04 NOTE — PROGRESS NOTES
Name: Bryant Bustamante Jr.      : 1955      MRN: [id number]  Encounter Provider: Pavan Rock DO  Encounter Date: 2025   Encounter department: [facility]  :  Assessment & Plan  Venous stasis ulcer of left lower leg with edema of left lower leg  (HCC)  Continued significant proved venous stasis disease with ulceration of left lower extremity.  Edema improved.  Ulcerations also smaller.  Continue current local wound care with compression wrap therapy.  Follow-up 1 week.    Orders:    Wound cleansing and dressings; Future    Cast Application    Wound Procedure Treatment    Venous stasis ulcer of right lower leg with edema of right lower leg  (HCC)  Continued significant proved venous stasis disease with ulceration of right lower extremity.  Edema improved.  Ulcerations also smaller.  Continue current local wound care with compression wrap therapy.  Follow-up 1 week.    Orders:    Wound cleansing and dressings; Future    Cast Application    Wound Procedure Treatment        History of Present Illness   Chief Complaint   Patient presents with    Follow Up Wound Care Visit     Left leg wound   Here for wound follow up.  70-year-old gentleman with bilateral lower extremity venous stasis disease with ulceration presents today follow-up evaluation treatment.  Overall doing well.  Continues compression wrap therapy bilaterally.  Drainage well-controlled.  No new complaints.      Objective   /66   Pulse 80   Temp (!) 97.3 °F (36.3 °C)   Resp 18     Physical Exam  Vitals reviewed. Exam conducted with a chaperone present.   Constitutional:       General: He is not in acute distress.     Appearance: Normal appearance. He is not ill-appearing, toxic-appearing or diaphoretic.   HENT:      Head: Normocephalic and atraumatic.      Right Ear: External ear normal.      Left Ear: External ear normal.   Eyes:      General: No scleral icterus.        Right eye: No discharge.         Left  eye: No discharge.   Cardiovascular:      Rate and Rhythm: Normal rate.   Pulmonary:      Effort: Pulmonary effort is normal. No respiratory distress.   Musculoskeletal:         General: Swelling present.      Cervical back: Normal range of motion.      Right lower leg: Edema present.      Left lower leg: Edema present.   Skin:     General: Skin is warm.      Coloration: Skin is not jaundiced or pale.      Findings: No bruising or erythema.      Comments: Bilateral lower extremity edema associated venous stasis disease and ulcerations.  Ulcers are rather small.  Clean base healthy good granulation tissue.  No need for debridement at this time.  Also significant improvement in overall dry skin and scaling.   Neurological:      General: No focal deficit present.      Mental Status: He is alert and oriented to person, place, and time.      Cranial Nerves: No cranial nerve deficit.   Psychiatric:         Mood and Affect: Mood normal.         Behavior: Behavior normal.         Thought Content: Thought content normal.         Judgment: Judgment normal.       Wound 03/11/25 Venous Ulcer Other (Comments) Ankle Lower;Right;Medial (Active)   Wound Image   05/01/25 1102   Wound Description Epithelialization;Pink;Yellow;Dry;White;Granulation tissue 05/01/25 1107   Non-staged Wound Description Full thickness 05/01/25 1107   Wound Length (cm) 0.3 cm 05/01/25 1107   Wound Width (cm) 0.3 cm 05/01/25 1107   Wound Depth (cm) 0.1 cm 05/01/25 1107   Wound Surface Area (cm^2) 0.09 cm^2 05/01/25 1107   Wound Volume (cm^3) 0.009 cm^3 05/01/25 1107   Calculated Wound Volume (cm^3) 0.01 cm^3 05/01/25 1107   Change in Wound Size % 99.95 05/01/25 1107   Drainage Amount Small 05/01/25 1107   Drainage Description Serous 05/01/25 1107   Kimberlyn-wound Assessment Dry;Scaly;Yellow-brown;White;Callus 05/01/25 1107   Treatments Cleansed 04/24/25 0832       Wound 03/11/25 Venous Ulcer Other (Comments) Ankle Left;Lower;Medial (Active)   Wound Image    05/01/25 1101   Wound Description Epithelialization;Pink;Yellow;White 05/01/25 1106   Non-staged Wound Description Full thickness 05/01/25 1106   Wound Length (cm) 0.4 cm 05/01/25 1106   Wound Width (cm) 0.4 cm 05/01/25 1106   Wound Depth (cm) 0.1 cm 05/01/25 1106   Wound Surface Area (cm^2) 0.16 cm^2 05/01/25 1106   Wound Volume (cm^3) 0.016 cm^3 05/01/25 1106   Calculated Wound Volume (cm^3) 0.02 cm^3 05/01/25 1106   Change in Wound Size % 99.97 05/01/25 1106   Drainage Amount Small 05/01/25 1106   Drainage Description Serous 05/01/25 1106   Kimberlyn-wound Assessment Brown;Dry;Scaly;Erythema;Yellow-brown;Callus 05/01/25 1106   Treatments Cleansed 04/24/25 0833       Wound 04/17/25 Ankle Left;Lateral (Active)   Wound Image   05/01/25 1101   Wound Description Epithelialization;Pink;White 05/01/25 1104   Non-staged Wound Description Full thickness 05/01/25 1104   Wound Length (cm) 0.3 cm 05/01/25 1104   Wound Width (cm) 0.4 cm 05/01/25 1104   Wound Depth (cm) 0.1 cm 05/01/25 1104   Wound Surface Area (cm^2) 0.12 cm^2 05/01/25 1104   Wound Volume (cm^3) 0.012 cm^3 05/01/25 1104   Calculated Wound Volume (cm^3) 0.01 cm^3 05/01/25 1104   Change in Wound Size % 80 05/01/25 1104   Drainage Amount Small 05/01/25 1104   Drainage Description Serous 05/01/25 1104   Kimberlyn-wound Assessment Pink;White;Brown;Scaly;Dry 05/01/25 1104       Procedures   Results from last 6 Months   Lab Units 03/13/25  1120   WOUND CULTURE  4+ Growth of Pseudomonas aeruginosa*  4+ Growth of Enterococcus faecalis*

## 2025-05-04 NOTE — ASSESSMENT & PLAN NOTE
Continued significant proved venous stasis disease with ulceration of right lower extremity.  Edema improved.  Ulcerations also smaller.  Continue current local wound care with compression wrap therapy.  Follow-up 1 week.    Orders:    Wound cleansing and dressings; Future    Cast Application    Wound Procedure Treatment

## 2025-05-05 ENCOUNTER — HOME CARE VISIT (OUTPATIENT)
Dept: HOME HEALTH SERVICES | Facility: HOME HEALTHCARE | Age: 70
End: 2025-05-05
Payer: MEDICARE

## 2025-05-05 NOTE — CASE COMMUNICATION
Call received from pt requesting to cx SN visit for today for wc.  Instructed on the necessity of performing wc as ordered and the necessity of SN visit for recert to be done to continue with HH services.  Pt states he has a prior appt scheduled and is not available today for visit.  CANDIDA Erickson RN made aware of the same.

## 2025-05-06 ENCOUNTER — HOME CARE VISIT (OUTPATIENT)
Dept: HOME HEALTH SERVICES | Facility: HOME HEALTHCARE | Age: 70
End: 2025-05-06
Payer: MEDICARE

## 2025-05-08 ENCOUNTER — TELEPHONE (OUTPATIENT)
Dept: OTHER | Facility: OTHER | Age: 70
End: 2025-05-08

## 2025-05-08 ENCOUNTER — HOME CARE VISIT (OUTPATIENT)
Dept: HOME HEALTH SERVICES | Facility: HOME HEALTHCARE | Age: 70
End: 2025-05-08
Payer: MEDICARE

## 2025-05-08 PROBLEM — Z12.11 COLON CANCER SCREENING: Status: RESOLVED | Noted: 2020-11-23 | Resolved: 2025-05-08

## 2025-05-08 PROBLEM — Z00.00 ENCOUNTER FOR MEDICARE ANNUAL WELLNESS EXAM: Status: RESOLVED | Noted: 2019-11-15 | Resolved: 2025-05-08

## 2025-05-08 NOTE — TELEPHONE ENCOUNTER
Patient is calling regarding cancelling an appointment.    Date/Time: 05/09 @100a      Patient was rescheduled: YES [x] NO []    Patient requesting call back to reschedule: YES [] NO [x]

## 2025-05-09 ENCOUNTER — HOME CARE VISIT (OUTPATIENT)
Dept: HOME HEALTH SERVICES | Facility: HOME HEALTHCARE | Age: 70
End: 2025-05-09
Payer: MEDICARE

## 2025-05-09 PROCEDURE — G0299 HHS/HOSPICE OF RN EA 15 MIN: HCPCS

## 2025-05-12 VITALS
RESPIRATION RATE: 18 BRPM | DIASTOLIC BLOOD PRESSURE: 82 MMHG | HEART RATE: 87 BPM | TEMPERATURE: 98.9 F | OXYGEN SATURATION: 94 % | SYSTOLIC BLOOD PRESSURE: 142 MMHG

## 2025-05-13 ENCOUNTER — HOME CARE VISIT (OUTPATIENT)
Dept: HOME HEALTH SERVICES | Facility: HOME HEALTHCARE | Age: 70
End: 2025-05-13
Payer: MEDICARE

## 2025-05-13 PROCEDURE — 400014 VN F/U

## 2025-05-13 PROCEDURE — G0299 HHS/HOSPICE OF RN EA 15 MIN: HCPCS

## 2025-05-14 VITALS
RESPIRATION RATE: 18 BRPM | TEMPERATURE: 97.4 F | DIASTOLIC BLOOD PRESSURE: 78 MMHG | HEART RATE: 91 BPM | OXYGEN SATURATION: 96 % | SYSTOLIC BLOOD PRESSURE: 150 MMHG

## 2025-05-15 ENCOUNTER — RESULTS FOLLOW-UP (OUTPATIENT)
Dept: FAMILY MEDICINE CLINIC | Facility: CLINIC | Age: 70
End: 2025-05-15

## 2025-05-15 ENCOUNTER — HOME CARE VISIT (OUTPATIENT)
Dept: HOME HEALTH SERVICES | Facility: HOME HEALTHCARE | Age: 70
End: 2025-05-15
Payer: MEDICARE

## 2025-05-15 ENCOUNTER — APPOINTMENT (OUTPATIENT)
Dept: LAB | Facility: HOSPITAL | Age: 70
End: 2025-05-15
Payer: MEDICARE

## 2025-05-15 ENCOUNTER — OFFICE VISIT (OUTPATIENT)
Dept: WOUND CARE | Facility: CLINIC | Age: 70
End: 2025-05-15
Payer: MEDICARE

## 2025-05-15 DIAGNOSIS — I83.029 VENOUS STASIS ULCER OF LEFT LOWER LEG WITH EDEMA OF LEFT LOWER LEG  (HCC): Primary | ICD-10-CM

## 2025-05-15 DIAGNOSIS — I83.892 VENOUS STASIS ULCER OF LEFT LOWER LEG WITH EDEMA OF LEFT LOWER LEG  (HCC): Primary | ICD-10-CM

## 2025-05-15 DIAGNOSIS — L97.929 VENOUS STASIS ULCER OF LEFT LOWER LEG WITH EDEMA OF LEFT LOWER LEG  (HCC): Primary | ICD-10-CM

## 2025-05-15 DIAGNOSIS — L97.919 VENOUS STASIS ULCER OF RIGHT LOWER LEG WITH EDEMA OF RIGHT LOWER LEG  (HCC): ICD-10-CM

## 2025-05-15 DIAGNOSIS — I83.019 VENOUS STASIS ULCER OF RIGHT LOWER LEG WITH EDEMA OF RIGHT LOWER LEG  (HCC): ICD-10-CM

## 2025-05-15 DIAGNOSIS — R60.0 VENOUS STASIS ULCER OF RIGHT LOWER LEG WITH EDEMA OF RIGHT LOWER LEG  (HCC): ICD-10-CM

## 2025-05-15 DIAGNOSIS — R60.0 VENOUS STASIS ULCER OF LEFT LOWER LEG WITH EDEMA OF LEFT LOWER LEG  (HCC): Primary | ICD-10-CM

## 2025-05-15 DIAGNOSIS — Z12.11 COLON CANCER SCREENING: Primary | ICD-10-CM

## 2025-05-15 DIAGNOSIS — I83.891 VENOUS STASIS ULCER OF RIGHT LOWER LEG WITH EDEMA OF RIGHT LOWER LEG  (HCC): ICD-10-CM

## 2025-05-15 LAB — HEMOCCULT STL QL IA: NEGATIVE

## 2025-05-15 PROCEDURE — G0328 FECAL BLOOD SCRN IMMUNOASSAY: HCPCS

## 2025-05-15 PROCEDURE — 99213 OFFICE O/P EST LOW 20 MIN: CPT | Performed by: SURGERY

## 2025-05-15 PROCEDURE — 99212 OFFICE O/P EST SF 10 MIN: CPT | Performed by: SURGERY

## 2025-05-15 NOTE — PROGRESS NOTES
Wound Procedure Treatment    Performed by: Liliana Molina RN  Authorized by: Pavan Rock DO  Associated wounds:   Wound 03/11/25 Venous Ulcer Other (Comment) Ankle Lower;Right;Medial  Wound 03/11/25 Venous Ulcer Other (Comment) Ankle Left;Lower;Medial  Wound 04/17/25 Ankle Left;Lateral    Wound cleansed with:  NSS   Applied to periwound:  Other   Dressing secured with:  Elastic tubular stocking and Size F   Comments:  Lac Hydrin

## 2025-05-15 NOTE — PATIENT INSTRUCTIONS
Orders Placed This Encounter   Procedures    Wound Procedure Treatment     This order was created via procedure documentation    Wound cleansing and dressings     Venous Ulcer Lower;Right and Left Legs - HEALED           Shower no do not get dressings wet  may purchase and use cast cover on [employer]       Continue Lac Hydrin to dry scaly areas       Spandagrips Size F  Elastic Tubular Stocking    Tubular elastic bandage: Apply from base of toes to behind the knee. Apply in AM, may remove for sleep.    Avoid prolonged standing in one place.    Elevate leg(s) above the level of the heart when sitting or as much as possible.     Spandagrips are good for two weeks  Must be hand washed and left to air dry       Please try to consume 3-4 servings of protein (30g) each day.   - Each serving of protein should contain about 30 mg protein.   - Good sources of protein include, lean meats (fish, chicken, etc), eggs, dairy products (yogurt, cheese), tofu, legumes (chickpeas, lentils, peas, black beans), nuts (cashew, walnut, peanuts, etc), & quinoa.         Recommend compression stockings from pharmacy (20-30 mmHg)  Recommend two pairs   Good for 6 months   Hand wash and left to air dry      Measurements:  RIGHT  Length 50.5 cm   Ankle 24.5 cm  Calf 40.1 cm   LEFT  Length 51 cm  Ankle 24.4 cm  Calf 39.9 cm     Follow up as needed. Any new issues arise please don't hesitate to give us a call!     Standing Status:   Future     Expected Date:   5/22/2025     Expiration Date:   5/22/2025

## 2025-05-15 NOTE — PROGRESS NOTES
Name: Bryant Bustamante Jr.      : 1955      MRN: [id number]  Encounter Provider: Pavan Rock DO  Encounter Date: 5/15/2025   Encounter department: [facility]  :  Assessment & Plan  Venous stasis ulcer of left lower leg with edema of left lower leg  (HCC)  Venous stasis ulcer left lower extremity now healed.  Encourage patient to continue wearing his compression stockings.  Follow-up as needed.    Orders:    Wound Procedure Treatment    Wound cleansing and dressings; Future    Venous stasis ulcer of right lower leg with edema of right lower leg  (HCC)  Right lower extremity edema noted but overall improved.  Venous stasis ulcer is now healed.  Patient should continue to wear pressure stockings.  Follow-up as needed.    Orders:    Wound Procedure Treatment    Wound cleansing and dressings; Future        History of Present Illness   Chief Complaint   Patient presents with    Follow Up Wound Care Visit     DEEDEE wounds    Here for wound follow up.  70-year-old gentleman with bilateral lower extremity venous stasis disease with edema and ulceration presents today for follow-up.  Overall doing well.  Denies drainage.  No significant pain.  Overall doing well.  No new complaints      Objective   There were no vitals taken for this visit.    Physical Exam  Vitals reviewed. Exam conducted with a chaperone present.   Constitutional:       General: He is not in acute distress.     Appearance: Normal appearance. He is not ill-appearing, toxic-appearing or diaphoretic.   HENT:      Head: Normocephalic and atraumatic.      Right Ear: External ear normal.      Left Ear: External ear normal.     Eyes:      General: No scleral icterus.        Right eye: No discharge.         Left eye: No discharge.       Cardiovascular:      Rate and Rhythm: Normal rate.   Pulmonary:      Effort: Pulmonary effort is normal. No respiratory distress.     Musculoskeletal:         General: Swelling present.       Cervical back: Normal range of motion.     Skin:     General: Skin is warm.      Coloration: Skin is not jaundiced or pale.      Findings: No bruising or erythema.      Comments: Bilateral lower extreme venous stasis disease noted.  Discoloration and staining of the skin evident.  Ulcerations bilaterally now healed.     Neurological:      General: No focal deficit present.      Mental Status: He is alert and oriented to person, place, and time.      Cranial Nerves: No cranial nerve deficit.     Psychiatric:         Mood and Affect: Mood normal.         Behavior: Behavior normal.         Thought Content: Thought content normal.         Judgment: Judgment normal.       Wound 03/11/25 Venous Ulcer Other (Comment) Ankle Lower;Right;Medial (Active)   Wound Image   05/15/25 0958   Wound Description Epithelialization;Dry 05/15/25 1003   Non-staged Wound Description Full thickness 05/01/25 1107   Wound Length (cm) 0 cm 05/15/25 1003   Wound Width (cm) 0 cm 05/15/25 1003   Wound Depth (cm) 0 cm 05/15/25 1003   Wound Surface Area (cm^2) 0 cm^2 05/15/25 1003   Wound Volume (cm^3) 0 cm^3 05/15/25 1003   Calculated Wound Volume (cm^3) 0 cm^3 05/15/25 1003   Change in Wound Size % 100 05/15/25 1003   Drainage Amount None 05/15/25 1003   Drainage Description Serous 05/01/25 1107   Kimberlyn-wound Assessment Dry;Scaly;Yellow-brown;Callus 05/15/25 1003   Treatments Cleansed 05/15/25 1003       Wound 03/11/25 Venous Ulcer Other (Comment) Ankle Left;Lower;Medial (Active)   Wound Image   05/15/25 1000   Wound Description Epithelialization;Pink 05/15/25 1003   Non-staged Wound Description Full thickness 05/01/25 1106   Wound Length (cm) 0 cm 05/15/25 1003   Wound Width (cm) 0 cm 05/15/25 1003   Wound Depth (cm) 0 cm 05/15/25 1003   Wound Surface Area (cm^2) 0 cm^2 05/15/25 1003   Wound Volume (cm^3) 0 cm^3 05/15/25 1003   Calculated Wound Volume (cm^3) 0 cm^3 05/15/25 1003   Change in Wound Size % 100 05/15/25 1003   Drainage Amount Small  05/01/25 1106   Drainage Description Serous 05/01/25 1106   Kimberlyn-wound Assessment Brown;Dry;Scaly;Erythema;Yellow-brown;Callus 05/01/25 1106   Treatments Cleansed 05/15/25 1003       Wound 04/17/25 Ankle Left;Lateral (Active)   Wound Image   05/15/25 0959   Wound Description Epithelialization 05/15/25 1002   Non-staged Wound Description Full thickness 05/01/25 1104   Wound Length (cm) 0 cm 05/15/25 1002   Wound Width (cm) 0 cm 05/15/25 1002   Wound Depth (cm) 0 cm 05/15/25 1002   Wound Surface Area (cm^2) 0 cm^2 05/15/25 1002   Wound Volume (cm^3) 0 cm^3 05/15/25 1002   Calculated Wound Volume (cm^3) 0 cm^3 05/15/25 1002   Change in Wound Size % 100 05/15/25 1002   Drainage Amount None 05/15/25 1002   Drainage Description Serous 05/01/25 1104   Kimberlyn-wound Assessment Pink;White;Brown;Scaly;Dry 05/15/25 1002       Procedures   Results from last 6 Months   Lab Units 03/13/25  1120   WOUND CULTURE  4+ Growth of Pseudomonas aeruginosa*  4+ Growth of Enterococcus faecalis*

## 2025-05-15 NOTE — ASSESSMENT & PLAN NOTE
Venous stasis ulcer left lower extremity now healed.  Encourage patient to continue wearing his compression stockings.  Follow-up as needed.    Orders:    Wound Procedure Treatment    Wound cleansing and dressings; Future

## 2025-05-15 NOTE — LETTER
[facility]  [address] [address]  Phone#  [phone]  Fax#  [phone]    Patient:  Bryant Bustamante Jr.  YOB: 1955  Phone:  [phone]  Date of Visit:  5/15/2025    Orders Placed This Encounter   Procedures   • Wound cleansing and dressings     Venous Ulcer Lower;Right and Left Legs - HEALED           Shower no do not get dressings wet  may purchase and use cast cover on [employer]       Continue Lac Hydrin to dry scaly areas     Avoid prolonged standing in one place.    Elevate leg(s) above the level of the heart when sitting or as much as possible.        Please try to consume 3-4 servings of protein (30g) each day.   - Each serving of protein should contain about 30 mg protein.   - Good sources of protein include, lean meats (fish, chicken, etc), eggs, dairy products (yogurt, cheese), tofu, legumes (chickpeas, lentils, peas, black beans), nuts (cashew, walnut, peanuts, etc), & quinoa.         Recommend compression stockings from pharmacy (20-30 mmHg)  Recommend two pairs   Good for 6 months   Hand wash and left to air dry      Measurements:  RIGHT  Length 50.5 cm   Ankle 24.5 cm  Calf 40.1 cm   LEFT  Length 51 cm  Ankle 24.4 cm  Calf 39.9 cm     Follow up as needed. Any new issues arise please don't hesitate to give us a call!     Standing Status:   Future     Expected Date:   5/22/2025     Expiration Date:   5/22/2025   • Wound Procedure Treatment     This order was created via procedure documentation         Electronically signed by Pavan Rock DO

## 2025-05-15 NOTE — ASSESSMENT & PLAN NOTE
Right lower extremity edema noted but overall improved.  Venous stasis ulcer is now healed.  Patient should continue to wear pressure stockings.  Follow-up as needed.    Orders:    Wound Procedure Treatment    Wound cleansing and dressings; Future

## 2025-05-15 NOTE — TELEPHONE ENCOUNTER
----- Message from Bryant Holloway DO sent at 5/15/2025  1:14 PM EDT -----  Stool test was negative  ----- Message -----  From: Lab, Background User  Sent: 5/15/2025  11:05 AM EDT  To: Bryant Holloway DO

## 2025-05-19 PROCEDURE — G0179 MD RECERTIFICATION HHA PT: HCPCS | Performed by: FAMILY MEDICINE

## 2025-05-20 ENCOUNTER — OFFICE VISIT (OUTPATIENT)
Dept: FAMILY MEDICINE CLINIC | Facility: CLINIC | Age: 70
End: 2025-05-20
Payer: MEDICARE

## 2025-05-20 VITALS
DIASTOLIC BLOOD PRESSURE: 78 MMHG | OXYGEN SATURATION: 96 % | HEIGHT: 73 IN | TEMPERATURE: 97.8 F | WEIGHT: 277.4 LBS | BODY MASS INDEX: 36.77 KG/M2 | SYSTOLIC BLOOD PRESSURE: 136 MMHG | HEART RATE: 74 BPM

## 2025-05-20 DIAGNOSIS — I10 ESSENTIAL HYPERTENSION, BENIGN: ICD-10-CM

## 2025-05-20 DIAGNOSIS — E11.42 TYPE 2 DIABETES MELLITUS WITH DIABETIC POLYNEUROPATHY, WITHOUT LONG-TERM CURRENT USE OF INSULIN (HCC): Primary | ICD-10-CM

## 2025-05-20 DIAGNOSIS — R91.1 PULMONARY NODULE: ICD-10-CM

## 2025-05-20 PROCEDURE — G2211 COMPLEX E/M VISIT ADD ON: HCPCS | Performed by: FAMILY MEDICINE

## 2025-05-20 PROCEDURE — 99214 OFFICE O/P EST MOD 30 MIN: CPT | Performed by: FAMILY MEDICINE

## 2025-05-20 NOTE — ASSESSMENT & PLAN NOTE
Patient has type 2 diabetes.  He did not have a logbook or his glucometer with him so that I can review his Accu-Cheks.  He is on metformin 500 mg twice daily.  Last GFR is above 30 so we will continue this.  He is on glipizide 10 mg twice daily.  I added Jardiance 10 mg daily to his regiment.  I will have him return to the office in 2 months and I plan repeat labs at that time.  Lab Results   Component Value Date    HGBA1C 12.7 (H) 03/13/2025       Orders:    Empagliflozin (Jardiance) 10 MG TABS tablet; Take 1 tablet (10 mg total) by mouth in the morning

## 2025-05-20 NOTE — PATIENT INSTRUCTIONS
"Patient Education     Foot care for people with diabetes   The Basics   Written by the doctors and editors at [facility]   Why is foot care important if I have diabetes? -- Diabetes can cause nerve damage if your blood sugar is high for a long time. The medical term for this is \"diabetic neuropathy.\"  If you have problems with the nerves in your feet, you might not be able to feel pain in your foot. Normally, people feel pain when they get a cut or a blister on their foot. The pain tells them that they need to treat their cut so it can heal. But people with nerve damage might not feel any pain when their feet get hurt. They might not even know that they have a cut, so they might not treat it. Problems that aren't treated right away can get much worse. For example, an untreated cut can get infected and turn into an open sore.  High blood sugar can also damage blood vessels and decrease blood flow to your feet. This can weaken your skin and make wounds take longer to heal. You are also more likely to get an infection if you have high blood sugar.  How do I take care of my feet? -- Taking good care of your feet can help prevent foot problems. You should:   Wash your feet every day with soap and warm water. Pat your feet dry, and be sure to dry the skin between your toes.   Keep your feet moisturized. Put lotion on the tops and bottoms of your feet, but not between your toes.   Check your feet every day (figure 1). Look for cuts, blisters, redness, or swelling. Use a mirror, or ask someone to help you check the bottoms of your feet. Check all parts of the foot, especially between the toes. Look for broken skin, ulcers, blisters, or redness.   Trim your toenails straight across when needed (figure 2). Do not cut the corners of your toenails. File rough edges. Do not cut your cuticles. Ask for help if you cannot see well or have problems reaching your feet.   Ask your doctor or nurse to check your feet at each visit. Take " your shoes and socks off for these checks.   See a foot care provider (such as a podiatrist) if you have an ingrown toenail, corn, or callus. Do not try to remove corns and calluses yourself.  How do I protect my feet from injury? -- There are several ways to protect your feet. You can:   Wear shoes and socks at all times, even at home. Do not walk barefoot. Wear swim shoes if you go to the beach or a swimming pool.   Choose shoes that fit well. They should not be not too tight or too loose. Your shoes should have plenty of room for your toes (figure 3). Your doctor might give you a prescription for special shoes. Check to see if they are covered by your insurance.   Check your shoes each time before you put them on to make sure that the lining is smooth. Also check to make sure that there is nothing inside the shoes before putting them on.   Do not wear shoes that expose any part of the foot, like sandals, thongs, or clogs.   Wear cotton socks that fit loosely. Do not wear shoes without socks.   Protect your feet from heat and cold. Test bath water before putting your feet in it to make sure that it is not too hot. Do not walk barefoot on hot ground. Take extra care when going outside in the cold and wear warm socks.  What else should I know? -- You can lower your risk for foot problems by keeping your blood sugar levels as close to your goal as possible. Other things you can do include:   Move your ankles and toes often to help with blood flow. You can wear a support stocking to help with swelling.   Walk often. Regular walking helps blood flow.   If you smoke, try to quit. Your doctor or nurse can help. Smoking causes poor blood flow to your feet and can damage your nerves.  When should I call the doctor? -- Call your doctor or nurse for advice if you have:   A fever of 100.4°F (38°C) or higher, chills, or a wound that will not heal   Swelling, redness, warmth around a wound, a foul smell coming from a wound, or  yellowish, greenish, or bloody discharge   Sores or blisters on your feet that hurt more or less than you would expect   Numbness or tingling in your foot or leg   Corns, calluses, blisters, or new sores on your foot   Very dry, scaly, or cracked skin on your feet   Changes in the way your foot joints or arch look  All topics are updated as new evidence becomes available and our peer review process is complete.  This topic retrieved from [employer] on: Mar 13, 2024.  Topic 610085 Version 2.0  Release: 32.2.4 - C32.71  © 2024 UpToDate, Inc. and/or its affiliates. All rights reserved.  figure 1: Foot check for people with diabetes     People with diabetes should check both of their feet every day. It is important to check your feet all over, including in between your toes. If you can't see the bottom of your foot, use a mirror or ask another person to check for you. Let your doctor or nurse know if you find any:  Redness   Cuts or cracks in the skin   Blisters   Swelling   Graphic 50940 Version 3.0  figure 2: Trim your toenails     Trim your toenails straight across and smooth them with a nail file.  Graphic 71553 Version 2.0  figure 3: Correct shoe shape     Choose shoes that fit the right way and are not too tight or too loose. Your shoes should have plenty of room for your toes.  Graphic 14468 Version 2.0  Consumer Information Use and Disclaimer   Disclaimer: This generalized information is a limited summary of diagnosis, treatment, and/or medication information. It is not meant to be comprehensive and should be used as a tool to help the user understand and/or assess potential diagnostic and treatment options. It does NOT include all information about conditions, treatments, medications, side effects, or risks that may apply to a specific patient. It is not intended to be medical advice or a substitute for the medical advice, diagnosis, or treatment of a health care provider based on the health care provider's  examination and assessment of a patient's specific and unique circumstances. Patients must speak with a health care provider for complete information about their health, medical questions, and treatment options, including any risks or benefits regarding use of medications. This information does not endorse any treatments or medications as safe, effective, or approved for treating a specific patient. Up[employer]Date, Inc. and its affiliates disclaim any warranty or liability relating to this information or the use thereof.The use of this information is governed by the Terms of Use, available at https://www.woltersRevision3uwer.com/en/know/clinical-effectiveness-terms. 2024© UpToDate, Inc. and its affiliates and/or licensors. All rights reserved.  Copyright   © 2024 UpToDate, Inc. and/or its affiliates. All rights reserved.

## 2025-05-20 NOTE — PROGRESS NOTES
Name: Bryant Bustamante Jr.      : 1955      MRN: [id number]  Encounter Provider: Bryant Holloway DO  Encounter Date: 2025   Encounter department: [facility]  :  Assessment & Plan  Type 2 diabetes mellitus with diabetic polyneuropathy, without long-term current use of insulin (HCC)  Patient has type 2 diabetes.  He did not have a logbook or his glucometer with him so that I can review his Accu-Cheks.  He is on metformin 500 mg twice daily.  Last GFR is above 30 so we will continue this.  He is on glipizide 10 mg twice daily.  I added Jardiance 10 mg daily to his regiment.  I will have him return to the office in 2 months and I plan repeat labs at that time.  Lab Results   Component Value Date    HGBA1C 12.7 (H) 2025       Orders:    Empagliflozin (Jardiance) 10 MG TABS tablet; Take 1 tablet (10 mg total) by mouth in the morning    Pulmonary nodule  Patient was discovered to have a pulmonary nodule during his hospitalization.  Due to his smoking history, repeat CT was recommended for 3 to 6 months.  I placed an order for the repeat CT of the chest without contrast.  Orders:    CT chest wo contrast; Future    Essential hypertension, benign  Patient has hypertension.  I checked his blood pressure myself today and found it to be 136/78.  Blood pressure is currently reasonably well-controlled.  I did not make any change with his current medication.                History of Present Illness   This is a 69 white male who presents to the office today for routine checkup.  He was discharged from home health care.  His wounds have healed.  He finally got into see urology and he is scheduled for an MRI of the prostate.  He forgot his glucometer.  Therefore, he did not have a record of his blood sugars with him.  He tells me his blood sugars have been fluctuating.  He could not really give me any specific numbers but in general, it sounds like they may still be running high.  He denies any  "hypoglycemic events.      Review of Systems   Cardiovascular:  Positive for leg swelling. Negative for chest pain and palpitations.        Denies orthopnea and paroxysmal nocturnal dyspnea   Gastrointestinal:  Negative for abdominal distention, abdominal pain, blood in stool, constipation, diarrhea and nausea.   Endocrine: Negative for polydipsia and polyphagia.   Skin:  Negative for wound.       Objective   /78 (BP Location: Left arm, Patient Position: Sitting, Cuff Size: Large)   Pulse 74   Temp 97.8 °F (36.6 °C) (Tympanic)   Ht 6' 1\" (1.854 m)   Wt 126 kg (277 lb 6.4 oz)   SpO2 96%   BMI 36.60 kg/m²      Physical Exam  Vitals reviewed.   Constitutional:       Comments: This is a 70-year-old white male who appears his stated age.  The patient is nonseptic in appearance and in no apparent distress   HENT:      Head: Normocephalic and atraumatic.      Right Ear: Tympanic membrane, ear canal and external ear normal. There is no impacted cerumen.      Left Ear: Tympanic membrane, ear canal and external ear normal. There is no impacted cerumen.      Mouth/Throat:      Mouth: Mucous membranes are moist.      Pharynx: Oropharynx is clear. No oropharyngeal exudate or posterior oropharyngeal erythema.     Eyes:      General:         Right eye: No discharge.         Left eye: No discharge.      Conjunctiva/sclera: Conjunctivae normal.      Pupils: Pupils are equal, round, and reactive to light.       Cardiovascular:      Rate and Rhythm: Normal rate and regular rhythm.      Pulses: no weak pulses.           Dorsalis pedis pulses are 2+ (Edematous foot and ankle) on the right side and 0 (Edematous foot and ankle) on the left side.        Posterior tibial pulses are 0 on the right side and 0 on the left side.      Heart sounds: Normal heart sounds. No murmur heard.     No friction rub. No gallop.   Pulmonary:      Effort: Pulmonary effort is normal. No respiratory distress.      Breath sounds: Normal breath " sounds. No stridor. No wheezing, rhonchi or rales.     Musculoskeletal:      Cervical back: Neck supple.      Right lower leg: Edema present.      Left lower leg: Edema present.   Feet:      Right foot:      Skin integrity: No ulcer, skin breakdown, erythema, warmth, callus or dry skin.      Left foot:      Skin integrity: No ulcer, skin breakdown, erythema, warmth, callus or dry skin.   Lymphadenopathy:      Cervical: No cervical adenopathy.     Skin:     Comments: There are chronic venous stasis changes present in both lower extremities.  His wounds have completely healed over.  His legs were dry     Psychiatric:         Mood and Affect: Mood normal.         Behavior: Behavior normal.         Thought Content: Thought content normal.         Judgment: Judgment normal.     Patient's shoes and socks removed.    Right Foot/Ankle   Right Foot Inspection  Skin Exam: skin normal and skin intact. No dry skin, no warmth, no callus, no erythema, no maceration, no abnormal color, no pre-ulcer, no ulcer and no callus.     Toe Exam: No swelling, no tenderness, erythema and  no right toe deformity    Sensory   Proprioception: absent  Monofilament testing: absent    Vascular  Capillary refills: < 3 seconds  The right DP pulse is 2+ (Edematous foot and ankle). The right PT pulse is 0.     Left Foot/Ankle  Left Foot Inspection  Skin Exam: skin normal and skin intact. No dry skin, no warmth, no erythema, no maceration, normal color, no pre-ulcer, no ulcer and no callus.     Toe Exam: No swelling, no tenderness, no erythema and no left toe deformity.     Sensory   Proprioception: absent  Monofilament testing: absent    Vascular  Capillary refills: < 3 seconds  The left DP pulse is 0 (Edematous foot and ankle). The left PT pulse is 0.     Assign Risk Category  Deformity present  Loss of protective sensation  No weak pulses  Risk: 2

## 2025-05-21 ENCOUNTER — HOSPITAL ENCOUNTER (OUTPATIENT)
Dept: MRI IMAGING | Facility: HOSPITAL | Age: 70
Discharge: HOME | End: 2025-05-21
Payer: MEDICARE

## 2025-05-21 DIAGNOSIS — R97.20 ELEVATED PSA: ICD-10-CM

## 2025-05-21 PROCEDURE — A9585 GADOBUTROL INJECTION: HCPCS

## 2025-05-21 PROCEDURE — 76377 3D RENDER W/INTRP POSTPROCES: CPT

## 2025-05-21 PROCEDURE — 72197 MRI PELVIS W/O & W/DYE: CPT

## 2025-05-21 RX ORDER — GADOBUTROL 604.72 MG/ML
13 INJECTION INTRAVENOUS
Status: COMPLETED | OUTPATIENT
Start: 2025-05-21 | End: 2025-05-21

## 2025-05-21 RX ADMIN — GADOBUTROL 13 ML: 604.72 INJECTION INTRAVENOUS at 13:44

## 2025-05-21 NOTE — ASSESSMENT & PLAN NOTE
Patient was discovered to have a pulmonary nodule during his hospitalization.  Due to his smoking history, repeat CT was recommended for 3 to 6 months.  I placed an order for the repeat CT of the chest without contrast.  Orders:    CT chest wo contrast; Future

## 2025-05-21 NOTE — ASSESSMENT & PLAN NOTE
Patient has hypertension.  I checked his blood pressure myself today and found it to be 136/78.  Blood pressure is currently reasonably well-controlled.  I did not make any change with his current medication.

## 2025-05-28 ENCOUNTER — RESULTS FOLLOW-UP (OUTPATIENT)
Dept: UROLOGY | Facility: CLINIC | Age: 70
End: 2025-05-28

## 2025-05-28 NOTE — TELEPHONE ENCOUNTER
I spoke with patient via telephone.  I confirmed their name and date of birth prior to my telephone encounter.      I called to speak with patient regarding his recent multiparametric MRI of the prostate results with the following impression.  Of note, there was an addendum made that findings are to the right apical posterior peripheral zone instead of the left.    IMPRESSION:     1. PI-RADSv2.1 Category 5 - Very high (clinically significant cancer is highly likely to be present).     2. No extraprostatic tumor, seminal vesicle invasion, pelvic lymphadenopathy, or pelvic osseous metastatic disease.     3. Moderate BPH with calculated prostate volume of 81 mL.          Recommendations:  I reviewed the patient's MRI findings with him in detail today.  Recommendations are to proceed with a MRI fusion guided biopsy of prostate.  I will place a case request and our surgery scheduler contact him to schedule this.  He has scheduled follow-up with me on 6/10 to review in more detail.    JUDY Garcia

## 2025-05-29 DIAGNOSIS — M54.50 CHRONIC BILATERAL LOW BACK PAIN WITHOUT SCIATICA: ICD-10-CM

## 2025-05-29 DIAGNOSIS — G89.29 CHRONIC BILATERAL LOW BACK PAIN WITHOUT SCIATICA: ICD-10-CM

## 2025-05-30 RX ORDER — METHOCARBAMOL 750 MG/1
750 TABLET, FILM COATED ORAL 3 TIMES DAILY
Qty: 90 TABLET | Refills: 1 | Status: SHIPPED | OUTPATIENT
Start: 2025-05-30

## 2025-06-02 ENCOUNTER — TELEPHONE (OUTPATIENT)
Dept: FAMILY MEDICINE CLINIC | Facility: CLINIC | Age: 70
End: 2025-06-02

## 2025-06-02 ENCOUNTER — TELEPHONE (OUTPATIENT)
Age: 70
End: 2025-06-02

## 2025-06-02 DIAGNOSIS — I10 ESSENTIAL HYPERTENSION, BENIGN: ICD-10-CM

## 2025-06-02 DIAGNOSIS — E11.42 TYPE 2 DIABETES MELLITUS WITH DIABETIC POLYNEUROPATHY, WITHOUT LONG-TERM CURRENT USE OF INSULIN (HCC): ICD-10-CM

## 2025-06-02 NOTE — TELEPHONE ENCOUNTER
Patient called the RX Refill Line. Message is being forwarded to the office.     Patient is requesting too speak to someone from the office regarding his last office visit 5/20/25. Patient stated he is to have a CT scan of his chest but has not received a call to schedule the appointment. Please contact the patient with the office number to set up the appointment.    Please contact patient at [phone]

## 2025-06-02 NOTE — TELEPHONE ENCOUNTER
Reason for call: - This is not a duplicate          Change in Pharmacy  [x] Refill   [] Prior Auth  [] Other:     Office:   [x] PCP/Provider - [facility]   [] Specialty/Provider -     Medication: Empagliflozin (Jardiance)     Dose/Frequency: 10mg   One Daily    Quantity: 90    Pharmacy: [id number]   Does the patient have enough for 3 days?   [] Yes   [x] No - Send as HP to POD    Mail Away Pharmacy   Does the patient have enough for 10 days?   [] Yes   [] No - Send as HP to POD

## 2025-06-02 NOTE — TELEPHONE ENCOUNTER
Pt under care of: Emma  Office Location: [place]    Insurance   Current Insurance? Medicare   Insurance E-verified? Yes    Pt called and requested to speak with his provider, he stated he was told he would be receiving a phone call to schedule a procedure and as of now he has not. Please review.     Pt can be reached at: [phone]

## 2025-06-03 RX ORDER — METOPROLOL SUCCINATE 50 MG/1
50 TABLET, EXTENDED RELEASE ORAL DAILY
Qty: 90 TABLET | Refills: 1 | Status: SHIPPED | OUTPATIENT
Start: 2025-06-03

## 2025-06-04 ENCOUNTER — PREP FOR PROCEDURE (OUTPATIENT)
Dept: UROLOGY | Facility: MEDICAL CENTER | Age: 70
End: 2025-06-04

## 2025-06-04 DIAGNOSIS — R39.89 SUSPECTED UTI: ICD-10-CM

## 2025-06-04 DIAGNOSIS — Z01.810 PRE-OPERATIVE CARDIOVASCULAR EXAMINATION: ICD-10-CM

## 2025-06-04 DIAGNOSIS — Z01.812 PRE-OPERATIVE LABORATORY EXAMINATION: ICD-10-CM

## 2025-06-04 DIAGNOSIS — R97.20 ELEVATED PSA: Primary | ICD-10-CM

## 2025-06-04 NOTE — TELEPHONE ENCOUNTER
Contacted Bryant and scheduled him for path review with Dr. Mathews on 7/10/25 @ 1:00 pm in [place].

## 2025-06-04 NOTE — TELEPHONE ENCOUNTER
Spoke with patient and confirmed surgery date of: 6/25/25  Type of surgery: TRANSP US GUIDED BX  Operating physician: DR. FREEMAN  Location of surgery: [facility]     Verbally went over prep with patient on: 6/4/25  NPO   Bowel prep? Yes, 1 enema 1 hour prior to leaving the house for the procedure   Hospital calls afternoon prior with arrival time -Calls Friday afternoon for Monday surgeries   Patient needs ride to and from surgery (outpatient)    Pre-op testing to be done 2 weeks prior to surgery    CBC,CMP, U/C,EKG  Blood thinners:Aspirin 81 mg    Clearances needed: none     Mailed packet on: 6/4/25  Copy of packet scanned into Media on: 6/4/25  Labs in packet   Soap instructions in packet    Post-op in packet   H&P on admit   PO  encounter sent       Consent: On admit

## 2025-06-08 DIAGNOSIS — M54.50 CHRONIC BILATERAL LOW BACK PAIN WITHOUT SCIATICA: ICD-10-CM

## 2025-06-08 DIAGNOSIS — G89.29 CHRONIC BILATERAL LOW BACK PAIN WITHOUT SCIATICA: ICD-10-CM

## 2025-06-09 RX ORDER — METHOCARBAMOL 750 MG/1
750 TABLET, FILM COATED ORAL 3 TIMES DAILY
Qty: 90 TABLET | Refills: 1 | Status: SHIPPED | OUTPATIENT
Start: 2025-06-09

## 2025-06-10 ENCOUNTER — OFFICE VISIT (OUTPATIENT)
Age: 70
End: 2025-06-10
Payer: MEDICARE

## 2025-06-10 ENCOUNTER — APPOINTMENT (OUTPATIENT)
Dept: LAB | Facility: HOSPITAL | Age: 70
End: 2025-06-10
Payer: MEDICARE

## 2025-06-10 VITALS
HEIGHT: 73 IN | TEMPERATURE: 98 F | BODY MASS INDEX: 37.77 KG/M2 | OXYGEN SATURATION: 96 % | DIASTOLIC BLOOD PRESSURE: 78 MMHG | WEIGHT: 285 LBS | HEART RATE: 76 BPM | SYSTOLIC BLOOD PRESSURE: 132 MMHG

## 2025-06-10 DIAGNOSIS — R97.20 ELEVATED PSA: Primary | ICD-10-CM

## 2025-06-10 DIAGNOSIS — Z01.812 PRE-OPERATIVE LABORATORY EXAMINATION: ICD-10-CM

## 2025-06-10 DIAGNOSIS — R97.20 ELEVATED PSA: ICD-10-CM

## 2025-06-10 DIAGNOSIS — R39.89 SUSPECTED UTI: ICD-10-CM

## 2025-06-10 LAB
ALBUMIN SERPL BCG-MCNC: 4.6 G/DL (ref 3.5–5)
ALP SERPL-CCNC: 61 U/L (ref 34–104)
ALT SERPL W P-5'-P-CCNC: 37 U/L (ref 7–52)
ANION GAP SERPL CALCULATED.3IONS-SCNC: 11 MMOL/L (ref 4–13)
AST SERPL W P-5'-P-CCNC: 25 U/L (ref 13–39)
BASOPHILS # BLD AUTO: 0.07 THOUSANDS/ÂΜL (ref 0–0.1)
BASOPHILS NFR BLD AUTO: 1 % (ref 0–1)
BILIRUB SERPL-MCNC: 0.33 MG/DL (ref 0.2–1)
BUN SERPL-MCNC: 34 MG/DL (ref 5–25)
CALCIUM SERPL-MCNC: 9.6 MG/DL (ref 8.4–10.2)
CHLORIDE SERPL-SCNC: 107 MMOL/L (ref 96–108)
CO2 SERPL-SCNC: 22 MMOL/L (ref 21–32)
CREAT SERPL-MCNC: 1.87 MG/DL (ref 0.6–1.3)
EOSINOPHIL # BLD AUTO: 0.65 THOUSAND/ÂΜL (ref 0–0.61)
EOSINOPHIL NFR BLD AUTO: 6 % (ref 0–6)
ERYTHROCYTE [DISTWIDTH] IN BLOOD BY AUTOMATED COUNT: 14.3 % (ref 11.6–15.1)
GFR SERPL CREATININE-BSD FRML MDRD: 35 ML/MIN/1.73SQ M
GLUCOSE P FAST SERPL-MCNC: 283 MG/DL (ref 65–99)
HCT VFR BLD AUTO: 36.3 % (ref 36.5–49.3)
HGB BLD-MCNC: 12.4 G/DL (ref 12–17)
IMM GRANULOCYTES # BLD AUTO: 0.03 THOUSAND/UL (ref 0–0.2)
IMM GRANULOCYTES NFR BLD AUTO: 0 % (ref 0–2)
LYMPHOCYTES # BLD AUTO: 2.17 THOUSANDS/ÂΜL (ref 0.6–4.47)
LYMPHOCYTES NFR BLD AUTO: 19 % (ref 14–44)
MCH RBC QN AUTO: 33.3 PG (ref 26.8–34.3)
MCHC RBC AUTO-ENTMCNC: 34.2 G/DL (ref 31.4–37.4)
MCV RBC AUTO: 98 FL (ref 82–98)
MONOCYTES # BLD AUTO: 0.94 THOUSAND/ÂΜL (ref 0.17–1.22)
MONOCYTES NFR BLD AUTO: 8 % (ref 4–12)
NEUTROPHILS # BLD AUTO: 7.39 THOUSANDS/ÂΜL (ref 1.85–7.62)
NEUTS SEG NFR BLD AUTO: 66 % (ref 43–75)
NRBC BLD AUTO-RTO: 0 /100 WBCS
PLATELET # BLD AUTO: 256 THOUSANDS/UL (ref 149–390)
PMV BLD AUTO: 9.8 FL (ref 8.9–12.7)
POTASSIUM SERPL-SCNC: 4.1 MMOL/L (ref 3.5–5.3)
PROT SERPL-MCNC: 8.3 G/DL (ref 6.4–8.4)
RBC # BLD AUTO: 3.72 MILLION/UL (ref 3.88–5.62)
SODIUM SERPL-SCNC: 140 MMOL/L (ref 135–147)
WBC # BLD AUTO: 11.25 THOUSAND/UL (ref 4.31–10.16)

## 2025-06-10 PROCEDURE — 80053 COMPREHEN METABOLIC PANEL: CPT

## 2025-06-10 PROCEDURE — 99213 OFFICE O/P EST LOW 20 MIN: CPT

## 2025-06-10 PROCEDURE — 85025 COMPLETE CBC W/AUTO DIFF WBC: CPT

## 2025-06-10 PROCEDURE — 36415 COLL VENOUS BLD VENIPUNCTURE: CPT

## 2025-06-10 PROCEDURE — 87086 URINE CULTURE/COLONY COUNT: CPT

## 2025-06-10 NOTE — ASSESSMENT & PLAN NOTE
History and physical was performed for the patient's upcoming transperineal ultrasound-guided biopsy of prostate without MRI fusion on 06/25/2025 with Dr. Mathews.  Technique, benefits, and risk of the procedure listed above were discussed with them today and informed written consent was obtained.  All questions and concerns regarding surgery and perioperative expectations have been addressed and answered.  No overall changes in their health since last visit.  Denies any prior complications with anesthesia.   Patient knows to have his preop labs, urine culture, EKG completed prior to his biopsy.  He was instructed to have this done tomorrow.  Again he must be noted that there was an addendum made to the original description of the location of his lesion on his multiparametric MRI of the prostate.  Lesion is located in the right apical posterior peripheral zone instead of the left.

## 2025-06-10 NOTE — H&P (VIEW-ONLY)
Name: Bryant Bustamante Jr.      : 1955      MRN: [id number]  Encounter Provider: JUDY Garcia  Encounter Date: 6/10/2025   [facility]    :  Assessment & Plan  Elevated PSA  History and physical was performed for the patient's upcoming transperineal ultrasound-guided biopsy of prostate without MRI fusion on 2025 with Dr. Mathews.  Technique, benefits, and risk of the procedure listed above were discussed with them today and informed written consent was obtained.  All questions and concerns regarding surgery and perioperative expectations have been addressed and answered.  No overall changes in their health since last visit.  Denies any prior complications with anesthesia.   Patient knows to have his preop labs, urine culture, EKG completed prior to his biopsy.  He was instructed to have this done tomorrow.  Again he must be noted that there was an addendum made to the original description of the location of his lesion on his multiparametric MRI of the prostate.  Lesion is located in the right apical posterior peripheral zone instead of the left.             Interval HPI:        History of Present Illness     Recently established patient initially seen by me on 2025 for evaluation of elevated PSA.  PSA at the time of his initial visit was 19.056 as of 3/13/2025. His PSA has been on a upward trajectory since  was 4.7.  He denies any family history of prostate cancer to his knowledge.  He states that his father had something done but he was very young at that time.  He denies any significant bothersome urinary symptoms.  He does report urinary frequency but attributes this to Lasix.  He otherwise feels he has a good urinary stream and empties well.  Denies any history of UTIs or prostatitis in the past.    Patient elected to have a multiparametric MRI of the prostate which were completed on 2025 which revealed a PI-RADS category 5 lesion  measuring 1.5 x 1.0 x 1.0 cm located in the right apical posterior peripheral zone.  It must be noted that radiology did make an addendum to the original description of location of the lesion.  There was no evidence of extraprostatic tumor, seminal vesicle invasion, pelvic lymphadenopathy, or pelvic osseous metastatic disease noted.  Patient does have moderate BPH with calculated prostate volume of 81 mL.        PSA Trend  19.056 (3/13/2025)  PSA % free 19.309  13.3 (3/28/2024)  10.4 (9/15/2022)  4.7 (10/24/2020)  4.0 (10/15/2019)  3.8 (2/11/2019)      Objective   There were no vitals taken for this visit.    Review of Systems   Constitutional:  Negative for chills and fever.   HENT:  Negative for congestion and sore throat.    Respiratory:  Negative for cough and shortness of breath.    Cardiovascular:  Negative for chest pain and leg swelling.   Gastrointestinal:  Negative for abdominal pain, constipation and diarrhea.   Genitourinary:  Negative for difficulty urinating, dysuria, frequency, hematuria and urgency.   Musculoskeletal:  Negative for back pain and gait problem.   Skin:  Negative for wound.   Allergic/Immunologic: Negative for immunocompromised state.   Hematological:  Does not bruise/bleed easily.       Physical Exam  Vitals and nursing note reviewed.   Constitutional:       General: He is not in acute distress.     Appearance: He is well-developed.   HENT:      Head: Normocephalic and atraumatic.      Mouth/Throat:      Mouth: Mucous membranes are moist.      Pharynx: Oropharynx is clear.     Eyes:      Conjunctiva/sclera: Conjunctivae normal.       Cardiovascular:      Rate and Rhythm: Normal rate and regular rhythm.      Pulses: Normal pulses.      Heart sounds: Normal heart sounds. No murmur heard.  Pulmonary:      Effort: Pulmonary effort is normal. No respiratory distress.      Breath sounds: Normal breath sounds.   Abdominal:      General: There is no distension.      Palpations: Abdomen is  "soft.      Tenderness: There is no abdominal tenderness. There is no right CVA tenderness or left CVA tenderness.     Musculoskeletal:         General: No swelling.      Cervical back: Neck supple.     Skin:     General: Skin is warm and dry.      Capillary Refill: Capillary refill takes less than 2 seconds.     Neurological:      Mental Status: He is alert.     Psychiatric:         Mood and Affect: Mood normal.           Imagin2025    ADDENDUM:     There is an error in the description of location of the lesion. Location of the lesion should should state \"RIGHT apical posterior peripheral zone\" instead of left.   Addended by Zahida Shipley MD on 2025  1:28 PM     Study Result    Narrative & Impression   MULTIPARAMETRIC MRI OF THE PROSTATE WITH AND WITHOUT CONTRAST-WITH 3-D POSTPROCESSING     INDICATION: R97.20: Elevated prostate specific antigen (PSA).     PSA LEVEL: 19.056 ng/mL on 3/13/2025.  PRIOR BIOPSY : None.     COMPARISON: None.     TECHNIQUE: Multiparametric MRI of the prostate was performed with and without contrast on a 1.5T MRI.     CONTRAST: Gadobutrol (Gadavist) 13 mL of Gadobutrol injection (SINGLE-DOSE)     TECHNICAL LIMITATIONS: None.     FINDINGS:     PROSTATE:  Size: 6.0 x 5.5 x 5.9 cm = 81 mL. PSAD= 0.23 ng/mL2  Hemorrhage: None.  Benign prostatic hyperplasia (BPH): Moderate.     Focal lesions as follows:  Lesion: 1  Size: 1.5 x 1.0 x 1.0 cm, series 300 image 15, series 350 image 15, series 4 image 20.  Location: Left apical posterior peripheral zone  T2-weighted images: Score 5: Circumscribed, homogeneous moderate hypointense focus/mass greater than or equal to 1.5 cm in greatest dimension OR definite extraprostatic extension/invasive behavior.  Diffusion-weighted images: Score 5: Focal markedly hypointense on ADC and markedly hyperintense on high b-value DWI, but greater than or equal to 1.5 cm in greatest dimension or definite extraprostatic extension/invasive " behavior.  Dynamic post-contrast images: (+) Focal, earlier or contemporaneous with, enhancement of adjacent normal prostatic tissues; corresponds to a finding on T2-weighted and/or DWI.  PI-RADS Assessment Category: 5, Very high (clinically significant cancer is highly likely to be present).  Extra-prostatic extension (EPE): Abuts capsule without visualized EPE.     Note: Clinically significant cancer is defined on pathology/histology as [place] score greater than or equal to 7, and/or volume of greater than or equal to 0.5 mL, and/or extraprostatic extension.     SEMINAL VESICLES : Unremarkable.     URINARY BLADDER: Unremarkable.     LYMPH NODES: No pelvic lymphadenopathy.     BONES: No suspicious osseous lesion. Susceptibility artifacts from lumbar spine fusion hardware.     OTHER: Colonic diverticulosis. Small fat-containing left inguinal hernia.     IMPRESSION:     1. PI-RADSv2.1 Category 5 - Very high (clinically significant cancer is highly likely to be present).     2. No extraprostatic tumor, seminal vesicle invasion, pelvic lymphadenopathy, or pelvic osseous metastatic disease.     3. Moderate BPH with calculated prostate volume of 81 mL.     Prostate gland boundaries and areas of concern for significant prostate cancer were segmented using 3D advanced post-processing on an independent [employer] system workstation with active physician participation. The segmentation was performed should   MR-ultrasound fusion biopsy be required.             Please Note:  Voice dictation software has been used to create this document. There may be inadvertent transcriptions errors.     JUDY Garcia 06/10/25

## 2025-06-10 NOTE — PROGRESS NOTES
Name: Bryant Bustamante Jr.      : 1955      MRN: [id number]  Encounter Provider: JUDY Garcia  Encounter Date: 6/10/2025   Encounter department: [facility]    :  Assessment & Plan  Elevated PSA  History and physical was performed for the patient's upcoming transperineal ultrasound-guided biopsy of prostate without MRI fusion on 2025 with Dr. Mathews.  Technique, benefits, and risk of the procedure listed above were discussed with them today and informed written consent was obtained.  All questions and concerns regarding surgery and perioperative expectations have been addressed and answered.  No overall changes in their health since last visit.  Denies any prior complications with anesthesia.   Patient knows to have his preop labs, urine culture, EKG completed prior to his biopsy.  He was instructed to have this done tomorrow.  Again he must be noted that there was an addendum made to the original description of the location of his lesion on his multiparametric MRI of the prostate.  Lesion is located in the right apical posterior peripheral zone instead of the left.             Interval HPI:        History of Present Illness     Recently established patient initially seen by me on 2025 for evaluation of elevated PSA.  PSA at the time of his initial visit was 19.056 as of 3/13/2025. His PSA has been on a upward trajectory since  was 4.7.  He denies any family history of prostate cancer to his knowledge.  He states that his father had something done but he was very young at that time.  He denies any significant bothersome urinary symptoms.  He does report urinary frequency but attributes this to Lasix.  He otherwise feels he has a good urinary stream and empties well.  Denies any history of UTIs or prostatitis in the past.    Patient elected to have a multiparametric MRI of the prostate which were completed on 2025 which revealed a PI-RADS category 5 lesion  measuring 1.5 x 1.0 x 1.0 cm located in the right apical posterior peripheral zone.  It must be noted that radiology did make an addendum to the original description of location of the lesion.  There was no evidence of extraprostatic tumor, seminal vesicle invasion, pelvic lymphadenopathy, or pelvic osseous metastatic disease noted.  Patient does have moderate BPH with calculated prostate volume of 81 mL.        PSA Trend  19.056 (3/13/2025)  PSA % free 19.309  13.3 (3/28/2024)  10.4 (9/15/2022)  4.7 (10/24/2020)  4.0 (10/15/2019)  3.8 (2/11/2019)      Objective   There were no vitals taken for this visit.    Review of Systems   Constitutional:  Negative for chills and fever.   HENT:  Negative for congestion and sore throat.    Respiratory:  Negative for cough and shortness of breath.    Cardiovascular:  Negative for chest pain and leg swelling.   Gastrointestinal:  Negative for abdominal pain, constipation and diarrhea.   Genitourinary:  Negative for difficulty urinating, dysuria, frequency, hematuria and urgency.   Musculoskeletal:  Negative for back pain and gait problem.   Skin:  Negative for wound.   Allergic/Immunologic: Negative for immunocompromised state.   Hematological:  Does not bruise/bleed easily.       Physical Exam  Vitals and nursing note reviewed.   Constitutional:       General: He is not in acute distress.     Appearance: He is well-developed.   HENT:      Head: Normocephalic and atraumatic.      Mouth/Throat:      Mouth: Mucous membranes are moist.      Pharynx: Oropharynx is clear.     Eyes:      Conjunctiva/sclera: Conjunctivae normal.       Cardiovascular:      Rate and Rhythm: Normal rate and regular rhythm.      Pulses: Normal pulses.      Heart sounds: Normal heart sounds. No murmur heard.  Pulmonary:      Effort: Pulmonary effort is normal. No respiratory distress.      Breath sounds: Normal breath sounds.   Abdominal:      General: There is no distension.      Palpations: Abdomen is  "soft.      Tenderness: There is no abdominal tenderness. There is no right CVA tenderness or left CVA tenderness.     Musculoskeletal:         General: No swelling.      Cervical back: Neck supple.     Skin:     General: Skin is warm and dry.      Capillary Refill: Capillary refill takes less than 2 seconds.     Neurological:      Mental Status: He is alert.     Psychiatric:         Mood and Affect: Mood normal.           Imagin2025    ADDENDUM:     There is an error in the description of location of the lesion. Location of the lesion should should state \"RIGHT apical posterior peripheral zone\" instead of left.   Addended by Zahida Shipley MD on 2025  1:28 PM     Study Result    Narrative & Impression   MULTIPARAMETRIC MRI OF THE PROSTATE WITH AND WITHOUT CONTRAST-WITH 3-D POSTPROCESSING     INDICATION: R97.20: Elevated prostate specific antigen (PSA).     PSA LEVEL: 19.056 ng/mL on 3/13/2025.  PRIOR BIOPSY : None.     COMPARISON: None.     TECHNIQUE: Multiparametric MRI of the prostate was performed with and without contrast on a 1.5T MRI.     CONTRAST: Gadobutrol (Gadavist) 13 mL of Gadobutrol injection (SINGLE-DOSE)     TECHNICAL LIMITATIONS: None.     FINDINGS:     PROSTATE:  Size: 6.0 x 5.5 x 5.9 cm = 81 mL. PSAD= 0.23 ng/mL2  Hemorrhage: None.  Benign prostatic hyperplasia (BPH): Moderate.     Focal lesions as follows:  Lesion: 1  Size: 1.5 x 1.0 x 1.0 cm, series 300 image 15, series 350 image 15, series 4 image 20.  Location: Left apical posterior peripheral zone  T2-weighted images: Score 5: Circumscribed, homogeneous moderate hypointense focus/mass greater than or equal to 1.5 cm in greatest dimension OR definite extraprostatic extension/invasive behavior.  Diffusion-weighted images: Score 5: Focal markedly hypointense on ADC and markedly hyperintense on high b-value DWI, but greater than or equal to 1.5 cm in greatest dimension or definite extraprostatic extension/invasive " behavior.  Dynamic post-contrast images: (+) Focal, earlier or contemporaneous with, enhancement of adjacent normal prostatic tissues; corresponds to a finding on T2-weighted and/or DWI.  PI-RADS Assessment Category: 5, Very high (clinically significant cancer is highly likely to be present).  Extra-prostatic extension (EPE): Abuts capsule without visualized EPE.     Note: Clinically significant cancer is defined on pathology/histology as [place] score greater than or equal to 7, and/or volume of greater than or equal to 0.5 mL, and/or extraprostatic extension.     SEMINAL VESICLES : Unremarkable.     URINARY BLADDER: Unremarkable.     LYMPH NODES: No pelvic lymphadenopathy.     BONES: No suspicious osseous lesion. Susceptibility artifacts from lumbar spine fusion hardware.     OTHER: Colonic diverticulosis. Small fat-containing left inguinal hernia.     IMPRESSION:     1. PI-RADSv2.1 Category 5 - Very high (clinically significant cancer is highly likely to be present).     2. No extraprostatic tumor, seminal vesicle invasion, pelvic lymphadenopathy, or pelvic osseous metastatic disease.     3. Moderate BPH with calculated prostate volume of 81 mL.     Prostate gland boundaries and areas of concern for significant prostate cancer were segmented using 3D advanced post-processing on an independent [facility] system workstation with active physician participation. The segmentation was performed should   MR-ultrasound fusion biopsy be required.             Please Note:  Voice dictation software has been used to create this document. There may be inadvertent transcriptions errors.     JUDY Garcia 06/10/25

## 2025-06-11 ENCOUNTER — PROCEDURE VISIT (OUTPATIENT)
Dept: PODIATRY | Facility: CLINIC | Age: 70
End: 2025-06-11
Payer: MEDICARE

## 2025-06-11 VITALS
TEMPERATURE: 97.8 F | BODY MASS INDEX: 37.77 KG/M2 | HEART RATE: 91 BPM | WEIGHT: 285 LBS | RESPIRATION RATE: 16 BRPM | OXYGEN SATURATION: 95 % | HEIGHT: 73 IN

## 2025-06-11 DIAGNOSIS — L85.3 XEROSIS OF SKIN: ICD-10-CM

## 2025-06-11 DIAGNOSIS — I83.029 VENOUS STASIS ULCER OF LEFT LOWER LEG WITH EDEMA OF LEFT LOWER LEG  (HCC): ICD-10-CM

## 2025-06-11 DIAGNOSIS — R60.0 VENOUS STASIS ULCER OF RIGHT LOWER LEG WITH EDEMA OF RIGHT LOWER LEG  (HCC): ICD-10-CM

## 2025-06-11 DIAGNOSIS — I83.019 VENOUS STASIS ULCER OF RIGHT LOWER LEG WITH EDEMA OF RIGHT LOWER LEG  (HCC): ICD-10-CM

## 2025-06-11 DIAGNOSIS — E11.42 TYPE 2 DIABETES MELLITUS WITH DIABETIC POLYNEUROPATHY, WITHOUT LONG-TERM CURRENT USE OF INSULIN (HCC): ICD-10-CM

## 2025-06-11 DIAGNOSIS — R60.0 VENOUS STASIS ULCER OF LEFT LOWER LEG WITH EDEMA OF LEFT LOWER LEG  (HCC): ICD-10-CM

## 2025-06-11 DIAGNOSIS — I83.891 VENOUS STASIS ULCER OF RIGHT LOWER LEG WITH EDEMA OF RIGHT LOWER LEG  (HCC): ICD-10-CM

## 2025-06-11 DIAGNOSIS — B35.1 ONYCHOMYCOSIS: Primary | ICD-10-CM

## 2025-06-11 DIAGNOSIS — L97.919 VENOUS STASIS ULCER OF RIGHT LOWER LEG WITH EDEMA OF RIGHT LOWER LEG  (HCC): ICD-10-CM

## 2025-06-11 DIAGNOSIS — I83.892 VENOUS STASIS ULCER OF LEFT LOWER LEG WITH EDEMA OF LEFT LOWER LEG  (HCC): ICD-10-CM

## 2025-06-11 DIAGNOSIS — L97.929 VENOUS STASIS ULCER OF LEFT LOWER LEG WITH EDEMA OF LEFT LOWER LEG  (HCC): ICD-10-CM

## 2025-06-11 DIAGNOSIS — S90.414A ABRASION OF TOE OF RIGHT FOOT, INITIAL ENCOUNTER: ICD-10-CM

## 2025-06-11 LAB — BACTERIA UR CULT: NORMAL

## 2025-06-11 PROCEDURE — 11721 DEBRIDE NAIL 6 OR MORE: CPT | Performed by: PODIATRIST

## 2025-06-11 PROCEDURE — 99212 OFFICE O/P EST SF 10 MIN: CPT | Performed by: PODIATRIST

## 2025-06-11 RX ORDER — MUPIROCIN 20 MG/G
OINTMENT TOPICAL DAILY
Qty: 22 G | Refills: 1 | Status: SHIPPED | OUTPATIENT
Start: 2025-06-11

## 2025-06-11 NOTE — PROGRESS NOTES
Name: Bryant Bustamante Jr.      : 1955      MRN: [id number]  Encounter Provider: Florecita Pardo DPM  Encounter Date: 2025   Encounter department: [facility] PODIATRY [place]  :  Assessment & Plan  Type 2 diabetes mellitus with diabetic polyneuropathy, without long-term current use of insulin (HCC)    Lab Results   Component Value Date    HGBA1C 12.7 (H) 2025            Xerosis of skin         Venous stasis ulcer of right lower leg with edema of right lower leg  (HCC)         Venous stasis ulcer of left lower leg with edema of left lower leg  (HCC)         Onychomycosis         Abrasion of toe of right foot, initial encounter    Orders:    mupirocin (BACTROBAN) 2 % ointment; Apply topically daily      IMPRESSION:  Diabetic footcare DM2 A1c 12.7% 3/13/2025  Onychomycosis  Xerosis  Bilateral venous stasis ulcerations, resolved  New right second toe medial and lateral superficial abrasions without SOI at today's exam     PLAN:  Diabetic footcare reviewed.  Need for strict toxemia control reviewed.  Effects of elevated blood sugar on wound healing, neuropathy, and ability to fight infection reviewed  New right second toe medial and lateral abrasion, does not appear infected at this time however patient counseled to cleanse the area daily, apply mupirocin ointment, and keep covered  Rx mupirocin 2% ointment  Continue with keratolytic moisturizers for the legs and continue with compression socks and elevation to maintain skin barrier  Elongated painful onychomycotic nails thinned and shortened patient's tolerance without incidence using large nail nipper and electric bur  Patient should continue with wide toebox/upper shoes to prevent rubbing and irritation on the feet  Patient counseled at length on the importance of daily foot checks and evaluation for changes at the feet  PCP note reviewed 2025  Wound care note reviewed 5/15/2025  Follow-up 2 weeks for repeat evaluation of right second  "toe  Follow-up 8 to 9 weeks for diabetic footcare      History of Present Illness   HPI  Bryant Bustamante Jr. is a 70 y.o. male who presents for evaluation management of diabetic feet.  Patient states that he has been discharged from wound care as his venous stasis wounds have healed.  He is trying to be consistent with moisturizer and compression sleeves to lower extremities.  He tries to keep his feet elevated.  Patient denies any other pedal complaints at this time      Review of Systems  Constitutional: Negative.    Eyes: Negative.    Respiratory: Negative.     Cardiovascular:  Positive for leg swelling.   Musculoskeletal:  Positive for arthralgias and gait problem.   Skin:  Positive for wound.        Objective   Pulse 91   Temp 97.8 °F (36.6 °C)   Resp 16   Ht 6' 1\" (1.854 m)   Wt 129 kg (285 lb)   SpO2 95%   BMI 37.60 kg/m²      Physical Exam    Cardiovascular:      Pulses: Pulses are weak.           Dorsalis pedis pulses are 0 on the right side and 0 on the left side.        Posterior tibial pulses are 0 on the right side and 0 on the left side.      Comments: DP/PT pulses nonpalpable secondary to edema    Musculoskeletal:      Comments: Mild hammertoe deformity noted 2nd through 3rd digits bilaterally  MMT 5/5 at level of ankle  Patient will dorsiflex, plantarflex distal digits   Feet:      Right foot:      Skin integrity: Dry skin present.      Left foot:      Skin integrity: Dry skin present.       Constitutional:       General: He is not in acute distress.     Appearance: He is not ill-appearing.   Cardiovascular:      Comments: DP/PT pulse nonpalpable secondary to edema.  CRT less than 3 seconds to distal digits  Absent pedal hair growth  Skin temperature gradient decreases from knees to digits bilaterally  Bilateral nonpitting edema, moderate  Skin:     Capillary Refill: Capillary refill takes 2 to 3 seconds.      Comments: Bilateral thin skin with hemosiderin deposits.  No open wounds.  " Hyperkeratotic tissue noted at medial lateral ankles bilaterally without open wound  Right second toe medial and lateral dorsal aspect with superficial abrasion.  No drainage, no crepitus, no fluctuance, no erythema, no edema  Neurological:      Sensory: Sensory deficit present.     Diabetic Foot Exam    Patient's shoes and socks removed.    Right Foot/Ankle   Right Foot Inspection  Skin Exam: dry skin. Skin not intact.     Toe Exam: right toe deformity.     Sensory   Vibration: absent  Monofilament testing: absent    Vascular  Capillary refills: < 3 seconds  The right DP pulse is 0. The right PT pulse is 0.     Left Foot/Ankle  Left Foot Inspection  Skin Exam: dry skin. Skin not intact.     Toe Exam: left toe deformity.     Sensory   Vibration: absent  Monofilament testing: absent    Vascular  Capillary refills: < 3 seconds  The left DP pulse is 0. The left PT pulse is 0.     Assign Risk Category  Deformity present  Loss of protective sensation  Weak pulses  Risk: 2

## 2025-06-13 ENCOUNTER — ANESTHESIA EVENT (OUTPATIENT)
Dept: PERIOP | Facility: HOSPITAL | Age: 70
End: 2025-06-13
Payer: MEDICARE

## 2025-06-16 DIAGNOSIS — Z91.89 RISK FACTORS FOR OBSTRUCTIVE SLEEP APNEA: Primary | ICD-10-CM

## 2025-06-16 NOTE — PRE-PROCEDURE INSTRUCTIONS
Pre-Surgery Instructions:   Medication Instructions    amLODIPine (NORVASC) 5 mg tablet Take day of surgery.    ammonium lactate (LAC-HYDRIN) 12 % lotion Hold day of surgery.    aspirin (Aspirin 81) 81 mg EC tablet Take day of surgery.    B Complex Vitamins (B COMPLEX 1 PO) Stop taking 7 days prior to surgery.    buPROPion (WELLBUTRIN XL) 150 mg 24 hr tablet Take day of surgery.    cetirizine (ZyrTEC) 10 mg tablet Take day of surgery.    ciclopirox (PENLAC) 8 % solution Hold day of surgery.    fluticasone (FLONASE) 50 mcg/act nasal spray Uses PRN- OK to take day of surgery    furosemide (LASIX) 40 mg tablet Hold day of surgery.    glipiZIDE (GLUCOTROL) 10 mg tablet Hold day of surgery.    lidocaine (LMX) 4 % cream Hold day of surgery.    lisinopril (ZESTRIL) 10 mg tablet Hold day of surgery.    metFORMIN (GLUCOPHAGE) 1000 MG tablet Hold day of surgery.    methocarbamol (ROBAXIN) 750 mg tablet Uses PRN- OK to take day of surgery    metoprolol succinate (TOPROL-XL) 50 mg 24 hr tablet Take day of surgery.    Multiple Vitamins-Minerals (MULTIVITAMIN ADULT PO) Stop taking 7 days prior to surgery.    mupirocin (BACTROBAN) 2 % ointment Hold day of surgery.    pregabalin (LYRICA) 150 mg capsule Take day of surgery.    rosuvastatin (CRESTOR) 40 MG tablet Take day of surgery.   Medication instructions for day of surgery reviewed. Please take all instructed medications with only a sip of water. Please do not take any over the counter (non-prescribed) vitamins or supplements for one week prior to date of surgery.     Per Anesthesia reminded pt to continue with DM prior to DOS and trying to keep Glucose <250 or possible cx of sx DOS.  Pt verbalized understanding     You will receive a call one business day prior to surgery with an arrival time and hospital directions. If your surgery is scheduled on a Monday, the hospital will be calling you on the Friday prior to your surgery. If you have not heard from anyone by 8pm, please  call the hospital supervisor through the hospital  at [phone]. ([place] [phone] or [place] [phone]).    Do not eat or drink anything after midnight the night before your surgery, including candy, mints, lifesavers, or chewing gum. Do not drink alcohol 24hrs before your surgery. Try not to smoke at least 24hrs before your surgery.       Follow the pre surgery showering instructions as listed in the “My Surgical Experience Booklet” or otherwise provided by your surgeon's office. Do not use a blade to shave the surgical area 1 week before surgery. It is okay to use a clean electric clippers up to 24 hours before surgery. Do not apply any lotions, creams, including makeup, cologne, deodorant, or perfumes after showering on the day of your surgery. Do not use dry shampoo, hair spray, hair gel, or any type of hair products.     No contact lenses, eye make-up, or artificial eyelashes. Remove nail polish, including gel polish, and any artificial, gel, or acrylic nails if possible. Remove all jewelry including rings and body piercing jewelry.     Wear causal clothing that is easy to take on and off. Consider your type of surgery.    Keep any valuables, jewelry, piercings at home. Please bring any specially ordered equipment (sling, braces) if indicated.    Arrange for a responsible person to drive you to and from the hospital on the day of your surgery. Please confirm the visitor policy for the day of your procedure when you receive your phone call with an arrival time.     Call the surgeon's office with any new illnesses, exposures, or additional questions prior to surgery.    Please reference your “My Surgical Experience Booklet” for additional information to prepare for your upcoming surgery.

## 2025-06-24 ENCOUNTER — HOSPITAL ENCOUNTER (OUTPATIENT)
Dept: CT IMAGING | Facility: HOSPITAL | Age: 70
Discharge: HOME | End: 2025-06-24
Attending: FAMILY MEDICINE
Payer: MEDICARE

## 2025-06-24 ENCOUNTER — OFFICE VISIT (OUTPATIENT)
Dept: LAB | Facility: HOSPITAL | Age: 70
End: 2025-06-24
Attending: UROLOGY
Payer: MEDICARE

## 2025-06-24 DIAGNOSIS — R97.20 ELEVATED PSA: ICD-10-CM

## 2025-06-24 DIAGNOSIS — R91.1 PULMONARY NODULE: ICD-10-CM

## 2025-06-24 DIAGNOSIS — Z01.810 PRE-OPERATIVE CARDIOVASCULAR EXAMINATION: ICD-10-CM

## 2025-06-24 PROCEDURE — 93005 ELECTROCARDIOGRAM TRACING: CPT

## 2025-06-24 PROCEDURE — 71250 CT THORAX DX C-: CPT

## 2025-06-25 ENCOUNTER — ANESTHESIA (OUTPATIENT)
Dept: PERIOP | Facility: HOSPITAL | Age: 70
End: 2025-06-25
Payer: MEDICARE

## 2025-06-25 ENCOUNTER — HOSPITAL ENCOUNTER (OUTPATIENT)
Facility: HOSPITAL | Age: 70
Setting detail: OUTPATIENT SURGERY
Discharge: HOME | End: 2025-06-25
Attending: UROLOGY | Admitting: UROLOGY
Payer: MEDICARE

## 2025-06-25 VITALS
WEIGHT: 285 LBS | HEIGHT: 73 IN | SYSTOLIC BLOOD PRESSURE: 141 MMHG | HEART RATE: 71 BPM | DIASTOLIC BLOOD PRESSURE: 76 MMHG | RESPIRATION RATE: 20 BRPM | TEMPERATURE: 97.6 F | BODY MASS INDEX: 37.77 KG/M2 | OXYGEN SATURATION: 96 %

## 2025-06-25 DIAGNOSIS — R93.89 ABNORMAL MRI: ICD-10-CM

## 2025-06-25 DIAGNOSIS — R97.20 ELEVATED PSA: ICD-10-CM

## 2025-06-25 LAB — GLUCOSE SERPL-MCNC: 207 MG/DL (ref 65–140)

## 2025-06-25 PROCEDURE — G0416 PROSTATE BIOPSY, ANY MTHD: HCPCS | Performed by: PATHOLOGY

## 2025-06-25 PROCEDURE — 88344 IMHCHEM/IMCYTCHM EA MLT ANTB: CPT | Performed by: PATHOLOGY

## 2025-06-25 PROCEDURE — 82948 REAGENT STRIP/BLOOD GLUCOSE: CPT

## 2025-06-25 RX ORDER — HYDROMORPHONE HCL IN WATER/PF 6 MG/30 ML
0.2 PATIENT CONTROLLED ANALGESIA SYRINGE INTRAVENOUS
Status: DISCONTINUED | OUTPATIENT
Start: 2025-06-25 | End: 2025-06-25 | Stop reason: HOSPADM

## 2025-06-25 RX ORDER — MIDAZOLAM HYDROCHLORIDE 2 MG/2ML
INJECTION, SOLUTION INTRAMUSCULAR; INTRAVENOUS AS NEEDED
Status: DISCONTINUED | OUTPATIENT
Start: 2025-06-25 | End: 2025-06-25

## 2025-06-25 RX ORDER — ONDANSETRON 2 MG/ML
INJECTION INTRAMUSCULAR; INTRAVENOUS AS NEEDED
Status: DISCONTINUED | OUTPATIENT
Start: 2025-06-25 | End: 2025-06-25

## 2025-06-25 RX ORDER — CEFAZOLIN SODIUM 3 G/50ML
3000 SOLUTION INTRAVENOUS ONCE
Status: COMPLETED | OUTPATIENT
Start: 2025-06-25 | End: 2025-06-25

## 2025-06-25 RX ORDER — FENTANYL CITRATE 50 UG/ML
INJECTION, SOLUTION INTRAMUSCULAR; INTRAVENOUS AS NEEDED
Status: DISCONTINUED | OUTPATIENT
Start: 2025-06-25 | End: 2025-06-25

## 2025-06-25 RX ORDER — HYDROMORPHONE HCL/PF 1 MG/ML
0.5 SYRINGE (ML) INJECTION
Status: DISCONTINUED | OUTPATIENT
Start: 2025-06-25 | End: 2025-06-25 | Stop reason: HOSPADM

## 2025-06-25 RX ORDER — PROPOFOL 10 MG/ML
INJECTION, EMULSION INTRAVENOUS AS NEEDED
Status: DISCONTINUED | OUTPATIENT
Start: 2025-06-25 | End: 2025-06-25

## 2025-06-25 RX ORDER — SODIUM CHLORIDE, SODIUM LACTATE, POTASSIUM CHLORIDE, CALCIUM CHLORIDE 600; 310; 30; 20 MG/100ML; MG/100ML; MG/100ML; MG/100ML
100 INJECTION, SOLUTION INTRAVENOUS CONTINUOUS
Status: DISCONTINUED | OUTPATIENT
Start: 2025-06-25 | End: 2025-06-25 | Stop reason: HOSPADM

## 2025-06-25 RX ORDER — HYDROCODONE BITARTRATE AND ACETAMINOPHEN 5; 325 MG/1; MG/1
1 TABLET ORAL EVERY 6 HOURS PRN
Status: DISCONTINUED | OUTPATIENT
Start: 2025-06-25 | End: 2025-06-25 | Stop reason: HOSPADM

## 2025-06-25 RX ORDER — FENTANYL CITRATE/PF 50 MCG/ML
25 SYRINGE (ML) INJECTION
Status: DISCONTINUED | OUTPATIENT
Start: 2025-06-25 | End: 2025-06-25 | Stop reason: HOSPADM

## 2025-06-25 RX ORDER — ONDANSETRON 2 MG/ML
4 INJECTION INTRAMUSCULAR; INTRAVENOUS ONCE AS NEEDED
Status: DISCONTINUED | OUTPATIENT
Start: 2025-06-25 | End: 2025-06-25 | Stop reason: HOSPADM

## 2025-06-25 RX ORDER — LIDOCAINE HYDROCHLORIDE 20 MG/ML
INJECTION, SOLUTION EPIDURAL; INFILTRATION; INTRACAUDAL; PERINEURAL AS NEEDED
Status: DISCONTINUED | OUTPATIENT
Start: 2025-06-25 | End: 2025-06-25 | Stop reason: HOSPADM

## 2025-06-25 RX ORDER — LIDOCAINE HYDROCHLORIDE 20 MG/ML
INJECTION, SOLUTION EPIDURAL; INFILTRATION; INTRACAUDAL; PERINEURAL AS NEEDED
Status: DISCONTINUED | OUTPATIENT
Start: 2025-06-25 | End: 2025-06-25

## 2025-06-25 RX ADMIN — LIDOCAINE HYDROCHLORIDE 80 MG: 20 INJECTION, SOLUTION EPIDURAL; INFILTRATION; INTRACAUDAL; PERINEURAL at 12:46

## 2025-06-25 RX ADMIN — FENTANYL CITRATE 50 MCG: 50 INJECTION INTRAMUSCULAR; INTRAVENOUS at 12:32

## 2025-06-25 RX ADMIN — PROPOFOL 100 MCG/KG/MIN: 10 INJECTION, EMULSION INTRAVENOUS at 12:48

## 2025-06-25 RX ADMIN — PROPOFOL 30 MG: 10 INJECTION, EMULSION INTRAVENOUS at 12:46

## 2025-06-25 RX ADMIN — ONDANSETRON 4 MG: 2 INJECTION INTRAMUSCULAR; INTRAVENOUS at 13:03

## 2025-06-25 RX ADMIN — SODIUM CHLORIDE, SODIUM LACTATE, POTASSIUM CHLORIDE, AND CALCIUM CHLORIDE 100 ML/HR: .6; .31; .03; .02 INJECTION, SOLUTION INTRAVENOUS at 12:19

## 2025-06-25 RX ADMIN — CEFAZOLIN SODIUM 3000 MG: 3 SOLUTION INTRAVENOUS at 12:44

## 2025-06-25 RX ADMIN — FENTANYL CITRATE 50 MCG: 50 INJECTION INTRAMUSCULAR; INTRAVENOUS at 12:45

## 2025-06-25 RX ADMIN — MIDAZOLAM 2 MG: 1 INJECTION INTRAMUSCULAR; INTRAVENOUS at 12:45

## 2025-06-25 NOTE — ANESTHESIA POSTPROCEDURE EVALUATION
Post-Op Assessment Note    CV Status:  Stable    Pain management: adequate       Mental Status:  Alert and awake   Hydration Status:  Euvolemic   PONV Controlled:  Controlled   Airway Patency:  Patent     Post Op Vitals Reviewed: Yes    No anethesia notable event occurred.    Staff: Anesthesiologist, CRNA           Last Filed PACU Vitals:  Vitals Value Taken Time   Temp 97.4    Pulse 76 06/25/25 13:19   /76 06/25/25 13:17   Resp 21 06/25/25 13:19   SpO2 86 % 06/25/25 13:19   Vitals shown include unfiled device data.

## 2025-06-25 NOTE — ANESTHESIA PREPROCEDURE EVALUATION
EF55%, moderate AS      Procedure:  TRANSPERINEAL ULTRASOUND GUIDED BIOPSY PROSTATE (Perineum)    Relevant Problems   CARDIO   (+) Atherosclerosis of native coronary artery of native heart without angina pectoris   (+) Essential hypertension, benign   (+) Heart murmur   (+) Hypertensive urgency   (+) Mixed hyperlipidemia   (+) Nonrheumatic aortic valve stenosis      ENDO   (+) Type 2 diabetes mellitus with diabetic polyneuropathy, without long-term current use of insulin (HCC)      /RENAL   (+) Chronic kidney disease, stage 3b (HCC)      MUSCULOSKELETAL   (+) Chronic low back pain without sciatica   (+) Primary osteoarthritis of both knees      NEURO/PSYCH   (+) Chronic low back pain without sciatica   (+) Chronic pain syndrome   (+) Severe recurrent major depression without psychotic features (HCC)   (+) Type 2 diabetes mellitus with diabetic polyneuropathy, without long-term current use of insulin (HCC)        Physical Exam    Airway     Mallampati score: III  TM Distance: >3 FB  Neck ROM: full      Cardiovascular  Rate: normal    Dental       Pulmonary  Pulmonary exam normal     Neurological      Other Findings  Per pt denies anything remaining that is loose or removeablePer pt denies anything remaining that is loose or removeable        Anesthesia Plan  ASA Score- 3     Anesthesia Type- IV sedation with anesthesia with ASA Monitors.         Additional Monitors:     Airway Plan:            Plan Factors-Exercise tolerance (METS): >4 METS.    Chart reviewed. EKG reviewed. Imaging results reviewed. Existing labs reviewed. Patient summary reviewed.    Patient is not a current smoker.              Induction-     Postoperative Plan- .   Monitoring Plan - Monitoring plan - standard ASA monitoring  Post Operative Pain Plan - non-opiod analgesics        Informed Consent- Anesthetic plan and risks discussed with patient.  I personally reviewed this patient with the CRNA. Discussed and agreed on the Anesthesia Plan with  the CRNA..      NPO Status:  No vitals data found for the desired time range.

## 2025-06-25 NOTE — OP NOTE
OPERATIVE REPORT  PATIENT NAME: Bryant Bustamante Jr.    :  1955  MRN: [id number]  Pt Location: [facility] OR ROOM 01    SURGERY DATE: 2025    Surgeons and Role:     * Timmy Mathews MD - Primary    Preop Diagnosis:  Elevated PSA [R97.20]  Abnormal MRI [R93.89]-category 5 lesion right posterior lateral apical lesion    Post-Op Diagnosis Codes:     * Elevated PSA [R97.20]     * Abnormal MRI [R93.89]  As above    Procedure(s):  TRANSPERINEAL ULTRASOUND GUIDED BIOPSY PROSTATE    Specimen(s):  ID Type Source Tests Collected by Time Destination   1 : right posterior medial Tissue Prostate TISSUE EXAM Timmy Mathews MD 2025 1233    2 : right anterior medial Tissue Prostate TISSUE EXAM Timmy Mathews MD 2025 1233    3 : right anterior lateral Tissue Prostate TISSUE EXAM Timmy Mathews MD 2025 1233    4 : left anterior medial Tissue Prostate TISSUE EXAM Timmy Mathews MD 2025 1233    5 : left anterior lateral Tissue Prostate TISSUE EXAM Timmy Mathews MD 2025 1233    6 : left posterior lateral Tissue Prostate TISSUE EXAM Timmy Mathews MD 2025 1233    7 : left posterior medial Tissue Prostate TISSUE EXAM Timmy Mathews MD 2025 1233    8 : left base Tissue Prostate TISSUE EXAM Timmy Mathews MD 2025 1233    9 : right posterior lateral  region of interest Tissue Prostate TISSUE EXAM Timmy Mathews MD 2025 1233    10 : right base Tissue Prostate TISSUE EXAM Timmy Mathews MD 2025 1233        Estimated Blood Loss:   Minimal    Drains:  * No LDAs found *    Anesthesia Type:   IV Sedation with Anesthesia    Operative Indications:  Elevated PSA [R97.20]  Abnormal MRI [R93.89]  As above    Operative Findings:  Region of interest-right posterolateral, 6 cores taken from that region.  2 cores taken from the other regions, a total of 24 cores.  No overt abnormalities.  Large transition zone.  Pubic arch interference was a factor.       Complications:   None    Procedure and Technique:  The patient is  is  here for transperineal prostate biopsy guided by biplanar transrectal ultrasound for elevated PSA and an abnormal MRI with a category 5 lesion right lateral apical lesion.  Due to time constraints, the patient was set up for this without MR fusion but just cognitive fusion.  81 cc gland.. The procedure, as well as the risks of bleeding, infection, and urinary retention have been explained he gives informed consent.     The patient was brought to the operating room and identified properly.  IV sedation was induced, and he was placed in the dorsal lithotomy position.  The perineum was shaved, a ChloraPrep scrub was used of the perineum and anal regions, and dried.  A large Tegaderm was used to tape the scrotum in a cranial direction to keep it out of the way the perineum.  A time-out was performed.  Care was taken when placing the patient in the dorsal lithotomy position to make sure all pressure points were protected.  .  I then prepared the biplanar ultrasound probe with ultrasound gel covering mostly the distal sensor, and covered it with a condom.  The precision Point kit was secured to this.   The condom was secured , and I placed the transrectal ultrasound probe into the rectum and visualized the prostate in sagittal and transverse views.  This was used with a split screen.   The perineum was anesthetized with 2% xylocaine 10 cc both superficially and deep with a spinal needle on both sides of the median raphe through the top and bottom aperture of the precision Point kit for a total of 20 cc.  I then placed the introducer needle through the upper apertures to start taking the anterior portions of the peripheral zone of the prostate.  Samples were taken right anterior medial, right anterior lateral, then left anterior medial left anterior lateral.  The biopsy needle was dipped in sterile water in between each biopsy.  I then switched to the lower apertures and performed bilateral posterior lateral zone,  posterior medial zone and base biopsies..  .  These were all peripheral zone biopsies.  Extra biopsies were taken in zones where the initial biopsy was suboptimal tissue.  Care was taken to try to include the entire length of the prostate as much as possible for complete coverage.   Excellent prostate tissue cores were obtained, and specimens were sent to pathology.  I withdrew the guide needle and I held pressure on the perineum for 1 minutes using a folded towel.  The perineum was then cleansed with sterile water.    I was present for the entire procedure. and A qualified resident physician was not available.    Patient Disposition:  PACU  and hemodynamically stable         SIGNATURE: Timmy Mathews MD  DATE: June 25, 2025  TIME: 1:11 PM

## 2025-06-25 NOTE — INTERVAL H&P NOTE
H&P reviewed. After examining the patient I find no changes in the patients condition since the H&P had been written.    Vitals:    06/25/25 1157   BP: 159/77   Pulse: 78   Resp: 20   Temp: 98.8 °F (37.1 °C)   SpO2: 94%

## 2025-06-26 LAB
ATRIAL RATE: 78 BPM
P AXIS: 59 DEGREES
PR INTERVAL: 194 MS
QRS AXIS: 33 DEGREES
QRSD INTERVAL: 70 MS
QT INTERVAL: 358 MS
QTC INTERVAL: 408 MS
T WAVE AXIS: 53 DEGREES
VENTRICULAR RATE: 78 BPM

## 2025-06-26 PROCEDURE — 93010 ELECTROCARDIOGRAM REPORT: CPT | Performed by: INTERNAL MEDICINE

## 2025-06-26 NOTE — ANESTHESIA POSTPROCEDURE EVALUATION
Post-Op Assessment Note    Last Filed PACU Vitals:  Vitals Value Taken Time   Temp 97.8 °F (36.6 °C) 06/25/25 13:35   Pulse 73 06/25/25 13:39   /62 06/25/25 13:37   Resp 17 06/25/25 13:38   SpO2 95 % 06/25/25 13:39   Vitals shown include unfiled device data.    Modified Nayan:     Vitals Value Taken Time   Activity 2 06/25/25 13:35   Respiration 2 06/25/25 13:35   Circulation 2 06/25/25 13:35   Consciousness 2 06/25/25 13:35   Oxygen Saturation 2 06/25/25 13:35     Modified Nayan Score: 10

## 2025-06-27 PROCEDURE — 88344 IMHCHEM/IMCYTCHM EA MLT ANTB: CPT | Performed by: PATHOLOGY

## 2025-06-27 PROCEDURE — G0416 PROSTATE BIOPSY, ANY MTHD: HCPCS | Performed by: PATHOLOGY

## 2025-06-30 DIAGNOSIS — E11.42 TYPE 2 DIABETES MELLITUS WITH DIABETIC POLYNEUROPATHY, WITHOUT LONG-TERM CURRENT USE OF INSULIN (HCC): ICD-10-CM

## 2025-07-01 ENCOUNTER — PATIENT OUTREACH (OUTPATIENT)
Dept: CASE MANAGEMENT | Facility: OTHER | Age: 70
End: 2025-07-01

## 2025-07-01 DIAGNOSIS — R73.09 HEMOGLOBIN A1C GREATER THAN 9%, INDICATING POOR DIABETIC CONTROL: Primary | ICD-10-CM

## 2025-07-01 NOTE — PROGRESS NOTES
Pt referred for complex management for elevated A1C of 12.7 from 3/13. Pt did have anther lab ordered since however has not had it drawn. He has a follow up appt on 7/28 and will need labs done by then.   Upon chart review, pt had prostate biopsy done on 6/25 at [facility] by dr correa.  Pt called and message left requesting return call back for interest in care management.

## 2025-07-07 ENCOUNTER — TELEPHONE (OUTPATIENT)
Age: 70
End: 2025-07-07

## 2025-07-07 DIAGNOSIS — E11.42 DIABETIC POLYNEUROPATHY ASSOCIATED WITH TYPE 2 DIABETES MELLITUS (HCC): ICD-10-CM

## 2025-07-07 RX ORDER — LANCETS 28 GAUGE
EACH MISCELLANEOUS
Qty: 100 EACH | Refills: 5 | Status: SHIPPED | OUTPATIENT
Start: 2025-07-07 | End: 2025-07-09 | Stop reason: SDUPTHER

## 2025-07-07 NOTE — TELEPHONE ENCOUNTER
"[facility] called stating they received a script for the Freestyle Lancets    The script says Check Sugars Twice a Day however, the patient's insurance will only cover Once a Day    Asking Dr. Holloway to send a corrected script saying \"Check Sugars Once a Day\"  "

## 2025-07-09 DIAGNOSIS — E11.42 DIABETIC POLYNEUROPATHY ASSOCIATED WITH TYPE 2 DIABETES MELLITUS (HCC): ICD-10-CM

## 2025-07-09 RX ORDER — LANCETS 28 GAUGE
EACH MISCELLANEOUS
Qty: 100 EACH | Refills: 5 | OUTPATIENT
Start: 2025-07-09

## 2025-07-09 RX ORDER — LANCETS 28 GAUGE
EACH MISCELLANEOUS
Qty: 100 EACH | Refills: 5 | Status: SHIPPED | OUTPATIENT
Start: 2025-07-09

## 2025-07-10 ENCOUNTER — OFFICE VISIT (OUTPATIENT)
Age: 70
End: 2025-07-10
Payer: MEDICARE

## 2025-07-10 ENCOUNTER — PATIENT OUTREACH (OUTPATIENT)
Dept: CASE MANAGEMENT | Facility: OTHER | Age: 70
End: 2025-07-10

## 2025-07-10 VITALS
OXYGEN SATURATION: 95 % | HEIGHT: 73 IN | SYSTOLIC BLOOD PRESSURE: 142 MMHG | WEIGHT: 285 LBS | HEART RATE: 92 BPM | DIASTOLIC BLOOD PRESSURE: 78 MMHG | TEMPERATURE: 99.2 F | BODY MASS INDEX: 37.77 KG/M2

## 2025-07-10 DIAGNOSIS — N52.9 ERECTILE DYSFUNCTION, UNSPECIFIED ERECTILE DYSFUNCTION TYPE: ICD-10-CM

## 2025-07-10 DIAGNOSIS — C61 PROSTATE CANCER (HCC): Primary | ICD-10-CM

## 2025-07-10 PROCEDURE — 99214 OFFICE O/P EST MOD 30 MIN: CPT | Performed by: UROLOGY

## 2025-07-10 RX ORDER — SILDENAFIL 100 MG/1
100 TABLET, FILM COATED ORAL DAILY PRN
Qty: 5 TABLET | Refills: 11 | Status: SHIPPED | OUTPATIENT
Start: 2025-07-10

## 2025-07-10 RX ORDER — LEVOFLOXACIN 5 MG/ML
750 INJECTION, SOLUTION INTRAVENOUS ONCE
OUTPATIENT
Start: 2025-07-10

## 2025-07-10 NOTE — LETTER
Date: 07/10/25    Dear Bryant Bustamante Jr.,   My name is Alma Marcial. I am a registered nurse from outpatient care management that works with [facility].    I have not been able to reach you and would like to set a time that I can talk with you regarding Care Management.   The Care Management Program is a free, voluntary program that you may opt out of at any time. The program is offered by [facility]. We provide resources and education to assist in managing chronic illness as well as assisting with social needs, if any exist. Please call me with any questions you may have. I look forward to speaking with you.  Sincerely,  Alma Marcial RN, BSN  [phone]  Outpatient Care Manager                    Care Management Program  PROGRAM DESCRIPTION:   The Care Management Program is an evidenced based program designed to provide you with the tools you need to make healthcare decisions.   Services offered in the program include the following:    Disease management    When you should follow up with your provider versus going to the emergency room    Help to navigate when transitioning from one care setting to another    Identifying barriers and developing patient goals    Communicates with your care team    Coordinating your care    Self-management action plans    Medication review    Connecting to community resources as needed   You are eligible for this program because you have a chronic condition. This free service, offered to you by your primary care office, can help you learn skills to manage your condition which may help you live a higher quality life.

## 2025-07-10 NOTE — PATIENT INSTRUCTIONS
You have been diagnosed with prostate cancer.  This is a very common cancer in men, with a lifetime incidence of at least 1 in 9 men.  Some cancers can be aggressive and more life-threatening but the majority of prostate cancers depending on their Judith grade are not immediately life-threatening.    We grade prostate cancer in terms of  aggressiveness with a scoring system called the Judith score.  The pathologist takes the 2 worst looking specimens from an area of the biopsy which qualifies cancer, and gives them a score from 1-5 where 1 is not even cancerous and 5 is a very aggressive cancer.  They then take the combination of the scores where 1 area may be scored a 3, and another area may be scored a 4.  They combine these 2 numbers to create a Judith score.  If there is more [place] 4 than there is [place] 3, then the score becomes [place] 4+3 equal 7.  If Judith 3 is the predominant pattern, then it is Judith 3+4 equal 7.  The [place] 4+3 is more aggressive than the [place] 3+4.  Likewise if you have [place] 4 in all of the patterns, it will be [place] 4+4 = 8.    [place] 6 is the lowest score you can have.  We do not even actively treat [place] 6 prostate cancer but observe it.  This is done by active surveillance which means a PSA every 6 months and repeat MRI and/or confirmatory biopsy at 1 year.    [place] 3+4 equal 7 is considered to be intermediate grade but lower intermediate grade.  This we sent for molecular testing.  If the molecular testing says it is okay, we can do active surveillance with this.  If the molecular testing says it is high risk, then you will need to move to a treatment.  [place] 4+3 equal 7 and anything higher such as [place] 8 9 and 10 need treatment.  These tend to be aggressive and have the risk of metastases.    Treatment is based on your age and the Judith score and the stage of the disease.    Surgery: This is performed by robotic assisted laparoscopic prostatectomy by  our urologists that are skilled in this.  This is usually an overnight stay in the hospital, an operation done laparoscopically with robot assistance, and you go home with a catheter for 1 to 2 weeks.  Side effects of this can be bleeding, infection, surgical complications in general and urinary incontinence and impotence.  If you decide to go this route, you will meet with one of our robotic surgeons will explain this to you more in detail.  Surgery is not recommended for men greater than 70 years old.  This is because of the risk of incontinence.    Radiation therapy: This is given in the form of intensity modulated radiation therapy, usually in 28 days of treatment 5 days/week.  Other forms of radiation or proton beam therapy offered elsewhere, and brachytherapy which is radioactive seeds also given elsewhere.  There is a form of radiation called CyberKnife which is 5 treatments giving the same dose.  If you have radiation therapy, you cannot have prostate removal after that.    Ablative therapy: This is given in the form of either cryoablation of the prostate or just the part that is affected by cancer, or high intensity focused ultrasound.  High intensity focused ultrasound known as HIFU is not currently available but I am awaiting clearance from our value analysis committee.  I have been trained in it twice.  I have been performing cryotherapy since 2003.  This is an overnight stay in the hospital where argon gas was used to create an ice ball on a needle that is placed into the affected area of the prostate and freezing of the cancer there.  After that you will wear a catheter for about a week and the catheter will be removed.  Side effects can be bleeding, infection, prolonged urinary retention, impotence-although if you have only 1 side done this is not the usual case, also incontinence is very rare unless you have had radiation before.  Cryotherapy is often used for men who have already had radiation but  have had recurrence despite the radiation.  This is called salvage therapy.    Active surveillance: As described above.    Androgen deprivation: This consists of receiving a drug that blocks your testosterone production-prostate cancer depends on testosterone for growth and this will put you into remission.  It is not curative.  Generally I save this for men over 80 years old or men under 80 that have severe life-threatening health issues aside from the prostate cancer.  It can be given every 6 months or only when the PSA rises a certain.

## 2025-07-10 NOTE — PROGRESS NOTES
UROLOGY PROGRESS NOTE   [facility]  [address]  [address]  [place] [address]  [phone]  Fax:[phone]  [email or web address]      NAME: Bryant Bsutamante Jr.  AGE: 70 y.o. SEX: male  : 1955   MRN: [id number]    DATE: 7/10/2025  TIME: 1:34 PM    Assessment and Plan:    Judith 3+4 equal 7 right side only.  Stage T1 disease.  Tissue sent for decipher testing.  In terms of the biopsy he is a good active surveillance candidate, but his PSA was 19.  Not a surgical candidate.  Radiation or focal therapy would be best.  Refer to radiation oncology for their input.  Check a PSMA PET scan and let him know the results.    Given the transportation needed to go for radiation oncology, he may be leaning towards focal ablation which we can do here at [facility].  Therefore we will go ahead and schedule right side focal ablation cryotherapy in the operating room.  The procedure as well as the risks of bleeding infection minor incidents of leakage, rectourethral fistula and prolonged urinary retention explained he gives informed consent.    ED: Viagra 100 mg sent to the pharmacy.  He can try this.  Multifactorial.    1. Prostate cancer (HCC)  -     Ambulatory Referral to Radiation Oncology; Future  -     NM PET CT skull base to mid thigh; Future; Expected date: 07/10/2025  2. Erectile dysfunction, unspecified erectile dysfunction type  -     sildenafil (VIAGRA) 100 mg tablet; Take 1 tablet (100 mg total) by mouth daily as needed for erectile dysfunction                  Chief Complaint   No chief complaint on file.      History of Present Illness   Prostate cancer:    Diagnosed by me 2025 by transperineal prostate biopsy without MRI fusion.  This shows [place] 3+4 equal 7 right sided only prostate cancer.  MP MRI shows right apical posterior peripheral zone lesion category 5 and an 81 cc gland.  He has a large transition zone and pubic arch interference was a factor.  Region of  interest was right posterior lateral.  inal Diagnosis   A. Prostate, right posterior medial:  - Prostatic adenocarcinoma, [place] score 7, 3+4 (Prognostic Grade Group 2; 30-35% pattern 4) involving 2 of 2 cores and 70% of tissue.   - Prostatic multiplex stain supports interpretation.  - See synoptic report.     B. Prostate, right anterior medial:  - Prostatic adenocarcinoma, Judith score 7, 3+4 (Prognostic Grade Group 2; 10 % pattern 4) discontinuously involving 2 of 2 cores and about 50% of the tissue.       C. Prostate, right anterior lateral:  - Benign prostatic tissue.      D. Prostate, left anterior medial:  - Prostatic adenocarcinoma, Judith score 7, 3+4 (Prognostic Grade Group 2; 5 % pattern 4) involving 1 of 2 cores and 25 % of tissue.       E. Prostate, left anterior lateral:  - Benign prostatic tissue.       F. Prostate, left posterior lateral:  - Benign prostatic tissue.       G. Prostate, left posterior medial:  - Benign prostatic tissue.       H. Prostate, left base:  - Benign prostatic tissue.       I. Prostate, right posterior lateral  region of interest:  - Prostatic adenocarcinoma, Judith score 7, 3+4 (Prognostic Grade Group 2; 40% pattern 4) involving 4 of 5 cores and 70% of tissue.   - Prostatic multiplex stain supports interpretation.      J. Prostate, right base:  - Prostatic adenocarcinoma, Judith score 7, 3+4 (Prognostic Grade Group 2; 30-35% pattern 4) involving 2 of 2 cores and 30% of tissue   Standard discussion with the patient.  We discussed the following-    His pathology report and the implications upfront and long-term.    Laparoscopic assisted robotic prostatectomy-the procedure itself was discussed and the risks of bleeding infection incontinence and impotence.  This is to be done by a robotic surgeon in our network and will require a separate referral to them.    Radiation therapy: Described IMRT, SBRT, CyberKnife ,brachytherapy and proton beam.  I do not recommend brachytherapy  due to the side effects upfront and long-term.  He would be a decent IMRT/SBRT candidate-the risks of bleeding infection impotence incontinence which are substantially less then other treatments were explained.  Referral to radiation oncology will be made upon his request.  He understands that after radiation therapy, he can undergo salvage cryoablation but cannot undergo a laparoscopic assisted robotic prostatectomy.  Proton beam is also touched upon, but very expensive and I believe unnecessary with IMRT and SBRT, along with substandard oncologic results documented in several studies.  Offered consultation with radiation oncology to discuss possible radiotherapy.    Ablative therapy-this is in the form of cryoablation which is currently offered at our institution.  This is either total gland ablation or focal ablation.  The need for catheter after the procedure, risks of impotence were explained, along with possible incontinence and rectourethral fistula.  This is an outpatient procedure, requiring at least a week of Marina catheter and intensive monitoring afterwards especially if this is only focal.  There is a risk of leaving undiagnosed cancer untreated.    Active surveillance: Used if he has Judith 6 prostate cancer , or Judith 3+4 equal 7 with low risk decipher testing.  Active surveillance and how it is done was explained to the patient and the possible need for treatment in the future.  This is done by monitoring a PSA every 6 months and repeating the biopsy and MRI if there is a sharp PSA rise, and also the possibility of confirmatory biopsy or MRI at 1 year.     ED: This only started a few months ago.  Cannot get an erection at all.    The following portions of the patient's history were reviewed and updated as appropriate: allergies, current medications, past family history, past medical history, past social history, past surgical history and problem list.  Past Medical History[1]  Past Surgical  "History[2]  shoulder  Review of Systems   Review of Systems   Constitutional:  Negative for fever.   Respiratory:  Negative for shortness of breath.    Cardiovascular:  Negative for chest pain.   Gastrointestinal: Negative.    Genitourinary: Negative.        Active Problem List   Problem List[3]    Objective   /78   Pulse 92   Temp 99.2 °F (37.3 °C)   Ht 6' 1\" (1.854 m)   Wt 129 kg (285 lb)   SpO2 95%   BMI 37.60 kg/m²     Physical Exam  Vitals reviewed.   Constitutional:       Appearance: Normal appearance.   HENT:      Head: Normocephalic and atraumatic.     Eyes:      Extraocular Movements: Extraocular movements intact.       Cardiovascular:      Rate and Rhythm: Normal rate and regular rhythm.      Heart sounds: Normal heart sounds.   Pulmonary:      Effort: Pulmonary effort is normal.      Breath sounds: Normal breath sounds.     Musculoskeletal:         General: Normal range of motion.      Cervical back: Normal range of motion.     Skin:     Coloration: Skin is not jaundiced or pale.     Neurological:      General: No focal deficit present.      Mental Status: He is alert and oriented to person, place, and time. Mental status is at baseline.     Psychiatric:         Mood and Affect: Mood normal.         Behavior: Behavior normal.         Thought Content: Thought content normal.         Judgment: Judgment normal.             Current Medications   Current Medications[4]        Timmy Mathews MD                [1]   Past Medical History:  Diagnosis Date    Anemia     last assessed: 1/11/2018    Arthritis     BPH without obstruction/lower urinary tract symptoms     last assessed: 12/3/2018    Chronic kidney disease     Coronary artery disease     last assessed: 7/22/2015    DDD (degenerative disc disease), lumbar     last assessed: 2/17/2014    Diabetes mellitus (HCC)     Elevated PSA     Erectile dysfunction     Generalized osteoarthritis     last assessed: 4/18/2019    Hx of fracture     ankle " fracture(left)    Hyperlipidemia     last assessed: 7/22/2015    Hypertension     last assessed: 7/22/2015    Knee injury     partial torn meniscus and acl    Lumbar spinal stenosis     last assessed: 2/17/2014    Pneumonia Dec 1994    Rotator cuff syndrome of shoulder and allied disorders     last assessed: 3/5/2014    Type 2 diabetes mellitus with diabetic polyneuropathy (HCC)     last assessed: 10/31/2018   [2]   Past Surgical History:  Procedure Laterality Date    CARDIAC SURGERY  2014    CORONARY ARTERY BYPASS GRAFT  2014    KNEE SURGERY Left 09/09/2024    LUMBAR FUSION      WA PROSTATE NEEDLE BIOPSY ANY APPROACH N/A 6/25/2025    Procedure: TRANSPERINEAL ULTRASOUND GUIDED BIOPSY PROSTATE;  Surgeon: Timmy Mathews MD;  Location: [facility];  Service: Urology    SHOULDER ARTHROSCOPY Right     SHOULDER SURGERY      2 right/1 left rotator cuff repairs    SPINAL FUSION      spinal triple fusion L4-5   [3]   Patient Active Problem List  Diagnosis    Chronic low back pain without sciatica    Primary osteoarthritis of both knees    Essential hypertension, benign    Mixed hyperlipidemia    Atherosclerosis of native coronary artery of native heart without angina pectoris    Puncture wound of right foot    Obesity, morbid (HCC)    Elevated PSA    Contusion of right foot    DDD (degenerative disc disease), lumbar    Chronic kidney disease, stage 3b (HCC)    Chronic prescription opiate use    Chronic pain syndrome    Macrocytosis    Severe recurrent major depression without psychotic features (HCC)    Heart murmur    Bee sting allergy    Type 2 diabetes mellitus with diabetic polyneuropathy, without long-term current use of insulin (HCC)    Pain of left lower extremity    Hypertensive urgency    Pulmonary edema    Pulmonary nodules    Bilateral cellulitis of lower leg    Venous stasis ulcer of left lower leg with edema of left lower leg  (HCC)    Venous stasis ulcer of right lower leg with edema of right lower leg  (HCC)     Nonrheumatic aortic valve stenosis    Pulmonary nodule    Abnormal MRI   [4]   Current Outpatient Medications:     amLODIPine (NORVASC) 5 mg tablet, Take 1 tablet (5 mg total) by mouth daily, Disp: 100 tablet, Rfl: 1    ammonium lactate (LAC-HYDRIN) 12 % lotion, Apply topically 2 (two) times a day as needed for dry skin Apply to dry skin / scales of both legs, Disp: 225 g, Rfl: 2    aspirin (Aspirin 81) 81 mg EC tablet, Take 81 mg by mouth in the morning., Disp: , Rfl:     B Complex Vitamins (B COMPLEX 1 PO), Take 1 tablet by mouth in the morning., Disp: , Rfl:     Blood Glucose Monitoring Suppl (FreeStyle Lite) MANISH, Use to check blood sugar once a day, Disp: 100 each, Rfl: 3    buPROPion (WELLBUTRIN XL) 150 mg 24 hr tablet, take 1 tablet by mouth every morning, Disp: 90 tablet, Rfl: 1    cetirizine (ZyrTEC) 10 mg tablet, Take 10 mg by mouth in the morning., Disp: , Rfl:     ciclopirox (PENLAC) 8 % solution, Apply topically daily at bedtime, Disp: 6 mL, Rfl: 2    fluticasone (FLONASE) 50 mcg/act nasal spray, 2 sprays into each nostril in the morning., Disp: , Rfl:     furosemide (LASIX) 40 mg tablet, Take 1 tablet (40 mg total) by mouth daily, Disp: 90 tablet, Rfl: 3    glipiZIDE (GLUCOTROL) 10 mg tablet, Take 1 tablet (10 mg total) by mouth 2 (two) times a day before meals, Disp: 180 tablet, Rfl: 1    glucose blood (FREESTYLE TEST STRIPS) test strip, Use to check blood sugars twice a day, Disp: 100 strip, Rfl: 5    Lancets (freestyle) lancets, Use to check sugars once daily, Disp: 100 each, Rfl: 5    lisinopril (ZESTRIL) 10 mg tablet, Take 1 tablet (10 mg total) by mouth daily, Disp: 100 tablet, Rfl: 3    metFORMIN (GLUCOPHAGE) 1000 MG tablet, TAKE 0.5 TABLETS (500 MG TOTAL) BY MOUTH IN THE MORNING AND 0.5 TABLETS (500 MG TOTAL) IN THE EVENING. TAKE WITH MEALS., Disp: 90 tablet, Rfl: 1    methocarbamol (ROBAXIN) 750 mg tablet, TAKE 1 TABLET 3 TIMES A DAY, Disp: 90 tablet, Rfl: 1    metoprolol succinate (TOPROL-XL)  50 mg 24 hr tablet, take 1 tablet by mouth once daily, Disp: 90 tablet, Rfl: 1    Multiple Vitamins-Minerals (MULTIVITAMIN ADULT PO), Take 1 tablet by mouth in the morning., Disp: , Rfl:     mupirocin (BACTROBAN) 2 % ointment, Apply topically daily, Disp: 22 g, Rfl: 1    nitroglycerin (NITROSTAT) 0.4 mg SL tablet, Place 1 tablet (0.4 mg total) under the tongue every 5 (five) minutes as needed for chest pain, Disp: 25 tablet, Rfl: 0    pregabalin (LYRICA) 150 mg capsule, Take 1 capsule by mouth in the morning and 1 capsule before bedtime., Disp: , Rfl:     rosuvastatin (CRESTOR) 40 MG tablet, take 1 tablet by mouth once daily, Disp: 90 tablet, Rfl: 1    sildenafil (VIAGRA) 100 mg tablet, Take 1 tablet (100 mg total) by mouth daily as needed for erectile dysfunction, Disp: 5 tablet, Rfl: 11    EPINEPHrine (EPIPEN) 0.3 mg/0.3 mL SOAJ, inject 0.3 milliliters ( 0.3 milligrams ) intramuscularly in OUTER THIGH and HOLD for 3 SECONDS use if needed for SEVERE ALLERGIC REACTION May repeat one time after 10 MINUTES if needed for maximum daily dose of 2 INJECTIONS (Patient not taking: Reported on 6/10/2025), Disp: 2 each, Rfl: 0    lidocaine (LMX) 4 % cream, Apply topically as needed for mild pain Apply to pain are of leg as needed. (Patient not taking: Reported on 7/10/2025), Disp: 60 g, Rfl: 2

## 2025-07-10 NOTE — PROGRESS NOTES
Outpatient Care Management Note:  Outreach call attempted to Mr. Bustamante.  Message left for patient to please return call.  Contact information left on message.     As this is the second unsuccessful outreach attempt, an [facility] letter is being sent to Mr. Bustamante's  MyChart per protocol.  Care management program will be closed at this time. Should Mr. Bustamante call and have care management needs, a new program will be opened.

## 2025-07-11 ENCOUNTER — TELEPHONE (OUTPATIENT)
Age: 70
End: 2025-07-11

## 2025-07-11 ENCOUNTER — PATIENT OUTREACH (OUTPATIENT)
Dept: HEMATOLOGY ONCOLOGY | Facility: CLINIC | Age: 70
End: 2025-07-11

## 2025-07-11 ENCOUNTER — DOCUMENTATION (OUTPATIENT)
Dept: HEMATOLOGY ONCOLOGY | Facility: CLINIC | Age: 70
End: 2025-07-11

## 2025-07-11 NOTE — TELEPHONE ENCOUNTER
----- Message from Timmy Mathews MD sent at 7/10/2025  1:23 PM EDT -----  Please send tissue for decipher testing

## 2025-07-11 NOTE — TELEPHONE ENCOUNTER
Decipher testing completed and faxed with patients information. Form to be uploaded in patients chart.

## 2025-07-11 NOTE — PROGRESS NOTES
All records needed are in patients chart. No records retrieval needed at this time.     Referral received/ Chart reviewed for work up completed   Referral to: Radiation Time frame: with in 5 days after PET   Dx: Prostate Cancer   Urologist: Dr. Mathews    Imaging completed: [x][facility] []Other:    [x] PET/CT- scheduled 7/21/25   [x] MRI  5/21/25  Prostate volume <60cc? 81mL   [] CT   [] US   [] Mammo   [] Bone scan   [] N/A    Pathology completed:    Location: [facility]   Date: 6/25/25   [place] Score: 3+4=7   Staging: T1       Additional records needed:   [] Genomic report   [] Genetic testing results   [] Office Note   [] Procedure/ Operative note   [] Lab results   [x] N/A    [] Radiation Oncology records retrieval needed (PN to route to rad/onc clerical pool once scheduled)  Date:  Location:    PSA Date:        Lab Results   Component Value Date    PSA 19.056 (H) 03/13/2025    PSA 13.3 (H) 03/28/2024    PSA 10.4 (H) 09/15/2022

## 2025-07-11 NOTE — PROGRESS NOTES
Oncology Nurse Navigator outreach attempted in response to referral received to radiation oncology. I left VM explaining my role and reason for my call. I included my direct number, [phone], and requested a return call.

## 2025-07-14 ENCOUNTER — TELEPHONE (OUTPATIENT)
Dept: UROLOGY | Facility: CLINIC | Age: 70
End: 2025-07-14

## 2025-07-14 ENCOUNTER — PATIENT OUTREACH (OUTPATIENT)
Dept: CASE MANAGEMENT | Facility: OTHER | Age: 70
End: 2025-07-14

## 2025-07-14 NOTE — PROGRESS NOTES
Encounter created to document incoming voicemail from pt returning call for 'something or other regarding care management'. Message left with this CM's contact info.pt had been contacted by covering CM and closed.

## 2025-07-17 ENCOUNTER — LAB REQUISITION (OUTPATIENT)
Dept: LAB | Facility: HOSPITAL | Age: 70
End: 2025-07-17

## 2025-07-17 DIAGNOSIS — L97.929 VENOUS STASIS ULCER OF LEFT LOWER LEG WITH EDEMA OF LEFT LOWER LEG  (HCC): ICD-10-CM

## 2025-07-17 DIAGNOSIS — C61 MALIGNANT NEOPLASM OF PROSTATE (HCC): ICD-10-CM

## 2025-07-17 DIAGNOSIS — I83.019 VENOUS STASIS ULCER OF RIGHT LOWER LEG WITH EDEMA OF RIGHT LOWER LEG  (HCC): ICD-10-CM

## 2025-07-17 DIAGNOSIS — I83.892 VENOUS STASIS ULCER OF LEFT LOWER LEG WITH EDEMA OF LEFT LOWER LEG  (HCC): ICD-10-CM

## 2025-07-17 DIAGNOSIS — R60.0 VENOUS STASIS ULCER OF LEFT LOWER LEG WITH EDEMA OF LEFT LOWER LEG  (HCC): ICD-10-CM

## 2025-07-17 DIAGNOSIS — I83.891 VENOUS STASIS ULCER OF RIGHT LOWER LEG WITH EDEMA OF RIGHT LOWER LEG  (HCC): ICD-10-CM

## 2025-07-17 DIAGNOSIS — I83.029 VENOUS STASIS ULCER OF LEFT LOWER LEG WITH EDEMA OF LEFT LOWER LEG  (HCC): ICD-10-CM

## 2025-07-17 DIAGNOSIS — R60.0 VENOUS STASIS ULCER OF RIGHT LOWER LEG WITH EDEMA OF RIGHT LOWER LEG  (HCC): ICD-10-CM

## 2025-07-17 DIAGNOSIS — L97.919 VENOUS STASIS ULCER OF RIGHT LOWER LEG WITH EDEMA OF RIGHT LOWER LEG  (HCC): ICD-10-CM

## 2025-07-18 PROBLEM — C61 PROSTATE CANCER (HCC): Status: ACTIVE | Noted: 2025-06-25

## 2025-07-20 RX ORDER — AMPICILLIN 500 MG/1
500 CAPSULE ORAL 4 TIMES DAILY
Qty: 28 CAPSULE | Refills: 0 | OUTPATIENT
Start: 2025-07-20 | End: 2025-07-27

## 2025-07-21 ENCOUNTER — RA CDI HCC (OUTPATIENT)
Dept: OTHER | Facility: HOSPITAL | Age: 70
End: 2025-07-21

## 2025-07-21 ENCOUNTER — HOSPITAL ENCOUNTER (OUTPATIENT)
Dept: NUCLEAR MEDICINE | Facility: HOSPITAL | Age: 70
Discharge: HOME | End: 2025-07-21
Attending: UROLOGY
Payer: MEDICARE

## 2025-07-21 DIAGNOSIS — C61 PROSTATE CANCER (HCC): ICD-10-CM

## 2025-07-21 PROCEDURE — 78815 PET IMAGE W/CT SKULL-THIGH: CPT

## 2025-07-21 PROCEDURE — A9595 HB PIFLUFOLASTAT F-18, DIAGNOSTIC, 1 MILLICURIE: HCPCS

## 2025-07-21 NOTE — TELEPHONE ENCOUNTER
LM explaining abx would not be refilled.  Encouraged patient to call wound care to be seen, if needed.

## 2025-07-22 ENCOUNTER — CONSULT (OUTPATIENT)
Dept: RADIATION ONCOLOGY | Facility: CLINIC | Age: 70
End: 2025-07-22
Attending: UROLOGY
Payer: MEDICARE

## 2025-07-22 VITALS
TEMPERATURE: 96.8 F | RESPIRATION RATE: 18 BRPM | HEART RATE: 72 BPM | HEIGHT: 73 IN | OXYGEN SATURATION: 96 % | WEIGHT: 293.4 LBS | DIASTOLIC BLOOD PRESSURE: 79 MMHG | BODY MASS INDEX: 38.88 KG/M2 | SYSTOLIC BLOOD PRESSURE: 134 MMHG

## 2025-07-22 DIAGNOSIS — C61 PROSTATE CANCER (HCC): Primary | ICD-10-CM

## 2025-07-22 PROCEDURE — 99211 OFF/OP EST MAY X REQ PHY/QHP: CPT | Performed by: RADIOLOGY

## 2025-07-22 PROCEDURE — 99204 OFFICE O/P NEW MOD 45 MIN: CPT | Performed by: RADIOLOGY

## 2025-07-22 NOTE — ASSESSMENT & PLAN NOTE
Orders:  •  Ambulatory Referral to Radiation Oncology  •  Radiation Simulation Treatment  •  PSA Total, Diagnostic; Future  Bryant Bustamante Jr. 1955 is a 70 y.o. male with a history of an elevated PSA level dating back to 2020.  He declined workup for this until his PSA went up to 19.056 on March 13, 2025.  He had his first prostate MRI in May 21, 2025 that came back category 5 with high suspicion for cancer.  There was moderate BPH with a prostate volume of 81 mL.  There was no evidence of extraprostatic tumor with no seminal vesicle invasion, no pelvic lymphadenopathy and no pelvic osseous metastatic disease.  He had a chest CT on June 24, 2025 that revealed a few pulmonary nodules that were stable measuring up to 4 mm in size.  He had his first prostate biopsies on June 25, 2025 that were positive for Judith score 3+4=7 disease on the right side region of interest as well as additional biopsies on the right side and 1 biopsy on the left side that was positive.  He was seen by Dr. Mathews on July 10, 2025.  Decipher score was sent and is pending.  He was told he is not a surgical candidate.  Options were discussed with him to include observation, cryoablation, and radiation therapy.  He had a PET/CT July 21, 2025 that revealed findings consistent with intraprostatic PSMA positive malignancy.  There was no extraprostatic PSMA positive metastatic disease.  The PET/CT results were discussed with him today.  He appears to have clinical stage IIB, T1c N0 M0 [place] score 3+4=7 prostate adenocarcinoma.  Treatment options were discussed with him today.  He is not comfortable with observation.  We discussed radiation therapy alone with a hypofractionated course of radiation therapy with 28 fractions versus prostate SBRT with 5 fractions over 2 weeks.  He would be a good candidate for the prostate SBRT using adaptive radiotherapy on our 8tracks [employer] machine.  He is willing to pursue this treatment.  We discussed placement of  fiducial markers for daily image guided radiation therapy to improve the accuracy of treatment and reduce side effects.  We discussed placement of SpaceOAR to reduce dose to the rectum.  We discussed the acute side effects and the potential chronic complications of treatment with him.  He does consent to proceed with the radiation treatment.  We recommend a repeat PSA level prior to the start of any treatment.  He could have PSA level taken in August or September.  He will return here to the [facility] for CT simulation after placement of fiducial markers and SpaceOAR.

## 2025-07-22 NOTE — PROGRESS NOTES
Consultation Visit   Name: Bryant Bustamante Jr.      : 1955      MRN: [id number]  Encounter Provider: David Barrera MD  Encounter Date: 2025   Encounter department: [facility] RADIATION ONCOLOGY  :  Assessment & Plan  Prostate cancer (HCC)    Orders:  •  Ambulatory Referral to Radiation Oncology  •  Radiation Simulation Treatment  •  PSA Total, Diagnostic; Future  Bryant Bustamante Jr. 1955 is a 70 y.o. male with a history of an elevated PSA level dating back to .  He declined workup for this until his PSA went up to 19.056 on 2025.  He had his first prostate MRI in May 21, 2025 that came back category 5 with high suspicion for cancer.  There was moderate BPH with a prostate volume of 81 mL.  There was no evidence of extraprostatic tumor with no seminal vesicle invasion, no pelvic lymphadenopathy and no pelvic osseous metastatic disease.  He had a chest CT on 2025 that revealed a few pulmonary nodules that were stable measuring up to 4 mm in size.  He had his first prostate biopsies on 2025 that were positive for [place] score 3+4=7 disease on the right side region of interest as well as additional biopsies on the right side and 1 biopsy on the left side that was positive.  He was seen by Dr. Mathews on July 10, 2025.  Decipher score was sent and is pending.  He was told he is not a surgical candidate.  Options were discussed with him to include observation, cryoablation, and radiation therapy.  He had a PET/CT 2025 that revealed findings consistent with intraprostatic PSMA positive malignancy.  There was no extraprostatic PSMA positive metastatic disease.  The PET/CT results were discussed with him today.  He appears to have clinical stage IIB, T1c N0 M0 Judith score 3+4=7 prostate adenocarcinoma.  Treatment options were discussed with him today.  He is not comfortable with observation.  We discussed radiation therapy alone with a  hypofractionated course of radiation therapy with 28 fractions versus prostate SBRT with 5 fractions over 2 weeks.  He would be a good candidate for the prostate SBRT using adaptive radiotherapy on our [employer]os machine.  He is willing to pursue this treatment.  We discussed placement of fiducial markers for daily image guided radiation therapy to improve the accuracy of treatment and reduce side effects.  We discussed placement of SpaceOAR to reduce dose to the rectum.  We discussed the acute side effects and the potential chronic complications of treatment with him.  He does consent to proceed with the radiation treatment.  We recommend a repeat PSA level prior to the start of any treatment.  He could have PSA level taken in August or September.  He will return here to the [facility] for CT simulation after placement of fiducial markers and SpaceOAR.             History of Present Illness   Chief Complaint   Patient presents with   • Consult     Pertinent Medical History   Bryant Bustamante Jr. 1955 is a 70 y.o. male  Referred by  for [place] 7 3+4 prostate cancer. He presented with elevated PSA. His biopsy was on 6/25/25. He presents to discuss radiation.     PMHx: BPH, CKD, Diabetes,CAD, ED, hypertension, Right shoulder arthroscopy,Spinal fusion       PSA   Latest Ref Rng 0.000 - 4.000 ng/mL   10/24/2020 4.7 (H)    9/15/2022 10.4 (H)    3/28/2024 13.3 (H)    3/13/2025 19.056 (H)       4/23/25 JUDY Garcia   Elevated PSA. MRI prostate ordered.   Discussed transperineal ultrasound-guided biopsy.      5/21/25 MRI Prostate - first MRI  IMPRESSION:     1. PI-RADSv2.1 Category 5 - Very high (clinically significant cancer is highly likely to be present).     2. No extraprostatic tumor, seminal vesicle invasion, pelvic lymphadenopathy, or pelvic osseous metastatic disease.     3. Moderate BPH with calculated prostate volume of 81 mL.     Prostate gland boundaries and areas of concern  "for significant prostate cancer were segmented using 3D advanced post-processing on an independent VALOR[employer] system workstation with active physician participation. The segmentation was performed should   MR-ultrasound fusion biopsy be required.   ADDENDUM:     There is an error in the description of location of the lesion. Location of the lesion should should state \"RIGHT apical posterior peripheral zone\" instead of left.     6/24/25 CT Chest  IMPRESSION:     Since December 22, 2024, unchanged few pulmonary nodules which measure up to 4 mm. Based on current Fleischner Society 2017 Guidelines on incidental pulmonary nodule, no routine follow-up is needed if the patient is low risk. If the patient is high risk,   optional follow-up chest CT at 12 months can be considered.     6/25/25 Tissue Exam - first biopsy  A. Prostate, right posterior medial:  - Prostatic adenocarcinoma, [place] score 7, 3+4 (Prognostic Grade Group 2; 30-35% pattern 4) involving 2 of 2 cores and 70% of tissue.   - Prostatic multiplex stain supports interpretation.  - See synoptic report.     B. Prostate, right anterior medial:  - Prostatic adenocarcinoma, [place] score 7, 3+4 (Prognostic Grade Group 2; 10 % pattern 4) discontinuously involving 2 of 2 cores and about 50% of the tissue.       C. Prostate, right anterior lateral:  - Benign prostatic tissue.      D. Prostate, left anterior medial:  - Prostatic adenocarcinoma, [place] score 7, 3+4 (Prognostic Grade Group 2; 5 % pattern 4) involving 1 of 2 cores and 25 % of tissue.       E. Prostate, left anterior lateral:  - Benign prostatic tissue.       F. Prostate, left posterior lateral:  - Benign prostatic tissue.       G. Prostate, left posterior medial:  - Benign prostatic tissue.       H. Prostate, left base:  - Benign prostatic tissue.       I. Prostate, right posterior lateral  region of interest:  - Prostatic adenocarcinoma, [place] score 7, 3+4 (Prognostic Grade Group 2; 40% pattern 4) " involving 4 of 5 cores and 70% of tissue.   - Prostatic multiplex stain supports interpretation.      J. Prostate, right base:  - Prostatic adenocarcinoma, [place] score 7, 3+4 (Prognostic Grade Group 2; 30-35% pattern 4) involving 2 of 2 cores and 30% of tissue.       7/10/25   Decipher sent. Discussed observation, not surgical candidate. Discussed radiation. Discussed right sided focal ablation cryotherapy, due to his transpiration needs.      7/21/25 PET CT   IMPRESSION:     1.  Findings consistent with intraprostatic PSMA positive malignancy.  2.  No focal tracer activity characteristic of extraprostatic PSMA positive metastatic disease.  3.  Variable low-level activity associated with nonspecific mildly prominent bilateral inguinal lymph nodes, possibly inflammatory.  4.  Patient's known tiny lung nodules which have been stable since 12/22/2024 were better characterized on prior dedicated CT of chest. Continued follow-up according to protocol is recommended to ensure stability given history of malignancy.     5.  Please see above for details and additional findings.     Patient seen for consultation today alone.  He is son lives with him who has PTSD from being in the [place] war.  He has been  since 1994.  He lives in [place] which is about 40 miles away and takes 1 hour to get to this office.  He denies any previous history of cancer including no skin cancer.  He denies any previous radiation therapy and no chemotherapy.  He reports nocturia 3-4 times per night but is on Lasix.  He reports a moderate urinary stream without any hematuria and no dysuria.  He is status post a previous left knee replacement and reports he needs to have a right knee replacement.  He has not had any hip replacement.  He is retired from working in the [employer] for [employer] for 39 years.  He retired/went out on disability due to a back injury in 2016 at the age of 60.  His father had a history of 4  "CVAs and then  of a myocardial infarction at the age of 79.  He had no known prostate cancer.  His mother  at 90 of a myocardial infarction which was in 2015.  He was an only child.  He reports he has had a known elevated PSA, however he declined workup including MRI and biopsies until recently.    Upcoming appointments:  25 Dr. Holloway PCP     Oncology History   Cancer Staging   Prostate cancer (Formerly [facility])  Staging form: Prostate, AJCC 8th Edition  - Clinical stage from 2025: Stage IIB (cT1c, cN0, cM0, PSA: 19.1, Grade Group: 2) - Signed by David Barrera MD on 2025  Histopathologic type: Adenocarcinoma, NOS  Prostate specific antigen (PSA) range: 10 to 19  Judith primary pattern: 3  [place] secondary pattern: 4  Judith score: 7  Histologic grading system: 5 grade system  Location of positive needle core biopsies: Both sides  Oncology History   Prostate cancer (Formerly [facility])   2025 Initial Diagnosis    Prostate cancer (Formerly [facility])     2025 -  Cancer Staged    Staging form: Prostate, AJCC 8th Edition  - Clinical stage from 2025: Stage IIB (cT1c, cN0, cM0, PSA: 19.1, Grade Group: 2) - Signed by David Barrera MD on 2025  Histopathologic type: Adenocarcinoma, NOS  Prostate specific antigen (PSA) range: 10 to 19  Judith primary pattern: 3  [place] secondary pattern: 4  [place] score: 7  Histologic grading system: 5 grade system  Location of positive needle core biopsies: Both sides          Review of Systems Refer to nursing note.    Medications Ordered Prior to Encounter[1]   Social History[1]     Objective   /79 (BP Location: Left arm)   Pulse 72   Temp (!) 96.8 °F (36 °C) (Temporal)   Resp 18   Ht 6' 1\" (1.854 m)   Wt 133 kg (293 lb 6.4 oz)   SpO2 96%   BMI 38.71 kg/m²     Pain Screening:  Pain Score:   8  ECOG ECOG Performance Status: 1 - Restricted in physically strenuous activity but ambulatory and able to carry out work of a light or sedentary nature, " e.g., light house work, office work  Physical Exam  Constitutional:       General: He is not in acute distress.     Appearance: Normal appearance. He is well-developed. He is not diaphoretic.   HENT:      Head: Normocephalic and atraumatic.      Mouth/Throat:      Pharynx: No oropharyngeal exudate.     Eyes:      General: No scleral icterus.     Conjunctiva/sclera: Conjunctivae normal.      Pupils: Pupils are equal, round, and reactive to light.     Neck:      Thyroid: No thyromegaly.      Trachea: No tracheal deviation.     Cardiovascular:      Rate and Rhythm: Normal rate and regular rhythm.      Heart sounds: Normal heart sounds.   Pulmonary:      Effort: Pulmonary effort is normal. No respiratory distress.      Breath sounds: Normal breath sounds. No stridor. No wheezing, rhonchi or rales.   Chest:      Chest wall: No tenderness.   Abdominal:      General: Bowel sounds are normal. There is no distension.      Palpations: Abdomen is soft. There is no mass.      Tenderness: There is no abdominal tenderness. There is no rebound.      Hernia: No hernia is present.   Genitourinary:     Comments: Rectal examination deferred.    Musculoskeletal:         General: No swelling or tenderness. Normal range of motion.      Cervical back: Normal range of motion and neck supple. No tenderness.   Lymphadenopathy:      Cervical: No cervical adenopathy.      Upper Body:      Right upper body: No supraclavicular adenopathy.      Left upper body: No supraclavicular adenopathy.      Lower Body: No right inguinal adenopathy. No left inguinal adenopathy.     Skin:     General: Skin is warm and dry.      Coloration: Skin is not jaundiced or pale.      Findings: No erythema or rash.     Neurological:      General: No focal deficit present.      Mental Status: He is alert and oriented to person, place, and time.      Cranial Nerves: No cranial nerve deficit.      Sensory: No sensory deficit.      Motor: No weakness.      Coordination:  Coordination normal.     Psychiatric:         Mood and Affect: Mood normal.         Behavior: Behavior normal.         Thought Content: Thought content normal.         Judgment: Judgment normal.          Radiology Results: I have reviewed radiology images/reports described above.       Administrative Statements   I have spent a total time of 55 minutes in caring for this patient on the day of the visit/encounter including Diagnostic results, Prognosis, Risks and benefits of tx options, Instructions for management, Patient and family education, Importance of tx compliance, Risk factor reductions, Impressions, Counseling / Coordination of care, Documenting in the medical record, Reviewing/placing orders in the medical record (including tests, medications, and/or procedures), Obtaining or reviewing history  , and Communicating with other healthcare professionals .         [1]  Current Outpatient Medications on File Prior to Visit   Medication Sig Dispense Refill   • amLODIPine (NORVASC) 5 mg tablet Take 1 tablet (5 mg total) by mouth daily 100 tablet 1   • ammonium lactate (LAC-HYDRIN) 12 % lotion Apply topically 2 (two) times a day as needed for dry skin Apply to dry skin / scales of both legs 225 g 2   • aspirin (Aspirin 81) 81 mg EC tablet Take 81 mg by mouth in the morning.     • B Complex Vitamins (B COMPLEX 1 PO) Take 1 tablet by mouth in the morning.     • Blood Glucose Monitoring Suppl (FreeStyle Lite) MANISH Use to check blood sugar once a day 100 each 3   • buPROPion (WELLBUTRIN XL) 150 mg 24 hr tablet take 1 tablet by mouth every morning 90 tablet 1   • cetirizine (ZyrTEC) 10 mg tablet Take 10 mg by mouth in the morning.     • fluticasone (FLONASE) 50 mcg/act nasal spray 2 sprays into each nostril in the morning.     • furosemide (LASIX) 40 mg tablet Take 1 tablet (40 mg total) by mouth daily 90 tablet 3   • glipiZIDE (GLUCOTROL) 10 mg tablet Take 1 tablet (10 mg total) by mouth 2 (two) times a day before  meals 180 tablet 1   • glucose blood (FREESTYLE TEST STRIPS) test strip Use to check blood sugars twice a day 100 strip 5   • Lancets (freestyle) lancets Use to check sugars once daily 100 each 5   • lisinopril (ZESTRIL) 10 mg tablet Take 1 tablet (10 mg total) by mouth daily 100 tablet 3   • metFORMIN (GLUCOPHAGE) 1000 MG tablet TAKE 0.5 TABLETS (500 MG TOTAL) BY MOUTH IN THE MORNING AND 0.5 TABLETS (500 MG TOTAL) IN THE EVENING. TAKE WITH MEALS. 90 tablet 1   • methocarbamol (ROBAXIN) 750 mg tablet TAKE 1 TABLET 3 TIMES A DAY 90 tablet 1   • metoprolol succinate (TOPROL-XL) 50 mg 24 hr tablet take 1 tablet by mouth once daily 90 tablet 1   • Multiple Vitamins-Minerals (MULTIVITAMIN ADULT PO) Take 1 tablet by mouth in the morning.     • nitroglycerin (NITROSTAT) 0.4 mg SL tablet Place 1 tablet (0.4 mg total) under the tongue every 5 (five) minutes as needed for chest pain 25 tablet 0   • pregabalin (LYRICA) 150 mg capsule Take 1 capsule by mouth in the morning and 1 capsule before bedtime.     • rosuvastatin (CRESTOR) 40 MG tablet take 1 tablet by mouth once daily 90 tablet 1   • sildenafil (VIAGRA) 100 mg tablet Take 1 tablet (100 mg total) by mouth daily as needed for erectile dysfunction 5 tablet 11   • ciclopirox (PENLAC) 8 % solution Apply topically daily at bedtime (Patient not taking: Reported on 7/22/2025) 6 mL 2   • EPINEPHrine (EPIPEN) 0.3 mg/0.3 mL SOAJ inject 0.3 milliliters ( 0.3 milligrams ) intramuscularly in OUTER THIGH and HOLD for 3 SECONDS use if needed for SEVERE ALLERGIC REACTION May repeat one time after 10 MINUTES if needed for maximum daily dose of 2 INJECTIONS (Patient not taking: Reported on 6/10/2025) 2 each 0   • lidocaine (LMX) 4 % cream Apply topically as needed for mild pain Apply to pain are of leg as needed. (Patient not taking: Reported on 7/10/2025) 60 g 2   • mupirocin (BACTROBAN) 2 % ointment Apply topically daily (Patient not taking: Reported on 7/22/2025) 22 g 1     No current  facility-administered medications on file prior to visit.   [1]  Social History  Tobacco Use   • Smoking status: Former     Current packs/day: 0.00     Average packs/day: 1 pack/day for 26.0 years (26.0 ttl pk-yrs)     Types: Cigarettes     Start date:      Quit date:      Years since quittin.5     Passive exposure: Past   • Smokeless tobacco: Never   Vaping Use   • Vaping status: Never Used   Substance and Sexual Activity   • Alcohol use: Not Currently   • Drug use: Not Currently     Types: Marijuana   • Sexual activity: Not Currently     Partners: Female     Birth control/protection: Condom Male

## 2025-07-22 NOTE — PROGRESS NOTES
"Bryant Bustamante Jr. 1955 is a 70 y.o. male  Referred by  for [place] 7 3+4 prostate cancer. He presented with elevated PSA. His biopsy was on 6/25/25. He presents to discuss radiation.    PMHx: BPH, CKD, Diabetes,CAD, ED, hypertension, Right shoulder arthroscopy,Spinal fusion       PSA   Latest Ref Rng 0.000 - 4.000 ng/mL   10/24/2020 4.7 (H)    9/15/2022 10.4 (H)    3/28/2024 13.3 (H)    3/13/2025 19.056 (H)       4/23/25 Bryant Carter, JUDY   Elevated PSA. MRI prostate ordered.   Discussed transperineal ultrasound-guided biopsy.     5/21/25 MRI Prostate  IMPRESSION:     1. PI-RADSv2.1 Category 5 - Very high (clinically significant cancer is highly likely to be present).     2. No extraprostatic tumor, seminal vesicle invasion, pelvic lymphadenopathy, or pelvic osseous metastatic disease.     3. Moderate BPH with calculated prostate volume of 81 mL.     Prostate gland boundaries and areas of concern for significant prostate cancer were segmented using 3D advanced post-processing on an independent [employer] system workstation with active physician participation. The segmentation was performed should   MR-ultrasound fusion biopsy be required.   ADDENDUM:     There is an error in the description of location of the lesion. Location of the lesion should should state \"RIGHT apical posterior peripheral zone\" instead of left.    6/24/25 CT Chest  IMPRESSION:     Since December 22, 2024, unchanged few pulmonary nodules which measure up to 4 mm. Based on current Fleischner Society 2017 Guidelines on incidental pulmonary nodule, no routine follow-up is needed if the patient is low risk. If the patient is high risk,   optional follow-up chest CT at 12 months can be considered.    6/25/25 Tissue Exam  A. Prostate, right posterior medial:  - Prostatic adenocarcinoma, Judith score 7, 3+4 (Prognostic Grade Group 2; 30-35% pattern 4) involving 2 of 2 cores and 70% of tissue.   - Prostatic multiplex stain supports " interpretation.  - See synoptic report.     B. Prostate, right anterior medial:  - Prostatic adenocarcinoma, Judith score 7, 3+4 (Prognostic Grade Group 2; 10 % pattern 4) discontinuously involving 2 of 2 cores and about 50% of the tissue.       C. Prostate, right anterior lateral:  - Benign prostatic tissue.      D. Prostate, left anterior medial:  - Prostatic adenocarcinoma, Argyle score 7, 3+4 (Prognostic Grade Group 2; 5 % pattern 4) involving 1 of 2 cores and 25 % of tissue.       E. Prostate, left anterior lateral:  - Benign prostatic tissue.       F. Prostate, left posterior lateral:  - Benign prostatic tissue.       G. Prostate, left posterior medial:  - Benign prostatic tissue.       H. Prostate, left base:  - Benign prostatic tissue.       I. Prostate, right posterior lateral  region of interest:  - Prostatic adenocarcinoma, Judith score 7, 3+4 (Prognostic Grade Group 2; 40% pattern 4) involving 4 of 5 cores and 70% of tissue.   - Prostatic multiplex stain supports interpretation.      J. Prostate, right base:  - Prostatic adenocarcinoma, Argyle score 7, 3+4 (Prognostic Grade Group 2; 30-35% pattern 4) involving 2 of 2 cores and 30% of tissue.      7/10/25   Decipher sent. Discussed observation, not surgical candidate. Discussed radiation. Discussed right sided focal ablation cryotherapy, due to his transpiration needs.     7/21/25 PET CT   IMPRESSION:     1.  Findings consistent with intraprostatic PSMA positive malignancy.  2.  No focal tracer activity characteristic of extraprostatic PSMA positive metastatic disease.  3.  Variable low-level activity associated with nonspecific mildly prominent bilateral inguinal lymph nodes, possibly inflammatory.  4.  Patient's known tiny lung nodules which have been stable since 12/22/2024 were better characterized on prior dedicated CT of chest. Continued follow-up according to protocol is recommended to ensure stability given history of malignancy.    "  5.  Please see above for details and additional findings.    No upcoming appointments found in system.           Oncology History Overview Note   Referred by  for Judith 7 3+4 prostate cancer. He presented with elevated PSA. His biopsy was on 6/25/25. He presents to discuss radiation.    PMHx: BPH, CKD, Diabetes,CAD, ED, hypertension, Right shoulder arthroscopy,Spinal fusion       PSA   Latest Ref Rng 0.000 - 4.000 ng/mL   10/24/2020 4.7 (H)    9/15/2022 10.4 (H)    3/28/2024 13.3 (H)    3/13/2025 19.056 (H)       4/23/25 JUDY Garcia   Elevated PSA. MRI prostate ordered.   Discussed transperineal ultrasound-guided biopsy.     5/21/25 MRI Prostate  IMPRESSION:     1. PI-RADSv2.1 Category 5 - Very high (clinically significant cancer is highly likely to be present).     2. No extraprostatic tumor, seminal vesicle invasion, pelvic lymphadenopathy, or pelvic osseous metastatic disease.     3. Moderate BPH with calculated prostate volume of 81 mL.     Prostate gland boundaries and areas of concern for significant prostate cancer were segmented using 3D advanced post-processing on an independent [employer] system workstation with active physician participation. The segmentation was performed should   MR-ultrasound fusion biopsy be required.   ADDENDUM:     There is an error in the description of location of the lesion. Location of the lesion should should state \"RIGHT apical posterior peripheral zone\" instead of left.    6/24/25 CT Chest  IMPRESSION:     Since December 22, 2024, unchanged few pulmonary nodules which measure up to 4 mm. Based on current Fleischner Society 2017 Guidelines on incidental pulmonary nodule, no routine follow-up is needed if the patient is low risk. If the patient is high risk,   optional follow-up chest CT at 12 months can be considered.    6/25/25 Tissue Exam  A. Prostate, right posterior medial:  - Prostatic adenocarcinoma, Judith score 7, 3+4 (Prognostic Grade Group 2; 30-35% " pattern 4) involving 2 of 2 cores and 70% of tissue.   - Prostatic multiplex stain supports interpretation.  - See synoptic report.     B. Prostate, right anterior medial:  - Prostatic adenocarcinoma, Woodson score 7, 3+4 (Prognostic Grade Group 2; 10 % pattern 4) discontinuously involving 2 of 2 cores and about 50% of the tissue.       C. Prostate, right anterior lateral:  - Benign prostatic tissue.      D. Prostate, left anterior medial:  - Prostatic adenocarcinoma, Woodson score 7, 3+4 (Prognostic Grade Group 2; 5 % pattern 4) involving 1 of 2 cores and 25 % of tissue.       E. Prostate, left anterior lateral:  - Benign prostatic tissue.       F. Prostate, left posterior lateral:  - Benign prostatic tissue.       G. Prostate, left posterior medial:  - Benign prostatic tissue.       H. Prostate, left base:  - Benign prostatic tissue.       I. Prostate, right posterior lateral  region of interest:  - Prostatic adenocarcinoma, Woodson score 7, 3+4 (Prognostic Grade Group 2; 40% pattern 4) involving 4 of 5 cores and 70% of tissue.   - Prostatic multiplex stain supports interpretation.      J. Prostate, right base:  - Prostatic adenocarcinoma, Woodson score 7, 3+4 (Prognostic Grade Group 2; 30-35% pattern 4) involving 2 of 2 cores and 30% of tissue.      7/10/25   Decipher sent. Discussed observation, not surgical candidate. Discussed radiation. Discussed right sided focal ablation cryotherapy, due to his transpiration needs.     7/21/25 PET CT   IMPRESSION:     1.  Findings consistent with intraprostatic PSMA positive malignancy.  2.  No focal tracer activity characteristic of extraprostatic PSMA positive metastatic disease.  3.  Variable low-level activity associated with nonspecific mildly prominent bilateral inguinal lymph nodes, possibly inflammatory.  4.  Patient's known tiny lung nodules which have been stable since 12/22/2024 were better characterized on prior dedicated CT of chest. Continued  follow-up according to protocol is recommended to ensure stability given history of malignancy.     5.  Please see above for details and additional findings.  No upcoming appointments found in system.              Prostate cancer (HCC)   6/25/2025 Initial Diagnosis    Prostate cancer (HCC)         Review of Systems:  Review of Systems   Constitutional:  Positive for fatigue. Negative for activity change and appetite change.   HENT:  Positive for ear pain (Report's water in ear).    Eyes: Negative.    Respiratory:  Positive for cough (Chronic post nasal drip) and shortness of breath (with activity).    Cardiovascular: Negative.    Gastrointestinal:  Negative for blood in stool, constipation, diarrhea, nausea and vomiting.   Endocrine: Negative.    Genitourinary:  Negative for difficulty urinating, dysuria, frequency, hematuria and urgency.   Musculoskeletal: Negative.    Skin: Negative.    Allergic/Immunologic: Negative.    Neurological: Negative.    Hematological:  Bruises/bleeds easily.   Psychiatric/Behavioral: Negative.         Clinical Trial: no    IPSS Questionnaire (AUA-7):  Over the past month…    1)  How often have you had a sensation of not emptying your bladder completely after you finish urinating?  0 - Not at all   2)  How often have you had to urinate again less than two hours after you finished urinating? 0 - Not at all   3)  How often have you found you stopped and started again several times when you urinated?  0 - Not at all   4) How difficult have you found it to postpone urination?  0 - Not at all   5) How often have you had a weak urinary stream?  0 - Not at all   6) How often have you had to push or strain to begin urination?  0 - Not at all   7) How many times did you most typically get up to urinate from the time you went to bed until the time you got up in the morning?  4 - 4 times   Total Score:  4       Pain assessment: 8    PSA 19.056      Prior Radiation none     History of autoimmune or  connective tissue disorder none     Teaching NCL booklet,Sim,Side Effects     MST completed     Implantable Devices (Port, pacemaker, pain stimulator) none     Hip Replacement Left knee    Health Maintenance   Topic Date Due    Zoster Vaccine (2 of 3) 01/29/2015    Diabetic Eye Exam  07/12/2022    BMI: Followup Plan  10/25/2023    COVID-19 Vaccine (5 - 2024-25 season) 09/01/2024    Colorectal Cancer Screening  05/16/2025    Influenza Vaccine (1) 09/01/2025    HEMOGLOBIN A1C  09/13/2025    Kidney Health Evaluation: Albumin/Creatinine Ratio  03/13/2026    Fall Risk  04/08/2026    Depression Screening  04/08/2026    Medicare Annual Wellness Visit (AWV)  04/08/2026    Depression Follow-up Plan  04/08/2026    Falls: Plan of Care  04/08/2026    Kidney Health Evaluation: GFR  06/10/2026    Diabetic Foot Exam  06/11/2026    BMI: Adult  07/10/2026    Hepatitis C Screening  Completed    RSV Vaccine for Pregnant Patients and Patients Age 60+ Years  Completed    Pneumococcal Vaccine: 50+ Years  Completed    Meningococcal B Vaccine  Aged Out    RSV Vaccine age 0-20 Months  Aged Out    HIB Vaccine  Aged Out    IPV Vaccine  Aged Out    Hepatitis A Vaccine  Aged Out    Meningococcal ACWY Vaccine  Aged Out    HPV Vaccine  Aged Out       Past Medical History[1]    Past Surgical History[2]    Family History[3]    Social History[4]     Current Medications[5]    Allergies[6]     There were no vitals filed for this visit.            [1]   Past Medical History:  Diagnosis Date    Anemia     last assessed: 1/11/2018    Arthritis     BPH without obstruction/lower urinary tract symptoms     last assessed: 12/3/2018    Chronic kidney disease     Coronary artery disease     last assessed: 7/22/2015    DDD (degenerative disc disease), lumbar     last assessed: 2/17/2014    Diabetes mellitus (HCC)     Elevated PSA     Erectile dysfunction     Generalized osteoarthritis     last assessed: 4/18/2019    Hx of fracture     ankle fracture(left)     Hyperlipidemia     last assessed: 2015    Hypertension     last assessed: 2015    Knee injury     partial torn meniscus and acl    Lumbar spinal stenosis     last assessed: 2014    Pneumonia Dec 1994    Rotator cuff syndrome of shoulder and allied disorders     last assessed: 3/5/2014    Type 2 diabetes mellitus with diabetic polyneuropathy (HCC)     last assessed: 10/31/2018   [2]   Past Surgical History:  Procedure Laterality Date    CARDIAC SURGERY      CORONARY ARTERY BYPASS GRAFT      KNEE SURGERY Left 2024    LUMBAR FUSION      MS PROSTATE NEEDLE BIOPSY ANY APPROACH N/A 2025    Procedure: TRANSPERINEAL ULTRASOUND GUIDED BIOPSY PROSTATE;  Surgeon: Timmy Mathews MD;  Location: [facility];  Service: Urology    SHOULDER ARTHROSCOPY Right     SHOULDER SURGERY      2 right/1 left rotator cuff repairs    SPINAL FUSION      spinal triple fusion L4-5   [3]   Family History  Problem Relation Name Age of Onset    Diabetes Mother Chey         type 2    Arthritis Mother Chey     Asthma Mother Chey     Heart attack Father Bryant Swanson     Stroke Father Bryant Swanson     Heart disease Father Bryant Swanson     Post-traumatic stress disorder Son Bryant LANDRUM     No Known Problems Daughter Wilda    [4]   Social History  Tobacco Use    Smoking status: Former     Current packs/day: 0.00     Average packs/day: 1 pack/day for 26.0 years (26.0 ttl pk-yrs)     Types: Cigarettes     Start date:      Quit date:      Years since quittin.5     Passive exposure: Past    Smokeless tobacco: Never   Vaping Use    Vaping status: Never Used   Substance Use Topics    Alcohol use: Not Currently    Drug use: Not Currently     Types: Marijuana   [5]   Current Outpatient Medications:     amLODIPine (NORVASC) 5 mg tablet, Take 1 tablet (5 mg total) by mouth daily, Disp: 100 tablet, Rfl: 1    ammonium lactate (LAC-HYDRIN) 12 % lotion, Apply topically 2 (two) times a day as needed for dry skin Apply to dry skin /  scales of both legs, Disp: 225 g, Rfl: 2    aspirin (Aspirin 81) 81 mg EC tablet, Take 81 mg by mouth in the morning., Disp: , Rfl:     B Complex Vitamins (B COMPLEX 1 PO), Take 1 tablet by mouth in the morning., Disp: , Rfl:     Blood Glucose Monitoring Suppl (FreeStyle Lite) MANISH, Use to check blood sugar once a day, Disp: 100 each, Rfl: 3    buPROPion (WELLBUTRIN XL) 150 mg 24 hr tablet, take 1 tablet by mouth every morning, Disp: 90 tablet, Rfl: 1    cetirizine (ZyrTEC) 10 mg tablet, Take 10 mg by mouth in the morning., Disp: , Rfl:     ciclopirox (PENLAC) 8 % solution, Apply topically daily at bedtime, Disp: 6 mL, Rfl: 2    EPINEPHrine (EPIPEN) 0.3 mg/0.3 mL SOAJ, inject 0.3 milliliters ( 0.3 milligrams ) intramuscularly in OUTER THIGH and HOLD for 3 SECONDS use if needed for SEVERE ALLERGIC REACTION May repeat one time after 10 MINUTES if needed for maximum daily dose of 2 INJECTIONS (Patient not taking: Reported on 6/10/2025), Disp: 2 each, Rfl: 0    fluticasone (FLONASE) 50 mcg/act nasal spray, 2 sprays into each nostril in the morning., Disp: , Rfl:     furosemide (LASIX) 40 mg tablet, Take 1 tablet (40 mg total) by mouth daily, Disp: 90 tablet, Rfl: 3    glipiZIDE (GLUCOTROL) 10 mg tablet, Take 1 tablet (10 mg total) by mouth 2 (two) times a day before meals, Disp: 180 tablet, Rfl: 1    glucose blood (FREESTYLE TEST STRIPS) test strip, Use to check blood sugars twice a day, Disp: 100 strip, Rfl: 5    Lancets (freestyle) lancets, Use to check sugars once daily, Disp: 100 each, Rfl: 5    lidocaine (LMX) 4 % cream, Apply topically as needed for mild pain Apply to pain are of leg as needed. (Patient not taking: Reported on 7/10/2025), Disp: 60 g, Rfl: 2    lisinopril (ZESTRIL) 10 mg tablet, Take 1 tablet (10 mg total) by mouth daily, Disp: 100 tablet, Rfl: 3    metFORMIN (GLUCOPHAGE) 1000 MG tablet, TAKE 0.5 TABLETS (500 MG TOTAL) BY MOUTH IN THE MORNING AND 0.5 TABLETS (500 MG TOTAL) IN THE EVENING. TAKE WITH  MEALS., Disp: 90 tablet, Rfl: 1    methocarbamol (ROBAXIN) 750 mg tablet, TAKE 1 TABLET 3 TIMES A DAY, Disp: 90 tablet, Rfl: 1    metoprolol succinate (TOPROL-XL) 50 mg 24 hr tablet, take 1 tablet by mouth once daily, Disp: 90 tablet, Rfl: 1    Multiple Vitamins-Minerals (MULTIVITAMIN ADULT PO), Take 1 tablet by mouth in the morning., Disp: , Rfl:     mupirocin (BACTROBAN) 2 % ointment, Apply topically daily, Disp: 22 g, Rfl: 1    nitroglycerin (NITROSTAT) 0.4 mg SL tablet, Place 1 tablet (0.4 mg total) under the tongue every 5 (five) minutes as needed for chest pain, Disp: 25 tablet, Rfl: 0    pregabalin (LYRICA) 150 mg capsule, Take 1 capsule by mouth in the morning and 1 capsule before bedtime., Disp: , Rfl:     rosuvastatin (CRESTOR) 40 MG tablet, take 1 tablet by mouth once daily, Disp: 90 tablet, Rfl: 1    sildenafil (VIAGRA) 100 mg tablet, Take 1 tablet (100 mg total) by mouth daily as needed for erectile dysfunction, Disp: 5 tablet, Rfl: 11  [6]   Allergies  Allergen Reactions    Naproxen GI Intolerance and Other (See Comments)     naproxen    Prednisone GI Intolerance

## 2025-07-23 ENCOUNTER — PREP FOR PROCEDURE (OUTPATIENT)
Dept: UROLOGY | Facility: MEDICAL CENTER | Age: 70
End: 2025-07-23

## 2025-07-23 ENCOUNTER — TELEPHONE (OUTPATIENT)
Dept: RADIATION ONCOLOGY | Facility: CLINIC | Age: 70
End: 2025-07-23

## 2025-07-23 DIAGNOSIS — C61 PROSTATE CANCER (HCC): Primary | ICD-10-CM

## 2025-07-23 NOTE — TELEPHONE ENCOUNTER
SpaceOAR/Fiducial Marker    SpaceOAR Needed: Yes  Fiducial Markers Needed: Yes  ADT Needed: No  Treatment Location:  [place]

## 2025-07-30 LAB — SCAN RESULT: NORMAL

## 2025-07-31 NOTE — TELEPHONE ENCOUNTER
Patient's daughter is faxing [facility] paperwork for herself to be able to bring the patient to appointments - she is also sending it to radiology oncology and was unsure if she needed to send it to both places. She is reaching out to her [facility] services and will find out who needs to have this and fill it out.    Just an FYI

## 2025-08-01 DIAGNOSIS — F33.2 SEVERE RECURRENT MAJOR DEPRESSION WITHOUT PSYCHOTIC FEATURES (HCC): ICD-10-CM

## 2025-08-01 RX ORDER — BUPROPION HYDROCHLORIDE 150 MG/1
150 TABLET ORAL EVERY MORNING
Qty: 90 TABLET | Refills: 1 | Status: SHIPPED | OUTPATIENT
Start: 2025-08-01

## 2025-08-04 ENCOUNTER — PATIENT OUTREACH (OUTPATIENT)
Dept: CASE MANAGEMENT | Facility: OTHER | Age: 70
End: 2025-08-04

## 2025-08-05 ENCOUNTER — PATIENT OUTREACH (OUTPATIENT)
Dept: HEMATOLOGY ONCOLOGY | Facility: CLINIC | Age: 70
End: 2025-08-05

## 2025-08-06 ENCOUNTER — PATIENT OUTREACH (OUTPATIENT)
Dept: HEMATOLOGY ONCOLOGY | Facility: CLINIC | Age: 70
End: 2025-08-06

## 2025-08-07 ENCOUNTER — TELEPHONE (OUTPATIENT)
Age: 70
End: 2025-08-07

## 2025-08-07 ENCOUNTER — PATIENT OUTREACH (OUTPATIENT)
Dept: HEMATOLOGY ONCOLOGY | Facility: CLINIC | Age: 70
End: 2025-08-07

## 2025-08-11 ENCOUNTER — TELEPHONE (OUTPATIENT)
Dept: RADIATION ONCOLOGY | Facility: CLINIC | Age: 70
End: 2025-08-11

## 2025-08-15 ENCOUNTER — APPOINTMENT (OUTPATIENT)
Dept: LAB | Facility: HOSPITAL | Age: 70
End: 2025-08-15
Payer: MEDICARE

## (undated) DEVICE — 3M™ TEGADERM™ TRANSPARENT FILM DRESSING FRAME STYLE, 1628, 6 IN X 8 IN (15 CM X 20 CM), 10/CT 8CT/CASE: Brand: 3M™ TEGADERM™

## (undated) DEVICE — SPONGE LAP 18 X 18 IN

## (undated) DEVICE — UTILITY MARKER,BLACK WITH LABELS: Brand: DEVON

## (undated) DEVICE — NEEDLE 18 G X 1 1/2

## (undated) DEVICE — TELFA NON-ADHERENT ABSORBENT DRESSING: Brand: TELFA

## (undated) DEVICE — Device

## (undated) DEVICE — 1820 FOAM BLOCK NEEDLE COUNTER: Brand: DEVON

## (undated) DEVICE — SYRINGE 10ML LL

## (undated) DEVICE — HEAVY DUTY TABLE COVER: Brand: CONVERTORS

## (undated) DEVICE — NEEDLE SPINAL 22G X 3.5IN  QUINCKE

## (undated) DEVICE — CHLORAPREP HI-LITE 26ML ORANGE

## (undated) DEVICE — GLOVE SRG BIOGEL 7

## (undated) DEVICE — MAX-CORE® DISPOSABLE CORE BIOPSY INSTRUMENT, 18G X 25CM: Brand: MAX-CORE

## (undated) DEVICE — SYSTEM TRANSPERINEAL ACCESS PRECISIONPOINT